# Patient Record
Sex: FEMALE | Race: WHITE | Employment: OTHER | URBAN - METROPOLITAN AREA
[De-identification: names, ages, dates, MRNs, and addresses within clinical notes are randomized per-mention and may not be internally consistent; named-entity substitution may affect disease eponyms.]

---

## 2017-01-06 RX ORDER — BACLOFEN 20 MG/1
TABLET ORAL
Qty: 40 TAB | Refills: 1 | Status: SHIPPED | OUTPATIENT
Start: 2017-01-06 | End: 2017-04-17 | Stop reason: SDUPTHER

## 2017-01-06 NOTE — TELEPHONE ENCOUNTER
She was in the office to see Surendra Ayala in October and has a follow up appt on the schedule with PCP on 2/27/17.  This was last filled in December for 40

## 2017-01-24 RX ORDER — BENAZEPRIL HYDROCHLORIDE 20 MG/1
TABLET ORAL
Qty: 90 TAB | Refills: 2 | Status: SHIPPED | OUTPATIENT
Start: 2017-01-24 | End: 2017-10-31 | Stop reason: SDUPTHER

## 2017-02-27 ENCOUNTER — OFFICE VISIT (OUTPATIENT)
Dept: FAMILY MEDICINE CLINIC | Age: 77
End: 2017-02-27

## 2017-02-27 VITALS
HEIGHT: 65 IN | WEIGHT: 153.9 LBS | DIASTOLIC BLOOD PRESSURE: 81 MMHG | OXYGEN SATURATION: 100 % | HEART RATE: 89 BPM | BODY MASS INDEX: 25.64 KG/M2 | SYSTOLIC BLOOD PRESSURE: 118 MMHG | TEMPERATURE: 98.5 F | RESPIRATION RATE: 20 BRPM

## 2017-02-27 DIAGNOSIS — M48.062 LUMBAR STENOSIS WITH NEUROGENIC CLAUDICATION: ICD-10-CM

## 2017-02-27 DIAGNOSIS — R53.82 CHRONIC FATIGUE: ICD-10-CM

## 2017-02-27 DIAGNOSIS — R73.9 HYPERGLYCEMIA: ICD-10-CM

## 2017-02-27 DIAGNOSIS — G25.2 FINE TREMOR: ICD-10-CM

## 2017-02-27 DIAGNOSIS — G25.81 RESTLESS LEGS SYNDROME (RLS): ICD-10-CM

## 2017-02-27 DIAGNOSIS — I10 ESSENTIAL HYPERTENSION, BENIGN: ICD-10-CM

## 2017-02-27 DIAGNOSIS — Z86.010 PERSONAL HISTORY OF COLONIC POLYPS: ICD-10-CM

## 2017-02-27 DIAGNOSIS — E78.5 DYSLIPIDEMIA: ICD-10-CM

## 2017-02-27 DIAGNOSIS — E55.9 VITAMIN D DEFICIENCY: ICD-10-CM

## 2017-02-27 DIAGNOSIS — T46.4X5A ACE-INHIBITOR COUGH: ICD-10-CM

## 2017-02-27 DIAGNOSIS — J45.20 MILD INTERMITTENT ASTHMA WITHOUT COMPLICATION: ICD-10-CM

## 2017-02-27 DIAGNOSIS — Z78.9 STATIN INTOLERANCE: ICD-10-CM

## 2017-02-27 DIAGNOSIS — Z85.828 HISTORY OF BASAL CELL CARCINOMA: ICD-10-CM

## 2017-02-27 DIAGNOSIS — J30.9 ALLERGIC RHINITIS, CAUSE UNSPECIFIED: ICD-10-CM

## 2017-02-27 DIAGNOSIS — E16.1 HYPERINSULINEMIA: ICD-10-CM

## 2017-02-27 DIAGNOSIS — J20.8 ACUTE BRONCHITIS DUE TO OTHER SPECIFIED ORGANISMS: Primary | ICD-10-CM

## 2017-02-27 DIAGNOSIS — R05.8 ACE-INHIBITOR COUGH: ICD-10-CM

## 2017-02-27 LAB
QUICKVUE INFLUENZA TEST: NEGATIVE
VALID INTERNAL CONTROL?: YES

## 2017-02-27 RX ORDER — MONTELUKAST SODIUM 10 MG/1
10 TABLET ORAL DAILY
Qty: 30 TAB | Refills: 11 | Status: SHIPPED | OUTPATIENT
Start: 2017-02-27 | End: 2018-12-19

## 2017-02-27 RX ORDER — AZITHROMYCIN 250 MG/1
TABLET, FILM COATED ORAL
Qty: 6 TAB | Refills: 0 | Status: SHIPPED | OUTPATIENT
Start: 2017-02-27 | End: 2017-03-04

## 2017-02-27 RX ORDER — ASPIRIN 81 MG/1
81 TABLET ORAL DAILY
Qty: 90 TAB | Refills: 3 | Status: SHIPPED | OUTPATIENT
Start: 2017-02-27 | End: 2017-10-13 | Stop reason: SINTOL

## 2017-02-27 NOTE — MR AVS SNAPSHOT
Visit Information Date & Time Provider Department Dept. Phone Encounter #  
 2/27/2017 10:00 AM Hung Baron DO [facility] [id number] Follow-up Instructions Return in about 1 month (around 3/27/2017) for bronchitis . Your Appointments 5/12/2017  1:40 PM  
Follow Up with Walt García MD  
[facility] [address]) Appt Note: Follow up $0CP tdb 11/3/16  
 [address] [place] [address], 
[address] [facility], [address] [phone]  
[address], [address] [facility], [address] [phone] Upcoming Health Maintenance Date Due  
 GLAUCOMA SCREENING Q2Y 5/31/2017* Pneumococcal 65+ Low/Medium Risk (2 of 2 - PPSV23) 6/21/2017* MEDICARE YEARLY EXAM 8/27/2017 BREAST CANCER SCRN MAMMOGRAM 9/12/2017 COLONOSCOPY 9/16/2020 DTaP/Tdap/Td series (2 - Td) 8/26/2026 *Topic was postponed. The date shown is not the original due date. Allergies as of 2/27/2017  Review Complete On: 2/27/2017 By: Hung Baron DO Severity Noted Reaction Type Reactions Other Food  10/03/2016    Itching IF EATS IN QUANTITY OR ACROSS A FEW DAYS Bee Sting [Sting, Bee] High 09/10/2010    Swelling Pcn [Penicillins] High 10/20/2009    Hives IN CHILDHOOD [place] High 10/03/2016    Itching Atarax [Hydroxyzine Hcl]  10/20/2009    Other (comments)  
 jittery Buspar [Buspirone]  10/20/2009    Nausea Only Crestor [Rosuvastatin]  09/10/2010    Myalgia Body cramps Epinephrine  10/20/2009    Palpitations Fig  10/03/2016    Itching Hydrochlorothiazide (Bulk)  10/20/2009    Myalgia  
 sorethroat Norco [Hydrocodone-acetaminophen]  02/27/2017    Nausea and Vomiting 5/325 MG Peach (Prunus Persica)  10/03/2016    Itching Percocet [Oxycodone-acetaminophen]  10/20/2009    Other (comments) \"hellish nightmares\" Shellfish Derived  10/03/2016    Itching Zestril [Lisinopril]  10/20/2009    Cough Zostavax [Zoster Vaccine Live (Pf)]  09/10/2010    Swelling Severe Right arm swelling with redness after administered by pharmacist in elbow Current Immunizations  Reviewed on 2/27/2017 Name Date Influenza High Dose Vaccine PF 10/7/2016, 9/2/2015, 9/20/2014, 10/29/2013 Pneumococcal Vaccine (Unspecified Type) 10/22/2004, 9/17/1997 TD Vaccine 9/7/2004 Zoster 1/21/2009 Reviewed by Christi Najera DO on 2/27/2017 at 10:42 AM  
You Were Diagnosed With   
  
 Codes Comments Acute bronchitis due to other specified organisms    -  Primary ICD-10-CM: J20.8 ICD-9-CM: 466.0 Mild intermittent asthma without complication     S-48-OZ: J45.20 ICD-9-CM: 493.90 Essential hypertension, benign     ICD-10-CM: I10 
ICD-9-CM: 061. 1 Chronic fatigue     ICD-10-CM: R53.82 
ICD-9-CM: 780.79 Dyslipidemia     ICD-10-CM: E78.5 ICD-9-CM: 272.4 Hyperglycemia     ICD-10-CM: R73.9 ICD-9-CM: 790.29 Allergic rhinitis, cause unspecified     ICD-10-CM: J30.9 ICD-9-CM: 477.9 Restless legs syndrome (RLS)     ICD-10-CM: G25.81 ICD-9-CM: 333.94 improving on Requip History of basal cell carcinoma     ICD-10-CM: R28.629 ICD-9-CM: V10.83 face without recurrence Vitamin D deficiency     ICD-10-CM: E55.9 ICD-9-CM: 268.9 Statin intolerance     ICD-10-CM: Z78.9 ICD-9-CM: 995.27 Hyperinsulinemia     ICD-10-CM: E16.1 ICD-9-CM: 251.1 Lumbar stenosis with neurogenic claudication     ICD-10-CM: M48.06 
ICD-9-CM: 724.03 Fine tremor     ICD-10-CM: G25.2 ICD-9-CM: 781.0 BL hands Personal history of colonic polyps     ICD-10-CM: Z86.010 
ICD-9-CM: V12.72   
 ACE-inhibitor cough     ICD-10-CM: R05, T46.4X5A 
ICD-9-CM: 786.2, E942.6 Vitals BP  
  
  
  
  
  
 118/81 (BP 1 Location: Left arm, BP Patient Position: Sitting) Vitals History BMI and BSA Data Body Mass Index Body Surface Area  
 25.61 kg/m 2 1.79 m 2 Preferred Pharmacy Pharmacy Name Phone [facility], [address] [phone] Your Updated Medication List  
  
   
This list is accurate as of: 2/27/17 11:12 AM.  Always use your most recent med list.  
  
  
  
  
 albuterol 90 mcg/actuation inhaler Commonly known as:  PROVENTIL HFA, VENTOLIN HFA, PROAIR HFA Take 2 Puffs by inhalation every four (4) hours as needed for Wheezing or Shortness of Breath. Indications: BRONCHOSPASM PREVENTION  
  
 amLODIPine 5 mg tablet Commonly known as:  Jus Costa Take 1 Tab by mouth daily. Indications: HYPERTENSION  
  
 ascorbic acid (vitamin C) 500 mg tablet Commonly known as:  VITAMIN C Take  by mouth. aspirin delayed-release 81 mg tablet Take 1 Tab by mouth daily. Indications: prevention of transient ischemic attack  
  
 azithromycin 250 mg tablet Commonly known as:  Bennetta Plum Take 2 tablets today, then take 1 tablet daily  
  
 baclofen 20 mg tablet Commonly known as:  LIORESAL  
take 1 tablet by mouth three times a day if needed for muscle spasm OF SPINAL ORIGIN  
  
 benazepril 20 mg tablet Commonly known as:  LOTENSIN  
take 1 tablet by mouth once daily  
  
 budesonide-formoterol 160-4.5 mcg/actuation HFA inhaler Commonly known as:  SYMBICORT Take 2 Puffs by inhalation two (2) times a day. Indications: MAINTENANCE THERAPY FOR ASTHMA CENTRUM SILVER PO Take  by mouth. cholecalciferol 1,000 unit tablet Commonly known as:  VITAMIN D3 Take  by mouth daily. FLUTICASONE NA  
2 Sprays by Nasal route daily as needed. GLUCOSAMINE-CHONDROITIN PO Take  by mouth two (2) times a day. magnesium 250 mg Tab Take  by mouth two (2) times a day. melatonin 1 mg tablet Take 2 mg by mouth nightly. meloxicam 7.5 mg tablet Commonly known as:  MOBIC  
 Take 7.5 mg by mouth two (2) times a day. Indications: OSTEOARTHRITIS  
  
 montelukast 10 mg tablet Commonly known as:  SINGULAIR Take 1 Tab by mouth daily. Indications: ALLERGIC RHINITIS, MAINTENANCE THERAPY FOR ASTHMA NEURONTIN 300 mg capsule Generic drug:  gabapentin Take 300 mg by mouth every evening. Dr. Miranda Rosado   Indications: ESSENTIAL TREMOR, Restless Legs Syndrome  
  
 omeprazole 40 mg capsule Commonly known as:  PRILOSEC  
daily. rOPINIRole 1 mg tablet Commonly known as:  Arelis Glaze Take 1.5 Tabs by mouth nightly. TYLENOL EXTRA STRENGTH 500 mg tablet Generic drug:  acetaminophen Take  by mouth every six (6) hours as needed for Pain. Prescriptions Sent to Pharmacy Refills  
 azithromycin (ZITHROMAX) 250 mg tablet 0 Sig: Take 2 tablets today, then take 1 tablet daily Class: Normal  
 [place] [id number] [address] [place] [address],[address], [place] [address] Ph #: [phone]  
 montelukast (SINGULAIR) 10 mg tablet 11 Sig: Take 1 Tab by mouth daily. Indications: ALLERGIC RHINITIS, MAINTENANCE THERAPY FOR ASTHMA Class: Normal  
 Pharmacy: [id number] [place] [address],[address], [place] [address] Ph #: [phone] Route: Oral  
 aspirin delayed-release 81 mg tablet 3 Sig: Take 1 Tab by mouth daily. Indications: prevention of transient ischemic attack Class: Normal  
 Pharmacy: [id number] [place] [address],[address] [place] [address] Ph #: [phone] Route: Oral  
  
We Performed the Following AMB POC RAPID INFLUENZA TEST [19577 CPT(R)] CBC W/O DIFF [56287 CPT(R)] HEMOGLOBIN A1C WITH EAG [13408 CPT(R)] LIPID PANEL [51350 CPT(R)] METABOLIC PANEL, COMPREHENSIVE [65163 CPT(R)] MICROALBUMIN, UR, RAND W/ MICROALBUMIN/CREA RATIO [id number] CPT(R)] TSH 3RD GENERATION [82669 CPT(R)] URINALYSIS W/ RFLX MICROSCOPIC [25867 CPT(R)] VITAMIN D, 25 HYDROXY [id number] CPT(R)] Follow-up Instructions Return in about 1 month (around 3/27/2017) for bronchitis . Patient Instructions USE ALBUTEROL INHALER AS NEEDED. IF YOU NEED IT MORE THAN TWICE PER WEEK, RESTART SYMBICORT DAILY. START TAKING SINGULAIR 10 MG AT BEDTIME FOR ALLERGIES AND ASTHMA. Asthma Action Plan: After Your Visit Your Care Instructions An asthma action plan is based on peak flow and asthma symptoms. Sorting symptoms and peak flow into red, yellow, and green \"zones\" can help you know how bad your asthma is and what actions you should take. Work with your doctor to make your plan. An action plan may include: · The peak flow readings and symptoms for each zone. · What medicines to take in each zone. · When to call a doctor. · A list of emergency contact numbers. · A list of your asthma triggers. Follow-up care is a key part of your treatment and safety. Be sure to make and go to all appointments, and call your doctor if you are having problems. It's also a good idea to know your test results and keep a list of the medicines you take. How can you care for yourself at home? · Take your daily medicines to help minimize long-term damage and avoid asthma attacks. · Check your peak flow every morning and evening. This is the best way to know how well your lungs are working. · Check your action plan to see what zone you are in. 
¨ If you are in the green zone, keep taking your daily asthma medicines as prescribed. ¨ If you are in the yellow zone, you may be having or will soon have an asthma attack. You may not have any symptoms, but your lungs are not working as well as they should. Take the medicines listed in your action plan. If you stay in the yellow zone, your doctor may need to increase the dose or add a medicine. ¨ If you are in the red zone, follow your action plan.  If your symptoms or peak flow don't improve soon, you may need to go to the emergency room or be admitted to the hospital. 
 · Use an asthma diary. Write down your peak flow readings in the asthma diary. If you have an attack, write down what caused it (if you know), the symptoms, and what medicine you took. · Make sure you know how and when to call your doctor or go to the hospital. 
· Take both the asthma action plan and the asthma diaryalong with your peak flow meter and medicineswhen you see your doctor. Tell your doctor if you are having trouble following your action plan. When should you call for help? Call 911 anytime you think you may need emergency care. For example, call if: 
· You have severe trouble breathing. Call your doctor now or seek immediate medical care if: 
· Your symptoms do not get better after you have followed your asthma action plan. · You cough up yellow, dark brown, or bloody mucus (sputum). Watch closely for changes in your health, and be sure to contact your doctor if: 
· Your coughing and wheezing get worse. · You need to use quick-relief medicine on more than 2 days a week (unless it is just for exercise). · You need help figuring out what is triggering your asthma attacks. Where can you learn more? Go to [employer] Enter [phone] in the search box to learn more about \"Asthma Action Plan: After Your Visit. \"  
© 7334-1852 Healthwise, Incorporated. Care instructions adapted under license by [employer] (which disclaims liability or warranty for this information). This care instruction is for use with your licensed healthcare professional. If you have questions about a medical condition or this instruction, always ask your healthcare professional. [place] [address] any warranty or liability for your use of this information. Content Version: 28.3.054738; Last Revised: March 9, 2012 Advised protocol for clearing congestion:  Increase fluid intake, especially water to thin mucous and boost the immune system.   Avoid sugar and dairy while congested since they thicken mucous. Get plenty of rest!  Gargle 3 times daily and as needed in Listerine or warm salt water vinegar solutions (1 tsp salt, 1 tsp vinegar in 1 cup lukewarm water.)  Use OTC nasal saline spray up each nostril twice daily. Use humidifier at bedtime. Use OTC Mucinex 600 mg twice daily to loosen mucous. Use OTC Tylenol Arthritis or Ibuprofen up to 800 mg up to 3 times daily as needed for pain, fever or headaches. Avoid decongestants and Ibuprofen if you have high blood pressure! If mucous is consistently discolored yellow or green throughout the day for more than a week, call the doctor for an evaluation. Asthma Action Plan: After Your Visit Your Care Instructions An asthma action plan is based on peak flow and asthma symptoms. Sorting symptoms and peak flow into red, yellow, and green \"zones\" can help you know how bad your asthma is and what actions you should take. Work with your doctor to make your plan. An action plan may include: · The peak flow readings and symptoms for each zone. · What medicines to take in each zone. · When to call a doctor. · A list of emergency contact numbers. · A list of your asthma triggers. Follow-up care is a key part of your treatment and safety. Be sure to make and go to all appointments, and call your doctor if you are having problems. It's also a good idea to know your test results and keep a list of the medicines you take. How can you care for yourself at home? · Take your daily medicines to help minimize long-term damage and avoid asthma attacks. · Check your peak flow every morning and evening. This is the best way to know how well your lungs are working. · Check your action plan to see what zone you are in. 
¨ If you are in the green zone, keep taking your daily asthma medicines as prescribed.  
¨ If you are in the yellow zone, you may be having or will soon have an asthma attack. You may not have any symptoms, but your lungs are not working as well as they should. Take the medicines listed in your action plan. If you stay in the yellow zone, your doctor may need to increase the dose or add a medicine. ¨ If you are in the red zone, follow your action plan. If your symptoms or peak flow don't improve soon, you may need to go to the emergency room or be admitted to the hospital. 
· Use an asthma diary. Write down your peak flow readings in the asthma diary. If you have an attack, write down what caused it (if you know), the symptoms, and what medicine you took. · Make sure you know how and when to call your doctor or go to the hospital. 
· Take both the asthma action plan and the asthma diaryalong with your peak flow meter and medicineswhen you see your doctor. Tell your doctor if you are having trouble following your action plan. When should you call for help? Call 911 anytime you think you may need emergency care. For example, call if: 
· You have severe trouble breathing. Call your doctor now or seek immediate medical care if: 
· Your symptoms do not get better after you have followed your asthma action plan. · You cough up yellow, dark brown, or bloody mucus (sputum). Watch closely for changes in your health, and be sure to contact your doctor if: 
· Your coughing and wheezing get worse. · You need to use quick-relief medicine on more than 2 days a week (unless it is just for exercise). · You need help figuring out what is triggering your asthma attacks. Where can you learn more? Go to [employer]be Enter [phone] in the search box to learn more about \"Asthma Action Plan: After Your Visit. \"  
© 7734-1419 Healthwise, Incorporated. Care instructions adapted under license by [employer]vel[employer] (which disclaims liability or warranty for this information).  This care instruction is for use with your licensed healthcare professional. If you have questions about a medical condition or this instruction, always ask your healthcare professional. [address] any warranty or liability for your use of this information. Content Version: 29.4.743547; Last Revised: March 9, 2012 Introducing [facility]! [employer] introduces [employer] patient portal. Now you can access parts of your medical record, email your doctor's office, and request medication refills online. 1. In your internet browser, go to [email or web address] 2. Click on the First Time User? Click Here link in the Sign In box. You will see the New Member Sign Up page. 3. Enter your [employer] Access Code exactly as it appears below. You will not need to use this code after youve completed the sign-up process. If you do not sign up before the expiration date, you must request a new code. · [employer] Access Code: [email or web address] Expires: 5/28/2017 11:10 AM 
 
4. Enter the last four digits of your Social Security Number (xxxx) and Date of Birth (mm/dd/yyyy) as indicated and click Submit. You will be taken to the next sign-up page. 5. Create a [employer] ID. This will be your [employer] login ID and cannot be changed, so think of one that is secure and easy to remember. 6. Create a [employer] password. You can change your password at any time. 7. Enter your Password Reset Question and Answer. This can be used at a later time if you forget your password. 8. Enter your e-mail address. You will receive e-mail notification when new information is available in [address] 9. Click Sign Up. You can now view and download portions of your medical record. 10. Click the Download Summary menu link to download a portable copy of your medical information. If you have questions, please visit the Frequently Asked Questions section of the [employer] website.  Remember, [employer] is NOT to be used for urgent needs. For medical emergencies, dial 911. Now available from your iPhone and Android! Please provide this summary of care documentation to your next provider. Your primary care clinician is listed as Isabel Serrato. If you have any questions after today's visit, please call [phone].

## 2017-02-27 NOTE — PATIENT INSTRUCTIONS
USE ALBUTEROL INHALER AS NEEDED. IF YOU NEED IT MORE THAN TWICE PER WEEK, RESTART SYMBICORT DAILY. START TAKING SINGULAIR 10 MG AT BEDTIME FOR ALLERGIES AND ASTHMA. Asthma Action Plan: After Your Visit  Your Care Instructions  An asthma action plan is based on peak flow and asthma symptoms. Sorting symptoms and peak flow into red, yellow, and green \"zones\" can help you know how bad your asthma is and what actions you should take. Work with your doctor to make your plan. An action plan may include:  · The peak flow readings and symptoms for each zone. · What medicines to take in each zone. · When to call a doctor. · A list of emergency contact numbers. · A list of your asthma triggers. Follow-up care is a key part of your treatment and safety. Be sure to make and go to all appointments, and call your doctor if you are having problems. It's also a good idea to know your test results and keep a list of the medicines you take. How can you care for yourself at home? · Take your daily medicines to help minimize long-term damage and avoid asthma attacks. · Check your peak flow every morning and evening. This is the best way to know how well your lungs are working. · Check your action plan to see what zone you are in.  ¨ If you are in the green zone, keep taking your daily asthma medicines as prescribed. ¨ If you are in the yellow zone, you may be having or will soon have an asthma attack. You may not have any symptoms, but your lungs are not working as well as they should. Take the medicines listed in your action plan. If you stay in the yellow zone, your doctor may need to increase the dose or add a medicine. ¨ If you are in the red zone, follow your action plan. If your symptoms or peak flow don't improve soon, you may need to go to the emergency room or be admitted to the hospital.  · Use an asthma diary. Write down your peak flow readings in the asthma diary.  If you have an attack, write down what caused it (if you know), the symptoms, and what medicine you took. · Make sure you know how and when to call your doctor or go to the hospital.  · Take both the asthma action plan and the asthma diaryalong with your peak flow meter and medicineswhen you see your doctor. Tell your doctor if you are having trouble following your action plan. When should you call for help? Call 911 anytime you think you may need emergency care. For example, call if:  · You have severe trouble breathing. Call your doctor now or seek immediate medical care if:  · Your symptoms do not get better after you have followed your asthma action plan. · You cough up yellow, dark brown, or bloody mucus (sputum). Watch closely for changes in your health, and be sure to contact your doctor if:  · Your coughing and wheezing get worse. · You need to use quick-relief medicine on more than 2 days a week (unless it is just for exercise). · You need help figuring out what is triggering your asthma attacks. Where can you learn more? Go to [employer]  Enter B511 in the search box to learn more about \"Asthma Action Plan: After Your Visit. \"   © 0433-3765 [employer]. Care instructions adapted under license by [employer] (which disclaims liability or warranty for this information). This care instruction is for use with your licensed healthcare professional. If you have questions about a medical condition or this instruction, always ask your healthcare professional. [place] [address] any warranty or liability for your use of this information. Content Version: 84.8.640339; Last Revised: March 9, 2012           Advised protocol for clearing congestion:  Increase fluid intake, especially water to thin mucous and boost the immune system. Avoid sugar and dairy while congested since they thicken mucous.   Get plenty of rest!  Gargle 3 times daily and as needed in Listerine or warm salt water vinegar solutions (1 tsp salt, 1 tsp vinegar in 1 cup lukewarm water.)  Use OTC nasal saline spray up each nostril twice daily. Use humidifier at bedtime. Use OTC Mucinex 600 mg twice daily to loosen mucous. Use OTC Tylenol Arthritis or Ibuprofen up to 800 mg up to 3 times daily as needed for pain, fever or headaches. Avoid decongestants and Ibuprofen if you have high blood pressure! If mucous is consistently discolored yellow or green throughout the day for more than a week, call the doctor for an evaluation. Asthma Action Plan: After Your Visit  Your Care Instructions  An asthma action plan is based on peak flow and asthma symptoms. Sorting symptoms and peak flow into red, yellow, and green \"zones\" can help you know how bad your asthma is and what actions you should take. Work with your doctor to make your plan. An action plan may include:  · The peak flow readings and symptoms for each zone. · What medicines to take in each zone. · When to call a doctor. · A list of emergency contact numbers. · A list of your asthma triggers. Follow-up care is a key part of your treatment and safety. Be sure to make and go to all appointments, and call your doctor if you are having problems. It's also a good idea to know your test results and keep a list of the medicines you take. How can you care for yourself at home? · Take your daily medicines to help minimize long-term damage and avoid asthma attacks. · Check your peak flow every morning and evening. This is the best way to know how well your lungs are working. · Check your action plan to see what zone you are in.  ¨ If you are in the green zone, keep taking your daily asthma medicines as prescribed. ¨ If you are in the yellow zone, you may be having or will soon have an asthma attack. You may not have any symptoms, but your lungs are not working as well as they should. Take the medicines listed in your action plan.  If you stay in the yellow zone, your doctor may need to increase the dose or add a medicine. ¨ If you are in the red zone, follow your action plan. If your symptoms or peak flow don't improve soon, you may need to go to the emergency room or be admitted to the hospital.  · Use an asthma diary. Write down your peak flow readings in the asthma diary. If you have an attack, write down what caused it (if you know), the symptoms, and what medicine you took. · Make sure you know how and when to call your doctor or go to the hospital.  · Take both the asthma action plan and the asthma diaryalong with your peak flow meter and medicineswhen you see your doctor. Tell your doctor if you are having trouble following your action plan. When should you call for help? Call 911 anytime you think you may need emergency care. For example, call if:  · You have severe trouble breathing. Call your doctor now or seek immediate medical care if:  · Your symptoms do not get better after you have followed your asthma action plan. · You cough up yellow, dark brown, or bloody mucus (sputum). Watch closely for changes in your health, and be sure to contact your doctor if:  · Your coughing and wheezing get worse. · You need to use quick-relief medicine on more than 2 days a week (unless it is just for exercise). · You need help figuring out what is triggering your asthma attacks. Where can you learn more? Go to [employer]  Enter B511 in the search box to learn more about \"Asthma Action Plan: After Your Visit. \"   © 7787-8955 [employer]. Care instructions adapted under license by [employer] (which disclaims liability or warranty for this information).  This care instruction is for use with your licensed healthcare professional. If you have questions about a medical condition or this instruction, always ask your healthcare professional. [address] any warranty or liability for your use of this information. Content Version: 96.3.328667;  Last Revised: March 9, 2012

## 2017-02-27 NOTE — PROGRESS NOTES
HISTORY OF PRESENT ILLNESS  Marsha Mack is a 68 y.o. female presents with Blood Pressure Check; Cold Symptoms; Referral Follow Up; and Labs    Agree with nurse note. Pt with hx of asthma. She complains of nasal congestion with yellow mucus, itchy/watery eyes, and wheezing x3 days. She felt nauseous this morning and has not been eating a lot. She has stayed in bed for the last 3 days and has not used her inhalers. Denies fever, HAs, facial pain, chest pain, chest tightness, or other associated sxs. Hypertensive, hyperglycemic pt with ACE-inhibitor intolerance, hyperinsulinemia, dyslipidemia, and hx of Vitamin D deficiency presents to the office with a BP of 118/81. For BP, she takes Norvasc 5 mg daily and Lotensin 20 mg daily, tolerating well. She weighs 153 lbs, lost 3 lbs since last ov. On 10/7/16, Hgb A1C was 5.9. On 8/26/16, LDL was 151. Triglycerides were 138. In 02/2016, Vitamin D was 41.1. Pt with RLS and fine tremor is followed by neurologist, Dr. Peng Osorio, whom she last saw on 11/3/16. She increased Requip to 1.5 mg qhs and Melatonin to 5 mg qhs. She wanted her to continue on Neurontin 300 mg qhs. F/U 6 months. Pt saw ENT, Dr. Lucero Moss on 2/10/17. Mild to moderate sensorineural hearing loss with slight asymmetry, R>L. She removed cerumen from the R. F/U 3 months and repeat audio screening. Pt has an appointment in 03/2017 with ophthalmologist, Dr. Dallas Wharton. Pt is s/p lumbar laminectomy from L3-S1 on 10/18/16 performed by orthopedist, Dr. Jonah Womack. She takes Baclofen 20 mg nightly to relax her back. She takes Mobic 7.5 mg.     Health Maintenance    Pt's most recent colonoscopy with polypectomy was on 09/16/15 with GI, Dr. Nunu Rajput. Per pt, f/u 5 years. Pt with hx of basal cell carcinoma is followed by dermatologist, Dr. Yakov Noguera. No recurrence.      Pt's most recent mammogram was on 9/12/16; normal.     Written by allen Dow, as dictated by Dr. Kristofer Pablo DO.    ANAHI    Review of Systems negative except as noted above in HPI. ALLERGIES:    Allergies   Allergen Reactions    Other Food Itching     IF EATS IN QUANTITY OR ACROSS A FEW DAYS    Bee Sting [Sting, Bee] Swelling    Pcn [Penicillins] Hives     IN CHILDHOOD    Strawberry Itching    Atarax [Hydroxyzine Hcl] Other (comments)     jittery    Buspar [Buspirone] Nausea Only    Crestor [Rosuvastatin] Myalgia     Body cramps    Epinephrine Palpitations    Fig Itching    Hydrochlorothiazide (Bulk) Myalgia     sorethroat    Norco [Hydrocodone-Acetaminophen] Nausea and Vomiting     5/325 MG    Peach (Prunus Persica) Itching    Percocet [Oxycodone-Acetaminophen] Other (comments)     \"hellish nightmares\"    Shellfish Derived Itching    Zestril [Lisinopril] Cough    Zostavax [Zoster Vaccine Live (Pf)] Swelling     Severe Right arm swelling with redness after administered by pharmacist in elbow       CURRENT MEDICATIONS:    Outpatient Prescriptions Marked as Taking for the 2/27/17 encounter (Office Visit) with Stephan Mcconnell,    Medication Sig Dispense Refill    azithromycin (ZITHROMAX) 250 mg tablet Take 2 tablets today, then take 1 tablet daily 6 Tab 0    montelukast (SINGULAIR) 10 mg tablet Take 1 Tab by mouth daily. Indications: ALLERGIC RHINITIS, MAINTENANCE THERAPY FOR ASTHMA 30 Tab 11    aspirin delayed-release 81 mg tablet Take 1 Tab by mouth daily. Indications: prevention of transient ischemic attack 90 Tab 3    benazepril (LOTENSIN) 20 mg tablet take 1 tablet by mouth once daily 90 Tab 2    baclofen (LIORESAL) 20 mg tablet take 1 tablet by mouth three times a day if needed for muscle spasm OF SPINAL ORIGIN 40 Tab 1    rOPINIRole (REQUIP) 1 mg tablet Take 1.5 Tabs by mouth nightly. 45 Tab 5    FLUTICASONE PROPIONATE (FLUTICASONE NA) 2 Sprays by Nasal route daily as needed.  magnesium 250 mg tab Take  by mouth two (2) times a day.       GLUCOSAMINE/CHONDROITIN SULF A (GLUCOSAMINE-CHONDROITIN PO) Take  by mouth two (2) times a day.  melatonin 1 mg tablet Take 2 mg by mouth nightly.  amLODIPine (NORVASC) 5 mg tablet Take 1 Tab by mouth daily. Indications: HYPERTENSION (Patient taking differently: Take 5 mg by mouth nightly. Indications: HYPERTENSION) 30 Tab 5    cholecalciferol (VITAMIN D3) 1,000 unit tablet Take  by mouth daily.  ascorbic acid (VITAMIN C) 500 mg tablet Take  by mouth.  meloxicam (MOBIC) 7.5 mg tablet Take 7.5 mg by mouth two (2) times a day. Indications: OSTEOARTHRITIS  0    FOLIC ACID/MULTIVIT-MIN/LUTEIN (CENTRUM SILVER PO) Take  by mouth.  omeprazole (PRILOSEC) 40 mg capsule daily. 0    acetaminophen (TYLENOL EXTRA STRENGTH) 500 mg tablet Take  by mouth every six (6) hours as needed for Pain.  gabapentin (NEURONTIN) 300 mg capsule Take 300 mg by mouth every evening. Dr. Ngozi Price   Indications: ESSENTIAL TREMOR, Restless Legs Syndrome      albuterol (PROVENTIL HFA, VENTOLIN HFA) 90 mcg/actuation inhaler Take 2 Puffs by inhalation every four (4) hours as needed for Wheezing or Shortness of Breath. Indications: BRONCHOSPASM PREVENTION 1 Inhaler 5       PAST MEDICAL HISTORY:    Past Medical History:   Diagnosis Date    AC (acromioclavicular) joint bone spurs 01/2014    in back. Dr. Jacquelyn Varela. Txd with shots x 3    Actinic keratosis 2015    Dr. Merelne Villegas. Dr. Courtney Zamora.  Allergic rhinitis, cause unspecified     Anxiety state, unspecified     Arthritis     R KNEE BONE-ON-BONE; R HAND-----OSTEO    Asthma     BRONCHITIS IN SPRING & FALL MOST YEARS    BCC (basal cell carcinoma), face 1980s (nose), 01/28/15 (forehead)    Dr. Mercedez Kerr. Dr. Sherine Waldrop.  Cataract     RIGHT    Chronic insomnia     Chronic low back pain 01/2014    Dr. Cody Lai. DJD and spurs, Narrowing of L 3,4,5. Dr. Jacquelyn Varela.       Chronic obstructive pulmonary disease (HCC)     CHRONIC (TWICE-YEARLY) BRONCHITIS    Colon polyps 11/2005, 01/29/09    benign. Dr. Ta Mclean    Constipation     Degenerative lumbar spinal stenosis 01/2014    Dr. [facility]  Narrowing of L 3,4,5    Dyslipidemia     Dysphagia 09/2014    due to Esophagitis. Dr. Nena Valero.  Essential hypertension, benign     Foot fracture, left 2009    stress.  Foot pain, bilateral 11/13/2013    Dr. Regina Barnett.  GERD (gastroesophageal reflux disease) 09/29/2014    ESOPHAGITIS    Knee pain, bilateral 10/28/13    Dr. Ha Contreras. Right > Left. Severe OA. Txd with PT.    Lumbar stenosis with neurogenic claudication     Menopause 1976    Migraine     NONE SINCE AGE 37    Mixed stress and urge urinary incontinence     NO LEAKAGE; GETS UP SEVERAL TIMES A NIGHT, PT STATES ON 10/3/16    OA (osteoarthritis) of knee     Dr. Ha Contreras    Osteopenia 08/24/2015    Restless legs syndrome (RLS) 09/2015    Dr. Carrillo Riggs.  Shoulder joint dislocation 06/2011    Right. due to fall. Dr. Cintia Iglesias Sleep apnea     undiagnosed    Syncope 06/25/11    due to fall down 5 steps. Right occipital head trauma.  Tremor of both hands 09/2015    Dr. Stew Bro GABAPENTIN       PAST SURGICAL HISTORY:    Past Surgical History:   Procedure Laterality Date    HX CATARACT REMOVAL Left 2007    Dr. Stefani Beavers  09/16/2015    Dr. Ta Mclean. due q 5 yrs    HX COLONOSCOPY  11/2005, 01/29/09    with polypectomy. Dr. Ta Mclean    HX GI  09/2014    due to dysphagia. Dr. Nena Valero. ESOPHAGEL DILATATION    HX GYN  2009? A&P REPAIR    HX LUMBAR LAMINECTOMY  10/18/2016    L3-S1 Dr. Wilder Ek  01/28/15    [address] [place], [place] [address]. L Forehead. MOHs SURGERY. Dr. Oneida Fox.  HX RAIMUNDO AND BSO  1976    due to fibroids    HX TONSILLECTOMY  1948    VT COMBINED ANT/POST COLPORRHAPHY  02/28/05    with enterocele RE.    Bruce and Dr. Blake Goins:    Family History   Problem Relation Age of Onset    Hypertension Mother     Dementia Mother      alzheimers    Thyroid Disease Mother     Hypertension Father     High Cholesterol Father     Kidney Disease Father      1 kidney    Emphysema Father      O2 requiring    Hypertension Sister     Diabetes Maternal Grandmother     Dementia Maternal Grandmother     Thyroid Disease Maternal Grandmother     Thyroid Disease Daughter      goiter    Heart Attack Maternal Uncle 51     massive    Heart Attack Maternal Uncle 39     massive    Heart Attack Maternal Aunt 51     massive    Thyroid Disease Daughter      thyroid cyst    Anesth Problems Neg Hx        SOCIAL HISTORY:    Social History     Social History    Marital status:      Spouse name: N/A    Number of children: N/A    Years of education: N/A     Social History Main Topics    Smoking status: Never Smoker    Smokeless tobacco: Never Used    Alcohol use No    Drug use: No    Sexual activity: Yes     Partners: Male     Birth control/ protection: Surgical     Other Topics Concern    None     Social History Narrative       IMMUNIZATIONS:    Immunization History   Administered Date(s) Administered    Influenza High Dose Vaccine PF 10/29/2013, 09/20/2014, 09/02/2015, 10/07/2016    Pneumococcal Vaccine (Unspecified Type) 09/17/1997, 10/22/2004    TD Vaccine 09/07/2004    Zoster 01/21/2009         PHYSICAL EXAMINATION    Vital Signs    Visit Vitals    /81 (BP 1 Location: Left arm, BP Patient Position: Sitting)    Pulse 89    Temp 98.5 °F (36.9 °C) (Oral)    Resp 20    Ht 5' 5\" (1.651 m)    Wt 153 lb 14.4 oz (69.8 kg)    SpO2 100%    BMI 25.61 kg/m2       Weight Metrics 2/27/2017 11/3/2016 10/20/2016 10/18/2016 10/7/2016 10/3/2016 8/26/2016   Weight 153 lb 14.4 oz 158 lb 140 lb 153 lb 156 lb 153 lb 153 lb 11.2 oz   BMI 25.61 kg/m2 26.29 kg/m2 23.3 kg/m2 27.1 kg/m2 27.63 kg/m2 27.1 kg/m2 27.23 kg/m2       General appearance - Well nourished. Well appearing. Well developed. No acute distress. Overweight. Head - Normocephalic. Atraumatic. Non tender sinuses x 4. Eyes - pupils equal and reactive. Extraocular eye movements intact. Sclera anicteric. Mildly injected sclera. Ears - Hearing is grossly normal bilaterally. Nose - normal and patent. No polyps noted. No erythema. No discharge. Frequent nose blowing. Mouth - mucous membranes with adequate moisture. Posterior pharynx normal with cobblestone appearance. No erythema, white exudate or obstruction. Neck - supple. Midline trachea. No carotid bruits noted bilaterally. No thyromegaly noted. Chest - clear to auscultation bilaterally anteriorly and posteriorly. No wheezes. No rales or rhonchi. Breath sounds are symmetrical bilaterally. Unlabored respirations. Frequent moist, tight cough. Heart - normal rate. Regular rhythm. Normal S1, S2. No murmur noted. No rubs, clicks or gallops noted. Abdomen - soft and distended. No masses or organomegaly. No rebound, rigidity or guarding. Bowel sounds normal x 4 quadrants. No tenderness noted. Neurological - awake, alert and oriented to person, place, and time and event. Cranial nerves II through XII intact. Clear speech. Muscle strength is +5/5 x 4 extremities. Sensation is intact to light touch bilaterally. Steady gait. Heme/Lymph - peripheral pulses normal x 4 extremities. No peripheral edema is noted. Musculoskeletal - Intact x 4 extremities. Full ROM x 4 extremities. No pain with movement. Back exam - normal range of motion. No pain on palpation of the spinous processes in the cervical, thoracic, lumbar, sacral regions. No CVA tenderness. Skin - no rashes, erythema, ecchymosis, lacerations, abrasions, suspicious moles noted  Psychological -   normal behavior, dress and thought processes. Good insight. Good eye contact. Normal affect. Appropriate mood. Normal speech. DATA REVIEWED    Lab Results   Component Value Date/Time    WBC 5.9 10/07/2016 08:57 AM    HGB 13.0 10/07/2016 08:57 AM    HCT 39.9 10/07/2016 08:57 AM    PLATELET 318 70/47/5451 08:57 AM    MCV 90 10/07/2016 08:57 AM     Lab Results   Component Value Date/Time    Sodium 139 10/07/2016 08:57 AM    Potassium 4.8 10/07/2016 08:57 AM    Chloride 98 10/07/2016 08:57 AM    CO2 27 10/07/2016 08:57 AM    Anion gap 7 07/10/2014 02:13 PM    Glucose 112 10/07/2016 08:57 AM    BUN 23 10/07/2016 08:57 AM    Creatinine 0.91 10/07/2016 08:57 AM    BUN/Creatinine ratio 25 10/07/2016 08:57 AM    GFR est AA 71 10/07/2016 08:57 AM    GFR est non-AA 61 10/07/2016 08:57 AM    Calcium 9.3 10/07/2016 08:57 AM    Bilirubin, total 0.4 10/07/2016 08:57 AM    AST (SGOT) 18 10/07/2016 08:57 AM    Alk. phosphatase 67 10/07/2016 08:57 AM    Protein, total 6.8 10/07/2016 08:57 AM    Albumin 4.5 10/07/2016 08:57 AM    Globulin 3.4 07/10/2014 02:13 PM    A-G Ratio 2.0 10/07/2016 08:57 AM    ALT (SGPT) 15 10/07/2016 08:57 AM     Lab Results   Component Value Date/Time    Cholesterol, total 240 08/26/2016 10:52 AM    HDL Cholesterol 61 08/26/2016 10:52 AM    LDL, calculated 151 08/26/2016 10:52 AM    VLDL, calculated 28 08/26/2016 10:52 AM    Triglyceride 138 08/26/2016 10:52 AM    CHOL/HDL Ratio 3.9 09/10/2010 01:43 PM     Lab Results   Component Value Date/Time    Vitamin D 25-Hydroxy 23.7 09/28/2011 12:17 PM    VITAMIN D, 25-HYDROXY 41.1 02/26/2016 11:53 AM       Lab Results   Component Value Date/Time    Hemoglobin A1c 5.9 10/07/2016 08:57 AM     Lab Results   Component Value Date/Time    TSH 1.080 08/26/2016 10:52 AM    TSH 1.73 08/21/2014 09:00 AM       ASSESSMENT and PLAN      ICD-10-CM ICD-9-CM    1. Acute bronchitis due to other specified organisms J20.8 466.0 azithromycin (ZITHROMAX) 250 mg tablet   2.  Mild intermittent asthma without complication Z43.98 936.99 montelukast (SINGULAIR) 10 mg tablet 3. Essential hypertension, benign I10 401.1 LIPID PANEL      METABOLIC PANEL, COMPREHENSIVE      TSH 3RD GENERATION      VITAMIN D, 25 HYDROXY      MICROALBUMIN, UR, RAND W/ MICROALBUMIN/CREA RATIO      URINALYSIS W/ RFLX MICROSCOPIC      aspirin delayed-release 81 mg tablet   4. Chronic fatigue R53.82 780.79 AMB POC RAPID INFLUENZA TEST      METABOLIC PANEL, COMPREHENSIVE      TSH 3RD GENERATION      URINALYSIS W/ RFLX MICROSCOPIC      CBC W/O DIFF   5. Dyslipidemia E78.5 272.4 LIPID PANEL      METABOLIC PANEL, COMPREHENSIVE   6. Hyperglycemia C81.8 301.20 METABOLIC PANEL, COMPREHENSIVE      HEMOGLOBIN A1C WITH EAG   7. Allergic rhinitis, cause unspecified J30.9 477.9 montelukast (SINGULAIR) 10 mg tablet   8. Restless legs syndrome (RLS) G25.81 333.94     improving on Requip   9. History of basal cell carcinoma Z85.828 V10.83     face without recurrence   10. Vitamin D deficiency E55.9 268.9 VITAMIN D, 25 HYDROXY   11. Statin intolerance Z78.9 995.27    12. Hyperinsulinemia E16.1 251.1 HEMOGLOBIN A1C WITH EAG   13. Lumbar stenosis with neurogenic claudication M48.06 724.03    14. Fine tremor G25.2 781.0     BL hands   15. Personal history of colonic polyps Z86.010 V12.72    16. ACE-inhibitor cough R05 786.2     T46.4X5A E942.6      Discussed the patient's BMI with her. The BMI follow up plan is as follows: I have counseled this patient on diet and exercise regimens. Addressed weight, diet and exercise with patient. Increase water intake and rest. Avoid sugar and dairy to thin mucus. Chart reviewed and updated. Continue current medications and care. Take Z-otto and start Singulair 10 mg qhs. Use Albuterol prn and if you need it more than 2x weekly, restart Symbicort daily. Take Aspirin 81 mg daily for cardioprotective benefit. Prescriptions written and sent to pharmacy; medication side effects discussed. Zithromax 250 mg. Singulair 10 mg. Most recent tests reviewed from 10/2016.    Recheck pertinent labs when fasting. Recent office visit notes from Dr. Karli Olivier and Dr. Drake Stephenson reviewed. Counseled patient on health concerns:  Asthma plan, bronchitis, allergies, RLS, and tremor. Relevant handouts given and discussed with patient. Immunizations noted. Postpone Prevnar vaccine until pt is feeling better. Offered empathy, support, legitimation, prayers, partnership to patient. Praised patient for progress. Advance Care Booklet given at office visit. Discussed with pt today. Declines honoring choices referral.   Follow-up Disposition:  Return in about 1 month (around 3/27/2017) for bronchitis . Patient was offered a choice/choices in the treatment plan today. Patient expresses understanding of the plan and agrees with recommendations. Written by allen Parks, as dictated by Dr. Mario Noyola DO. Documentation True and Accepted by Romelia Juarez. Gretchen Moore. Patient Instructions     USE ALBUTEROL INHALER AS NEEDED. IF YOU NEED IT MORE THAN TWICE PER WEEK, RESTART SYMBICORT DAILY. START TAKING SINGULAIR 10 MG AT BEDTIME FOR ALLERGIES AND ASTHMA. Asthma Action Plan: After Your Visit  Your Care Instructions  An asthma action plan is based on peak flow and asthma symptoms. Sorting symptoms and peak flow into red, yellow, and green \"zones\" can help you know how bad your asthma is and what actions you should take. Work with your doctor to make your plan. An action plan may include:  · The peak flow readings and symptoms for each zone. · What medicines to take in each zone. · When to call a doctor. · A list of emergency contact numbers. · A list of your asthma triggers. Follow-up care is a key part of your treatment and safety. Be sure to make and go to all appointments, and call your doctor if you are having problems. It's also a good idea to know your test results and keep a list of the medicines you take. How can you care for yourself at home?   · Take your daily medicines to help minimize long-term damage and avoid asthma attacks. · Check your peak flow every morning and evening. This is the best way to know how well your lungs are working. · Check your action plan to see what zone you are in.  ¨ If you are in the green zone, keep taking your daily asthma medicines as prescribed. ¨ If you are in the yellow zone, you may be having or will soon have an asthma attack. You may not have any symptoms, but your lungs are not working as well as they should. Take the medicines listed in your action plan. If you stay in the yellow zone, your doctor may need to increase the dose or add a medicine. ¨ If you are in the red zone, follow your action plan. If your symptoms or peak flow don't improve soon, you may need to go to the emergency room or be admitted to the hospital.  · Use an asthma diary. Write down your peak flow readings in the asthma diary. If you have an attack, write down what caused it (if you know), the symptoms, and what medicine you took. · Make sure you know how and when to call your doctor or go to the hospital.  · Take both the asthma action plan and the asthma diaryalong with your peak flow meter and medicineswhen you see your doctor. Tell your doctor if you are having trouble following your action plan. When should you call for help? Call 911 anytime you think you may need emergency care. For example, call if:  · You have severe trouble breathing. Call your doctor now or seek immediate medical care if:  · Your symptoms do not get better after you have followed your asthma action plan. · You cough up yellow, dark brown, or bloody mucus (sputum). Watch closely for changes in your health, and be sure to contact your doctor if:  · Your coughing and wheezing get worse. · You need to use quick-relief medicine on more than 2 days a week (unless it is just for exercise). · You need help figuring out what is triggering your asthma attacks. Where can you learn more?    Go to DealExplorer.be  Enter B511 in the search box to learn more about \"Asthma Action Plan: After Your Visit. \"   © 3433-9969 Healthwise, Incorporated. Care instructions adapted under license by [employer] Bad[employer] (which disclaims liability or warranty for this information). This care instruction is for use with your licensed healthcare professional. If you have questions about a medical condition or this instruction, always ask your healthcare professional. [place] [address] any warranty or liability for your use of this information. Content Version: 90.2.838662; Last Revised: March 9, 2012           Advised protocol for clearing congestion:  Increase fluid intake, especially water to thin mucous and boost the immune system. Avoid sugar and dairy while congested since they thicken mucous. Get plenty of rest!  Gargle 3 times daily and as needed in Listerine or warm salt water vinegar solutions (1 tsp salt, 1 tsp vinegar in 1 cup lukewarm water.)  Use OTC nasal saline spray up each nostril twice daily. Use humidifier at bedtime. Use OTC Mucinex 600 mg twice daily to loosen mucous. Use OTC Tylenol Arthritis or Ibuprofen up to 800 mg up to 3 times daily as needed for pain, fever or headaches. Avoid decongestants and Ibuprofen if you have high blood pressure! If mucous is consistently discolored yellow or green throughout the day for more than a week, call the doctor for an evaluation. Asthma Action Plan: After Your Visit  Your Care Instructions  An asthma action plan is based on peak flow and asthma symptoms. Sorting symptoms and peak flow into red, yellow, and green \"zones\" can help you know how bad your asthma is and what actions you should take. Work with your doctor to make your plan. An action plan may include:  · The peak flow readings and symptoms for each zone. · What medicines to take in each zone. · When to call a doctor.   · A list of emergency contact numbers. · A list of your asthma triggers. Follow-up care is a key part of your treatment and safety. Be sure to make and go to all appointments, and call your doctor if you are having problems. It's also a good idea to know your test results and keep a list of the medicines you take. How can you care for yourself at home? · Take your daily medicines to help minimize long-term damage and avoid asthma attacks. · Check your peak flow every morning and evening. This is the best way to know how well your lungs are working. · Check your action plan to see what zone you are in.  ¨ If you are in the green zone, keep taking your daily asthma medicines as prescribed. ¨ If you are in the yellow zone, you may be having or will soon have an asthma attack. You may not have any symptoms, but your lungs are not working as well as they should. Take the medicines listed in your action plan. If you stay in the yellow zone, your doctor may need to increase the dose or add a medicine. ¨ If you are in the red zone, follow your action plan. If your symptoms or peak flow don't improve soon, you may need to go to the emergency room or be admitted to the hospital.  · Use an asthma diary. Write down your peak flow readings in the asthma diary. If you have an attack, write down what caused it (if you know), the symptoms, and what medicine you took. · Make sure you know how and when to call your doctor or go to the hospital.  · Take both the asthma action plan and the asthma diaryalong with your peak flow meter and medicineswhen you see your doctor. Tell your doctor if you are having trouble following your action plan. When should you call for help? Call 911 anytime you think you may need emergency care. For example, call if:  · You have severe trouble breathing. Call your doctor now or seek immediate medical care if:  · Your symptoms do not get better after you have followed your asthma action plan.   · You cough up yellow, dark brown, or bloody mucus (sputum). Watch closely for changes in your health, and be sure to contact your doctor if:  · Your coughing and wheezing get worse. · You need to use quick-relief medicine on more than 2 days a week (unless it is just for exercise). · You need help figuring out what is triggering your asthma attacks. Where can you learn more? Go to [employer].be  Enter B511 in the search box to learn more about \"Asthma Action Plan: After Your Visit. \"   © 5952-0168 Healthwise, Incorporated. Care instructions adapted under license by [employer] (which disclaims liability or warranty for this information). This care instruction is for use with your licensed healthcare professional. If you have questions about a medical condition or this instruction, always ask your healthcare professional. [address] any warranty or liability for your use of this information. Content Version: 28.0.160766;  Last Revised: March 9, 2012

## 2017-02-27 NOTE — ACP (ADVANCE CARE PLANNING)
Re-discussed ACP with patient. Gave her another Right to [facility]. She prefers to discuss it with her . Declines referral to [facility] team at this time. Patient will bring document to her next office visit.

## 2017-02-27 NOTE — PROGRESS NOTES
Chief Complaint   Patient presents with    Blood Pressure Check    Diabetes     1. Have you been to the ER, urgent care clinic since your last visit? Hospitalized since your last visit? No    2. Have you seen or consulted any other health care providers outside of the [address] [place] [address] since your last visit? Include any pap smears or colon screening. No    In the event something were to happen to you and you were unable to speak on your behalf, do you have an Advance Directive/ Living Will in place stating your wishes? NO    If yes, do we have a copy on file NO    If no, would you like information:    Patient stated that she is still in the process of completing. Patient states that she came down with a bad cold around 4 days ago, and has been staying in bed most of the time because she is feeling too weak to do anything. Patient number is wearing a mask this visit. Patient has back surgery 10/18/2016 at [facility]; details of surgery are in Encounters section. Patient stated that she has an eye exam on March 20, 2017. Also stated that she is fasting so she can have lab work done this morning.

## 2017-03-07 ENCOUNTER — HOSPITAL ENCOUNTER (OUTPATIENT)
Dept: GENERAL RADIOLOGY | Age: 77
Discharge: HOME | End: 2017-03-07
Payer: MEDICARE

## 2017-03-07 ENCOUNTER — OFFICE VISIT (OUTPATIENT)
Dept: FAMILY MEDICINE CLINIC | Age: 77
End: 2017-03-07

## 2017-03-07 VITALS
BODY MASS INDEX: 25.87 KG/M2 | DIASTOLIC BLOOD PRESSURE: 84 MMHG | TEMPERATURE: 97.3 F | SYSTOLIC BLOOD PRESSURE: 125 MMHG | RESPIRATION RATE: 18 BRPM | HEIGHT: 65 IN | HEART RATE: 75 BPM | WEIGHT: 155.3 LBS | OXYGEN SATURATION: 99 %

## 2017-03-07 DIAGNOSIS — R05.3 CHRONIC COUGH: ICD-10-CM

## 2017-03-07 DIAGNOSIS — I10 ESSENTIAL HYPERTENSION, BENIGN: ICD-10-CM

## 2017-03-07 DIAGNOSIS — J45.31 MILD PERSISTENT ASTHMA WITH ACUTE EXACERBATION: Primary | ICD-10-CM

## 2017-03-07 DIAGNOSIS — R53.83 OTHER FATIGUE: ICD-10-CM

## 2017-03-07 DIAGNOSIS — J20.9 ACUTE BRONCHITIS WITH SYMPTOMS > 10 DAYS: ICD-10-CM

## 2017-03-07 DIAGNOSIS — Z88.0 PENICILLIN ALLERGY: ICD-10-CM

## 2017-03-07 DIAGNOSIS — J30.89 SEASONAL ALLERGIC RHINITIS DUE TO OTHER ALLERGIC TRIGGER: ICD-10-CM

## 2017-03-07 PROCEDURE — 71020 XR CHEST PA LAT: CPT

## 2017-03-07 RX ORDER — AMLODIPINE BESYLATE 5 MG/1
5 TABLET ORAL DAILY
Qty: 90 TAB | Refills: 3 | Status: SHIPPED | OUTPATIENT
Start: 2017-03-07 | End: 2018-04-17 | Stop reason: ALTCHOICE

## 2017-03-07 RX ORDER — FLUTICASONE PROPIONATE 50 MCG
2 SPRAY, SUSPENSION (ML) NASAL DAILY
Qty: 1 BOTTLE | Refills: 5 | Status: SHIPPED | OUTPATIENT
Start: 2017-03-07 | End: 2021-08-18

## 2017-03-07 RX ORDER — DOXYCYCLINE 100 MG/1
100 TABLET ORAL 2 TIMES DAILY
Qty: 14 TAB | Refills: 0 | Status: SHIPPED | OUTPATIENT
Start: 2017-03-07 | End: 2017-03-14

## 2017-03-07 RX ORDER — BUDESONIDE AND FORMOTEROL FUMARATE DIHYDRATE 160; 4.5 UG/1; UG/1
2 AEROSOL RESPIRATORY (INHALATION)
Qty: 1 INHALER | Refills: 5 | Status: SHIPPED | OUTPATIENT
Start: 2017-03-07 | End: 2017-03-14 | Stop reason: ALTCHOICE

## 2017-03-07 NOTE — PROGRESS NOTES
HISTORY OF PRESENT ILLNESS  Stephen Beaulieu is a 68 y.o. female presents with Cough (getting worse stated by patient. patient stated that she has used her inhalers and tried the singulair also. patient stated that she would like to have a chest x-ray done as well) and Medication Refill    Agree with nurse note. Pt with asthma and allergies. She was recently seen on 2/27/17 and dx'd with bronchitis. Tx'd with Z-otto, which she finished on 3/2/17. She was started on Singulair 10 mg qhs. It was recommended she start Symbicort if she was using Albuterol more than twice weekly. She has used Albuterol more than twice weekly and has not restarted Symbicort. She is taking Singulair 10 mg daily, tolerating well. She continues with a productive cough with green/yellow sputum certain times of the day and nasal congestion with clear mucus. She feels very weak and wakes herself up wheezing. She recalls feeling similarly in 1999 when she had pneumonia and requests a CXR. Her appetite has returned. She is allergic to Penicillin. She received her flu vaccine on 10/7/16. She received her pneumococcal vaccines on 9/17/97 and again on 10/22/14. Hypertensive pt presents to the office with a BP of 125/84. For BP, she takes Norvasc 5 mg daily and Benazepril 20 mg daily, tolerating well. Requests refill of Norvasc 5 mg. She weighs 155 lbs, gained 2 lbs since last ov. Written by allen Chaudhary, as dictated by DO. ANAHI Joe    Review of Systems negative except as noted above in HPI.     ALLERGIES:    Allergies   Allergen Reactions    Other Food Itching     IF EATS IN QUANTITY OR ACROSS A FEW DAYS    Bee Sting [Sting, Bee] Swelling    Pcn [Penicillins] Hives     IN CHILDHOOD    Strawberry Itching    Atarax [Hydroxyzine Hcl] Other (comments)     jittery    Buspar [Buspirone] Nausea Only    Crestor [Rosuvastatin] Myalgia     Body cramps    Epinephrine Palpitations    Fig Itching    Hydrochlorothiazide (Bulk) Myalgia     sorethroat    Norco [Hydrocodone-Acetaminophen] Nausea and Vomiting     5/325 MG    Peach (Prunus Persica) Itching    Percocet [Oxycodone-Acetaminophen] Other (comments)     \"hellish nightmares\"    Shellfish Derived Itching    Zestril [Lisinopril] Cough    Zostavax [Zoster Vaccine Live (Pf)] Swelling     Severe Right arm swelling with redness after administered by pharmacist in elbow       CURRENT MEDICATIONS:    Outpatient Prescriptions Marked as Taking for the 3/7/17 encounter (Office Visit) with Arnulfochelitatorrey Bennett, DO   Medication Sig Dispense Refill    budesonide-formoterol (SYMBICORT) 160-4.5 mcg/actuation HFA inhaler Take 2 Puffs by inhalation two (2) times daily as needed. Indications: MAINTENANCE THERAPY FOR ASTHMA 1 Inhaler 5    fluticasone (FLONASE) 50 mcg/actuation nasal spray 2 Sprays by Both Nostrils route daily. 1 Bottle 5    amLODIPine (NORVASC) 5 mg tablet Take 1 Tab by mouth daily. Indications: hypertension 90 Tab 3    doxycycline (ADOXA) 100 mg tablet Take 1 Tab by mouth two (2) times a day for 7 days. Indications: BRONCHITIS 14 Tab 0    montelukast (SINGULAIR) 10 mg tablet Take 1 Tab by mouth daily. Indications: ALLERGIC RHINITIS, MAINTENANCE THERAPY FOR ASTHMA 30 Tab 11    aspirin delayed-release 81 mg tablet Take 1 Tab by mouth daily. Indications: prevention of transient ischemic attack 90 Tab 3    benazepril (LOTENSIN) 20 mg tablet take 1 tablet by mouth once daily 90 Tab 2    baclofen (LIORESAL) 20 mg tablet take 1 tablet by mouth three times a day if needed for muscle spasm OF SPINAL ORIGIN 40 Tab 1    rOPINIRole (REQUIP) 1 mg tablet Take 1.5 Tabs by mouth nightly. 45 Tab 5    GLUCOSAMINE/CHONDROITIN SULF A (GLUCOSAMINE-CHONDROITIN PO) Take  by mouth two (2) times a day.  melatonin 1 mg tablet Take 2 mg by mouth nightly.  cholecalciferol (VITAMIN D3) 1,000 unit tablet Take  by mouth daily.       ascorbic acid (VITAMIN C) 500 mg tablet Take  by mouth.  meloxicam (MOBIC) 7.5 mg tablet Take 7.5 mg by mouth two (2) times a day. Indications: OSTEOARTHRITIS  0    omeprazole (PRILOSEC) 40 mg capsule daily. 0    acetaminophen (TYLENOL EXTRA STRENGTH) 500 mg tablet Take  by mouth every six (6) hours as needed for Pain.  gabapentin (NEURONTIN) 300 mg capsule Take 300 mg by mouth every evening. Dr. Brenda Guerrero   Indications: ESSENTIAL TREMOR, Restless Legs Syndrome      albuterol (PROVENTIL HFA, VENTOLIN HFA) 90 mcg/actuation inhaler Take 2 Puffs by inhalation every four (4) hours as needed for Wheezing or Shortness of Breath. Indications: BRONCHOSPASM PREVENTION 1 Inhaler 5       PAST MEDICAL HISTORY:    Past Medical History:   Diagnosis Date    AC (acromioclavicular) joint bone spurs 01/2014    in back. Dr. Carmella Rutherford. Txd with shots x 3    Actinic keratosis 2015    Dr. Josh Lee. Dr. Jona Kapadia.  Allergic rhinitis, cause unspecified     Anxiety state, unspecified     Arthritis     R KNEE BONE-ON-BONE; R HAND-----OSTEO    Asthma     BRONCHITIS IN SPRING & FALL MOST YEARS    BCC (basal cell carcinoma), face 1980s (nose), 01/28/15 (forehead)    Dr. Nano Rose. Dr. Bong Rothman.  Cataract     RIGHT    Chronic insomnia     Chronic low back pain 01/2014    Dr. Dex Hodges. DJD and spurs, Narrowing of L 3,4,5. Dr. Carmella Rutherford.  Chronic obstructive pulmonary disease ([facility] [address]     CHRONIC (TWICE-YEARLY) BRONCHITIS    Colon polyps 11/2005, 01/29/09    benign. Dr. Ross Bolus    Constipation     Degenerative lumbar spinal stenosis 01/2014    Dr. Phoebe Goyal Narrowing of L 3,4,5    Dyslipidemia     Dysphagia 09/2014    due to Esophagitis. Dr. Grazyna Medrano.  Essential hypertension, benign     Foot fracture, left 2009    stress.  Foot pain, bilateral 11/13/2013    Dr. Los Frazier.  GERD (gastroesophageal reflux disease) 09/29/2014    ESOPHAGITIS    Knee pain, bilateral 10/28/13    Dr. Deb Martinez. Right > Left. Severe OA. Txd with PT.    Lumbar stenosis with neurogenic claudication     Menopause     Migraine     NONE SINCE AGE 37    Mixed stress and urge urinary incontinence     NO LEAKAGE; GETS UP SEVERAL TIMES A NIGHT, PT STATES ON 10/3/16    OA (osteoarthritis) of knee     Dr. Britany Jennings    Osteopenia 2015    Restless legs syndrome (RLS) 2015    Dr. Toñito Mckeon.  Shoulder joint dislocation 2011    Right. due to fall. Dr. Galdamez Brain Sleep apnea     undiagnosed    Syncope 11    due to fall down 5 steps. Right occipital head trauma.  Tremor of both hands 2015    Dr. Nidhi Cary GABAPENTIN       PAST SURGICAL HISTORY:    Past Surgical History:   Procedure Laterality Date    HX CATARACT REMOVAL Left     Dr. Riley Fox  2015    Dr. Tesha Cole. due q 5 yrs    HX COLONOSCOPY  2005, 09    with polypectomy. Dr. Tesha Cole    HX GI  2014    due to dysphagia. Dr. Elizabeth Joel. ESOPHAGEL DILATATION    HX GYN  ? A&P REPAIR    HX LUMBAR LAMINECTOMY  10/18/2016    L3-S1 Dr. Jorge L Butler  01/28/15    [facility]. L Forehead. MOHs SURGERY. Dr. Rock Garcia.  HX RAIMUNDO AND BSO      due to fibroids    HX TONSILLECTOMY      KS COMBINED ANT/POST COLPORRHAPHY  05    with enterocele RE.   Dr. Shweta Florian and Dr. Patricia Godoy:    Family History   Problem Relation Age of Onset    Hypertension Mother     Dementia Mother      alzheimers    Thyroid Disease Mother     Hypertension Father     High Cholesterol Father     Kidney Disease Father      1 kidney    Emphysema Father      O2 requiring    Hypertension Sister     Diabetes Maternal Grandmother     Dementia Maternal Grandmother     Thyroid Disease Maternal Grandmother     Thyroid Disease Daughter      goiter    Heart Attack Maternal Uncle 46     massive    Heart Attack Maternal Uncle 39     massive    Heart Attack Maternal Aunt 51     massive    Thyroid Disease Daughter      thyroid cyst    Anesth Problems Neg Hx        SOCIAL HISTORY:    Social History     Social History    Marital status:      Spouse name: N/A    Number of children: N/A    Years of education: N/A     Social History Main Topics    Smoking status: Never Smoker    Smokeless tobacco: Never Used    Alcohol use No    Drug use: No    Sexual activity: Yes     Partners: Male     Birth control/ protection: Surgical     Other Topics Concern    None     Social History Narrative       IMMUNIZATIONS:    Immunization History   Administered Date(s) Administered    Influenza High Dose Vaccine PF 10/29/2013, 09/20/2014, 09/02/2015, 10/07/2016    Pneumococcal Vaccine (Unspecified Type) 09/17/1997, 10/22/2004    TD Vaccine 09/07/2004    Zoster 01/21/2009         PHYSICAL EXAMINATION    Vital Signs    Visit Vitals    /84 (BP 1 Location: Left arm, BP Patient Position: Sitting)    Pulse 75    Temp 97.3 °F (36.3 °C) (Oral)    Resp 18    Ht 5' 5\" (1.651 m)    Wt 155 lb 4.8 oz (70.4 kg)    SpO2 99%    BMI 25.84 kg/m2       Weight Metrics 3/7/2017 2/27/2017 11/3/2016 10/20/2016 10/18/2016 10/7/2016 10/3/2016   Weight 155 lb 4.8 oz 153 lb 14.4 oz 158 lb 140 lb 153 lb 156 lb 153 lb   BMI 25.84 kg/m2 25.61 kg/m2 26.29 kg/m2 23.3 kg/m2 27.1 kg/m2 27.63 kg/m2 27.1 kg/m2       General appearance - Well nourished. Tired appearing. Well developed. No acute distress. Overweight. Head - Normocephalic. Atraumatic. Non tender sinuses x 4. Eyes - pupils equal and reactive. Extraocular eye movements intact. Sclera anicteric. Mildly injected sclera. Ears - Hearing is grossly normal bilaterally. Nose - normal and patent. No polyps noted. No erythema. No discharge. Mouth - mucous membranes with adequate moisture. Posterior pharynx normal with cobblestone appearance. No erythema, white exudate or obstruction. Neck - supple. Midline trachea. No carotid bruits noted bilaterally. No thyromegaly noted. Chest - clear to auscultation bilaterally anteriorly and posteriorly. No wheezes. No rales or rhonchi. Breath sounds are symmetrical bilaterally. Unlabored respirations. Frequent, tight, moist cough. Heart - normal rate. Regular rhythm. Normal S1, S2. No murmur noted. No rubs, clicks or gallops noted. Abdomen - soft and distended. No masses or organomegaly. No rebound, rigidity or guarding. Bowel sounds normal x 4 quadrants. No tenderness noted. Neurological - awake, alert and oriented to person, place, and time and event. Cranial nerves II through XII intact. Clear speech. Muscle strength is +5/5 x 4 extremities. Sensation is intact to light touch bilaterally. Steady gait. Skin - no rashes, erythema, ecchymosis, lacerations, abrasions, suspicious moles noted  Psychological -   normal behavior, dress and thought processes. Good insight. Good eye contact. Normal affect. Appropriate mood. Normal speech. DATA REVIEWED    Results for orders placed or performed in visit on 02/27/17   AMB POC RAPID INFLUENZA TEST   Result Value Ref Range    VALID INTERNAL CONTROL POC Yes     QuickVue Influenza test Negative Negative       ASSESSMENT and PLAN      ICD-10-CM ICD-9-CM    1. Mild persistent asthma with acute exacerbation J45.31 493.92 DISCONTINUED: budesonide-formoterol (SYMBICORT) 160-4.5 mcg/actuation HFA inhaler   2. Acute bronchitis with symptoms > 10 days J20.9 466.0 doxycycline (ADOXA) 100 mg tablet   3. Seasonal allergic rhinitis due to other allergic trigger J30.89  fluticasone (FLONASE) 50 mcg/actuation nasal spray   4. Essential hypertension, benign I10 401.1 amLODIPine (NORVASC) 5 mg tablet   5. Penicillin allergy Z88.0 V14.0    6. Chronic cough R05 786.2 XR CHEST PA LAT   7. Other fatigue R53.83 780.79 XR CHEST PA LAT   8.  Essential hypertension, benign I10 401.1 amLODIPine (NORVASC) 5 mg tablet    stable       Discussed the patient's BMI with her. The BMI follow up plan is as follows: I have counseled this patient on diet and exercise regimens. Addressed weight, diet and exercise with patient. Decrease carbohydrates (white foods, sweet foods, sweet drinks and alcohol), increase green leafy vegetables and protein (lean meats and beans) with each meal.  Avoid fried foods. Eat 3-5 small meals daily. Do not skip meals. Increase water intake. Increase physical activity to 30 minutes daily for health benefit or 60 minutes daily to prevent weight regain, as tolerated. Get 7-8 hours uninterrupted sleep nightly. Chart reviewed and updated. CXR ordered. Continue current medications and care. Start Doxycycline 100 mg BID x7 days. Cautioned pt not to take Zinc and Magnesium while taking Doxycycline. Restart OTC antihistamine, Flonase, and continue with Singulair 10 mg. Restart Symbicort 2 puffs daily and use Albuterol prn. Prescriptions written and sent to pharmacy; medication side effects discussed. Doxycycline 100 mg. Symbicort 160-4.5 mcg/actuation HFA inhaler. Flonase 50 mcg. Norvasc 5 mg. Counseled patient on health concerns:  Asthma plan and allergies. Proper inhaler use. Relevant handouts given and discussed with patient. Immunizations noted. Offered empathy, support, legitimation, prayers, partnership to patient. Praised patient for progress. Follow-up Disposition:  Return in about 2 weeks (around 3/21/2017) for cough, asthma, allergies . Patient was offered a choice/choices in the treatment plan today. Patient expresses understanding of the plan and agrees with recommendations. Written by allen Neal, as dictated by Dr. Twila Floyd DO. Documentation True and Accepted by Ani Durbin. Jackelyn Lackey. Patient Instructions   START DOXYCYCLINE (ANTIBIOTIC) 100 MG TWICE A DAY FOR 7 DAYS. START OVER THE COUNTER ANTIHISTAMINE (ALLEGRA, CLARTIIN ETC.) DAILY AND START FLONASE DAILY. CONTINUE TAKING SINGULAIR 10 MG DAILY. RESTART SYMBICORT 2 PUFFS DAILY AND USE ALBUTEROL AS NEEDED.

## 2017-03-07 NOTE — PROGRESS NOTES
Chief Complaint   Patient presents with    Cough     getting worse stated by patient. patient stated that she has used her inhalers and tried the singulair also. patient stated that she would like to have a chest x-ray done as well         1. Have you been to the ER, urgent care clinic since your last visit? Hospitalized since your last visit? no    2. Have you seen or consulted any other health care providers outside of the [address] [place] [address] since your last visit? Include any pap smears or colon screening. No    The patient was counseled on the dangers of tobacco use, and was advised to quit and does not smoke. Reviewed strategies to maximize success, including continue not to smoke.         Advanced Care Planning information given to patient and explained at previous visit

## 2017-03-07 NOTE — PATIENT INSTRUCTIONS
START DOXYCYCLINE (ANTIBIOTIC) 100 MG TWICE A DAY FOR 7 DAYS. START OVER THE COUNTER ANTIHISTAMINE (ALLEGRA, CLARTIIN ETC.) DAILY AND START FLONASE DAILY. CONTINUE TAKING SINGULAIR 10 MG DAILY. RESTART SYMBICORT 2 PUFFS DAILY AND USE ALBUTEROL AS NEEDED.

## 2017-03-07 NOTE — MR AVS SNAPSHOT
Visit Information Date & Time Provider Department Dept. Phone Encounter #  
 3/7/2017  9:15 AM Stephan Mcconnell DO [facility] [phone] [id number] Follow-up Instructions Return in about 2 weeks (around 3/21/2017) for cough, asthma, allergies . Your Appointments 5/12/2017  1:40 PM  
Follow Up with Juan Francisco Mcqueen MD  
[facility] Appt Note: Follow up $0CP tdb 11/3/16  
 [address] [place] [address], 
[address] [place] [address], [address] [address]  
[address] [facility], [address] [facility], [address] [phone]  
  
    
 6/30/2017 10:45 AM  
ROUTINE CARE with DO [facility] ([facility] [address] [place]) Appt Note: Per Dr. Jose Angel Cazares 1 MO F/U Bronchitis; Per Dr. Jose Angel Cazares 1 MO F/U Bronchitis [address] [facility] [address]  
[phone]  
  
   
 [address] [place] [address] Upcoming Health Maintenance Date Due  
 GLAUCOMA SCREENING Q2Y 5/31/2017* Pneumococcal 65+ Low/Medium Risk (2 of 2 - PPSV23) 6/21/2017* MEDICARE YEARLY EXAM 8/27/2017 BREAST CANCER SCRN MAMMOGRAM 9/12/2017 COLONOSCOPY 9/16/2020 DTaP/Tdap/Td series (2 - Td) 8/26/2026 *Topic was postponed. The date shown is not the original due date. Allergies as of 3/7/2017  Review Complete On: 3/7/2017 By: Preston Spencer LPN Severity Noted Reaction Type Reactions Other Food  10/03/2016    Itching IF EATS IN QUANTITY OR ACROSS A FEW DAYS Bee Sting [Sting, Bee] High 09/10/2010    Swelling Pcn [Penicillins] High 10/20/2009    Hives IN CHILDHOOD [place] High 10/03/2016    Itching Atarax [Hydroxyzine Hcl]  10/20/2009    Other (comments)  
 jittery Buspar [Buspirone]  10/20/2009    Nausea Only Crestor [Rosuvastatin]  09/10/2010    Myalgia Body cramps Epinephrine  10/20/2009    Palpitations Fig  10/03/2016    Itching Hydrochlorothiazide (Bulk)  10/20/2009    Myalgia  
 sorethroat Norco [Hydrocodone-acetaminophen]  02/27/2017    Nausea and Vomiting 5/325 MG Peach (Prunus Persica)  10/03/2016    Itching Percocet [Oxycodone-acetaminophen]  10/20/2009    Other (comments) \"hellish nightmares\" Shellfish Derived  10/03/2016    Itching Zestril [Lisinopril]  10/20/2009    Cough Zostavax [Zoster Vaccine Live (Pf)]  09/10/2010    Swelling Severe Right arm swelling with redness after administered by pharmacist in [facility] Current Immunizations  Reviewed on 3/7/2017 Name Date Influenza High Dose Vaccine PF 10/7/2016, 9/2/2015, 9/20/2014, 10/29/2013 Pneumococcal Vaccine (Unspecified Type) 10/22/2004, 9/17/1997 TD Vaccine 9/7/2004 Zoster 1/21/2009 Reviewed by Carolina Cruz DO on 3/7/2017 at 10:44 AM  
You Were Diagnosed With   
  
 Codes Comments Mild persistent asthma with acute exacerbation    -  Primary ICD-10-CM: J45.31 
ICD-9-CM: 493.92 Acute bronchitis with symptoms > 10 days     ICD-10-CM: J20.9 ICD-9-CM: 466.0 Seasonal allergic rhinitis due to other allergic trigger     ICD-10-CM: J30.89 Essential hypertension, benign     ICD-10-CM: I10 
ICD-9-CM: 401.1 Penicillin allergy     ICD-10-CM: Z88.0 ICD-9-CM: V14.0 Chronic cough     ICD-10-CM: R05 ICD-9-CM: 786.2 Other fatigue     ICD-10-CM: R53.83 ICD-9-CM: 780.79 Essential hypertension, benign     ICD-10-CM: I10 
ICD-9-CM: 401.1 stable Vitals BP Pulse Temp Resp Height(growth percentile) Weight(growth percentile) 125/84 (BP 1 Location: Left arm, BP Patient Position: Sitting) 75 97.3 °F (36.3 °C) (Oral) 18 5' 5\" (1.651 m) 155 lb 4.8 oz (70.4 kg) SpO2 BMI OB Status Smoking Status 99% 25.84 kg/m2 Hysterectomy Never Smoker BMI and BSA Data Body Mass Index Body Surface Area  
 25.84 kg/m 2 1.8 m 2 Preferred Pharmacy Pharmacy Name Phone [facility] [phone] Your Updated Medication List  
  
   
This list is accurate as of: 3/7/17 10:52 AM.  Always use your most recent med list.  
  
  
  
  
 albuterol 90 mcg/actuation inhaler Commonly known as:  PROVENTIL HFA, VENTOLIN HFA, PROAIR HFA Take 2 Puffs by inhalation every four (4) hours as needed for Wheezing or Shortness of Breath. Indications: BRONCHOSPASM PREVENTION  
  
 amLODIPine 5 mg tablet Commonly known as:  Rudene Post Take 1 Tab by mouth daily. Indications: hypertension  
  
 ascorbic acid (vitamin C) 500 mg tablet Commonly known as:  VITAMIN C Take  by mouth. aspirin delayed-release 81 mg tablet Take 1 Tab by mouth daily. Indications: prevention of transient ischemic attack  
  
 baclofen 20 mg tablet Commonly known as:  LIORESAL  
take 1 tablet by mouth three times a day if needed for muscle spasm OF SPINAL ORIGIN  
  
 benazepril 20 mg tablet Commonly known as:  LOTENSIN  
take 1 tablet by mouth once daily  
  
 budesonide-formoterol 160-4.5 mcg/actuation HFA inhaler Commonly known as:  SYMBICORT Take 2 Puffs by inhalation two (2) times daily as needed. Indications: MAINTENANCE THERAPY FOR ASTHMA  
  
 cholecalciferol 1,000 unit tablet Commonly known as:  VITAMIN D3 Take  by mouth daily. doxycycline 100 mg tablet Commonly known as:  ADOXA Take 1 Tab by mouth two (2) times a day for 7 days. Indications: BRONCHITIS  
  
 fluticasone 50 mcg/actuation nasal spray Commonly known as:  Imagene Poot 2 Sprays by Both Nostrils route daily. GLUCOSAMINE-CHONDROITIN PO Take  by mouth two (2) times a day. melatonin 1 mg tablet Take 2 mg by mouth nightly. meloxicam 7.5 mg tablet Commonly known as:  MOBIC Take 7.5 mg by mouth two (2) times a day. Indications: OSTEOARTHRITIS  
  
 montelukast 10 mg tablet Commonly known as:  SINGULAIR  
 Take 1 Tab by mouth daily. Indications: ALLERGIC RHINITIS, MAINTENANCE THERAPY FOR ASTHMA NEURONTIN 300 mg capsule Generic drug:  gabapentin Take 300 mg by mouth every evening. Dr. Antionette Pascual   Indications: ESSENTIAL TREMOR, Restless Legs Syndrome  
  
 omeprazole 40 mg capsule Commonly known as:  PRILOSEC  
daily. rOPINIRole 1 mg tablet Commonly known as:  Silva Niuean Take 1.5 Tabs by mouth nightly. TYLENOL EXTRA STRENGTH 500 mg tablet Generic drug:  acetaminophen Take  by mouth every six (6) hours as needed for Pain. Prescriptions Sent to Pharmacy Refills  
 budesonide-formoterol (SYMBICORT) 160-4.5 mcg/actuation HFA inhaler 5 Sig: Take 2 Puffs by inhalation two (2) times daily as needed. Indications: MAINTENANCE THERAPY FOR ASTHMA Class: Normal  
 Pharmacy: [id number] [place] [address],[address] Ph #: [phone] Route: Inhalation  
 fluticasone (FLONASE) 50 mcg/actuation nasal spray 5 Si Sprays by Both Nostrils route daily. Class: Normal  
 Pharmacy: [id number] [place] [address],[address] Ph #: [phone] Route: Both Nostrils  
 amLODIPine (NORVASC) 5 mg tablet 3 Sig: Take 1 Tab by mouth daily. Indications: hypertension Class: Normal  
 Pharmacy: [id number] [place] [address],[address] Ph #: [phone] Route: Oral  
 doxycycline (ADOXA) 100 mg tablet 0 Sig: Take 1 Tab by mouth two (2) times a day for 7 days. Indications: BRONCHITIS Class: Normal  
 Pharmacy: [id number] [address] [place] [address],[address] Ph #: [phone] Route: Oral  
  
Follow-up Instructions Return in about 2 weeks (around 3/21/2017) for cough, asthma, allergies . To-Do List   
 2017 Imaging:  XR CHEST PA LAT Patient Instructions START DOXYCYCLINE (ANTIBIOTIC) 100 MG TWICE A DAY FOR 7 DAYS. START OVER THE COUNTER ANTIHISTAMINE (ALLEGRA, CLARTIIN ETC.) DAILY AND START FLONASE DAILY. CONTINUE TAKING SINGULAIR 10 MG DAILY. RESTART SYMBICORT 2 PUFFS DAILY AND USE ALBUTEROL AS NEEDED. Introducing \[facility] [place] [facility]\"" SERVICES! Daija Gonzalez introduces [employer] patient portal. Now you can access parts of your medical record, email your doctor's office, and request medication refills online. 1. In your internet browser, go to [email or web address] 2. Click on the First Time User? Click Here link in the Sign In box. You will see the New Member Sign Up page. 3. Enter your [employer] Access Code exactly as it appears below. You will not need to use this code after youve completed the sign-up process. If you do not sign up before the expiration date, you must request a new code. · [employer] Access Code: [email or web address] Expires: 5/28/2017 11:10 AM 
 
4. Enter the last four digits of your Social Security Number (xxxx) and Date of Birth (mm/dd/yyyy) as indicated and click Submit. You will be taken to the next sign-up page. 5. Create a [employer] ID. This will be your [employer] login ID and cannot be changed, so think of one that is secure and easy to remember. 6. Create a [employer] password. You can change your password at any time. 7. Enter your Password Reset Question and Answer. This can be used at a later time if you forget your password. 8. Enter your e-mail address. You will receive e-mail notification when new information is available in [address] 9. Click Sign Up. You can now view and download portions of your medical record. 10. Click the Download Summary menu link to download a portable copy of your medical information. If you have questions, please visit the Frequently Asked Questions section of the [employer] website. Remember, [employer] is NOT to be used for urgent needs. For medical emergencies, dial 911. Now available from your iPhone and Android! Please provide this summary of care documentation to your next provider. Your primary care clinician is listed as Ebonie Flores. If you have any questions after today's visit, please call [phone].

## 2017-03-23 ENCOUNTER — OFFICE VISIT (OUTPATIENT)
Dept: FAMILY MEDICINE CLINIC | Age: 77
End: 2017-03-23

## 2017-03-23 VITALS
DIASTOLIC BLOOD PRESSURE: 74 MMHG | WEIGHT: 153.7 LBS | RESPIRATION RATE: 16 BRPM | SYSTOLIC BLOOD PRESSURE: 113 MMHG | TEMPERATURE: 97.8 F | HEART RATE: 80 BPM | HEIGHT: 65 IN | OXYGEN SATURATION: 97 % | BODY MASS INDEX: 25.61 KG/M2

## 2017-03-23 DIAGNOSIS — J45.20 MILD INTERMITTENT ASTHMA WITHOUT COMPLICATION: ICD-10-CM

## 2017-03-23 DIAGNOSIS — H26.9 CATARACT: ICD-10-CM

## 2017-03-23 DIAGNOSIS — R05.8 ACE-INHIBITOR COUGH: ICD-10-CM

## 2017-03-23 DIAGNOSIS — M85.80 OSTEOPENIA: ICD-10-CM

## 2017-03-23 DIAGNOSIS — Z01.818 PRE-OP EXAM: Primary | ICD-10-CM

## 2017-03-23 DIAGNOSIS — R73.9 HYPERGLYCEMIA: ICD-10-CM

## 2017-03-23 DIAGNOSIS — T46.4X5A ACE-INHIBITOR COUGH: ICD-10-CM

## 2017-03-23 DIAGNOSIS — Z78.9 STATIN INTOLERANCE: ICD-10-CM

## 2017-03-23 DIAGNOSIS — M48.061 DEGENERATIVE LUMBAR SPINAL STENOSIS: ICD-10-CM

## 2017-03-23 DIAGNOSIS — I10 ESSENTIAL HYPERTENSION, BENIGN: ICD-10-CM

## 2017-03-23 DIAGNOSIS — E73.9 LACTOSE INTOLERANCE: ICD-10-CM

## 2017-03-23 RX ORDER — NEPAFENAC 3 MG/ML
SUSPENSION OPHTHALMIC
Refills: 0 | COMMUNITY
Start: 2017-03-21 | End: 2017-10-13 | Stop reason: ALTCHOICE

## 2017-03-23 RX ORDER — OFLOXACIN 3 MG/ML
SOLUTION/ DROPS OPHTHALMIC
Refills: 0 | COMMUNITY
Start: 2017-03-20 | End: 2017-10-13 | Stop reason: ALTCHOICE

## 2017-03-23 RX ORDER — DUREZOL 0.5 MG/ML
EMULSION OPHTHALMIC
Refills: 0 | COMMUNITY
Start: 2017-03-21 | End: 2017-10-13 | Stop reason: ALTCHOICE

## 2017-03-23 NOTE — PROGRESS NOTES
Chief Complaint   Patient presents with    Documentation     cataract sx 4/10/17    Cough     follow up, \"no cough, feeling better. \"

## 2017-03-23 NOTE — PROGRESS NOTES
HISTORY OF PRESENT ILLNESS  Alfa Quinn is a 68 y.o. female presents with Pre-op Exam (cataract sx 4/10/17); Cough (follow up, \"no cough, feeling better. \"); Cataract; and Labs    Agree with nurse note. Pt as scheduled for follow up of her cough. She presents with a preop clearance requesting that we \"just fill it out based upon today's follow up visit. \"  When I shared that she needed to have scheduled a preop appointment she then stated that Dr. Travon Jimenez changed her original surgery to the current date so she could have this exam today. She denies having a cough, stating \"I feel fine\" and prefers to be seen for preop clearance today. Pt with hx of BL cataracts has R cataract surgery scheduled for 4/10/17 by ophthalmologist, Dr. Beverley Strauss. Her R eye vision has been \"dim\" and she has had difficulty putting her make up on. She is s/p L cataract surgery since 2007 performed by Dr. Travon Jimenez. He is recommending she have the R cataract removed now. Hypertensive, hyperglycemic pt with Vit D deficiency, lactose intolerant, statin intolerant pt who has a hx of an ACE-inhibitor cough presents to the office with a BP of 113/74. Patient denies vision changes, headaches, dizziness, chest pain, SOB, or swelling. She weighs 153 lbs, lost 2 lbs since last ov. She is fasting today for labs. Pt with asthma; improved since last office visit when she was having a terrible flare up. Written by allen Avery, as dictated by Dr. Kristofer Pablo DO.    ANAHI    Review of Systems negative except as noted above in HPI.     ALLERGIES:    Allergies   Allergen Reactions    Other Food Itching     IF EATS IN QUANTITY OR ACROSS A FEW DAYS  TOMATOES    Bee Sting [Sting, Bee] Swelling    Fig Itching    Pcn [Penicillins] Hives     IN CHILDHOOD    Peach (Prunus Persica) Itching    Shellfish Derived Itching    Strawberry Itching    Zostavax [Zoster Vaccine Live (Pf)] Swelling     Severe Right arm swelling with redness after administered by pharmacist in elbow    Atarax [Hydroxyzine Hcl] Other (comments)     jittery    Buspar [Buspirone] Nausea Only    Crestor [Rosuvastatin] Myalgia     Body cramps    Epinephrine Palpitations    Hydrochlorothiazide (Bulk) Myalgia     sorethroat    Norco [Hydrocodone-Acetaminophen] Nausea and Vomiting     5/325 MG    Percocet [Oxycodone-Acetaminophen] Other (comments)     \"hellish nightmares\"    Zestril [Lisinopril] Cough       CURRENT MEDICATIONS:    Outpatient Prescriptions Marked as Taking for the 3/23/17 encounter (Office Visit) with Randal Cox, DO   Medication Sig Dispense Refill    fluticasone (FLONASE) 50 mcg/actuation nasal spray 2 Sprays by Both Nostrils route daily. 1 Bottle 5    amLODIPine (NORVASC) 5 mg tablet Take 1 Tab by mouth daily. Indications: hypertension 90 Tab 3    aspirin delayed-release 81 mg tablet Take 1 Tab by mouth daily. Indications: prevention of transient ischemic attack 90 Tab 3    benazepril (LOTENSIN) 20 mg tablet take 1 tablet by mouth once daily 90 Tab 2    baclofen (LIORESAL) 20 mg tablet take 1 tablet by mouth three times a day if needed for muscle spasm OF SPINAL ORIGIN 40 Tab 1    rOPINIRole (REQUIP) 1 mg tablet Take 1.5 Tabs by mouth nightly. 45 Tab 5    GLUCOSAMINE/CHONDROITIN SULF A (GLUCOSAMINE-CHONDROITIN PO) Take  by mouth two (2) times a day.  melatonin 1 mg tablet Take 2 mg by mouth nightly.  cholecalciferol (VITAMIN D3) 1,000 unit tablet Take  by mouth daily.  ascorbic acid (VITAMIN C) 500 mg tablet Take  by mouth.  meloxicam (MOBIC) 7.5 mg tablet Take 7.5 mg by mouth two (2) times a day. Indications: OSTEOARTHRITIS  0    omeprazole (PRILOSEC) 40 mg capsule daily. 0    acetaminophen (TYLENOL EXTRA STRENGTH) 500 mg tablet Take  by mouth every six (6) hours as needed for Pain.  gabapentin (NEURONTIN) 300 mg capsule Take 300 mg by mouth every evening.  Dr. Chacha Hwang Indications: ESSENTIAL TREMOR, Restless Legs Syndrome      albuterol (PROVENTIL HFA, VENTOLIN HFA) 90 mcg/actuation inhaler Take 2 Puffs by inhalation every four (4) hours as needed for Wheezing or Shortness of Breath. Indications: BRONCHOSPASM PREVENTION 1 Inhaler 5       PAST MEDICAL HISTORY:    Past Medical History:   Diagnosis Date    AC (acromioclavicular) joint bone spurs 01/2014    in back. Dr. Anastasia Santos. Txd with shots x 3    Actinic keratosis 2015    Dr. Huang Camp. Dr. Paul Villalba  Allergic rhinitis, cause unspecified     Anxiety state, unspecified     Arthritis     R KNEE BONE-ON-BONE; R HAND-----OSTEO    Asthma     BRONCHITIS IN SPRING & FALL MOST YEARS    BCC (basal cell carcinoma), face 1980s (nose), 01/28/15 (forehead)    Dr. Joanna Duffy. Dr. Rhonda Harman.  Cataract 2007    Dr. Leopold Dakin    Chronic insomnia     Chronic low back pain 01/2014    Dr. Karina Smallwood. DJD and spurs, Narrowing of L 3,4,5. Dr. Anastasia Santos.  Chronic obstructive pulmonary disease ([facility] [address]     CHRONIC (TWICE-YEARLY) BRONCHITIS    Colon polyps 11/2005, 01/29/09    benign. Dr. Misti Vargas    Constipation     Degenerative lumbar spinal stenosis 01/2014    Dr. Kelin Granados Narrowing of L 3,4,5    Dyslipidemia     Dysphagia 09/2014    due to Esophagitis. Dr. Juvenal Troy.  Essential hypertension, benign     Foot fracture, left 2009    stress.  Foot pain, bilateral 11/13/2013    Dr. Elinor Bess.  GERD (gastroesophageal reflux disease) 09/29/2014    ESOPHAGITIS    Knee pain, bilateral 10/28/13    Dr. Ronald Glez. Right > Left. Severe OA. Txd with PT.    Lumbar stenosis with neurogenic claudication     Menopause 1976    Migraine     NONE SINCE AGE 37    Mixed stress and urge urinary incontinence     NO LEAKAGE; GETS UP SEVERAL TIMES A NIGHT, PT STATES ON 10/3/16    OA (osteoarthritis) of knee     Dr. Ronald Glez    Osteopenia 08/24/2015    Restless legs syndrome (RLS) 09/2015    Dr. Vanessa Polo.  Shoulder joint dislocation 06/2011    Right. due to fall. Dr. Sejal Watters Sleep apnea     undiagnosed    Syncope 06/25/11    due to fall down 5 steps. Right occipital head trauma.  Tremor of both hands 09/2015    Dr. Omi Gee GABAPENTIN       PAST SURGICAL HISTORY:    Past Surgical History:   Procedure Laterality Date    HX CATARACT REMOVAL Left 2007    Dr. Sujey Tinoco HX COLONOSCOPY  09/16/2015    Dr. Obrien Erps. due q 5 yrs    HX COLONOSCOPY  11/2005, 01/29/09    with polypectomy. Dr. Obrien Erps    HX GI  09/2014    due to dysphagia. Dr. Candida Salcedo. ESOPHAGEL DILATATION    HX GYN  2009? A&P REPAIR    HX LUMBAR LAMINECTOMY  10/18/2016    L3-S1 Dr. Lisbet Jiang  01/28/15    [address] L Forehead. [facility]. Dr. Dutta Needs.  HX RAIMUNDO AND BSO  1976    due to fibroids    HX TONSILLECTOMY  1948    MD COMBINED ANT/POST COLPORRHAPHY  02/28/05    with enterocele RE.   Dr. Eduar Fox and Dr. Mukul Leavitt:    Family History   Problem Relation Age of Onset    Hypertension Mother     Dementia Mother      alzheimers    Thyroid Disease Mother     Hypertension Father     High Cholesterol Father     Kidney Disease Father      1 kidney    Emphysema Father      O2 requiring    Hypertension Sister     Diabetes Maternal Grandmother     Dementia Maternal Grandmother     Thyroid Disease Maternal Grandmother     Thyroid Disease Daughter      goiter    Heart Attack Maternal Uncle 51     massive    Heart Attack Maternal Uncle 39     massive    Heart Attack Maternal Aunt 51     massive    Thyroid Disease Daughter      thyroid cyst    Anesth Problems Neg Hx        SOCIAL HISTORY:    Social History     Social History    Marital status:      Spouse name: N/A    Number of children: N/A    Years of education: N/A     Social History Main Topics    Smoking status: Never Smoker    Smokeless tobacco: Never Used    Alcohol use No    Drug use: No    Sexual activity: Yes     Partners: Male     Birth control/ protection: Surgical     Other Topics Concern    None     Social History Narrative       IMMUNIZATIONS:    Immunization History   Administered Date(s) Administered    Influenza High Dose Vaccine PF 10/29/2013, 09/20/2014, 09/02/2015, 10/07/2016    Pneumococcal Vaccine (Unspecified Type) 09/17/1997, 10/22/2004    TD Vaccine 09/07/2004    Zoster 01/21/2009         PHYSICAL EXAMINATION    Vital Signs    Visit Vitals    /74 (BP 1 Location: Right arm, BP Patient Position: Sitting)    Pulse 80    Temp 97.8 °F (36.6 °C) (Oral)    Resp 16    Ht 5' 5\" (1.651 m)    Wt 153 lb 11.2 oz (69.7 kg)    SpO2 97%    BMI 25.58 kg/m2       Weight Metrics 3/23/2017 3/7/2017 2/27/2017 11/3/2016 10/20/2016 10/18/2016 10/7/2016   Weight 153 lb 11.2 oz 155 lb 4.8 oz 153 lb 14.4 oz 158 lb 140 lb 153 lb 156 lb   BMI 25.58 kg/m2 25.84 kg/m2 25.61 kg/m2 26.29 kg/m2 23.3 kg/m2 27.1 kg/m2 27.63 kg/m2       General appearance - Well nourished. Well appearing. Well developed. No acute distress. Head - Normocephalic. Atraumatic. Eyes - pupils equal and reactive. Extraocular eye movements intact. Sclera anicteric. Mildly injected sclera. Ears - Hearing is grossly normal bilaterally. Mild TM retraction BL. Decreased red reflex on the R. Nose - normal and patent. No polyps noted. No erythema. No discharge. Mouth - mucous membranes with adequate moisture. Posterior pharynx normal with cobblestone appearance. No erythema, white exudate or obstruction. Neck - supple. Midline trachea. No carotid bruits noted bilaterally. No thyromegaly noted. Chest - clear to auscultation bilaterally anteriorly and posteriorly. No wheezes. No rales or rhonchi. Breath sounds are symmetrical bilaterally. Unlabored respirations.   Heart - normal rate. Regular rhythm. Normal S1, S2. No murmur noted. No rubs, clicks or gallops noted. Abdomen - soft and nondistended. No masses or organomegaly. No rebound, rigidity or guarding. Bowel sounds normal x 4 quadrants. No tenderness noted. Neurological - awake, alert and oriented to person, place, and time and event. Cranial nerves II through XII intact. Clear speech. Muscle strength is +5/5 x 4 extremities. Sensation is intact to light touch bilaterally. Steady gait. Heme/Lymph - peripheral pulses normal x 4 extremities. No peripheral edema is noted. Musculoskeletal - Intact x 4 extremities. Full ROM x 4 extremities. No pain with movement. Back exam - normal range of motion. No pain on palpation of the spinous processes in the cervical, thoracic, lumbar, sacral regions. No CVA tenderness. Skin - no rashes, erythema, ecchymosis, lacerations, abrasions, suspicious moles noted  Psychological -   normal behavior, dress and thought processes. Good insight. Good eye contact. Normal affect. Appropriate mood. Normal speech. DATA REVIEWED    Lab Results   Component Value Date/Time    WBC 5.9 10/07/2016 08:57 AM    HGB 13.0 10/07/2016 08:57 AM    HCT 39.9 10/07/2016 08:57 AM    PLATELET 056 16/09/2100 08:57 AM    MCV 90 10/07/2016 08:57 AM     Lab Results   Component Value Date/Time    Sodium 139 10/07/2016 08:57 AM    Potassium 4.8 10/07/2016 08:57 AM    Chloride 98 10/07/2016 08:57 AM    CO2 27 10/07/2016 08:57 AM    Anion gap 7 07/10/2014 02:13 PM    Glucose 112 10/07/2016 08:57 AM    BUN 23 10/07/2016 08:57 AM    Creatinine 0.91 10/07/2016 08:57 AM    BUN/Creatinine ratio 25 10/07/2016 08:57 AM    GFR est AA 71 10/07/2016 08:57 AM    GFR est non-AA 61 10/07/2016 08:57 AM    Calcium 9.3 10/07/2016 08:57 AM    Bilirubin, total 0.4 10/07/2016 08:57 AM    AST (SGOT) 18 10/07/2016 08:57 AM    Alk.  phosphatase 67 10/07/2016 08:57 AM    Protein, total 6.8 10/07/2016 08:57 AM Albumin 4.5 10/07/2016 08:57 AM    Globulin 3.4 07/10/2014 02:13 PM    A-G Ratio 2.0 10/07/2016 08:57 AM    ALT (SGPT) 15 10/07/2016 08:57 AM     Lab Results   Component Value Date/Time    Cholesterol, total 240 08/26/2016 10:52 AM    HDL Cholesterol 61 08/26/2016 10:52 AM    LDL, calculated 151 08/26/2016 10:52 AM    VLDL, calculated 28 08/26/2016 10:52 AM    Triglyceride 138 08/26/2016 10:52 AM    CHOL/HDL Ratio 3.9 09/10/2010 01:43 PM     Lab Results   Component Value Date/Time    Vitamin D 25-Hydroxy 23.7 09/28/2011 12:17 PM    VITAMIN D, 25-HYDROXY 41.1 02/26/2016 11:53 AM       Lab Results   Component Value Date/Time    Hemoglobin A1c 5.9 10/07/2016 08:57 AM     Lab Results   Component Value Date/Time    TSH 1.080 08/26/2016 10:52 AM    TSH 1.73 08/21/2014 09:00 AM       ASSESSMENT and PLAN      ICD-10-CM ICD-9-CM    1. Pre-op exam Z01.818 V72.84 AMB POC EKG ROUTINE W/ 12 LEADS, INTER & REP   2. Essential hypertension, benign I10 401.1     stable   3. Cataract H26.9 366.9 DUREZOL 0.05 % ophthalmic emulsion      ILEVRO 0.3 % drps      ofloxacin (FLOXIN) 0.3 % ophthalmic solution    Right   4. Mild intermittent asthma without complication A26.04 378.79     stable   5. Osteopenia M85.80 733.90    6. Hyperglycemia R73.9 790.29    7. Degenerative lumbar spinal stenosis M48.06 724.02    8. Lactose intolerance E73.9 271.3    9. Statin intolerance Z78.9 995.27    10. ACE-inhibitor cough R05 786.2     T46.4X5A E942.6        Discussed the patient's BMI with her. The BMI follow up plan is as follows: Pt not eligible for BMI calculation due to normal weight. Addressed weight, diet and exercise with patient. Decrease carbohydrates (white foods, sweet foods, sweet drinks and alcohol), increase green leafy vegetables and protein (lean meats and beans) with each meal.  Avoid fried foods. Eat 3-5 small meals daily. Do not skip meals. Increase water intake.     Increase physical activity to 30 minutes daily for health benefit or 60 minutes daily to prevent weight regain, as tolerated. Get 7-8 hours uninterrupted sleep nightly. Chart reviewed and updated. Pt cleared for surgery with acceptable risk. Pre-op paperwork completed. Original given to patient today and copied to the chart. SEE SCANNED DOCUMENT. Continue current medications and care. Most recent tests reviewed. Recheck pertinent labs today while fasting. Get recent office visit notes from Dr. Benedicto Baker. Advised pt to sign release. Counseled patient on health concerns:  Cataracts. Immunizations noted. Offered empathy, support, legitimation, prayers, partnership to patient. Praised patient for progress. Remind pt to complete ACP. Follow-up Disposition:  Return in about 6 months (around 9/23/2017) for bp check, results, referral follow up . Patient was offered a choice/choices in the treatment plan today. Patient expresses understanding of the plan and agrees with recommendations. Patient declines any additional handouts. Patient is satisfied with previous handouts received from our office    Written by allen Ahn, as dictated by Dr. Jonel Bravo DO. Documentation True and Accepted by Susana Grimm.  Parish Reyes.

## 2017-04-04 LAB
25(OH)D3+25(OH)D2 SERPL-MCNC: 46.3 NG/ML (ref 30–100)
ALBUMIN SERPL-MCNC: 4.1 G/DL (ref 3.5–4.8)
ALBUMIN/CREAT UR: <13.2 MG/G CREAT (ref 0–30)
ALBUMIN/GLOB SERPL: 1.8 {RATIO} (ref 1.2–2.2)
ALP SERPL-CCNC: 63 IU/L (ref 39–117)
ALT SERPL-CCNC: 16 IU/L (ref 0–32)
APPEARANCE UR: CLEAR
AST SERPL-CCNC: 17 IU/L (ref 0–40)
BILIRUB SERPL-MCNC: 0.4 MG/DL (ref 0–1.2)
BILIRUB UR QL STRIP: NEGATIVE
BUN SERPL-MCNC: 16 MG/DL (ref 8–27)
BUN/CREAT SERPL: 17 (ref 12–28)
CALCIUM SERPL-MCNC: 9.1 MG/DL (ref 8.7–10.3)
CHLORIDE SERPL-SCNC: 100 MMOL/L (ref 96–106)
CHOLEST SERPL-MCNC: 208 MG/DL (ref 100–199)
CO2 SERPL-SCNC: 23 MMOL/L (ref 18–29)
COLOR UR: YELLOW
CREAT SERPL-MCNC: 0.92 MG/DL (ref 0.57–1)
CREAT UR-MCNC: 22.8 MG/DL
ERYTHROCYTE [DISTWIDTH] IN BLOOD BY AUTOMATED COUNT: 14.3 % (ref 12.3–15.4)
EST. AVERAGE GLUCOSE BLD GHB EST-MCNC: 128 MG/DL
GLOBULIN SER CALC-MCNC: 2.3 G/DL (ref 1.5–4.5)
GLUCOSE SERPL-MCNC: 105 MG/DL (ref 65–99)
GLUCOSE UR QL: NEGATIVE
HBA1C MFR BLD: 6.1 % (ref 4.8–5.6)
HCT VFR BLD AUTO: 39.8 % (ref 34–46.6)
HDLC SERPL-MCNC: 53 MG/DL
HGB BLD-MCNC: 13.1 G/DL (ref 11.1–15.9)
HGB UR QL STRIP: NEGATIVE
INTERPRETATION, 910389: NORMAL
KETONES UR QL STRIP: NEGATIVE
LDLC SERPL CALC-MCNC: 125 MG/DL (ref 0–99)
LEUKOCYTE ESTERASE UR QL STRIP: NEGATIVE
MCH RBC QN AUTO: 29.3 PG (ref 26.6–33)
MCHC RBC AUTO-ENTMCNC: 32.9 G/DL (ref 31.5–35.7)
MCV RBC AUTO: 89 FL (ref 79–97)
MICRO URNS: NORMAL
MICROALBUMIN UR-MCNC: <3 UG/ML
NITRITE UR QL STRIP: NEGATIVE
PH UR STRIP: 6.5 [PH] (ref 5–7.5)
PLATELET # BLD AUTO: 341 X10E3/UL (ref 150–379)
POTASSIUM SERPL-SCNC: 4.2 MMOL/L (ref 3.5–5.2)
PROT SERPL-MCNC: 6.4 G/DL (ref 6–8.5)
PROT UR QL STRIP: NEGATIVE
RBC # BLD AUTO: 4.47 X10E6/UL (ref 3.77–5.28)
SODIUM SERPL-SCNC: 139 MMOL/L (ref 134–144)
SP GR UR: 1.01 (ref 1–1.03)
TRIGL SERPL-MCNC: 149 MG/DL (ref 0–149)
TSH SERPL DL<=0.005 MIU/L-ACNC: 1.38 UIU/ML (ref 0.45–4.5)
UROBILINOGEN UR STRIP-MCNC: 0.2 MG/DL (ref 0.2–1)
VLDLC SERPL CALC-MCNC: 30 MG/DL (ref 5–40)
WBC # BLD AUTO: 5.4 X10E3/UL (ref 3.4–10.8)

## 2017-04-20 RX ORDER — BACLOFEN 20 MG/1
TABLET ORAL
Qty: 40 TAB | Refills: 1 | Status: SHIPPED | OUTPATIENT
Start: 2017-04-20 | End: 2017-07-08 | Stop reason: SDUPTHER

## 2017-07-12 RX ORDER — BACLOFEN 20 MG/1
TABLET ORAL
Qty: 40 TAB | Refills: 1 | Status: SHIPPED | OUTPATIENT
Start: 2017-07-12 | End: 2017-10-13 | Stop reason: SDUPTHER

## 2017-07-25 DIAGNOSIS — Z86.2 HISTORY OF ANEMIA: ICD-10-CM

## 2017-07-25 DIAGNOSIS — G25.81 RESTLESS LEG SYNDROME: ICD-10-CM

## 2017-07-25 RX ORDER — ROPINIROLE 1 MG/1
TABLET, FILM COATED ORAL
Qty: 45 TAB | Refills: 5 | Status: SHIPPED | OUTPATIENT
Start: 2017-07-25 | End: 2018-02-27 | Stop reason: SDUPTHER

## 2017-10-13 ENCOUNTER — OFFICE VISIT (OUTPATIENT)
Dept: FAMILY MEDICINE CLINIC | Age: 77
End: 2017-10-13

## 2017-10-13 VITALS
HEART RATE: 87 BPM | HEIGHT: 65 IN | RESPIRATION RATE: 18 BRPM | OXYGEN SATURATION: 97 % | SYSTOLIC BLOOD PRESSURE: 91 MMHG | DIASTOLIC BLOOD PRESSURE: 62 MMHG | WEIGHT: 146.9 LBS | BODY MASS INDEX: 24.47 KG/M2 | TEMPERATURE: 97.1 F

## 2017-10-13 DIAGNOSIS — M85.88 OSTEOPENIA OF OTHER SITE: ICD-10-CM

## 2017-10-13 DIAGNOSIS — M81.0 POSTMENOPAUSAL BONE LOSS: ICD-10-CM

## 2017-10-13 DIAGNOSIS — Z90.710 S/P TAH-BSO (TOTAL ABDOMINAL HYSTERECTOMY AND BILATERAL SALPINGO-OOPHORECTOMY): ICD-10-CM

## 2017-10-13 DIAGNOSIS — R25.2 MUSCLE CRAMPS: ICD-10-CM

## 2017-10-13 DIAGNOSIS — H25.11 AGE-RELATED NUCLEAR CATARACT OF RIGHT EYE: ICD-10-CM

## 2017-10-13 DIAGNOSIS — E78.5 DYSLIPIDEMIA: ICD-10-CM

## 2017-10-13 DIAGNOSIS — Z13.820 OSTEOPOROSIS SCREENING: ICD-10-CM

## 2017-10-13 DIAGNOSIS — J45.20 MILD INTERMITTENT ASTHMA WITHOUT COMPLICATION: ICD-10-CM

## 2017-10-13 DIAGNOSIS — Z90.722 S/P TAH-BSO (TOTAL ABDOMINAL HYSTERECTOMY AND BILATERAL SALPINGO-OOPHORECTOMY): ICD-10-CM

## 2017-10-13 DIAGNOSIS — E55.9 VITAMIN D DEFICIENCY: ICD-10-CM

## 2017-10-13 DIAGNOSIS — Z86.010 HISTORY OF COLON POLYPS: ICD-10-CM

## 2017-10-13 DIAGNOSIS — G89.29 CHRONIC PAIN OF BOTH KNEES: ICD-10-CM

## 2017-10-13 DIAGNOSIS — M25.561 CHRONIC PAIN OF BOTH KNEES: ICD-10-CM

## 2017-10-13 DIAGNOSIS — Z78.9 STATIN INTOLERANCE: ICD-10-CM

## 2017-10-13 DIAGNOSIS — Z83.3 FAMILY HISTORY OF DIABETES MELLITUS: ICD-10-CM

## 2017-10-13 DIAGNOSIS — G89.29 INSOMNIA SECONDARY TO CHRONIC PAIN: ICD-10-CM

## 2017-10-13 DIAGNOSIS — Z00.00 MEDICARE ANNUAL WELLNESS VISIT, SUBSEQUENT: Primary | ICD-10-CM

## 2017-10-13 DIAGNOSIS — G25.81 RESTLESS LEGS SYNDROME (RLS): ICD-10-CM

## 2017-10-13 DIAGNOSIS — M48.061 DEGENERATIVE LUMBAR SPINAL STENOSIS: ICD-10-CM

## 2017-10-13 DIAGNOSIS — E16.1 HYPERINSULINEMIA: ICD-10-CM

## 2017-10-13 DIAGNOSIS — Z23 ENCOUNTER FOR IMMUNIZATION: ICD-10-CM

## 2017-10-13 DIAGNOSIS — I10 ESSENTIAL HYPERTENSION, BENIGN: ICD-10-CM

## 2017-10-13 DIAGNOSIS — R05.8 ACE-INHIBITOR COUGH: ICD-10-CM

## 2017-10-13 DIAGNOSIS — R73.9 HYPERGLYCEMIA: ICD-10-CM

## 2017-10-13 DIAGNOSIS — R63.4 WEIGHT LOSS: ICD-10-CM

## 2017-10-13 DIAGNOSIS — T46.4X5A ACE-INHIBITOR COUGH: ICD-10-CM

## 2017-10-13 DIAGNOSIS — G25.2 FINE TREMOR: ICD-10-CM

## 2017-10-13 DIAGNOSIS — G47.01 INSOMNIA SECONDARY TO CHRONIC PAIN: ICD-10-CM

## 2017-10-13 DIAGNOSIS — R23.3 EASY BRUISING: ICD-10-CM

## 2017-10-13 DIAGNOSIS — Z90.79 S/P TAH-BSO (TOTAL ABDOMINAL HYSTERECTOMY AND BILATERAL SALPINGO-OOPHORECTOMY): ICD-10-CM

## 2017-10-13 DIAGNOSIS — R29.898 BILATERAL LEG WEAKNESS: ICD-10-CM

## 2017-10-13 DIAGNOSIS — Z78.9 ASPIRIN INTOLERANCE: ICD-10-CM

## 2017-10-13 DIAGNOSIS — E11.9 DIABETES MELLITUS TYPE 2, DIET-CONTROLLED (HCC): ICD-10-CM

## 2017-10-13 DIAGNOSIS — Z82.49 FAMILY HISTORY OF HEART ATTACK: ICD-10-CM

## 2017-10-13 DIAGNOSIS — Z83.49 FAMILY HISTORY OF THYROID DISEASE: ICD-10-CM

## 2017-10-13 DIAGNOSIS — M25.562 CHRONIC PAIN OF BOTH KNEES: ICD-10-CM

## 2017-10-13 RX ORDER — BACLOFEN 20 MG/1
20 TABLET ORAL
Qty: 90 TAB | Refills: 1 | Status: SHIPPED | OUTPATIENT
Start: 2017-10-13 | End: 2018-04-30 | Stop reason: SDUPTHER

## 2017-10-13 NOTE — PROGRESS NOTES
Demi Lovett is a 68 y.o. female and presents for annual Medicare Wellness Visit. Problem List: Reviewed with patient and discussed risk factors.     Patient Active Problem List   Diagnosis Code    Allergic rhinitis, cause unspecified J30.9    Menopause Z78.0    Essential hypertension, benign I10    Fine tremor G25.2    Dysphagia R13.10    Actinic keratosis L57.0    Family history of thyroid disease Z80.51    Family history of diabetes mellitus Z83.3    Family history of heart attack Z82.49    Dyslipidemia E78.5    Vitamin D deficiency E55.9    Hyperglycemia R73.9    Asthma J45.909    Hyperinsulinemia E16.1    Muscle cramps R25.2    Degenerative lumbar spinal stenosis M48.061    Personal history of colonic polyps Z86.010    Lactose intolerance E73.9    Restless legs syndrome (RLS) G25.81    Chronic pain of both knees M25.561, M25.562, G89.29    Osteopenia M85.80    Statin intolerance Z78.9    Insomnia secondary to chronic pain G89.29, G47.01    Advance care planning Z71.89    Family history of thyroid disease in grandmother Z80.51   Arley eTllo Jerky body movements R25.8    Lumbar stenosis with neurogenic claudication M48.062    ACE-inhibitor cough R05, T46.4X5A    Diabetes mellitus type 2, diet-controlled ([address] E11.9       Current medical providers:  Patient Care Team:  Case Pickett DO as PCP - General  Becca Chapa MD as Physician (Radiology)  Carla Keating MD (Pain Management)  Maribell Cevallos MD (Dermatology)  Corina Monteiro MD (Neurology)  Caden Frances MD (Colon and Rectal Surgery)  Asha Goins MD (Otolaryngology)  Kaitlin Cat MD (Orthopedic Surgery)  Aydin Burgos MD (Ophthalmology)  Jeff Sapp MD (Gastroenterology)    PSH: Reviewed with patient  Past Surgical History:   Procedure Laterality Date    HX CATARACT REMOVAL Bilateral , R 04/10/17    Dr. Aydin Burgos    HX COLONOSCOPY  09/16/2015    Dr. Erica Winter Rosalio Mohs. due q 5 yrs    HX COLONOSCOPY  11/2005, 01/29/09    with polypectomy. Dr. Tesha Cole    HX GI  09/2014    due to dysphagia. Dr. Elizabeth Joel. ESOPHAGEL DILATATION    HX GYN  2009? A&P REPAIR    HX LUMBAR LAMINECTOMY  10/18/2016    L3-S1 Dr. Jorge L Butler  01/28/15    [address]  Forehead. MOHs SURGERY. Dr. Rock Garcia.  HX RAIMUNDO AND BSO  1976    due to fibroids    HX TONSILLECTOMY  1948    CT COMBINED ANT/POST COLPORRHAPHY  02/28/05    with enterocele RE. Dr. Shweta Florian and Dr. Chiki Ribeiro        SH: Reviewed with patient  Social History   Substance Use Topics    Smoking status: Never Smoker    Smokeless tobacco: Never Used    Alcohol use No       FH: Reviewed with patient  Family History   Problem Relation Age of Onset    Hypertension Mother     Dementia Mother      alzheimers    Thyroid Disease Mother     Hypertension Father     High Cholesterol Father     Kidney Disease Father      1 kidney    Emphysema Father      O2 requiring    Hypertension Sister     Diabetes Maternal Grandmother     Dementia Maternal Grandmother     Thyroid Disease Maternal Grandmother     Thyroid Disease Daughter      goiter    Heart Attack Maternal Uncle 46     massive    Heart Attack Maternal Uncle 39     massive    Heart Attack Maternal Aunt 51     massive    Thyroid Disease Daughter      thyroid cyst    Anesth Problems Neg Hx        Medications/Allergies: Reviewed with patient  Current Outpatient Prescriptions on File Prior to Visit   Medication Sig Dispense Refill    rOPINIRole (REQUIP) 1 mg tablet take 1 and 1/2 tablets by mouth NIGHTLY 45 Tab 5    amLODIPine (NORVASC) 5 mg tablet Take 1 Tab by mouth daily. Indications: hypertension 90 Tab 3    benazepril (LOTENSIN) 20 mg tablet take 1 tablet by mouth once daily 90 Tab 2    melatonin 1 mg tablet Take 3 mg by mouth nightly.       omeprazole (PRILOSEC) 40 mg capsule Take 40 mg by mouth daily. 3 times weekly  0    gabapentin (NEURONTIN) 300 mg capsule Take 300 mg by mouth every evening. Dr. Ashly Davenport   Indications: ESSENTIAL TREMOR, Restless Legs Syndrome      albuterol (PROVENTIL VENTOLIN) 2.5 mg /3 mL (0.083 %) nebulizer solution 1.5 mL by Nebulization route every four (4) hours as needed for Wheezing. 24 Each 3    budesonide (PULMICORT) 0.25 mg/2 mL nbsp 2 mL by Nebulization route two (2) times a day. 24 Each 3    fluticasone (FLONASE) 50 mcg/actuation nasal spray 2 Sprays by Both Nostrils route daily. 1 Bottle 5    montelukast (SINGULAIR) 10 mg tablet Take 1 Tab by mouth daily. Indications: ALLERGIC RHINITIS, MAINTENANCE THERAPY FOR ASTHMA 30 Tab 11    acetaminophen (TYLENOL EXTRA STRENGTH) 500 mg tablet Take  by mouth every six (6) hours as needed for Pain.  albuterol (PROVENTIL HFA, VENTOLIN HFA) 90 mcg/actuation inhaler Take 2 Puffs by inhalation every four (4) hours as needed for Wheezing or Shortness of Breath. Indications: BRONCHOSPASM PREVENTION 1 Inhaler 5     No current facility-administered medications on file prior to visit.        Allergies   Allergen Reactions    Other Food Itching     IF EATS IN QUANTITY OR ACROSS A FEW DAYS  TOMATOES    Bee Sting [Sting, Bee] Swelling    Fig Itching    Pcn [Penicillins] Hives     IN CHILDHOOD    Peach (Prunus Persica) Itching    Shellfish Derived Itching    Strawberry Itching    Zostavax [Zoster Vaccine Live (Pf)] Swelling     Severe Right arm swelling with redness after administered by pharmacist in elbow    Atarax [Hydroxyzine Hcl] Other (comments)     jittery    Buspar [Buspirone] Nausea Only    Crestor [Rosuvastatin] Myalgia     Body cramps    Epinephrine Palpitations    Hydrochlorothiazide (Bulk) Myalgia     sorethroat    Norco [Hydrocodone-Acetaminophen] Nausea and Vomiting     5/325 MG    Percocet [Oxycodone-Acetaminophen] Other (comments)     \"hellish nightmares\"    Zestril [Lisinopril] Cough Objective:  Visit Vitals    BP 91/62    Pulse 87    Temp 97.1 °F (36.2 °C) (Oral)    Resp 18    Ht 5' 5\" (1.651 m)    Wt 146 lb 14.4 oz (66.6 kg)    SpO2 97%    BMI 24.45 kg/m2    Body mass index is 24.45 kg/(m^2). Assessment of cognitive impairment: Alert and oriented x 4    Depression Screen:   PHQ over the last two weeks 10/13/2017   Little interest or pleasure in doing things Not at all   Feeling down, depressed or hopeless Not at all   Total Score PHQ 2 0       Fall Risk Assessment:    Fall Risk Assessment, last 12 mths 10/13/2017   Able to walk? Yes   Fall in past 12 months? Yes   Fall with injury? No   Number of falls in past 12 months 1   Fall Risk Score 1   SEAT FOLDED UNDER HER AT A RETREAT WHILE SITTING DOWN.  R KNEE HURT. BETTER NOW. Functional Ability:   Does the patient exhibit a steady gait? No.  NOTICES LEG WEAKNESS WITH STAIRCLIMBING. DENIES ORTHO OR PT REFERRAL TODAY. How long did it take the patient to get up and walk from a sitting position? INSTANTLY AND WITHOUT ASSISTANCE   Is the patient self reliant?  (ie can do own laundry, meals, household chores)  yes     Does the patient handle his/her own medications? yes     Does the patient handle his/her own money? yes     Is the patients home safe (ie good lighting, handrails on stairs and bath, etc.)? yes     Did you notice or did patient express any hearing difficulties? Yes. HAD A HEARING TEST THAT SHOWED DECREASE ON THE R.  NO HEARING AIDS RECOMMENDED. Did you notice or did patient express any vision difficulties?   no     Were distance and reading eye charts used? no       Advance Care Planning:   Patient was offered the opportunity to discuss advance care planning:  yes     Does patient have an Advance Directive:  no   If no, did you provide information on [facility]? Yes. ACCEPTS [place]. Plan:      ICD-10-CM ICD-9-CM    1.  Medicare annual wellness visit, subsequent Z00.00 V70.0    2. Essential hypertension, benign I10 401.1     with low readings    3. Diabetes mellitus type 2, diet-controlled (HCC) E11.9 250.00 HEMOGLOBIN A1C WITH EAG   4. Dyslipidemia E78.5 272.4 LIPID PANEL   5. Hyperglycemia R73.9 790.29    6. Weight loss R63.4 783.21     9# since 03/2017 due to reduced sweets, increased vegetables, less meat, eating earlier, sleeping longer   7. Restless legs syndrome (RLS) G25.81 333.94    8. Mild intermittent asthma without complication I68.05 405.29 Nebulizer Accessories misc    stable worse during Spring and Fall   9. Vitamin D deficiency E55.9 268.9    10. Hyperinsulinemia E16.1 251.1    11. Muscle cramps R25.2 729.82 baclofen (LIORESAL) 20 mg tablet   12. Osteopenia of other site M85.88 733.90 DEXA BONE DENSITY STUDY AXIAL   13. Statin intolerance Z78.9 995.27    14. Insomnia secondary to chronic pain G89.29 338.29     G47.01 327.01     in BL legs and feet at night due to cramping   15. Aspirin intolerance Z78.9 995.27      E935.3    16. Easy bruising R23.8 782.9     improved since stopped baby ASA daily   17. Family history of thyroid disease Z83.49 V18.19    18. Family history of diabetes mellitus Z83.3 V18.0    23. Family history of heart attack Z82.49 V17.3    20. ACE-inhibitor cough R05 786.2     T46.4X5A E942.6     Lisinopril   21. Fine tremor G25.2 781.0     unchanged on Neurontin 300 mg q hs   22. Degenerative lumbar spinal stenosis M48.061 724.02 baclofen (LIORESAL) 20 mg tablet   23. Bilateral leg weakness R29.898 729.89     due to back vs deconditioning vs other   24. S/P RAIMUNDO-BSO (total abdominal hysterectomy and bilateral salpingo-oophorectomy) Z90.710 V88.01     Z90.722 V45.77     Z90.79     25. Age-related nuclear cataract of right eye H25.11 366.16     Resolved since surgery 04/10/17   26. Postmenopausal bone loss M81.0 733.01 DEXA BONE DENSITY STUDY AXIAL   27. Osteoporosis screening Z13.820 V82.81 DEXA BONE DENSITY STUDY AXIAL   28.  History of colon polyps Z86.010 V12.72    29. Chronic pain of both knees M25.561 719.46     M25.562 338.29     G89.29      R>L   30. Encounter for immunization Z23 V03.89 INFLUENZA VIRUS VACCINE, HIGH DOSE SEASONAL, PRESERVATIVE FREE      PNEUMOCOCCAL CONJ VACCINE 13 VALENT IM      NE IMMUNIZ ADMIN,1 SINGLE/COMB VAC/TOXOID       Orders Placed This Encounter    DEXA BONE DENSITY STUDY AXIAL    LIPID PANEL    HEMOGLOBIN A1C WITH EAG    Nebulizer Accessories misc    baclofen (LIORESAL) 20 mg tablet       Health Maintenance   Topic Date Due    GLAUCOMA SCREENING Q2Y  02/16/2018 (Originally 6/20/2005)    BREAST CANCER SCRN MAMMOGRAM  09/13/2018    MEDICARE YEARLY EXAM  10/14/2018    COLONOSCOPY  09/16/2020    DTaP/Tdap/Td series (2 - Td) 08/26/2026    OSTEOPOROSIS SCREENING (DEXA)  Completed    ZOSTER VACCINE AGE 60>  Completed    Pneumococcal 65+ Low/Medium Risk  Completed    INFLUENZA AGE 9 TO ADULT  Completed       *Patient verbalized understanding and agreement with the plan. A copy of the After Visit Summary with personalized health plan was given to the patient today.

## 2017-10-13 NOTE — ACP (ADVANCE CARE PLANNING)
Re-discussed ACP with patient. Gave her an [facility] folder. Accepts referral to [facility] Choices team at this time.   Staff message sent to [facility]. team.

## 2017-10-13 NOTE — MR AVS SNAPSHOT
Visit Information Date & Time Provider Department Dept. Phone Encounter #  
 10/13/2017 11:00 AM DO Aj Vidal [phone] [id number] Follow-up Instructions Return in about 6 months (around 4/13/2018) for bp check, DM, results . Routing History Upcoming Health Maintenance Date Due  
 EYE EXAM RETINAL OR DILATED Q1 6/20/1950 FOOT EXAM Q1 3/1/2011 HEMOGLOBIN A1C Q6M 10/3/2017 GLAUCOMA SCREENING Q2Y 2/16/2018* MICROALBUMIN Q1 4/3/2018 LIPID PANEL Q1 4/3/2018 BREAST CANCER SCRN MAMMOGRAM 9/13/2018 MEDICARE YEARLY EXAM 10/14/2018 COLONOSCOPY 9/16/2020 DTaP/Tdap/Td series (2 - Td) 8/26/2026 *Topic was postponed. The date shown is not the original due date. Allergies as of 10/13/2017  Review Complete On: 10/13/2017 By: Tesha Pratt Severity Noted Reaction Type Reactions Other Food High 10/03/2016    Itching IF EATS IN QUANTITY OR ACROSS A FEW DAYS 
TOMATOES Bee Sting [Sting, Bee] High 09/10/2010    Swelling Fig High 10/03/2016    Itching Pcn [Penicillins] High 10/20/2009    Hives IN CHILDHOOD Peach (Prunus Persica) High 10/03/2016    Itching Shellfish Derived High 10/03/2016    Itching Enders High 10/03/2016    Itching Zostavax [Zoster Vaccine Live (Pf)] High 09/10/2010    Swelling Severe Right arm swelling with redness after administered by pharmacist in elbow Atarax [Hydroxyzine Hcl]  10/20/2009    Other (comments)  
 jittery Buspar [Buspirone]  10/20/2009    Nausea Only Crestor [Rosuvastatin]  09/10/2010    Myalgia Body cramps Epinephrine  10/20/2009    Palpitations Hydrochlorothiazide (Bulk)  10/20/2009    Myalgia  
 sorethroat Norco [Hydrocodone-acetaminophen]  02/27/2017    Nausea and Vomiting 5/325 MG Percocet [Oxycodone-acetaminophen]  10/20/2009    Other (comments) \"hellish nightmares\" Zestril [Lisinopril]  10/20/2009    Cough Current Immunizations  Reviewed on 10/13/2017 Name Date Influenza High Dose Vaccine PF 10/7/2016, 9/2/2015, 9/20/2014, 10/29/2013 TD Vaccine 9/7/2004 ZZZ-RETIRED (DO NOT USE) Pneumococcal Vaccine (Unspecified Type) 10/22/2004, 9/17/1997 Zoster 1/21/2009 Reviewed by Jennifer Gamino DO on 10/13/2017 at 11:42 AM  
You Were Diagnosed With   
  
 Codes Comments Medicare annual wellness visit, subsequent    -  Primary ICD-10-CM: Z00.00 ICD-9-CM: V70.0 Essential hypertension, benign     ICD-10-CM: I10 
ICD-9-CM: 401.1 with low readings Diabetes mellitus type 2, diet-controlled ([facility] [address]     ICD-10-CM: E11.9 ICD-9-CM: 250.00 Dyslipidemia     ICD-10-CM: E78.5 ICD-9-CM: 272.4 Hyperglycemia     ICD-10-CM: R73.9 ICD-9-CM: 790.29 Weight loss     ICD-10-CM: R63.4 ICD-9-CM: 783.21 9# since 03/2017 due to reduced sweets, increased vegetables, less meat, eating earlier, sleeping longer Restless legs syndrome (RLS)     ICD-10-CM: G25.81 ICD-9-CM: 333.94 Mild intermittent asthma without complication     VPZ-73-YG: J45.20 ICD-9-CM: 493.90 stable worse during Spring and Fall Vitamin D deficiency     ICD-10-CM: E55.9 ICD-9-CM: 268.9 Hyperinsulinemia     ICD-10-CM: E16.1 ICD-9-CM: 251.1 Muscle cramps     ICD-10-CM: R25.2 ICD-9-CM: 729.82 Osteopenia of other site     ICD-10-CM: M85.88 ICD-9-CM: 733.90 Statin intolerance     ICD-10-CM: Z78.9 ICD-9-CM: 995.27 Insomnia secondary to chronic pain     ICD-10-CM: G89.29, G47.01 
ICD-9-CM: 338.29, 327.01 in BL legs and feet at night due to cramping Aspirin intolerance     ICD-10-CM: Z78.9 ICD-9-CM: 995.27, E935.3 Easy bruising     ICD-10-CM: R23.8 ICD-9-CM: 782.9 improved since stopped baby ASA daily Family history of thyroid disease     ICD-10-CM: Z83.49 
ICD-9-CM: V18.19 Family history of diabetes mellitus     ICD-10-CM: Z83.3 ICD-9-CM: V18.0 Family history of heart attack     ICD-10-CM: Z82.49 
ICD-9-CM: V17.3 ACE-inhibitor cough     ICD-10-CM: R05, T46.4X5A 
ICD-9-CM: 786.2, E942.6 Lisinopril Fine tremor     ICD-10-CM: G25.2 ICD-9-CM: 781.0 unchanged on Neurontin 300 mg q hs Degenerative lumbar spinal stenosis     ICD-10-CM: M48.061 
ICD-9-CM: 724.02 Bilateral leg weakness     ICD-10-CM: R29.898 ICD-9-CM: 729.89 due to back vs deconditioning vs other S/P RAIMUNDO-BSO (total abdominal hysterectomy and bilateral salpingo-oophorectomy)     ICD-10-CM: Z90.710, Z90.722, Z90.79 ICD-9-CM: V88.01, V45.77 Age-related nuclear cataract of right eye     ICD-10-CM: H25.11 ICD-9-CM: 366.16 Resolved since surgery 04/10/17 Postmenopausal bone loss     ICD-10-CM: M81.0 ICD-9-CM: 733.01 Osteoporosis screening     ICD-10-CM: Z13.820 ICD-9-CM: V82.81 History of colon polyps     ICD-10-CM: Z86.010 
ICD-9-CM: V12.72 Chronic pain of both knees     ICD-10-CM: M25.561, M25.562, G89.29 ICD-9-CM: 719.46, 338.29 R>L Vitals BP Pulse Temp Resp Height(growth percentile) Weight(growth percentile) 91/62 87 97.1 °F (36.2 °C) (Oral) 18 5' 5\" (1.651 m) 146 lb 14.4 oz (66.6 kg) SpO2 BMI OB Status Smoking Status 97% 24.45 kg/m2 Hysterectomy Never Smoker Vitals History BMI and BSA Data Body Mass Index Body Surface Area  
 24.45 kg/m 2 1.75 m 2 Preferred Pharmacy Pharmacy Name Phone [phone] [phone] Your Updated Medication List  
  
   
This list is accurate as of: 10/13/17 12:19 PM.  Always use your most recent med list.  
  
  
  
  
 * albuterol 90 mcg/actuation inhaler Commonly known as:  PROVENTIL HFA, VENTOLIN HFA, PROAIR HFA Take 2 Puffs by inhalation every four (4) hours as needed for Wheezing or Shortness of Breath. Indications: BRONCHOSPASM PREVENTION  
  
 * albuterol 2.5 mg /3 mL (0.083 %) nebulizer solution Commonly known as:  PROVENTIL VENTOLIN  
1.5 mL by Nebulization route every four (4) hours as needed for Wheezing. amLODIPine 5 mg tablet Commonly known as:  Cecilia Croon Take 1 Tab by mouth daily. Indications: hypertension  
  
 baclofen 20 mg tablet Commonly known as:  LIORESAL Take 1 Tab by mouth nightly. Indications: Muscle Spasticity of Spinal Origin  
  
 benazepril 20 mg tablet Commonly known as:  LOTENSIN  
take 1 tablet by mouth once daily  
  
 budesonide 0.25 mg/2 mL Nbsp Commonly known as:  PULMICORT  
2 mL by Nebulization route two (2) times a day. fluticasone 50 mcg/actuation nasal spray Commonly known as:  Melodie Serene 2 Sprays by Both Nostrils route daily. melatonin 1 mg tablet Take 3 mg by mouth nightly. montelukast 10 mg tablet Commonly known as:  SINGULAIR Take 1 Tab by mouth daily. Indications: ALLERGIC RHINITIS, MAINTENANCE THERAPY FOR ASTHMA [address] Please dispense 1 nebulizer kit and accessories  approved by patient's insurance company NEURONTIN 300 mg capsule Generic drug:  gabapentin Take 300 mg by mouth every evening. Dr. Lakia Tian   Indications: ESSENTIAL TREMOR, Restless Legs Syndrome  
  
 omeprazole 40 mg capsule Commonly known as:  PRILOSEC Take 40 mg by mouth daily. 3 times weekly  
  
 rOPINIRole 1 mg tablet Commonly known as:  REQUIP  
take 1 and 1/2 tablets by mouth NIGHTLY  
  
 TYLENOL EXTRA STRENGTH 500 mg tablet Generic drug:  acetaminophen Take  by mouth every six (6) hours as needed for Pain. * Notice: This list has 2 medication(s) that are the same as other medications prescribed for you. Read the directions carefully, and ask your doctor or other care provider to review them with you. Prescriptions Sent to Pharmacy Refills Nebulizer Accessories misc 0 Sig: Please dispense 1 nebulizer kit and accessories  approved by patient's insurance company Class: Normal  
 [facility]acy: [id number] [address], [facility] Ph #: [phone]  
 baclofen (LIORESAL) 20 mg tablet 1 Sig: Take 1 Tab by mouth nightly. Indications: Muscle Spasticity of Spinal Origin Class: Normal  
 Pharmacy: [id number] [address], [place] [address] Ph #: [phone] Route: Oral  
  
We Performed the Following HEMOGLOBIN A1C WITH EAG [61034 CPT(R)] LIPID PANEL [21396 CPT(R)] Follow-up Instructions Return in about 6 months (around 4/13/2018) for bp check, DM, results . To-Do List   
 10/13/2017 Imaging:  DEXA BONE DENSITY STUDY AXIAL Patient Instructions Stop Amlodipine 5 mg but continue taking Benazepril 20 mg daily. Increase your water intake to 4 bottles or about 64 oz daily. This may help your muscle cramps. Diet for a Healthy Bladder: Care Instructions Your Care Instructions You can help your bladder stay healthy. Drink lots of water and eat a healthy diet. This may help prevent urinary tract infections and other bladder problems. Follow-up care is a key part of your treatment and safety. Be sure to make and go to all appointments, and call your doctor if you are having problems. It's also a good idea to know your test results and keep a list of the medicines you take. How can you care for yourself at home? · Drink plenty of water or other drinks that do not have alcohol. This will help flush bacteria from your bladder. If you have kidney, heart, or liver disease and have to limit fluids, talk with your doctor before you increase the amount of fluids you drink. · Limit or avoid alcohol. Alcohol can irritate the bladder. For some people, caffeine (found in coffee, tea, and cola drinks) also can irritate the bladder. · Avoid constipation. This can help some people who have a problem with an urgent need to urinate a lot. ¨ Include fruits, vegetables, cooked dry beans, and whole grains in your diet each day. These foods are high in fiber. ¨ Get at least 30 minutes of physical activity on most days of the week. Walking is a good choice. You also may want to do other activities, such as running, swimming, cycling, or playing tennis or team sports. ¨ Take a fiber supplement, such as Citrucel or Metamucil, every day if needed. Read and follow all instructions on the label. ¨ Schedule time each day for a bowel movement. Having a daily routine may help. Take your time and do not strain when having your bowel movement. Where can you learn more? Go to http://newton-briana.info/. Enter D477 in the search box to learn more about \"Diet for a Healthy Bladder: Care Instructions. \" Current as of: May 4, 2017 Content Version: 11.3 © 7417-6748 Movable[employer]k. Care instructions adapted under license by [employer] (which disclaims liability or warranty for this information). If you have questions about a medical condition or this instruction, always ask your healthcare professional. [address] any warranty or liability for your use of this information. Bladder Training: Care Instructions Your Care Instructions Bladder training is used to treat urge incontinence and stress incontinence. Urge incontinence means that the need to urinate comes on so fast that you can't get to a toilet in time. Stress incontinence means that you leak urine because of pressure on your bladder. For example, it may happen when you laugh, cough, or lift something heavy. Bladder training can increase how long you can wait before you have to urinate. It can also help your bladder hold more urine. And it can give you better control over the urge to urinate.  
It is important to remember that bladder training takes a few weeks to a few months to make a difference. You may not see results right away, but don't give up. Follow-up care is a key part of your treatment and safety. Be sure to make and go to all appointments, and call your doctor if you are having problems. It's also a good idea to know your test results and keep a list of the medicines you take. How can you care for yourself at home? Work with your doctor to come up with a bladder training program that is right for you. You may use one or more of the following methods. Delayed urination · In the beginning, try to keep from urinating for 5 minutes after you first feel the need to go. · While you wait, take deep, slow breaths to relax. Kegel exercises can also help you delay the need to go to the bathroom. · After some practice, when you can easily wait 5 minutes to urinate, try to wait 10 minutes before you urinate. · Slowly increase the waiting period until you are able to control when you have to urinate. Scheduled urination · Empty your bladder when you first wake up in the morning. · Schedule times throughout the day when you will urinate. · Start by going to the bathroom every hour, even if you don't need to go. · Slowly increase the time between trips to the bathroom. · When you have found a schedule that works well for you, keep doing it. · If you wake up during the night and have to urinate, do it. Apply your schedule to waking hours only. Kegel exercises These tighten and strengthen pelvic muscles, which can help you control the flow of urine. To do Kegel exercises: 
· Squeeze the same muscles you would use to stop your urine. Your belly and thighs should not move. · Hold the squeeze for 3 seconds, and then relax for 3 seconds. · Start with 3 seconds. Then add 1 second each week until you are able to squeeze for 10 seconds. · Repeat the exercise 10 to 15 times a session. Do three or more sessions a day. When should you call for help? Watch closely for changes in your health, and be sure to contact your doctor if: 
· Your incontinence is getting worse. · You do not get better as expected. Where can you learn more? Go to http://newton-briana.info/. Enter X306 in the search box to learn more about \"Bladder Training: Care Instructions. \" Current as of: August 12, 2016 Content Version: 11.3 © 8704-4844 [employer]hot. Care instructions adapted under license by [employer] (which disclaims liability or warranty for this information). If you have questions about a medical condition or this instruction, always ask your healthcare professional. [place] [address] any warranty or liability for your use of this information. Resistance Training With Surgical Tubing: Exercises Your Care Instructions Here are some examples of exercises for resistance training. Start each exercise slowly. Ease off the exercise if you start to have pain. Your doctor or physical therapist will tell you when you can start these exercises and which ones will work best for you. How to do the exercises Side pull 1. Raise both arms overhead, palms of your hands facing forward. 2. Pull one arm down and to the side, bending your elbow as shown, and hold. 3. Slowly reach up again. Repeat with the other arm. 4. Repeat 8 to 12 times with each hand. 5. Rest for a minute, and repeat the exercise. Overhead pull 1. Raise both arms overhead, palms of your hands facing forward. 2. Tighten the tubing by slowly pulling both arms away from center, and hold. 3. Slowly return to the starting position with your arms straight up. 4. Repeat 8 to 12 times. 5. Rest for a minute, and repeat the exercise. Up-down pull 1. Raise both arms overhead. 2. Bend your elbows so that they are shoulder height, and hold the stretched tubing behind or in front of your head. 3. Slowly return to the starting position with your arms straight up. 4. Repeat 8 to 12 times. 5. Rest for a minute, and repeat the exercise. Chest-level pull 1. Raise your arms in front of you to chest level. Your elbows will be bent and held up at about shoulder height. 2. Pull your hands apart, stretching the tubing, and hold. Try to keep your hands up at your chest level, and do not pull your shoulders up toward your ears. 3. Slowly return to your starting position. 4. Repeat 8 to 12 times. 5. Rest for a minute, and repeat the exercise. Hip-level pull 1. Hold your hands at the level of your hips, or near your lap if you are sitting down. 2. Pull your hands apart, stretching the tubing, and hold. 3. Slowly return to your starting position. 4. Repeat 8 to 12 times. 5. Rest for a minute, and repeat the exercise. Follow-up care is a key part of your treatment and safety. Be sure to make and go to all appointments, and call your doctor if you are having problems. It's also a good idea to know your test results and keep a list of the medicines you take. Where can you learn more? Go to http://newton-briana.info/. Enter D991 in the search box to learn more about \"Resistance Training With Surgical Tubing: Exercises. \" Current as of: March 13, 2017 Content Version: 11.3 © 7282-6803 [employer]. Care instructions adapted under license by [employer] (which disclaims liability or warranty for this information). If you have questions about a medical condition or this instruction, always ask your healthcare professional. [address] any warranty or liability for your use of this information. Resistance Training With Free Weights: Exercises Your Care Instructions Here are some examples of exercises for resistance training. Start each exercise slowly. Ease off the exercise if you start to have pain. Your doctor or physical therapist will tell you when you can start these exercises and which ones will work best for you. How to do the exercises Chest fly 6. Lie on a bench or exercise ball, and hold the weights straight up over your chest. Do not lock your elbows. You can keep them slightly bent if that is comfortable for you. 7. Slowly lower your arms, keeping them extended, until the weights are level with your chest, or slightly lower. 8. Slowly raise your arms until you are in the starting position. 9. Repeat 8 to 12 times. 10. Rest for a minute, and repeat the exercise. Lateral raise for the outer part of the shoulder (deltoid) 6. Stand with your feet shoulder-width apart and your knees slightly bent. 7. Bend your arms 90 degrees with your elbows at hip level. With your palms facing in, hold the weights straight in front of you. 8. Slowly lift the weights and your elbows out to the sides to shoulder level, keeping your elbows bent. Keep your shoulders down and relaxed as you lift. If you find you are shrugging your shoulders up toward your ears, your weights may be too heavy. 9. Slowly lower the weights back to your sides. 10. Repeat 8 to 12 times. 11. Rest for a minute, and repeat the exercise. Biceps curls 6. Sit leaning forward with your legs slightly spread and your left hand on your left thigh. 7. Hold the weight in your right hand, and place your right elbow on your right thigh. 8. Slowly curl the weight up and toward your chest. 
9. Slowly lower the weight to the original position. 10. Repeat 8 to 12 times. 11. Rest for a minute, and repeat the exercise. 12. Do the same exercise with your other arm. Follow-up care is a key part of your treatment and safety. Be sure to make and go to all appointments, and call your doctor if you are having problems. It's also a good idea to know your test results and keep a list of the medicines you take. Where can you learn more? Go to http://newton-briana.info/. Enter Q855 in the search box to learn more about \"Resistance Training With Free Weights: Exercises. \" Current as of: March 13, 2017 Content Version: 11.3 © 7812-2557 Serious Parody. Care instructions adapted under license by [employer] (which disclaims liability or warranty for this information). If you have questions about a medical condition or this instruction, always ask your healthcare professional. [address] any warranty or liability for your use of this information. Knee: Exercises Your Care Instructions Here are some examples of exercises for your knee. Start each exercise slowly. Ease off the exercise if you start to have pain. Your doctor or physical therapist will tell you when you can start these exercises and which ones will work best for you. How to do the exercises Flexuspine 11. Sit with your leg straight and supported on the floor or a firm bed. (If you feel discomfort in the front or back of your knee, place a small towel roll under your knee.) 12. Tighten the muscles on top of your thigh by pressing the back of your knee flat down to the floor. (If you feel discomfort under your kneecap, place a small towel roll under your knee.) 13. Hold for about 6 seconds, then rest for up to 10 seconds. 14. Do 8 to 12 repetitions several times a day. Straight-leg raises to the front 12. Lie on your back with your good knee bent so that your foot rests flat on the floor. Your injured leg should be straight. Make sure that your low back has a normal curve. You should be able to slip your flat hand in between the floor and the small of your back, with your palm touching the floor and your back touching the back of your hand. 15. Tighten the thigh muscles in the injured leg by pressing the back of your knee flat down to the floor. Hold your knee straight. 14. Keeping the thigh muscles tight, lift your injured leg up so that your heel is about 12 inches off the floor. Hold for about 6 seconds and then lower slowly. 15. Do 8 to 12 repetitions, 3 times a day. Straight-leg raises to the outside 13. Lie on your side, with your injured leg on top. 15. Tighten the front thigh muscles of your injured leg to keep your knee straight. 15. Keep your hip and your leg straight in line with the rest of your body, and keep your knee pointing forward. Do not drop your hip back. 16. Lift your injured leg straight up toward the ceiling, about 12 inches off the floor. Hold for about 6 seconds, then slowly lower your leg. 17. Do 8 to 12 repetitions. Straight-leg raises to the back 6. Lie on your stomach, and lift your leg straight up behind you (toward the ceiling). 7. Lift your toes about 6 inches off the floor, hold for about 6 seconds, then lower slowly. 8. Do 8 to 12 repetitions. Straight-leg raises to the inside 6. Lie on the side of your body with the injured leg. 7. You can either prop your other (good) leg up on a chair, or you can bend your good knee and put that foot in front of your injured knee. Do not drop your hip back. 8. Tighten the muscles on the front of your thigh to straighten your injured knee. 9. Keep your kneecap pointing forward, and lift your whole leg up toward the ceiling about 6 inches. Hold for about 6 seconds, then lower slowly. 10. Do 8 to 12 repetitions. Heel dig bridging 1. Lie on your back with both knees bent and your ankles bent so that only your heels are digging into the floor. Your knees should be bent about 90 degrees. 2. Then push your heels into the floor, squeeze your buttocks, and lift your hips off the floor until your shoulders, hips, and knees are all in a straight line.  
3. Hold for about 6 seconds as you continue to breathe normally, and then slowly lower your hips back down to the floor and rest for up to 10 seconds. 4. Do 8 to 12 repetitions. Hamstring curls 1. Lie on your stomach with your knees straight. If your kneecap is uncomfortable, roll up a washcloth and put it under your leg just above your kneecap. 2. Lift the foot of your injured leg by bending the knee so that you bring the foot up toward your buttock. If this motion hurts, try it without bending your knee quite as far. This may help you avoid any painful motion. 3. Slowly lower your leg back to the floor. 4. Do 8 to 12 repetitions. 5. With permission from your doctor or physical therapist, you may also want to add a cuff weight to your ankle (not more than 5 pounds). With weight, you do not have to lift your leg more than 12 inches to get a hamstring workout. Shallow standing knee bends Note: Do this exercise only if you have very little pain; if you have no clicking, locking, or giving way if you have an injured knee; and if it does not hurt while you are doing 8 to 12 repetitions. 1. Stand with your hands lightly resting on a counter or chair in front of you. Put your feet shoulder-width apart. 2. Slowly bend your knees so that you squat down like you are going to sit in a chair. Make sure your knees do not go in front of your toes. 3. Lower yourself about 6 inches. Your heels should remain on the floor at all times. 4. Rise slowly to a standing position. Heel raises 1. Stand with your feet a few inches apart, with your hands lightly resting on a counter or chair in front of you. 2. Slowly raise your heels off the floor while keeping your knees straight. 3. Hold for about 6 seconds, then slowly lower your heels to the floor. 4. Do 8 to 12 repetitions several times during the day. Follow-up care is a key part of your treatment and safety. Be sure to make and go to all appointments, and call your doctor if you are having problems. It's also a good idea to know your test results and keep a list of the medicines you take. Where can you learn more? Go to http://[email or web address]-[email or web address].info/. Enter G966 in the search box to learn more about \"Knee: Exercises. \" Current as of: March 21, 2017 Content Version: 11.3 © 1604-2292 [employer]. Care instructions adapted under license by [employer] (which disclaims liability or warranty for this information). If you have questions about a medical condition or this instruction, always ask your healthcare professional. [address] any warranty or liability for your use of this information. Knee Arthritis: Exercises Your Care Instructions Here are some examples of exercises for knee arthritis. Start each exercise slowly. Ease off the exercise if you start to have pain. Your doctor or physical therapist will tell you when you can start these exercises and which ones will work best for you. How to do the exercises Knee flexion with heel slide 15. Lie on your back with your knees bent. 16. Slide your heel back by bending your affected knee as far as you can. Then hook your other foot around your ankle to help pull your heel even farther back. 17. Hold for about 6 seconds, then rest for up to 10 seconds. 18. Repeat 8 to 12 times. 19. Switch legs and repeat steps 1 through 4, even if only one knee is sore. [employer] 16. Sit with your affected leg straight and supported on the floor or a firm bed. Place a small, rolled-up towel under your knee. Your other leg should be bent, with that foot flat on the floor. 16. Tighten the thigh muscles of your affected leg by pressing the back of your knee down into the towel. 18. Hold for about 6 seconds, then rest for up to 10 seconds. 19. Repeat 8 to 12 times. 20. Switch legs and repeat steps 1 through 4, even if only one knee is sore. Straight-leg raises to the front 18.  Lie on your back with your good knee bent so that your foot rests flat on the floor. Your affected leg should be straight. Make sure that your low back has a normal curve. You should be able to slip your hand in between the floor and the small of your back, with your palm touching the floor and your back touching the back of your hand. 23. Tighten the thigh muscles in your affected leg by pressing the back of your knee flat down to the floor. Hold your knee straight. 20. Keeping the thigh muscles tight and your leg straight, lift your affected leg up so that your heel is about 12 inches off the floor. Hold for about 6 seconds, then lower slowly. 21. Relax for up to 10 seconds between repetitions. 22. Repeat 8 to 12 times. 23. Switch legs and repeat steps 1 through 5, even if only one knee is sore. Active knee flexion 9. Lie on your stomach with your knees straight. If your kneecap is uncomfortable, roll up a washcloth and put it under your leg just above your kneecap. 10. Lift the foot of your affected leg by bending the knee so that you bring the foot up toward your buttock. If this motion hurts, try it without bending your knee quite as far. This may help you avoid any painful motion. 11. Slowly move your leg up and down. 12. Repeat 8 to 12 times. 13. Switch legs and repeat steps 1 through 4, even if only one knee is sore. Quadriceps stretch (facedown) 11. Lie flat on your stomach, and rest your face on the floor. 12. Wrap a towel or belt strap around the lower part of your affected leg. Then use the towel or belt strap to slowly pull your heel toward your buttock until you feel a stretch. 13. Hold for about 15 to 30 seconds, then relax your leg against the towel or belt strap. 14. Repeat 2 to 4 times. 15. Switch legs and repeat steps 1 through 4, even if only one knee is sore. Stationary exercise bike If you do not have a stationary exercise bike at home, you can find one to ride at your local health club or community center. 5. Adjust the height of the bike seat so that your knee is slightly bent when your leg is extended downward. If your knee hurts when the pedal reaches the top, you can raise the seat so that your knee does not bend as much. 6. Start slowly. At first, try to do 5 to 10 minutes of cycling with little to no resistance. Then increase your time and the resistance bit by bit until you can do 20 to 30 minutes without pain. 7. If you start to have pain, rest your knee until your pain gets back to the level that is normal for you. Or cycle for less time or with less effort. Follow-up care is a key part of your treatment and safety. Be sure to make and go to all appointments, and call your doctor if you are having problems. It's also a good idea to know your test results and keep a list of the medicines you take. Where can you learn more? Go to http://[email or web address]-briana.info/. Enter C159 in the search box to learn more about \"Knee Arthritis: Exercises. \" Current as of: March 21, 2017 Content Version: 11.3 © 7867-9014 [employer]. Care instructions adapted under license by [employer] (which disclaims liability or warranty for this information). If you have questions about a medical condition or this instruction, always ask your healthcare professional. [place] [address] any warranty or liability for your use of this information. Introducing [facility]! [employer] introduces [employer] patient portal. Now you can access parts of your medical record, email your doctor's office, and request medication refills online. 1. In your internet browser, go to [email or web address] 2. Click on the First Time User? Click Here link in the Sign In box. You will see the New Member Sign Up page. 3. Enter your [employer] Access Code exactly as it appears below. You will not need to use this code after youve completed the sign-up process.  If you do not sign up before the expiration date, you must request a new code. · [facility] Access Code: [email or web address] Expires: 1/11/2018 10:53 AM 
 
4. Enter the last four digits of your Social Security Number (xxxx) and Date of Birth (mm/dd/yyyy) as indicated and click Submit. You will be taken to the next sign-up page. 5. Create a [facility] ID. This will be your [facility] login ID and cannot be changed, so think of one that is secure and easy to remember. 6. Create a [facility] password. You can change your password at any time. 7. Enter your Password Reset Question and Answer. This can be used at a later time if you forget your password. 8. Enter your e-mail address. You will receive e-mail notification when new information is available in [address] 9. Click Sign Up. You can now view and download portions of your medical record. 10. Click the Download Summary menu link to download a portable copy of your medical information. If you have questions, please visit the Frequently Asked Questions section of the [facility] website. Remember, [facility] is NOT to be used for urgent needs. For medical emergencies, dial 911. Now available from your iPhone and Android! Please provide this summary of care documentation to your next provider. Your primary care clinician is listed as Grey Espinoza. If you have any questions after today's visit, please call [phone].

## 2017-10-13 NOTE — PROGRESS NOTES
HISTORY OF PRESENT ILLNESS  Sharmaine Dia is a 68 y.o. female presents with Blood Pressure Check; Results; Referral Follow Up; Annual Wellness Visit; Immunization/Injection; and Diabetes    Agree with nurse note. Diabetic, hypertensive pt with dyslipidemia, Vit D deficiency, hyperinsulinemia, statin intolerance, and family hx of heart attack, DM, and thyroid disease presents to the office with a BP of 91/62. She brought her BP log today which shows a range of 92/60 up to 124/56 with one reading of 145/72. For BP, she takes Benazepril 20 mg daily and Norvasc 5 mg daily, tolerating well. Patient denies vision changes, headaches, dizziness, chest pain, SOB, or swelling. She requests her most recent labs from 04/03/17. Urine microalbumin <3.0. CBC WNL. Hgb A1C 6.1. Vit D 46.3. TSH 1.38. Cr 0.92. . She weighs 146 lbs, lost 9 lbs since 03/2017. She has decreased her intake of potatoes and sweets. She increased her intake of vegetables. She also eats earlier now. She drinks 32 oz of water daily. \"I feel really good and I have been able to go down in my clothes. \"     Pt with RLS, muscle cramps, back spasms, chronic BL knee pain, and hx of lumbar laminectomy at L3-S1 on 10/18/16 with Dr. Darin Bartlett. She wakes up nightly 1-2x with foot cramps and occasionally leg cramps. She takes Baclofen 20 mg nightly and requests a refill. She also takes Requip 1 mg and Gabapentin 300 mg nightly, tolerating well. She feels her leg are weak and declines PT referral. She attributes the leg weakness to being deconditioned and plans to stop walking. Pt with mild intermittent asthma. She has not needed Albuterol inhaler, Flonase, or Singulair recently. She typically has an asthma flare up this time of year but feels well today. She requests an order for a nebulizer machine be sent to [facility]. She limits taking Prilosec 40 mg to a few x per week for heartburn.     She stopped taking Aspirin 81 mg daily because she was getting easy bruising. Health Maintenance    Pt's most recent colonoscopy was on 09/16/15 with GI, Dr. Rodolfo Penn. F/U 5 years. She had R cataract removed on 04/10/17 and L cataract removed in 2007, both performed by ophthalmologist, Dr. Bridger Galloway. Pt's most recent mammogram was on 09/13/17 at the [facility]; normal.      DEXA scan on 08/24/15 showed osteopenia. Annual [address] completed today. Written by allen Parks, as dictated by Dr. Mario Noyola DO.    ROS    Review of Systems negative except as noted above in HPI. ALLERGIES:    Allergies   Allergen Reactions    Other Food Itching     IF EATS IN QUANTITY OR ACROSS A FEW DAYS  TOMATOES    Bee Sting [Sting, Bee] Swelling    Fig Itching    Pcn [Penicillins] Hives     IN CHILDHOOD    Peach (Prunus Persica) Itching    Shellfish Derived Itching    Strawberry Itching    Zostavax [Zoster Vaccine Live (Pf)] Swelling     Severe Right arm swelling with redness after administered by pharmacist in elbow    Atarax [Hydroxyzine Hcl] Other (comments)     jittery    Buspar [Buspirone] Nausea Only    Crestor [Rosuvastatin] Myalgia     Body cramps    Epinephrine Palpitations    Hydrochlorothiazide (Bulk) Myalgia     sorethroat    Norco [Hydrocodone-Acetaminophen] Nausea and Vomiting     5/325 MG    Percocet [Oxycodone-Acetaminophen] Other (comments)     \"hellish nightmares\"    Zestril [Lisinopril] Cough       CURRENT MEDICATIONS:    Outpatient Prescriptions Marked as Taking for the 10/13/17 encounter (Office Visit) with Christi Najera DO   Medication Sig Dispense Refill    Nebulizer Accessories misc Please dispense 1 nebulizer kit and accessories  approved by patient's insurance company 1 Each 0    baclofen (LIORESAL) 20 mg tablet Take 1 Tab by mouth nightly.  Indications: Muscle Spasticity of Spinal Origin 90 Tab 1    rOPINIRole (REQUIP) 1 mg tablet take 1 and 1/2 tablets by mouth NIGHTLY 45 Tab 5    amLODIPine (NORVASC) 5 mg tablet Take 1 Tab by mouth daily. Indications: hypertension 90 Tab 3    benazepril (LOTENSIN) 20 mg tablet take 1 tablet by mouth once daily 90 Tab 2    melatonin 1 mg tablet Take 3 mg by mouth nightly.  omeprazole (PRILOSEC) 40 mg capsule Take 40 mg by mouth daily. 3 times weekly  0    gabapentin (NEURONTIN) 300 mg capsule Take 300 mg by mouth every evening. Dr. Souht Settler   Indications: ESSENTIAL TREMOR, Restless Legs Syndrome         PAST MEDICAL HISTORY:    Past Medical History:   Diagnosis Date    AC (acromioclavicular) joint bone spurs 01/2014    in back. Dr. Chantal Grajeda. Txd with shots x 3    Actinic keratosis 2015    Dr. Maximino Choi. Dr. Bibiana Peters.  Allergic rhinitis, cause unspecified     Anxiety state, unspecified     Arthritis     R KNEE BONE-ON-BONE; R HAND-----OSTEO    Asthma     BRONCHITIS IN SPRING & FALL MOST YEARS    BCC (basal cell carcinoma), face 1980s (nose), 01/28/15 (forehead)    Dr. Guedra Albright. Dr. Fina Wilson.  Cataract 2007    Dr. Danny Fofana    Chronic insomnia     Chronic low back pain 01/2014    Dr. Juan Maharaj. DJD and spurs, Narrowing of L 3,4,5. Dr. Chantal Grajeda.  Chronic obstructive pulmonary disease ([facility] [address]     CHRONIC (TWICE-YEARLY) BRONCHITIS    Colon polyps 11/2005, 01/29/09    benign. Dr. Verena Pickett    Constipation     Degenerative lumbar spinal stenosis 01/2014    Dr. Scott Le Narrowing of L 3,4,5    Diabetes mellitus type 2, diet-controlled ([facility] [address] 10/13/2017    Dyslipidemia     Dysphagia 09/2014    due to Esophagitis. Dr. Mckay Henry.  Essential hypertension, benign     Foot fracture, left 2009    stress.  Foot pain, bilateral 11/13/2013    Dr. Socrates Betancourt.  GERD (gastroesophageal reflux disease) 09/29/2014    ESOPHAGITIS    Hearing loss     Dr. Jerson Rodgers.  Knee pain, bilateral 10/28/13    Dr. Lukas Moreno. Right > Left. Severe OA. Txd with PT.   [facility] Lumbar stenosis with neurogenic claudication     Menopause 1976    Migraine     NONE SINCE AGE 37    Mixed stress and urge urinary incontinence     NO LEAKAGE; GETS UP SEVERAL TIMES A NIGHT, PT STATES ON 10/3/16    OA (osteoarthritis) of knee     Dr. Queen    Osteopenia 08/24/2015    Restless legs syndrome (RLS) 09/2015    Dr. Shashank Worthy.  Shoulder joint dislocation 06/2011    Right. due to fall. Dr. Rahul Archer Sleep apnea     undiagnosed    Syncope 06/25/11    due to fall down 5 steps. Right occipital head trauma.  Tremor of both hands 09/2015    Dr. Norvell Meckel GABAPENTIN       PAST SURGICAL HISTORY:    Past Surgical History:   Procedure Laterality Date    HX CATARACT REMOVAL Bilateral , R 04/10/17    Dr. Frederick Drake  09/16/2015    Dr. Tre Sagastume. due q 5 yrs    HX COLONOSCOPY  11/2005, 01/29/09    with polypectomy. Dr. Tre Sagastume    HX GI  09/2014    due to dysphagia. Dr. Leeanna Faith. ESOPHAGEL DILATATION    HX GYN  2009? A&P REPAIR    HX LUMBAR LAMINECTOMY  10/18/2016    L3-S1 Dr. Joseph Night  01/28/15    [address] [facility]. MOHs SURGERY. Dr. Janise Duverney.  HX RAIMUNDO AND BSO  1976    due to fibroids    HX TONSILLECTOMY  1948    OR COMBINED ANT/POST COLPORRHAPHY  02/28/05    with enterocele RE.   Dr. Kingston Calvo and Dr. Arturo Giles:    Family History   Problem Relation Age of Onset    Hypertension Mother     Dementia Mother      alzheimers    Thyroid Disease Mother     Hypertension Father     High Cholesterol Father     Kidney Disease Father      1 kidney    Emphysema Father      O2 requiring    Hypertension Sister     Diabetes Maternal Grandmother     Dementia Maternal Grandmother     Thyroid Disease Maternal Grandmother     Thyroid Disease Daughter      goiter    Heart Attack Maternal Uncle 51     massive    Heart Attack Maternal Uncle 39     massive    Heart Attack Maternal Aunt 51     massive    Thyroid Disease Daughter      thyroid cyst    Anesth Problems Neg Hx        SOCIAL HISTORY:    Social History     Social History    Marital status:      Spouse name: N/A    Number of children: N/A    Years of education: N/A     Social History Main Topics    Smoking status: Never Smoker    Smokeless tobacco: Never Used    Alcohol use No    Drug use: No    Sexual activity: Yes     Partners: Male     Birth control/ protection: Surgical     Other Topics Concern    None     Social History Narrative       IMMUNIZATIONS:    Immunization History   Administered Date(s) Administered    Influenza High Dose Vaccine PF 10/29/2013, 09/20/2014, 09/02/2015, 10/07/2016    TD Vaccine 09/07/2004    ZZZ-RETIRED (DO NOT USE) Pneumococcal Vaccine (Unspecified Type) 09/17/1997, 10/22/2004    Zoster 01/21/2009         PHYSICAL EXAMINATION    Vital Signs    Visit Vitals    BP 91/62    Pulse 87    Temp 97.1 °F (36.2 °C) (Oral)    Resp 18    Ht 5' 5\" (1.651 m)    Wt 146 lb 14.4 oz (66.6 kg)    SpO2 97%    BMI 24.45 kg/m2       Weight Metrics 10/13/2017 3/23/2017 3/7/2017 2/27/2017 11/3/2016 10/20/2016 10/18/2016   Weight 146 lb 14.4 oz 153 lb 11.2 oz 155 lb 4.8 oz 153 lb 14.4 oz 158 lb 140 lb 153 lb   BMI 24.45 kg/m2 25.58 kg/m2 25.84 kg/m2 25.61 kg/m2 26.29 kg/m2 23.3 kg/m2 27.1 kg/m2       General appearance - Well nourished. Well appearing. Well developed. No acute distress. Head - Normocephalic. Atraumatic. Eyes - pupils equal and reactive. Extraocular eye movements intact. Sclera anicteric. Mildly injected sclera. Ears - Hearing is grossly normal bilaterally. Nose - normal and patent. No polyps noted. No erythema. No discharge. Mouth - mucous membranes with adequate moisture. Posterior pharynx normal with cobblestone appearance. No erythema, white exudate or obstruction. Neck - supple. Midline trachea. No carotid bruits noted bilaterally. No thyromegaly noted. Chest - clear to auscultation bilaterally anteriorly and posteriorly. No wheezes. No rales or rhonchi. Breath sounds are symmetrical bilaterally. Unlabored respirations. Heart - normal rate. Regular rhythm. Normal S1, S2. No murmur noted. No rubs, clicks or gallops noted. Abdomen - soft and nondistended. No masses or organomegaly. No rebound, rigidity or guarding. Bowel sounds normal x 4 quadrants. No tenderness noted. Neurological - awake, alert and oriented to person, place, and time and event. Cranial nerves II through XII intact. Clear speech. Muscle strength is +5/5 x 4 extremities. Sensation is intact to light touch bilaterally. Steady gait. Mild fine hand tremor noted. Heme/Lymph - peripheral pulses normal x 4 extremities. No peripheral edema is noted. Musculoskeletal - Intact x 4 extremities. Full ROM x 4 extremities. No pain with movement. Back exam - normal range of motion. No pain on palpation of the spinous processes in the cervical, thoracic, lumbar, sacral regions. No CVA tenderness. Skin - no rashes, erythema, ecchymosis, lacerations, abrasions, suspicious moles noted  Psychological -   normal behavior, dress and thought processes. Good insight. Good eye contact. Normal affect. Appropriate mood. Normal speech.       DATA REVIEWED    Results for orders placed or performed in visit on 02/27/17   LIPID PANEL   Result Value Ref Range    Cholesterol, total 208 (H) 100 - 199 mg/dL    Triglyceride 149 0 - 149 mg/dL    HDL Cholesterol 53 >39 mg/dL    VLDL, calculated 30 5 - 40 mg/dL    LDL, calculated 125 (H) 0 - 99 mg/dL   METABOLIC PANEL, COMPREHENSIVE   Result Value Ref Range    Glucose 105 (H) 65 - 99 mg/dL    BUN 16 8 - 27 mg/dL    Creatinine 0.92 0.57 - 1.00 mg/dL    GFR est non-AA 61 >59 mL/min/1.73    GFR est AA 70 >59 mL/min/1.73    BUN/Creatinine ratio 17 12 - 28    Sodium 139 134 - 144 mmol/L    Potassium 4.2 3.5 - 5.2 mmol/L    Chloride 100 96 - 106 mmol/L    CO2 23 18 - 29 mmol/L    Calcium 9.1 8.7 - 10.3 mg/dL    Protein, total 6.4 6.0 - 8.5 g/dL    Albumin 4.1 3.5 - 4.8 g/dL    GLOBULIN, TOTAL 2.3 1.5 - 4.5 g/dL    A-G Ratio 1.8 1.2 - 2.2    Bilirubin, total 0.4 0.0 - 1.2 mg/dL    Alk.  phosphatase 63 39 - 117 IU/L    AST (SGOT) 17 0 - 40 IU/L    ALT (SGPT) 16 0 - 32 IU/L   TSH 3RD GENERATION   Result Value Ref Range    TSH 1.380 0.450 - 4.500 uIU/mL   VITAMIN D, 25 HYDROXY   Result Value Ref Range    VITAMIN D, 25-HYDROXY 46.3 30.0 - 100.0 ng/mL   MICROALBUMIN, UR, RAND W/ MICROALBUMIN/CREA RATIO   Result Value Ref Range    Creatinine, urine 22.8 Not Estab. mg/dL    Microalbumin, urine <3.0 Not Estab. ug/mL    Microalb/Creat ratio (ug/mg creat.) <13.2 0.0 - 30.0 mg/g creat   URINALYSIS W/ RFLX MICROSCOPIC   Result Value Ref Range    Specific Gravity 1.011 1.005 - 1.030    pH (UA) 6.5 5.0 - 7.5    Color Yellow Yellow    Appearance Clear Clear    Leukocyte Esterase Negative Negative    Protein Negative Negative/Trace    Glucose Negative Negative    Ketone Negative Negative    Blood Negative Negative    Bilirubin Negative Negative    Urobilinogen 0.2 0.2 - 1.0 mg/dL    Nitrites Negative Negative    Microscopic Examination Comment    HEMOGLOBIN A1C WITH EAG   Result Value Ref Range    Hemoglobin A1c 6.1 (H) 4.8 - 5.6 %    Estimated average glucose 128 mg/dL   CBC W/O DIFF   Result Value Ref Range    WBC 5.4 3.4 - 10.8 x10E3/uL    RBC 4.47 3.77 - 5.28 x10E6/uL    HGB 13.1 11.1 - 15.9 g/dL    HCT 39.8 34.0 - 46.6 %    MCV 89 79 - 97 fL    MCH 29.3 26.6 - 33.0 pg    MCHC 32.9 31.5 - 35.7 g/dL    RDW 14.3 12.3 - 15.4 %    PLATELET 691 950 - 888 x10E3/uL   CVD REPORT   Result Value Ref Range    INTERPRETATION Note    AMB POC RAPID INFLUENZA TEST   Result Value Ref Range    VALID INTERNAL CONTROL POC Yes     QuickVue Influenza test Negative Negative       ASSESSMENT and PLAN      ICD-10-CM ICD-9-CM 1. Medicare annual wellness visit, subsequent Z00.00 V70.0    2. Essential hypertension, benign I10 401.1     with low readings    3. Diabetes mellitus type 2, diet-controlled (HCC) E11.9 250.00 HEMOGLOBIN A1C WITH EAG   4. Dyslipidemia E78.5 272.4 LIPID PANEL   5. Hyperglycemia R73.9 790.29    6. Weight loss R63.4 783.21     9# since 03/2017 due to reduced sweets, increased vegetables, less meat, eating earlier, sleeping longer   7. Restless legs syndrome (RLS) G25.81 333.94    8. Mild intermittent asthma without complication H65.78 055.99 Nebulizer Accessories misc    stable worse during Spring and Fall   9. Vitamin D deficiency E55.9 268.9    10. Hyperinsulinemia E16.1 251.1    11. Muscle cramps R25.2 729.82 baclofen (LIORESAL) 20 mg tablet   12. Osteopenia of other site M85.88 733.90 DEXA BONE DENSITY STUDY AXIAL   13. Statin intolerance Z78.9 995.27    14. Insomnia secondary to chronic pain G89.29 338.29     G47.01 327.01     in BL legs and feet at night due to cramping   15. Aspirin intolerance Z78.9 995.27      E935.3    16. Easy bruising R23.8 782.9     improved since stopped baby ASA daily   17. Family history of thyroid disease Z83.49 V18.19    18. Family history of diabetes mellitus Z83.3 V18.0    23. Family history of heart attack Z82.49 V17.3    20. ACE-inhibitor cough R05 786.2     T46.4X5A E942.6     Lisinopril   21. Fine tremor G25.2 781.0     unchanged on Neurontin 300 mg q hs   22. Degenerative lumbar spinal stenosis M48.061 724.02 baclofen (LIORESAL) 20 mg tablet   23. Bilateral leg weakness R29.898 729.89     due to back vs deconditioning vs other   24. S/P RAIMUNDO-BSO (total abdominal hysterectomy and bilateral salpingo-oophorectomy) Z90.710 V88.01     Z90.722 V45.77     Z90.79     25. Age-related nuclear cataract of right eye H25.11 366.16     Resolved since surgery 04/10/17   26. Postmenopausal bone loss M81.0 733.01 DEXA BONE DENSITY STUDY AXIAL   27.  Osteoporosis screening Z13.820 V82.81 DEXA BONE DENSITY STUDY AXIAL   28. History of colon polyps Z86.010 V12.72    29. Chronic pain of both knees M25.561 719.46     M25.562 338.29     G89.29      R>L   30. Encounter for immunization Z23 V03.89 INFLUENZA VIRUS VACCINE, HIGH DOSE SEASONAL, PRESERVATIVE FREE      PNEUMOCOCCAL CONJ VACCINE 13 VALENT IM      [place] IMMUNIZ ADMIN,1 SINGLE/COMB VAC/TOXOID       Discussed the patient's BMI with her. The BMI follow up plan is as follows: Pt not eligible for BMI calculation due to normal weight. Decrease carbohydrates (white foods, sweet foods, sweet drinks and alcohol), increase green leafy vegetables and protein (lean meats and beans) with each meal.  Avoid fried foods. Eat 3-5 small meals daily. Do not skip meals. Increase water intake. Increase physical activity to 30 minutes daily for health benefit or 60 minutes daily to prevent weight regain, as tolerated. Get 7-8 hours uninterrupted sleep nightly. Chart reviewed and updated. Continue current medications and care. Stop Norvasc 5 mg and continue Benazepril 20 mg.   Prescriptions written and sent to pharmacy; medication side effects discussed. Nebulizer kit. Baclofen 20 mg. DEXA Scan ordered. Most recent tests reviewed from 04/03/17. Recheck pertinent labs today while fasting. Get recent office visit notes from Dr. Sakshi Colon. Advised pt to sign release. Counseled patient on health concerns: DM, BP, weight management, muscle cramps, osteopenia, and knee care. Sleep hygiene. Relevant handouts given and discussed with patient. Immunizations noted. Administered flu and Prevnar vaccines today. Advise tetanus vaccine when pt has a cut, animal bite, or burn. Offered empathy, support, legitimation, prayers, partnership to patient. Praised patient for progress. Referred to honoring choices for assistance with ACP. Staff message sent to facilitator.    Follow-up Disposition:  Return in about 6 months (around 4/13/2018) for bp check, DM, results .    Patient was offered a choice/choices in the treatment plan today. Patient expresses understanding of the plan and agrees with recommendations. Written by allen Philippe, as dictated by Dr. Fritz Erwin DO. Documentation True and Accepted by Efrain Reed. Marcela Silva. Patient Instructions   Stop Amlodipine 5 mg but continue taking Benazepril 20 mg daily. Increase your water intake to 4 bottles or about 64 oz daily. This may help your muscle cramps. Diet for a Healthy Bladder: Care Instructions  Your Care Instructions  You can help your bladder stay healthy. Drink lots of water and eat a healthy diet. This may help prevent urinary tract infections and other bladder problems. Follow-up care is a key part of your treatment and safety. Be sure to make and go to all appointments, and call your doctor if you are having problems. It's also a good idea to know your test results and keep a list of the medicines you take. How can you care for yourself at home? · Drink plenty of water or other drinks that do not have alcohol. This will help flush bacteria from your bladder. If you have kidney, heart, or liver disease and have to limit fluids, talk with your doctor before you increase the amount of fluids you drink. · Limit or avoid alcohol. Alcohol can irritate the bladder. For some people, caffeine (found in coffee, tea, and cola drinks) also can irritate the bladder. · Avoid constipation. This can help some people who have a problem with an urgent need to urinate a lot. ¨ Include fruits, vegetables, cooked dry beans, and whole grains in your diet each day. These foods are high in fiber. ¨ Get at least 30 minutes of physical activity on most days of the week. Walking is a good choice. You also may want to do other activities, such as running, swimming, cycling, or playing tennis or team sports. ¨ Take a fiber supplement, such as Citrucel or Metamucil, every day if needed.  Read and follow all instructions on the label. ¨ Schedule time each day for a bowel movement. Having a daily routine may help. Take your time and do not strain when having your bowel movement. Where can you learn more? Go to http://newton-briana.info/. Enter H090 in the search box to learn more about \"Diet for a Healthy Bladder: Care Instructions. \"  Current as of: May 4, 2017  Content Version: 11.3  © 5668-3249 [employer]. Care instructions adapted under license by [employer] (which disclaims liability or warranty for this information). If you have questions about a medical condition or this instruction, always ask your healthcare professional. [place] [address] any warranty or liability for your use of this information. Bladder Training: Care Instructions  Your Care Instructions  Bladder training is used to treat urge incontinence and stress incontinence. Urge incontinence means that the need to urinate comes on so fast that you can't get to a toilet in time. Stress incontinence means that you leak urine because of pressure on your bladder. For example, it may happen when you laugh, cough, or lift something heavy. Bladder training can increase how long you can wait before you have to urinate. It can also help your bladder hold more urine. And it can give you better control over the urge to urinate. It is important to remember that bladder training takes a few weeks to a few months to make a difference. You may not see results right away, but don't give up. Follow-up care is a key part of your treatment and safety. Be sure to make and go to all appointments, and call your doctor if you are having problems. It's also a good idea to know your test results and keep a list of the medicines you take. How can you care for yourself at home? Work with your doctor to come up with a bladder training program that is right for you.  You may use one or more of the following methods. Delayed urination  · In the beginning, try to keep from urinating for 5 minutes after you first feel the need to go. · While you wait, take deep, slow breaths to relax. Kegel exercises can also help you delay the need to go to the bathroom. · After some practice, when you can easily wait 5 minutes to urinate, try to wait 10 minutes before you urinate. · Slowly increase the waiting period until you are able to control when you have to urinate. Scheduled urination  · Empty your bladder when you first wake up in the morning. · Schedule times throughout the day when you will urinate. · Start by going to the bathroom every hour, even if you don't need to go. · Slowly increase the time between trips to the bathroom. · When you have found a schedule that works well for you, keep doing it. · If you wake up during the night and have to urinate, do it. Apply your schedule to waking hours only. Kegel exercises  These tighten and strengthen pelvic muscles, which can help you control the flow of urine. To do Kegel exercises:  · Squeeze the same muscles you would use to stop your urine. Your belly and thighs should not move. · Hold the squeeze for 3 seconds, and then relax for 3 seconds. · Start with 3 seconds. Then add 1 second each week until you are able to squeeze for 10 seconds. · Repeat the exercise 10 to 15 times a session. Do three or more sessions a day. When should you call for help? Watch closely for changes in your health, and be sure to contact your doctor if:  · Your incontinence is getting worse. · You do not get better as expected. Where can you learn more? Go to http://newton-briana.info/. Enter Q782 in the search box to learn more about \"Bladder Training: Care Instructions. \"  Current as of: August 12, 2016  Content Version: 11.3  © 1545-1178 [employer].  Care instructions adapted under license by [employer] (which disclaims liability or warranty for this information). If you have questions about a medical condition or this instruction, always ask your healthcare professional. [place] [address] any warranty or liability for your use of this information. Resistance Training With Surgical Tubing: Exercises  Your Care Instructions  Here are some examples of exercises for resistance training. Start each exercise slowly. Ease off the exercise if you start to have pain. Your doctor or physical therapist will tell you when you can start these exercises and which ones will work best for you. How to do the exercises  Side pull    1. Raise both arms overhead, palms of your hands facing forward. 2. Pull one arm down and to the side, bending your elbow as shown, and hold. 3. Slowly reach up again. Repeat with the other arm. 4. Repeat 8 to 12 times with each hand. 5. Rest for a minute, and repeat the exercise. Overhead pull    1. Raise both arms overhead, palms of your hands facing forward. 2. Tighten the tubing by slowly pulling both arms away from center, and hold. 3. Slowly return to the starting position with your arms straight up. 4. Repeat 8 to 12 times. 5. Rest for a minute, and repeat the exercise. Up-down pull    1. Raise both arms overhead. 2. Bend your elbows so that they are shoulder height, and hold the stretched tubing behind or in front of your head. 3. Slowly return to the starting position with your arms straight up. 4. Repeat 8 to 12 times. 5. Rest for a minute, and repeat the exercise. Chest-level pull    1. Raise your arms in front of you to chest level. Your elbows will be bent and held up at about shoulder height. 2. Pull your hands apart, stretching the tubing, and hold. Try to keep your hands up at your chest level, and do not pull your shoulders up toward your ears. 3. Slowly return to your starting position. 4. Repeat 8 to 12 times.   5. Rest for a minute, and repeat the exercise. Hip-level pull    1. Hold your hands at the level of your hips, or near your lap if you are sitting down. 2. Pull your hands apart, stretching the tubing, and hold. 3. Slowly return to your starting position. 4. Repeat 8 to 12 times. 5. Rest for a minute, and repeat the exercise. Follow-up care is a key part of your treatment and safety. Be sure to make and go to all appointments, and call your doctor if you are having problems. It's also a good idea to know your test results and keep a list of the medicines you take. Where can you learn more? Go to http://[facility]riana.info/. Enter Y463 in the search box to learn more about \"Resistance Training With Surgical Tubing: Exercises. \"  Current as of: March 13, 2017  Content Version: 11.3  © 9343-5912 [employer]. Care instructions adapted under license by [employer] (which disclaims liability or warranty for this information). If you have questions about a medical condition or this instruction, always ask your healthcare professional. [address] any warranty or liability for your use of this information. Resistance Training With Free Weights: Exercises  Your Care Instructions  Here are some examples of exercises for resistance training. Start each exercise slowly. Ease off the exercise if you start to have pain. Your doctor or physical therapist will tell you when you can start these exercises and which ones will work best for you. How to do the exercises  Chest fly    6. Lie on a bench or exercise ball, and hold the weights straight up over your chest. Do not lock your elbows. You can keep them slightly bent if that is comfortable for you. 7. Slowly lower your arms, keeping them extended, until the weights are level with your chest, or slightly lower. 8. Slowly raise your arms until you are in the starting position. 9. Repeat 8 to 12 times.   10. Rest for a minute, and repeat the exercise. Lateral raise for the outer part of the shoulder (deltoid)    6. Stand with your feet shoulder-width apart and your knees slightly bent. 7. Bend your arms 90 degrees with your elbows at hip level. With your palms facing in, hold the weights straight in front of you. 8. Slowly lift the weights and your elbows out to the sides to shoulder level, keeping your elbows bent. Keep your shoulders down and relaxed as you lift. If you find you are shrugging your shoulders up toward your ears, your weights may be too heavy. 9. Slowly lower the weights back to your sides. 10. Repeat 8 to 12 times. 11. Rest for a minute, and repeat the exercise. Biceps curls    6. Sit leaning forward with your legs slightly spread and your left hand on your left thigh. 7. Hold the weight in your right hand, and place your right elbow on your right thigh. 8. Slowly curl the weight up and toward your chest.  9. Slowly lower the weight to the original position. 10. Repeat 8 to 12 times. 11. Rest for a minute, and repeat the exercise. 12. Do the same exercise with your other arm. Follow-up care is a key part of your treatment and safety. Be sure to make and go to all appointments, and call your doctor if you are having problems. It's also a good idea to know your test results and keep a list of the medicines you take. Where can you learn more? Go to http://new[facility]-briana.info/. Enter G436 in the search box to learn more about \"Resistance Training With Free Weights: Exercises. \"  Current as of: March 13, 2017  Content Version: 11.3  © 0314-4602 [employer]. Care instructions adapted under license by [employer] (which disclaims liability or warranty for this information). If you have questions about a medical condition or this instruction, always ask your healthcare professional. [address] any warranty or liability for your use of this information. Knee: Exercises  Your Care Instructions  Here are some examples of exercises for your knee. Start each exercise slowly. Ease off the exercise if you start to have pain. Your doctor or physical therapist will tell you when you can start these exercises and which ones will work best for you. How to do the exercises  Quad sets    11. Sit with your leg straight and supported on the floor or a firm bed. (If you feel discomfort in the front or back of your knee, place a small towel roll under your knee.)  12. Tighten the muscles on top of your thigh by pressing the back of your knee flat down to the floor. (If you feel discomfort under your kneecap, place a small towel roll under your knee.)  13. Hold for about 6 seconds, then rest for up to 10 seconds. 14. Do 8 to 12 repetitions several times a day. Straight-leg raises to the front    12. Lie on your back with your good knee bent so that your foot rests flat on the floor. Your injured leg should be straight. Make sure that your low back has a normal curve. You should be able to slip your flat hand in between the floor and the small of your back, with your palm touching the floor and your back touching the back of your hand. 15. Tighten the thigh muscles in the injured leg by pressing the back of your knee flat down to the floor. Hold your knee straight. 14. Keeping the thigh muscles tight, lift your injured leg up so that your heel is about 12 inches off the floor. Hold for about 6 seconds and then lower slowly. 15. Do 8 to 12 repetitions, 3 times a day. Straight-leg raises to the outside    13. Lie on your side, with your injured leg on top. 15. Tighten the front thigh muscles of your injured leg to keep your knee straight. 15. Keep your hip and your leg straight in line with the rest of your body, and keep your knee pointing forward. Do not drop your hip back. 16. Lift your injured leg straight up toward the ceiling, about 12 inches off the floor.  Hold for about 6 seconds, then slowly lower your leg. 17. Do 8 to 12 repetitions. Straight-leg raises to the back    6. Lie on your stomach, and lift your leg straight up behind you (toward the ceiling). 7. Lift your toes about 6 inches off the floor, hold for about 6 seconds, then lower slowly. 8. Do 8 to 12 repetitions. Straight-leg raises to the inside    6. Lie on the side of your body with the injured leg. 7. You can either prop your other (good) leg up on a chair, or you can bend your good knee and put that foot in front of your injured knee. Do not drop your hip back. 8. Tighten the muscles on the front of your thigh to straighten your injured knee. 9. Keep your kneecap pointing forward, and lift your whole leg up toward the ceiling about 6 inches. Hold for about 6 seconds, then lower slowly. 10. Do 8 to 12 repetitions. Heel dig bridging    1. Lie on your back with both knees bent and your ankles bent so that only your heels are digging into the floor. Your knees should be bent about 90 degrees. 2. Then push your heels into the floor, squeeze your buttocks, and lift your hips off the floor until your shoulders, hips, and knees are all in a straight line. 3. Hold for about 6 seconds as you continue to breathe normally, and then slowly lower your hips back down to the floor and rest for up to 10 seconds. 4. Do 8 to 12 repetitions. Hamstring curls    1. Lie on your stomach with your knees straight. If your kneecap is uncomfortable, roll up a washcloth and put it under your leg just above your kneecap. 2. Lift the foot of your injured leg by bending the knee so that you bring the foot up toward your buttock. If this motion hurts, try it without bending your knee quite as far. This may help you avoid any painful motion. 3. Slowly lower your leg back to the floor. 4. Do 8 to 12 repetitions.   5. With permission from your doctor or physical therapist, you may also want to add a cuff weight to your ankle (not more than 5 pounds). With weight, you do not have to lift your leg more than 12 inches to get a hamstring workout. Shallow standing knee bends    Note: Do this exercise only if you have very little pain; if you have no clicking, locking, or giving way if you have an injured knee; and if it does not hurt while you are doing 8 to 12 repetitions. 1. Stand with your hands lightly resting on a counter or chair in front of you. Put your feet shoulder-width apart. 2. Slowly bend your knees so that you squat down like you are going to sit in a chair. Make sure your knees do not go in front of your toes. 3. Lower yourself about 6 inches. Your heels should remain on the floor at all times. 4. Rise slowly to a standing position. Heel raises    1. Stand with your feet a few inches apart, with your hands lightly resting on a counter or chair in front of you. 2. Slowly raise your heels off the floor while keeping your knees straight. 3. Hold for about 6 seconds, then slowly lower your heels to the floor. 4. Do 8 to 12 repetitions several times during the day. Follow-up care is a key part of your treatment and safety. Be sure to make and go to all appointments, and call your doctor if you are having problems. It's also a good idea to know your test results and keep a list of the medicines you take. Where can you learn more? Go to http://newton-briana.info/. Enter L964 in the search box to learn more about \"Knee: Exercises. \"  Current as of: March 21, 2017  Content Version: 11.3  © 7173-7510 [employer]. Care instructions adapted under license by [employer] (which disclaims liability or warranty for this information). If you have questions about a medical condition or this instruction, always ask your healthcare professional. [address] any warranty or liability for your use of this information.        Knee Arthritis: Exercises  Your Care Instructions  Here are some examples of exercises for knee arthritis. Start each exercise slowly. Ease off the exercise if you start to have pain. Your doctor or physical therapist will tell you when you can start these exercises and which ones will work best for you. How to do the exercises  Knee flexion with heel slide    15. Lie on your back with your knees bent. 16. Slide your heel back by bending your affected knee as far as you can. Then hook your other foot around your ankle to help pull your heel even farther back. 17. Hold for about 6 seconds, then rest for up to 10 seconds. 18. Repeat 8 to 12 times. 19. Switch legs and repeat steps 1 through 4, even if only one knee is sore. Quad sets    16. Sit with your affected leg straight and supported on the floor or a firm bed. Place a small, rolled-up towel under your knee. Your other leg should be bent, with that foot flat on the floor. 16. Tighten the thigh muscles of your affected leg by pressing the back of your knee down into the towel. 18. Hold for about 6 seconds, then rest for up to 10 seconds. 19. Repeat 8 to 12 times. 20. Switch legs and repeat steps 1 through 4, even if only one knee is sore. Straight-leg raises to the front    18. Lie on your back with your good knee bent so that your foot rests flat on the floor. Your affected leg should be straight. Make sure that your low back has a normal curve. You should be able to slip your hand in between the floor and the small of your back, with your palm touching the floor and your back touching the back of your hand. 23. Tighten the thigh muscles in your affected leg by pressing the back of your knee flat down to the floor. Hold your knee straight. 20. Keeping the thigh muscles tight and your leg straight, lift your affected leg up so that your heel is about 12 inches off the floor. Hold for about 6 seconds, then lower slowly. 21. Relax for up to 10 seconds between repetitions.   22. Repeat 8 to 12 times. 23. Switch legs and repeat steps 1 through 5, even if only one knee is sore. Active knee flexion    9. Lie on your stomach with your knees straight. If your kneecap is uncomfortable, roll up a washcloth and put it under your leg just above your kneecap. 10. Lift the foot of your affected leg by bending the knee so that you bring the foot up toward your buttock. If this motion hurts, try it without bending your knee quite as far. This may help you avoid any painful motion. 11. Slowly move your leg up and down. 12. Repeat 8 to 12 times. 13. Switch legs and repeat steps 1 through 4, even if only one knee is sore. Quadriceps stretch (facedown)    11. Lie flat on your stomach, and rest your face on the floor. 12. Wrap a towel or belt strap around the lower part of your affected leg. Then use the towel or belt strap to slowly pull your heel toward your buttock until you feel a stretch. 13. Hold for about 15 to 30 seconds, then relax your leg against the towel or belt strap. 14. Repeat 2 to 4 times. 15. Switch legs and repeat steps 1 through 4, even if only one knee is sore. Stationary exercise bike    If you do not have a stationary exercise bike at home, you can find one to ride at your local health club or community center. 5. Adjust the height of the bike seat so that your knee is slightly bent when your leg is extended downward. If your knee hurts when the pedal reaches the top, you can raise the seat so that your knee does not bend as much. 6. Start slowly. At first, try to do 5 to 10 minutes of cycling with little to no resistance. Then increase your time and the resistance bit by bit until you can do 20 to 30 minutes without pain. 7. If you start to have pain, rest your knee until your pain gets back to the level that is normal for you. Or cycle for less time or with less effort. Follow-up care is a key part of your treatment and safety.  Be sure to make and go to all appointments, and call your doctor if you are having problems. It's also a good idea to know your test results and keep a list of the medicines you take. Where can you learn more? Go to http://[facility]riana.info/. Enter C159 in the search box to learn more about \"Knee Arthritis: Exercises. \"  Current as of: March 21, 2017  Content Version: 11.3  © 7298-8502 [employer]. Care instructions adapted under license by [employer] (which disclaims liability or warranty for this information). If you have questions about a medical condition or this instruction, always ask your healthcare professional. [address] any warranty or liability for your use of this information.

## 2017-10-13 NOTE — PATIENT INSTRUCTIONS
Stop Amlodipine 5 mg but continue taking Benazepril 20 mg daily. Increase your water intake to 4 bottles or about 64 oz daily. This may help your muscle cramps. Diet for a Healthy Bladder: Care Instructions  Your Care Instructions  You can help your bladder stay healthy. Drink lots of water and eat a healthy diet. This may help prevent urinary tract infections and other bladder problems. Follow-up care is a key part of your treatment and safety. Be sure to make and go to all appointments, and call your doctor if you are having problems. It's also a good idea to know your test results and keep a list of the medicines you take. How can you care for yourself at home? · Drink plenty of water or other drinks that do not have alcohol. This will help flush bacteria from your bladder. If you have kidney, heart, or liver disease and have to limit fluids, talk with your doctor before you increase the amount of fluids you drink. · Limit or avoid alcohol. Alcohol can irritate the bladder. For some people, caffeine (found in coffee, tea, and cola drinks) also can irritate the bladder. · Avoid constipation. This can help some people who have a problem with an urgent need to urinate a lot. ¨ Include fruits, vegetables, cooked dry beans, and whole grains in your diet each day. These foods are high in fiber. ¨ Get at least 30 minutes of physical activity on most days of the week. Walking is a good choice. You also may want to do other activities, such as running, swimming, cycling, or playing tennis or team sports. ¨ Take a fiber supplement, such as Citrucel or Metamucil, every day if needed. Read and follow all instructions on the label. ¨ Schedule time each day for a bowel movement. Having a daily routine may help. Take your time and do not strain when having your bowel movement. Where can you learn more? Go to http://newton-briana.info/.   Enter J761 in the search box to learn more about \"Diet for a Healthy Bladder: Care Instructions. \"  Current as of: May 4, 2017  Content Version: 11.3  © 6113-0709 [employer]. Care instructions adapted under license by [employer] (which disclaims liability or warranty for this information). If you have questions about a medical condition or this instruction, always ask your healthcare professional. [address] any warranty or liability for your use of this information. Bladder Training: Care Instructions  Your Care Instructions  Bladder training is used to treat urge incontinence and stress incontinence. Urge incontinence means that the need to urinate comes on so fast that you can't get to a toilet in time. Stress incontinence means that you leak urine because of pressure on your bladder. For example, it may happen when you laugh, cough, or lift something heavy. Bladder training can increase how long you can wait before you have to urinate. It can also help your bladder hold more urine. And it can give you better control over the urge to urinate. It is important to remember that bladder training takes a few weeks to a few months to make a difference. You may not see results right away, but don't give up. Follow-up care is a key part of your treatment and safety. Be sure to make and go to all appointments, and call your doctor if you are having problems. It's also a good idea to know your test results and keep a list of the medicines you take. How can you care for yourself at home? Work with your doctor to come up with a bladder training program that is right for you. You may use one or more of the following methods. Delayed urination  · In the beginning, try to keep from urinating for 5 minutes after you first feel the need to go. · While you wait, take deep, slow breaths to relax. Kegel exercises can also help you delay the need to go to the bathroom.   · After some practice, when you can easily wait 5 minutes to urinate, try to wait 10 minutes before you urinate. · Slowly increase the waiting period until you are able to control when you have to urinate. Scheduled urination  · Empty your bladder when you first wake up in the morning. · Schedule times throughout the day when you will urinate. · Start by going to the bathroom every hour, even if you don't need to go. · Slowly increase the time between trips to the bathroom. · When you have found a schedule that works well for you, keep doing it. · If you wake up during the night and have to urinate, do it. Apply your schedule to waking hours only. Kegel exercises  These tighten and strengthen pelvic muscles, which can help you control the flow of urine. To do Kegel exercises:  · Squeeze the same muscles you would use to stop your urine. Your belly and thighs should not move. · Hold the squeeze for 3 seconds, and then relax for 3 seconds. · Start with 3 seconds. Then add 1 second each week until you are able to squeeze for 10 seconds. · Repeat the exercise 10 to 15 times a session. Do three or more sessions a day. When should you call for help? Watch closely for changes in your health, and be sure to contact your doctor if:  · Your incontinence is getting worse. · You do not get better as expected. Where can you learn more? Go to http://newton-briana.info/. Enter E475 in the search box to learn more about \"Bladder Training: Care Instructions. \"  Current as of: August 12, 2016  Content Version: 11.3  © 6034-9269 [employer]. Care instructions adapted under license by [employer] (which disclaims liability or warranty for this information). If you have questions about a medical condition or this instruction, always ask your healthcare professional. [address] any warranty or liability for your use of this information.        Resistance Training With Surgical Tubing: Exercises  Your Care Instructions  Here are some examples of exercises for resistance training. Start each exercise slowly. Ease off the exercise if you start to have pain. Your doctor or physical therapist will tell you when you can start these exercises and which ones will work best for you. How to do the exercises  Side pull    1. Raise both arms overhead, palms of your hands facing forward. 2. Pull one arm down and to the side, bending your elbow as shown, and hold. 3. Slowly reach up again. Repeat with the other arm. 4. Repeat 8 to 12 times with each hand. 5. Rest for a minute, and repeat the exercise. Overhead pull    1. Raise both arms overhead, palms of your hands facing forward. 2. Tighten the tubing by slowly pulling both arms away from center, and hold. 3. Slowly return to the starting position with your arms straight up. 4. Repeat 8 to 12 times. 5. Rest for a minute, and repeat the exercise. Up-down pull    1. Raise both arms overhead. 2. Bend your elbows so that they are shoulder height, and hold the stretched tubing behind or in front of your head. 3. Slowly return to the starting position with your arms straight up. 4. Repeat 8 to 12 times. 5. Rest for a minute, and repeat the exercise. Chest-level pull    1. Raise your arms in front of you to chest level. Your elbows will be bent and held up at about shoulder height. 2. Pull your hands apart, stretching the tubing, and hold. Try to keep your hands up at your chest level, and do not pull your shoulders up toward your ears. 3. Slowly return to your starting position. 4. Repeat 8 to 12 times. 5. Rest for a minute, and repeat the exercise. Hip-level pull    1. Hold your hands at the level of your hips, or near your lap if you are sitting down. 2. Pull your hands apart, stretching the tubing, and hold. 3. Slowly return to your starting position. 4. Repeat 8 to 12 times. 5. Rest for a minute, and repeat the exercise.   Follow-up care is a key part of your treatment and safety. Be sure to make and go to all appointments, and call your doctor if you are having problems. It's also a good idea to know your test results and keep a list of the medicines you take. Where can you learn more? Go to http://newton-briana.info/. Enter E894 in the search box to learn more about \"Resistance Training With Surgical Tubing: Exercises. \"  Current as of: March 13, 2017  Content Version: 11.3  © 5722-4936 [employer]. Care instructions adapted under license by [employer] (which disclaims liability or warranty for this information). If you have questions about a medical condition or this instruction, always ask your healthcare professional. [address] any warranty or liability for your use of this information. Resistance Training With Free Weights: Exercises  Your Care Instructions  Here are some examples of exercises for resistance training. Start each exercise slowly. Ease off the exercise if you start to have pain. Your doctor or physical therapist will tell you when you can start these exercises and which ones will work best for you. How to do the exercises  Chest fly    6. Lie on a bench or exercise ball, and hold the weights straight up over your chest. Do not lock your elbows. You can keep them slightly bent if that is comfortable for you. 7. Slowly lower your arms, keeping them extended, until the weights are level with your chest, or slightly lower. 8. Slowly raise your arms until you are in the starting position. 9. Repeat 8 to 12 times. 10. Rest for a minute, and repeat the exercise. Lateral raise for the outer part of the shoulder (deltoid)    6. Stand with your feet shoulder-width apart and your knees slightly bent. 7. Bend your arms 90 degrees with your elbows at hip level. With your palms facing in, hold the weights straight in front of you.   8. Slowly lift the weights and your elbows out to the sides to shoulder level, keeping your elbows bent. Keep your shoulders down and relaxed as you lift. If you find you are shrugging your shoulders up toward your ears, your weights may be too heavy. 9. Slowly lower the weights back to your sides. 10. Repeat 8 to 12 times. 11. Rest for a minute, and repeat the exercise. Biceps curls    6. Sit leaning forward with your legs slightly spread and your left hand on your left thigh. 7. Hold the weight in your right hand, and place your right elbow on your right thigh. 8. Slowly curl the weight up and toward your chest.  9. Slowly lower the weight to the original position. 10. Repeat 8 to 12 times. 11. Rest for a minute, and repeat the exercise. 12. Do the same exercise with your other arm. Follow-up care is a key part of your treatment and safety. Be sure to make and go to all appointments, and call your doctor if you are having problems. It's also a good idea to know your test results and keep a list of the medicines you take. Where can you learn more? Go to http://newton-briana.info/. Enter U916 in the search box to learn more about \"Resistance Training With Free Weights: Exercises. \"  Current as of: March 13, 2017  Content Version: 11.3  © 4714-6103 [employer]. Care instructions adapted under license by [employer] (which disclaims liability or warranty for this information). If you have questions about a medical condition or this instruction, always ask your healthcare professional. [place] [address] any warranty or liability for your use of this information. Knee: Exercises  Your Care Instructions  Here are some examples of exercises for your knee. Start each exercise slowly. Ease off the exercise if you start to have pain. Your doctor or physical therapist will tell you when you can start these exercises and which ones will work best for you.   How to do the exercises  Quad sets    11. Sit with your leg straight and supported on the floor or a firm bed. (If you feel discomfort in the front or back of your knee, place a small towel roll under your knee.)  12. Tighten the muscles on top of your thigh by pressing the back of your knee flat down to the floor. (If you feel discomfort under your kneecap, place a small towel roll under your knee.)  13. Hold for about 6 seconds, then rest for up to 10 seconds. 14. Do 8 to 12 repetitions several times a day. Straight-leg raises to the front    12. Lie on your back with your good knee bent so that your foot rests flat on the floor. Your injured leg should be straight. Make sure that your low back has a normal curve. You should be able to slip your flat hand in between the floor and the small of your back, with your palm touching the floor and your back touching the back of your hand. 15. Tighten the thigh muscles in the injured leg by pressing the back of your knee flat down to the floor. Hold your knee straight. 14. Keeping the thigh muscles tight, lift your injured leg up so that your heel is about 12 inches off the floor. Hold for about 6 seconds and then lower slowly. 15. Do 8 to 12 repetitions, 3 times a day. Straight-leg raises to the outside    13. Lie on your side, with your injured leg on top. 15. Tighten the front thigh muscles of your injured leg to keep your knee straight. 15. Keep your hip and your leg straight in line with the rest of your body, and keep your knee pointing forward. Do not drop your hip back. 16. Lift your injured leg straight up toward the ceiling, about 12 inches off the floor. Hold for about 6 seconds, then slowly lower your leg. 17. Do 8 to 12 repetitions. Straight-leg raises to the back    6. Lie on your stomach, and lift your leg straight up behind you (toward the ceiling). 7. Lift your toes about 6 inches off the floor, hold for about 6 seconds, then lower slowly.   8. Do 8 to 12 repetitions. Straight-leg raises to the inside    6. Lie on the side of your body with the injured leg. 7. You can either prop your other (good) leg up on a chair, or you can bend your good knee and put that foot in front of your injured knee. Do not drop your hip back. 8. Tighten the muscles on the front of your thigh to straighten your injured knee. 9. Keep your kneecap pointing forward, and lift your whole leg up toward the ceiling about 6 inches. Hold for about 6 seconds, then lower slowly. 10. Do 8 to 12 repetitions. Heel dig bridging    1. Lie on your back with both knees bent and your ankles bent so that only your heels are digging into the floor. Your knees should be bent about 90 degrees. 2. Then push your heels into the floor, squeeze your buttocks, and lift your hips off the floor until your shoulders, hips, and knees are all in a straight line. 3. Hold for about 6 seconds as you continue to breathe normally, and then slowly lower your hips back down to the floor and rest for up to 10 seconds. 4. Do 8 to 12 repetitions. Hamstring curls    1. Lie on your stomach with your knees straight. If your kneecap is uncomfortable, roll up a washcloth and put it under your leg just above your kneecap. 2. Lift the foot of your injured leg by bending the knee so that you bring the foot up toward your buttock. If this motion hurts, try it without bending your knee quite as far. This may help you avoid any painful motion. 3. Slowly lower your leg back to the floor. 4. Do 8 to 12 repetitions. 5. With permission from your doctor or physical therapist, you may also want to add a cuff weight to your ankle (not more than 5 pounds). With weight, you do not have to lift your leg more than 12 inches to get a hamstring workout.   Shallow standing knee bends    Note: Do this exercise only if you have very little pain; if you have no clicking, locking, or giving way if you have an injured knee; and if it does not hurt while you are doing 8 to 12 repetitions. 1. Stand with your hands lightly resting on a counter or chair in front of you. Put your feet shoulder-width apart. 2. Slowly bend your knees so that you squat down like you are going to sit in a chair. Make sure your knees do not go in front of your toes. 3. Lower yourself about 6 inches. Your heels should remain on the floor at all times. 4. Rise slowly to a standing position. Heel raises    1. Stand with your feet a few inches apart, with your hands lightly resting on a counter or chair in front of you. 2. Slowly raise your heels off the floor while keeping your knees straight. 3. Hold for about 6 seconds, then slowly lower your heels to the floor. 4. Do 8 to 12 repetitions several times during the day. Follow-up care is a key part of your treatment and safety. Be sure to make and go to all appointments, and call your doctor if you are having problems. It's also a good idea to know your test results and keep a list of the medicines you take. Where can you learn more? Go to http://[email or web address].info/. Enter J651 in the search box to learn more about \"Knee: Exercises. \"  Current as of: March 21, 2017  Content Version: 11.3  © 6181-5405 [employer]. Care instructions adapted under license by [employer] (which disclaims liability or warranty for this information). If you have questions about a medical condition or this instruction, always ask your healthcare professional. [place] [address] any warranty or liability for your use of this information. Knee Arthritis: Exercises  Your Care Instructions  Here are some examples of exercises for knee arthritis. Start each exercise slowly. Ease off the exercise if you start to have pain. Your doctor or physical therapist will tell you when you can start these exercises and which ones will work best for you.   How to do the exercises  Knee flexion with heel slide    15. Lie on your back with your knees bent. 16. Slide your heel back by bending your affected knee as far as you can. Then hook your other foot around your ankle to help pull your heel even farther back. 17. Hold for about 6 seconds, then rest for up to 10 seconds. 18. Repeat 8 to 12 times. 19. Switch legs and repeat steps 1 through 4, even if only one knee is sore. Quad sets    16. Sit with your affected leg straight and supported on the floor or a firm bed. Place a small, rolled-up towel under your knee. Your other leg should be bent, with that foot flat on the floor. 16. Tighten the thigh muscles of your affected leg by pressing the back of your knee down into the towel. 18. Hold for about 6 seconds, then rest for up to 10 seconds. 19. Repeat 8 to 12 times. 20. Switch legs and repeat steps 1 through 4, even if only one knee is sore. Straight-leg raises to the front    18. Lie on your back with your good knee bent so that your foot rests flat on the floor. Your affected leg should be straight. Make sure that your low back has a normal curve. You should be able to slip your hand in between the floor and the small of your back, with your palm touching the floor and your back touching the back of your hand. 23. Tighten the thigh muscles in your affected leg by pressing the back of your knee flat down to the floor. Hold your knee straight. 20. Keeping the thigh muscles tight and your leg straight, lift your affected leg up so that your heel is about 12 inches off the floor. Hold for about 6 seconds, then lower slowly. 21. Relax for up to 10 seconds between repetitions. 22. Repeat 8 to 12 times. 23. Switch legs and repeat steps 1 through 5, even if only one knee is sore. Active knee flexion    9. Lie on your stomach with your knees straight. If your kneecap is uncomfortable, roll up a washcloth and put it under your leg just above your kneecap.   10. Lift the foot of your affected leg by bending the knee so that you bring the foot up toward your buttock. If this motion hurts, try it without bending your knee quite as far. This may help you avoid any painful motion. 11. Slowly move your leg up and down. 12. Repeat 8 to 12 times. 13. Switch legs and repeat steps 1 through 4, even if only one knee is sore. Quadriceps stretch (facedown)    11. Lie flat on your stomach, and rest your face on the floor. 12. Wrap a towel or belt strap around the lower part of your affected leg. Then use the towel or belt strap to slowly pull your heel toward your buttock until you feel a stretch. 13. Hold for about 15 to 30 seconds, then relax your leg against the towel or belt strap. 14. Repeat 2 to 4 times. 15. Switch legs and repeat steps 1 through 4, even if only one knee is sore. Stationary exercise bike    If you do not have a stationary exercise bike at home, you can find one to ride at your local health club or community center. 5. Adjust the height of the bike seat so that your knee is slightly bent when your leg is extended downward. If your knee hurts when the pedal reaches the top, you can raise the seat so that your knee does not bend as much. 6. Start slowly. At first, try to do 5 to 10 minutes of cycling with little to no resistance. Then increase your time and the resistance bit by bit until you can do 20 to 30 minutes without pain. 7. If you start to have pain, rest your knee until your pain gets back to the level that is normal for you. Or cycle for less time or with less effort. Follow-up care is a key part of your treatment and safety. Be sure to make and go to all appointments, and call your doctor if you are having problems. It's also a good idea to know your test results and keep a list of the medicines you take. Where can you learn more? Go to http://newton-briana.info/. Enter C159 in the search box to learn more about \"Knee Arthritis: Exercises. \"  Current as of: March 21, 2017  Content Version: 11.3  © 1121-6779 [employer]. Care instructions adapted under license by [employer] (which disclaims liability or warranty for this information). If you have questions about a medical condition or this instruction, always ask your healthcare professional. [address] any warranty or liability for your use of this information.

## 2017-10-13 NOTE — PROGRESS NOTES
Chief Complaint   Patient presents with    Blood Pressure Check    Results    Referral Follow Up    Annual Wellness Visit    Immunization/Injection    Diabetes     1. Have you been to the ER, urgent care clinic since your last visit? Hospitalized since your last visit? No    2. Have you seen or consulted any other health care providers outside of the [address] [place] [address] since your last visit? Include any pap smears or colon screening. Elizabeth Guillen is a 68 y.o. female who presents for routine immunizations. She denies any symptoms , reactions or allergies that would exclude them from being immunized today. Risks and adverse reactions were discussed and the VIS was given to them. All questions were addressed. She was observed for 15 min post injection. There were no reactions observed.     Aundra Kolton Corea         Diabetic foot exam performed by Serena Stephens     Measurement  Response Nurse Comment Physician Comment   Monofilament  R - normal sensation with micro filament  L - normal sensation with micro filament     Pulse DP R - present  L - present     Pulse TP R - present  L - present     Structural deformity R - None  L - None     Skin Integrity / Deformity R - None  L - None        Reviewed by: Starr Haynes LPN

## 2017-10-14 LAB
CHOLEST SERPL-MCNC: 221 MG/DL (ref 100–199)
EST. AVERAGE GLUCOSE BLD GHB EST-MCNC: 117 MG/DL
HBA1C MFR BLD: 5.7 % (ref 4.8–5.6)
HDLC SERPL-MCNC: 55 MG/DL
INTERPRETATION, 910389: NORMAL
LDLC SERPL CALC-MCNC: 148 MG/DL (ref 0–99)
Lab: NORMAL
TRIGL SERPL-MCNC: 88 MG/DL (ref 0–149)
VLDLC SERPL CALC-MCNC: 18 MG/DL (ref 5–40)

## 2017-10-31 RX ORDER — BENAZEPRIL HYDROCHLORIDE 20 MG/1
TABLET ORAL
Qty: 90 TAB | Refills: 2 | Status: SHIPPED | OUTPATIENT
Start: 2017-10-31 | End: 2018-07-22 | Stop reason: SDUPTHER

## 2017-11-02 ENCOUNTER — HOSPITAL ENCOUNTER (OUTPATIENT)
Dept: MAMMOGRAPHY | Age: 77
Discharge: HOME | End: 2017-11-02
Attending: FAMILY MEDICINE
Payer: MEDICARE

## 2017-11-02 DIAGNOSIS — M81.0 POSTMENOPAUSAL BONE LOSS: ICD-10-CM

## 2017-11-02 DIAGNOSIS — M85.88 OSTEOPENIA OF OTHER SITE: ICD-10-CM

## 2017-11-02 DIAGNOSIS — Z13.820 OSTEOPOROSIS SCREENING: ICD-10-CM

## 2017-11-02 PROCEDURE — 77080 DXA BONE DENSITY AXIAL: CPT

## 2018-02-27 DIAGNOSIS — G25.81 RESTLESS LEG SYNDROME: ICD-10-CM

## 2018-02-27 DIAGNOSIS — Z86.2 HISTORY OF ANEMIA: ICD-10-CM

## 2018-02-27 RX ORDER — ROPINIROLE 1 MG/1
TABLET, FILM COATED ORAL
Qty: 45 TAB | Refills: 5 | Status: SHIPPED | OUTPATIENT
Start: 2018-02-27 | End: 2018-09-23 | Stop reason: SDUPTHER

## 2018-04-03 ENCOUNTER — HOSPITAL ENCOUNTER (OUTPATIENT)
Age: 78
Setting detail: OUTPATIENT SURGERY
Discharge: HOME | End: 2018-04-03
Attending: ORTHOPAEDIC SURGERY | Admitting: ORTHOPAEDIC SURGERY
Payer: MEDICARE

## 2018-04-03 VITALS
TEMPERATURE: 98 F | OXYGEN SATURATION: 99 % | DIASTOLIC BLOOD PRESSURE: 68 MMHG | WEIGHT: 146.83 LBS | HEIGHT: 65 IN | HEART RATE: 68 BPM | BODY MASS INDEX: 24.46 KG/M2 | SYSTOLIC BLOOD PRESSURE: 131 MMHG | RESPIRATION RATE: 20 BRPM

## 2018-04-03 PROBLEM — G56.02 CARPAL TUNNEL SYNDROME OF LEFT WRIST: Status: ACTIVE | Noted: 2018-04-03

## 2018-04-03 PROCEDURE — 77030020782 HC GWN BAIR PAWS FLX 3M -B

## 2018-04-03 PROCEDURE — 77030002916 HC SUT ETHLN J&J -A: Performed by: ORTHOPAEDIC SURGERY

## 2018-04-03 PROCEDURE — 74011000250 HC RX REV CODE- 250: Performed by: ORTHOPAEDIC SURGERY

## 2018-04-03 PROCEDURE — 76030000002 HC AMB SURG OR TIME FIRST 0.: Performed by: ORTHOPAEDIC SURGERY

## 2018-04-03 PROCEDURE — 77030018836 HC SOL IRR NACL ICUM -A: Performed by: ORTHOPAEDIC SURGERY

## 2018-04-03 PROCEDURE — 77030020754 HC CUF TRNQT 2BLA STRY -B: Performed by: ORTHOPAEDIC SURGERY

## 2018-04-03 PROCEDURE — 76210000050 HC AMBSU PH II REC 0.5 TO 1 HR: Performed by: ORTHOPAEDIC SURGERY

## 2018-04-03 RX ORDER — LIDOCAINE HYDROCHLORIDE 10 MG/ML
INJECTION INFILTRATION; PERINEURAL AS NEEDED
Status: DISCONTINUED | OUTPATIENT
Start: 2018-04-03 | End: 2018-04-03 | Stop reason: HOSPADM

## 2018-04-03 NOTE — OP NOTES
NAME: Karl Kraus Donya  Surgeon: Rudolph Seip, MD         Preoperative Diagnosis: left Carpal Tunnel Syndrome    Postoperative Diagnosis: left Carpal Tunnel Syndrome    Surgeon: Rudolph Seip, MD    Specimen removed: none    Estimated blood loss: minimal    Anesthesia: Local/No Anesthesia    Indications: A 68 y.o. female with pain and paresthesias in the median nerve distribution, not responsive to conservative management with positive   EMG. Description of Procedure: The patient was taken to the operating room and given conscious sedation. The right hand and arm were prepped and draped in   the usual fashion. Lidocaine 1% was used to infiltrate the area for the   proposed incision. The hand was elevated and manually exsanguinated and the   tourniquet inflated to 250 mmHg. A longitudinal palmar incision was made in line with the ring finger down   through skin and subcutaneous tissue. A self-retaining retractor was   placed. The transverse carpal ligament was identified and transected down   to the underlying carpal canal. The median nerve was identified. A dilator   was advanced just underneath the ligament, thereby protecting the nerve. A   15 blade was advanced down the groove with the dilator releasing the   ligament distally well into the distal palmar fascia. The dilator was then   advanced proximally again just underneath the ligament, thereby protecting   the nerve. A 15 blade was advanced down the groove with the dilator   releasing the ligament proximally well up in the distal forearm fascia. I then evaluated the nerve, noted it to be intact and free of further   compression. The skin edges were approximated using 4-0 nylon in horizontal   mattress fashion. A bulky sterile dressing was applied and the tourniquet   was let down after a total tourniquet time of 8 minutes. The patient was   then transferred to the recovery room in stable condition. There were no   Complications. Estimated Blood Loss: Minimal    Specimen: None    Reviewed on 4/3/2018 at 11:54 AM    BRYAN López MD

## 2018-04-03 NOTE — BRIEF OP NOTE
BRIEF OPERATIVE NOTE    Date of Procedure: 4/3/2018   Preoperative Diagnosis: LEFT CARPAL TUNNEL  Postoperative Diagnosis: LEFT CARPAL TUNNEL    Procedure(s):  LEFT CARPAL TUNNEL RELEASE   Surgeon(s) and Role:     * Sydnee Swenson MD - Primary         Assistant Staff: None      Surgical Staff:  Circ-1: Gardenia Richardson RN  Local Nurse Monitor: Gianfranco Saenz RN  Scrub RN-1: Haven Campbell RN  Event Time In   Incision Start 1141   Incision Close 1151     Anesthesia: Local   Estimated Blood Loss: none  Specimens: * No specimens in log *   Findings: as above   Complications: none  Implants: * No implants in log *

## 2018-04-03 NOTE — H&P
Problem List      None          Subjective    Subjective:          Patient Active Problem List   Diagnosis    Asthma    Degenerative lumbar spinal stenosis    Diabetes mellitus type 2, diet-controlled    Dyslipidemia    Essential hypertension, benign         Chief Complaint: Numbness and Follow-up of the Left Hand        HPI: Christiano Morales is a 68 y.o. female who comes in today with continued complaints of numbness and tingling and primarily in her left hand. She has some mild symptoms on the right. She continues to awaken nightly with paresthesias in the left hand. She has been wearing a brace with some improvement. She has not noticed any weakness. She underwent EMG studies confirming moderate bilateral carpal tunnel syndrome. She continues to complain of pain in her right knee where she has documented osteoarthritis.             Objective:      There were no vitals filed for this visit. Height: 5' 3\"       Wt Readings from Last 3 Encounters:   02/20/18 140 lb   02/15/18 140 lb   02/08/18 140 lb      Body mass index is 24.8 kg/m².     Musculoskeletal :  She has some mild thenar atrophy on the left. She has subjective decreased sensation in the median nerve distribution. She has a positive Tinel sign and positive Phalen sign. There is no swelling or synovitis. She has a full range of motion of bilateral hips and knees. Right knee has diffuse crepitus with no effusion. Her ligaments are stable. She has a strong pedal pulse. She has no pedal edema.        Radiographs:       No imaging obtained       Assessment:      1. Bilateral carpal tunnel syndrome    2. Primary localized osteoarthritis of right knee          Plan:   I reviewed her EMG studies with her. We discussed treatment options. She has symptomatic left carpal tunnel syndrome. This is confirmed by EMG studies. We discussed treatment options.   I went over carpal tunnel release surgery at length including expected outcomes and postop recovery as well as risks and complications. She has symptomatic osteoarthritis of the right knee however would like to continue with conservative management of this. After much discussion she would like to proceed with left carpal tunnel release under local anesthesia. PROCEDURE: Left carpal tunnel release  Date of Surgery Update:  Aroldo Oliva was seen and examined. Past Medical History:   Diagnosis Date    AC (acromioclavicular) joint bone spurs 01/2014    in back. Dr. Kiran Doctor. Txd with shots x 3    Actinic keratosis 2015    Dr. Janice Avalos. Dr. Shira Rucker.  Allergic rhinitis, cause unspecified     Anxiety state, unspecified     Arthritis     R KNEE BONE-ON-BONE; R HAND-----OSTEO    Asthma     BRONCHITIS IN [place] MOST YEARS    BCC (basal cell carcinoma), face 1980s (nose), 01/28/15 (forehead)    Dr. Benito Wilson. Dr. Mk Anaya.  Cataract 2007    Dr. Luis Lopez    Chronic insomnia     Chronic low back pain 01/2014    Dr. Karen Shultz. DJD and spurs, Narrowing of L 3,4,5. Dr. Kiran Doctor.  Chronic obstructive pulmonary disease ([facility] [address]     CHRONIC (TWICE-YEARLY) BRONCHITIS    Colon polyps 11/2005, 01/29/09    benign. Dr. Michelle Ott    Constipation     Degenerative lumbar spinal stenosis 01/2014    Dr. Juan R Medina Narrowing of L 3,4,5    Diabetes mellitus type 2, diet-controlled ([facility] [address] 10/13/2017    Dyslipidemia     Dysphagia 09/2014    due to Esophagitis. Dr. Sera Mckinley.  Essential hypertension, benign     Foot fracture, left 2009    stress.  Foot pain, bilateral 11/13/2013    Dr. Rachelle Hanson.  GERD (gastroesophageal reflux disease) 09/29/2014    ESOPHAGITIS    Hearing loss     Dr. Thomas Jimenez.  Knee pain, bilateral 10/28/13    Dr. Frederick Carranza. Right > Left. Severe OA. Txd with PT.    Lumbar stenosis with neurogenic claudication     Menopause 1976    Migraine     NONE SINCE AGE 37    Mixed stress and urge urinary incontinence     NO LEAKAGE; GETS UP SEVERAL TIMES A NIGHT, PT STATES ON 10/3/16    OA (osteoarthritis) of knee     Dr. Gabriela Jones    Osteopenia 2015    Restless legs syndrome (RLS) 2015    Dr. Essie Carrel.  Shoulder joint dislocation 2011    Right. due to fall. Dr. Jim Holcomb Sleep apnea     undiagnosed    Syncope 11    due to fall down 5 steps. Right occipital head trauma.  Tremor of both hands 2015    Dr. Marsha Thakkar GABAPENTIN     Prior to Admission Medications   Prescriptions Last Dose Informant Patient Reported? Taking? Nebulizer Accessories misc Not Taking at Unknown time  No No   Sig: Please dispense 1 nebulizer kit and accessories  approved by patient's insurance company   acetaminophen (TYLENOL EXTRA STRENGTH) 500 mg tablet Not Taking at Unknown time  Yes No   Sig: Take  by mouth every six (6) hours as needed for Pain. albuterol (PROVENTIL HFA, VENTOLIN HFA) 90 mcg/actuation inhaler 2018 at Unknown time  No Yes   Sig: Take 2 Puffs by inhalation every four (4) hours as needed for Wheezing or Shortness of Breath. Indications: BRONCHOSPASM PREVENTION   albuterol (PROVENTIL VENTOLIN) 2.5 mg /3 mL (0.083 %) nebulizer solution 2018 at Unknown time  No Yes   Si.5 mL by Nebulization route every four (4) hours as needed for Wheezing. amLODIPine (NORVASC) 5 mg tablet 2018 at Unknown time  No Yes   Sig: Take 1 Tab by mouth daily. Indications: hypertension   baclofen (LIORESAL) 20 mg tablet Not Taking at Unknown time  No No   Sig: Take 1 Tab by mouth nightly. Indications: Muscle Spasticity of Spinal Origin   benazepril (LOTENSIN) 20 mg tablet 2018 at Unknown time  No Yes   Sig: take 1 tablet by mouth once daily   budesonide (PULMICORT) 0.25 mg/2 mL nbsp 4/3/2018 at Unknown time  No Yes   Si mL by Nebulization route two (2) times a day.    fluticasone (FLONASE) 50 mcg/actuation nasal spray 4/3/2018 at Unknown time  No Yes   Si Sprays by Both Nostrils route daily. gabapentin (NEURONTIN) 300 mg capsule 4/3/2018 at Unknown time  Yes Yes   Sig: Take 300 mg by mouth every evening. Dr. Yuniel Camacho   Indications: ESSENTIAL TREMOR, Restless Legs Syndrome   melatonin 1 mg tablet 2018 at Unknown time  Yes Yes   Sig: Take 3 mg by mouth nightly. montelukast (SINGULAIR) 10 mg tablet 4/3/2018 at Unknown time  No Yes   Sig: Take 1 Tab by mouth daily. Indications: ALLERGIC RHINITIS, MAINTENANCE THERAPY FOR ASTHMA   omeprazole (PRILOSEC) 40 mg capsule 2018 at Unknown time  Yes Yes   Sig: Take 40 mg by mouth daily. 3 times weekly   rOPINIRole (REQUIP) 1 mg tablet 2018 at Unknown time  No Yes   Sig: take 1 and 1/2 tablets by mouth NIGHTLY      Facility-Administered Medications: None      Allergy to: Other food; Bee sting [sting, bee]; Fig; Pcn [penicillins]; Peach (prunus persica); Shellfish derived; Strawberry; Zostavax [zoster vaccine live (pf)]; Atarax [hydroxyzine hcl]; Buspar [buspirone]; Crestor [rosuvastatin]; Epinephrine; Hydrochlorothiazide (bulk); Norco [hydrocodone-acetaminophen]; Percocet [oxycodone-acetaminophen]; and Zestril [lisinopril]  Physical Examination: General appearance - alert, well appearing, and in no distress  Chest - clear to auscultation, no wheezes, rales or rhonchi, symmetric air entry  Heart - normal rate and regular rhythm  Abdomen - soft, nontender, nondistended, no masses or organomegaly  History and physical has been reviewed. The patient has been examined.  There have been no significant clinical changes since the completion of the originally dated History and Physical.    Signed By: Nat Monzon MD     April 3, 2018 11:32 AM

## 2018-04-03 NOTE — IP AVS SNAPSHOT
[address] [facility] [address] [place] 
[phone] Patient: Aroldo Oliva MRN: [id number] About your hospitalization You were admitted on:  April 3, 2018 You last received care in the:  [facility] PACU You were discharged on:  April 3, 2018 Why you were hospitalized Your primary diagnosis was:  Carpal Tunnel Syndrome Of Left Wrist  
  
Follow-up Information Follow up With Details Comments Contact Info Jorge A Roca, [address] [place] [address] 
[address] [facility] [place] [address] 
[phone] Your Scheduled Appointments Tuesday April 17, 2018 11:30 AM EDT  
SAME DAY with DO Aman Ferreira 74 (HARLAN Boykin) [address] [facility] [address] [phone] Discharge Orders None A check bishnu indicates which time of day the medication should be taken. My Medications CONTINUE taking these medications Instructions Each Dose to Equal  
 Morning Noon Evening Bedtime * albuterol 90 mcg/actuation inhaler Commonly known as:  PROVENTIL HFA, VENTOLIN HFA, PROAIR HFA Your last dose was: Your next dose is: Take 2 Puffs by inhalation every four (4) hours as needed for Wheezing or Shortness of Breath. Indications: BRONCHOSPASM PREVENTION  
 2 Puff * albuterol 2.5 mg /3 mL (0.083 %) nebulizer solution Commonly known as:  PROVENTIL VENTOLIN Your last dose was: Your next dose is:    
   
   
 1.5 mL by Nebulization route every four (4) hours as needed for Wheezing. 1.25 mg  
    
   
   
   
  
 amLODIPine 5 mg tablet Commonly known as:  Dk Corey Your last dose was: Your next dose is: Take 1 Tab by mouth daily. Indications: hypertension 5 mg  
    
   
   
   
  
 baclofen 20 mg tablet Commonly known as:  LIORESAL Your last dose was: Your next dose is: Take 1 Tab by mouth nightly. Indications: Muscle Spasticity of Spinal Origin 20 mg  
    
   
   
   
  
 benazepril 20 mg tablet Commonly known as:  LOTENSIN Your last dose was: Your next dose is:    
   
   
 take 1 tablet by mouth once daily  
     
   
   
   
  
 budesonide 0.25 mg/2 mL Nbsp Commonly known as:  PULMICORT Your last dose was: Your next dose is:    
   
   
 2 mL by Nebulization route two (2) times a day. 250 mcg  
    
   
   
   
  
 fluticasone 50 mcg/actuation nasal spray Commonly known as:  Jacek Carreno Your last dose was: Your next dose is: 2 Sprays by Both Nostrils route daily. 2 Spray  
    
   
   
   
  
 melatonin 1 mg tablet Your last dose was: Your next dose is: Take 3 mg by mouth nightly. 3 mg  
    
   
   
   
  
 montelukast 10 mg tablet Commonly known as:  SINGULAIR Your last dose was: Your next dose is: Take 1 Tab by mouth daily. Indications: ALLERGIC RHINITIS, MAINTENANCE THERAPY FOR ASTHMA 10 mg [address] Your last dose was: Your next dose is: Please dispense 1 nebulizer kit and accessories  approved by patient's insurance company NEURONTIN 300 mg capsule Generic drug:  gabapentin Your last dose was: Your next dose is: Take 300 mg by mouth every evening. Dr. Neri Perez   Indications: ESSENTIAL TREMOR, Restless Legs Syndrome 300 mg  
    
   
   
   
  
 omeprazole 40 mg capsule Commonly known as:  PRILOSEC Your last dose was: Your next dose is: Take 40 mg by mouth daily. 3 times weekly 40 mg  
    
   
   
   
  
 rOPINIRole 1 mg tablet Commonly known as:  Brandy Dias Your last dose was: Your next dose is:    
   
   
 take 1 and 1/2 tablets by mouth NIGHTLY  
     
   
   
   
  
 TYLENOL EXTRA STRENGTH 500 mg tablet Generic drug:  acetaminophen Your last dose was: Your next dose is: Take  by mouth every six (6) hours as needed for Pain. * Notice: This list has 2 medication(s) that are the same as other medications prescribed for you. Read the directions carefully, and ask your doctor or other care provider to review them with you. Discharge Instructions Carpal Tunnel Release: What to Expect at Home Your Recovery Your hand will hurt and may feel weak with some numbness. This usually goes away in a few days, but it may take several months. Your doctor may remove the large bandage, or he or she will tell you when and how to remove it yourself. In some cases, you may have a splint. If you have one, you will wear it for about 2 weeks. Your doctor will take out your stitches in 1 to 2 weeks. Your hand and wrist may feel worse than they had felt. But the pain should begin to go away. It usually takes 3 to 4 months to recover and up to 1 year before hand strength returns. How much hand strength returns will vary. The timing of your return to work depends on the type of surgery you had, whether the surgery was on your dominant hand (the hand you use most), and your work activities. If you had open surgery on your dominant hand and you do repeated actions at work, you may be able to return to work in 10 to 8 weeks. Repeated motions include typing or assembly-line work. If the surgery was on the other hand and you do not do repeated actions at work, you may be able to return to work in 9 to 14 days. If you had endoscopic surgery, you may be able to return to work sooner than with open surgery.  
This care sheet gives you a general idea about how long it will take for you to recover. But each person recovers at a different pace. Follow the steps below to get better as quickly as possible. How can you care for yourself at home? Activity ? · Rest when you feel tired. Getting enough sleep will help you recover. ? · Try to walk each day. Start by walking a little more than you did the day before. Bit by bit, increase the amount you walk. ? · For up to 2 weeks after surgery, avoid lifting things heavier than 1 to 2 pounds and using your hand. This includes doing repeated arm or hand movements, such as typing or using a computer mouse, washing windows, vacuuming, or chopping food. Do not use power tools, and avoid activities that cause vibration. ? · You may begin heavier tasks about 4 weeks after surgery. These include vacuuming, mowing the lawn, and gardening. ? · You may shower 24 to 48 hours after surgery, if your doctor okays it. Keep your bandage dry by taping a sheet of plastic to cover it. If you have a splint, keep it dry. Your doctor will tell you whether you can remove it when you shower. Be careful not to put the splint on too tight. Do not take a bath until the incision heals, or until your doctor tells you it is okay. ? · You may drive when you are fully able to use your hand. Diet ? · You can eat your normal diet. If your stomach is upset, try bland, low-fat foods like plain rice, broiled chicken, toast, and yogurt. Medicines ? · Your doctor will tell you if and when you can restart your medicines. He or she will also give you instructions about taking any new medicines. ? · If you take blood thinners, such as warfarin (Coumadin), clopidogrel (Plavix), or aspirin, be sure to talk to your doctor. He or she will tell you if and when to start taking those medicines again. Make sure that you understand exactly what your doctor wants you to do. ? · Take pain medicines exactly as directed. ¨ If the doctor gave you a prescription medicine for pain, take it as prescribed. ¨ If you are not taking a prescription pain medicine, take an over-the-counter medicine such as acetaminophen (Tylenol), ibuprofen (Advil, Motrin), or naproxen (Aleve). Read and follow all instructions on the label. ¨ Do not take two or more pain medicines at the same time unless the doctor told you to. Many pain medicines have acetaminophen, which is Tylenol. Too much acetaminophen (Tylenol) can be harmful. ? · If you think your pain medicine is making you sick to your stomach: 
¨ Take your medicine after meals (unless your doctor has told you not to). ¨ Ask your doctor for a different pain medicine. ? · If your doctor prescribed antibiotics, take them as directed. Do not stop taking them just because you feel better. You need to take the full course of antibiotics. ?Incision and splint care ? · Keep your bandage dry. If it gets dirty, you may change it. ? · If you have a splint, talk to your doctor about when you should wear it. Exercise ? · You may need wrist and hand rehabilitation. This is a series of exercises you do after your surgery. This helps you get back your wrist's and hand's range of motion, strength, and . You will work with your doctor and physical or occupational therapist to plan this exercise program. To get the best results, you need to do the exercises correctly and as often and as long as your doctor tells you. Ice and elevation ? · Put ice or a cold pack on your wrist for 10 to 20 minutes at a time. Try to do this every 1 to 2 hours for the next 3 days (when you are awake) or until the swelling goes down. Put a thin cloth between the ice and your skin. ? · Prop up the sore wrist on a pillow when you ice it or anytime you sit or lie down during the next 3 days. Try to keep it above the level of your heart. This will help reduce swelling. Other instructions ? · Avoid letting your hand hang down. This can cause swelling. Follow-up care is a key part of your treatment and safety. Be sure to make and go to all appointments, and call your doctor if you are having problems. It's also a good idea to know your test results and keep a list of the medicines you take. When should you call for help? Call 911 anytime you think you may need emergency care. For example, call if: 
? · You passed out (lost consciousness). ? · You have chest pain, are short of breath, or cough up blood. ?Call your doctor now or seek immediate medical care if: 
? · You have pain that does not get better after you take pain medicine. ? · Your hand is cool or pale or changes color. ? · Your cast or splint feels too tight. ? · You have tingling, weakness, or numbness in your hand or fingers. ? · You are sick to your stomach or cannot drink fluids. ? · You have loose stitches, or your incision comes open. ? · You have signs of a blood clot in your leg (called a deep vein thrombosis), such as: 
¨ Pain in your calf, back of the knee, thigh, or groin. ¨ Redness or swelling in your leg. ? · You have signs of infection, such as: 
¨ Increased pain, swelling, warmth, or redness. ¨ Red streaks leading from the incision. ¨ Pus draining from the incision. ¨ A fever. ? · Bright red blood has soaked through the bandage over your incision. ? Watch closely for any changes in your health, and be sure to contact your doctor if: 
? · You have a problem with your cast or splint. ? · You do not get better as expected. Where can you learn more? Go to http://newton-briana.info/. Enter N388 in the search box to learn more about \"Carpal Tunnel Release: What to Expect at Home. \" Current as of: March 21, 2017 Content Version: 11.4 © 6684-7329 [employer].  Care instructions adapted under license by "[employer] (which disclaims liability or warranty for this information). If you have questions about a medical condition or this instruction, always ask your healthcare professional. [address] any warranty or liability for your use of this information. ACO Transitions of Care Introducing [facility] offers a voluntary care coordination program to provide high quality service and care to [facility] fee-for-service beneficiaries. Jeffrey Ornelas was designed to help you enhance your health and well-being through the following services: ? Transitions of Care  support for individuals who are transitioning from one care setting to another (example: Hospital to home). ? Chronic and Complex Care Coordination  support for individuals and caregivers of those with serious or chronic illnesses or with more than one chronic (ongoing) condition and those who take a number of different medications. If you meet specific medical criteria, a [facility] [address] may call you directly to coordinate your care with your primary care physician and your other care providers. For questions about the [facility] programs, please, contact your physicians office. For general questions or additional information about Accountable Care Organizations: 
Please visit www.medicare.gov/acos. html or call 1-800-MEDICARE (9-174.146.8160) TTY users should call 6-188.127.6566. Introducing \[facility]\"" & HEALTH SERVICES! [employer] introduces [employer] patient portal. Now you can access parts of your medical record, email your doctor's office, and request medication refills online. 1. In your internet browser, go to [email or web address] 2. Click on the First Time User? Click Here link in the Sign In box. You will see the New Member Sign Up page. 3. Enter your [employer] Access Code exactly as it appears below.  You will not need to use this code after youve completed the sign-up process. If you do not sign up before the expiration date, you must request a new code. · [employer] Access Code: [email or web address] Expires: 7/1/2018  8:16 AM 
 
4. Enter the last four digits of your Social Security Number (xxxx) and Date of Birth (mm/dd/yyyy) as indicated and click Submit. You will be taken to the next sign-up page. 5. Create a [employer] ID. This will be your [employer] login ID and cannot be changed, so think of one that is secure and easy to remember. 6. Create a [employer] password. You can change your password at any time. 7. Enter your Password Reset Question and Answer. This can be used at a later time if you forget your password. 8. Enter your e-mail address. You will receive e-mail notification when new information is available in [address] 9. Click Sign Up. You can now view and download portions of your medical record. 10. Click the Download Summary menu link to download a portable copy of your medical information. If you have questions, please visit the Frequently Asked Questions section of the [employer] website. Remember, [employer] is NOT to be used for urgent needs. For medical emergencies, dial 911. Now available from your iPhone and Android! Introducing Get Maldonado As a [facility] patient, I wanted to make you aware of our electronic visit tool called Get Maldonado. [facility] 24/7 allows you to connect within minutes with a medical provider 24 hours a day, seven days a week via a mobile device or tablet or logging into a secure website from your computer. You can access Get Maldonado from anywhere in the [place].  
 
A virtual visit might be right for you when you have a simple condition and feel like you just dont want to get out of bed, or cant get away from work for an appointment, when your regular [facility] provider is not available (evenings, weekends or holidays), or when youre out of town and need minor care. Electronic visits cost only $49 and if the [facility] 24/Inquirly provider determines a prescription is needed to treat your condition, one can be electronically transmitted to a nearby pharmacy*. Please take a moment to enroll today if you have not already done so. The enrollment process is free and takes just a few minutes. To enroll, please download the [employer] elena to your tablet or phone, or visit [email or web address] to enroll on your computer. And, as an [address] [place] [address] patient with a [employer] account, the results of your visits will be scanned into your electronic medical record and your primary care provider will be able to view the scanned results. We urge you to continue to see your regular [facility] provider for your ongoing medical care. And while your primary care provider may not be the one available when you seek a [employer] virtual visit, the peace of mind you get from getting a real diagnosis real time can be priceless. For more information on [employer], view our Frequently Asked Questions (FAQs) at [email or web address]. Sincerely, 
 
Navin Long MD 
Chief Medical Officer [employer] *:  certain medications cannot be prescribed via [employer] Providers Seen During Your Hospitalization Provider Specialty Primary office phone Guilherme Hays MD Orthopedic Surgery [phone] Your Primary Care Physician (PCP) Primary Care Physician Office Phone Office Fax Asher Youngblood [phone] You are allergic to the following Allergen Reactions Other Food Itching IF EATS IN QUANTITY OR ACROSS A FEW DAYS 
TOMATOES Bee Sting (Sting, Bee) Swelling Fig Itching Pcn (Penicillins) Hives IN CHILDHOOD Peach (Prunus Persica) Itching Shellfish Derived Itching Strawberry Itching Zostavax (Zoster Vaccine Live (Pf)) Swelling Severe Right arm swelling with redness after administered by pharmacist in elbow Atarax (Hydroxyzine Hcl) Other (comments)  
 jittery Buspar (Buspirone) Nausea Only Crestor (Rosuvastatin) Myalgia Body cramps Epinephrine Palpitations Hydrochlorothiazide (Bulk) Myalgia  
 sorethroat Norco (Hydrocodone-Acetaminophen) Nausea and Vomiting 5/325 MG Percocet (Oxycodone-Acetaminophen) Other (comments) \"hellish nightmares\" Zestril (Lisinopril) Cough Recent Documentation Height Weight BMI OB Status Smoking Status 1.651 m 66.6 kg 24.43 kg/m2 Hysterectomy Never Smoker Emergency Contacts Name Discharge Info Relation Home Work Mobile Eric Naqvi DISCHARGE CAREGIVER [3] Spouse [3] [phone] Patient Belongings The following personal items are in your possession at time of discharge: 
  Dental Appliances: None Please provide this summary of care documentation to your next provider. Signatures-by signing, you are acknowledging that this After Visit Summary has been reviewed with you and you have received a copy. Patient Signature:  ____________________________________________________________ Date:  ____________________________________________________________  
  
Bobby Felix Provider Signature:  ____________________________________________________________ Date:  ____________________________________________________________

## 2018-04-03 NOTE — DISCHARGE INSTRUCTIONS
Carpal Tunnel Release: What to Expect at Home  Your Recovery  Your hand will hurt and may feel weak with some numbness. This usually goes away in a few days, but it may take several months. Your doctor may remove the large bandage, or he or she will tell you when and how to remove it yourself. In some cases, you may have a splint. If you have one, you will wear it for about 2 weeks. Your doctor will take out your stitches in 1 to 2 weeks. Your hand and wrist may feel worse than they had felt. But the pain should begin to go away. It usually takes 3 to 4 months to recover and up to 1 year before hand strength returns. How much hand strength returns will vary. The timing of your return to work depends on the type of surgery you had, whether the surgery was on your dominant hand (the hand you use most), and your work activities. If you had open surgery on your dominant hand and you do repeated actions at work, you may be able to return to work in 10 to 8 weeks. Repeated motions include typing or assembly-line work. If the surgery was on the other hand and you do not do repeated actions at work, you may be able to return to work in 9 to 14 days. If you had endoscopic surgery, you may be able to return to work sooner than with open surgery. This care sheet gives you a general idea about how long it will take for you to recover. But each person recovers at a different pace. Follow the steps below to get better as quickly as possible. How can you care for yourself at home? Activity  ? · Rest when you feel tired. Getting enough sleep will help you recover. ? · Try to walk each day. Start by walking a little more than you did the day before. Bit by bit, increase the amount you walk. ? · For up to 2 weeks after surgery, avoid lifting things heavier than 1 to 2 pounds and using your hand.  This includes doing repeated arm or hand movements, such as typing or using a computer mouse, washing windows, vacuuming, or chopping food. Do not use power tools, and avoid activities that cause vibration. ? · You may begin heavier tasks about 4 weeks after surgery. These include vacuuming, mowing the lawn, and gardening. ? · You may shower 24 to 48 hours after surgery, if your doctor okays it. Keep your bandage dry by taping a sheet of plastic to cover it. If you have a splint, keep it dry. Your doctor will tell you whether you can remove it when you shower. Be careful not to put the splint on too tight. Do not take a bath until the incision heals, or until your doctor tells you it is okay. ? · You may drive when you are fully able to use your hand. Diet  ? · You can eat your normal diet. If your stomach is upset, try bland, low-fat foods like plain rice, broiled chicken, toast, and yogurt. Medicines  ? · Your doctor will tell you if and when you can restart your medicines. He or she will also give you instructions about taking any new medicines. ? · If you take blood thinners, such as warfarin (Coumadin), clopidogrel (Plavix), or aspirin, be sure to talk to your doctor. He or she will tell you if and when to start taking those medicines again. Make sure that you understand exactly what your doctor wants you to do. ? · Take pain medicines exactly as directed. ¨ If the doctor gave you a prescription medicine for pain, take it as prescribed. ¨ If you are not taking a prescription pain medicine, take an over-the-counter medicine such as acetaminophen (Tylenol), ibuprofen (Advil, Motrin), or naproxen (Aleve). Read and follow all instructions on the label. ¨ Do not take two or more pain medicines at the same time unless the doctor told you to. Many pain medicines have acetaminophen, which is Tylenol. Too much acetaminophen (Tylenol) can be harmful. ? · If you think your pain medicine is making you sick to your stomach:  ¨ Take your medicine after meals (unless your doctor has told you not to).   ¨ Ask your doctor for a different pain medicine. ? · If your doctor prescribed antibiotics, take them as directed. Do not stop taking them just because you feel better. You need to take the full course of antibiotics. ?Incision and splint care  ? · Keep your bandage dry. If it gets dirty, you may change it. ? · If you have a splint, talk to your doctor about when you should wear it. Exercise  ? · You may need wrist and hand rehabilitation. This is a series of exercises you do after your surgery. This helps you get back your wrist's and hand's range of motion, strength, and . You will work with your doctor and physical or occupational therapist to plan this exercise program. To get the best results, you need to do the exercises correctly and as often and as long as your doctor tells you. Ice and elevation  ? · Put ice or a cold pack on your wrist for 10 to 20 minutes at a time. Try to do this every 1 to 2 hours for the next 3 days (when you are awake) or until the swelling goes down. Put a thin cloth between the ice and your skin. ? · Prop up the sore wrist on a pillow when you ice it or anytime you sit or lie down during the next 3 days. Try to keep it above the level of your heart. This will help reduce swelling. Other instructions  ? · Avoid letting your hand hang down. This can cause swelling. Follow-up care is a key part of your treatment and safety. Be sure to make and go to all appointments, and call your doctor if you are having problems. It's also a good idea to know your test results and keep a list of the medicines you take. When should you call for help? Call 911 anytime you think you may need emergency care. For example, call if:  ? · You passed out (lost consciousness). ? · You have chest pain, are short of breath, or cough up blood. ?Call your doctor now or seek immediate medical care if:  ? · You have pain that does not get better after you take pain medicine.    ? · Your hand is cool or pale or changes color. ? · Your cast or splint feels too tight. ? · You have tingling, weakness, or numbness in your hand or fingers. ? · You are sick to your stomach or cannot drink fluids. ? · You have loose stitches, or your incision comes open. ? · You have signs of a blood clot in your leg (called a deep vein thrombosis), such as:  ¨ Pain in your calf, back of the knee, thigh, or groin. ¨ Redness or swelling in your leg. ? · You have signs of infection, such as:  ¨ Increased pain, swelling, warmth, or redness. ¨ Red streaks leading from the incision. ¨ Pus draining from the incision. ¨ A fever. ? · Bright red blood has soaked through the bandage over your incision. ? Watch closely for any changes in your health, and be sure to contact your doctor if:  ? · You have a problem with your cast or splint. ? · You do not get better as expected. Where can you learn more? Go to http://newton-briana.info/. Enter N388 in the search box to learn more about \"Carpal Tunnel Release: What to Expect at Home. \"  Current as of: March 21, 2017  Content Version: 11.4  © 1627-8774 Healthwise, [employer]. Care instructions adapted under license by [employer] (which disclaims liability or warranty for this information). If you have questions about a medical condition or this instruction, always ask your healthcare professional. [place] [address] any warranty or liability for your use of this information.

## 2018-04-17 ENCOUNTER — OFFICE VISIT (OUTPATIENT)
Dept: FAMILY MEDICINE CLINIC | Age: 78
End: 2018-04-17

## 2018-04-17 VITALS
HEIGHT: 65 IN | TEMPERATURE: 97.4 F | WEIGHT: 143.1 LBS | SYSTOLIC BLOOD PRESSURE: 150 MMHG | OXYGEN SATURATION: 98 % | RESPIRATION RATE: 12 BRPM | BODY MASS INDEX: 23.84 KG/M2 | HEART RATE: 58 BPM | DIASTOLIC BLOOD PRESSURE: 93 MMHG

## 2018-04-17 DIAGNOSIS — E55.9 VITAMIN D DEFICIENCY: ICD-10-CM

## 2018-04-17 DIAGNOSIS — R05.8 ACE-INHIBITOR COUGH: ICD-10-CM

## 2018-04-17 DIAGNOSIS — G56.02 CARPAL TUNNEL SYNDROME OF LEFT WRIST: ICD-10-CM

## 2018-04-17 DIAGNOSIS — I10 ESSENTIAL HYPERTENSION, BENIGN: ICD-10-CM

## 2018-04-17 DIAGNOSIS — R68.82 DECREASED LIBIDO: ICD-10-CM

## 2018-04-17 DIAGNOSIS — E11.9 DIABETES MELLITUS TYPE 2, DIET-CONTROLLED (HCC): Primary | ICD-10-CM

## 2018-04-17 DIAGNOSIS — E16.1 HYPERINSULINEMIA: ICD-10-CM

## 2018-04-17 DIAGNOSIS — E78.5 DYSLIPIDEMIA: ICD-10-CM

## 2018-04-17 DIAGNOSIS — R25.2 MUSCLE CRAMPS: ICD-10-CM

## 2018-04-17 DIAGNOSIS — M25.561 BILATERAL CHRONIC KNEE PAIN: ICD-10-CM

## 2018-04-17 DIAGNOSIS — G89.29 BILATERAL CHRONIC KNEE PAIN: ICD-10-CM

## 2018-04-17 DIAGNOSIS — Z78.9 STATIN INTOLERANCE: ICD-10-CM

## 2018-04-17 DIAGNOSIS — M25.562 BILATERAL CHRONIC KNEE PAIN: ICD-10-CM

## 2018-04-17 DIAGNOSIS — T46.4X5A ACE-INHIBITOR COUGH: ICD-10-CM

## 2018-04-17 DIAGNOSIS — R63.4 WEIGHT LOSS: ICD-10-CM

## 2018-04-17 LAB — HBA1C MFR BLD HPLC: 5.5 %

## 2018-04-17 RX ORDER — ASPIRIN 81 MG/1
TABLET ORAL DAILY
COMMUNITY
End: 2018-08-13

## 2018-04-17 RX ORDER — MELOXICAM 7.5 MG/1
TABLET ORAL
Refills: 0 | COMMUNITY
Start: 2018-03-06 | End: 2018-08-29

## 2018-04-17 NOTE — ACP (ADVANCE CARE PLANNING)
Re-discussed ACP with patient. Patient already has an Advanced Directive on file. She returned it today. No changes to the documentation or further support from the [facility] Choices team requested at this time.

## 2018-04-17 NOTE — PROGRESS NOTES
Bridget Naqvi  Identified pt with two pt identifiers(name and ). Chief Complaint   Patient presents with    Blood Pressure Check    Diabetes    Results    Labs       1. Have you been to the ER, urgent care clinic since your last visit? Hospitalized since your last visit? No    2. Have you seen or consulted any other health care providers outside of the [address] [place] [address] since your last visit? Include any pap smears or colon screening. Dr. Jaymie Claros, Dr. Thomas Mitchell     In the event something were to happen to you and you were unable to speak on your behalf, do you have an Advance Directive/ Living Will in place stating your wishes? YES    If yes, do we have a copy on file NO    If no, would you like information:   Pt brought into office and needs two witness signatures. Medication reconciliation up to date and corrected with patient at this time. Today's provider has been notified of reason for visit, vitals and flowsheets obtained on patients. Reviewed record in preparation for visit, huddled with provider and have obtained necessary documentation.       Health Maintenance Due   Topic    EYE EXAM RETINAL OR DILATED Q1     GLAUCOMA SCREENING Q2Y     FOOT EXAM Q1     MICROALBUMIN Q1     HEMOGLOBIN A1C Q6M        Wt Readings from Last 3 Encounters:   18 143 lb 1.6 oz (64.9 kg)   18 146 lb 13.2 oz (66.6 kg)   10/13/17 146 lb 14.4 oz (66.6 kg)     Temp Readings from Last 3 Encounters:   18 98 °F (36.7 °C)   10/13/17 97.1 °F (36.2 °C) (Oral)   17 97.8 °F (36.6 °C) (Oral)     BP Readings from Last 3 Encounters:   18 131/68   10/13/17 91/62   17 113/74     Pulse Readings from Last 3 Encounters:   18 68   10/13/17 87   17 80     Vitals:    18 1142   BP: (!) 150/93   Pulse: (!) 58   Resp: 12   Temp: 97.4 °F (36.3 °C)   TempSrc: Oral   SpO2: 98%   Weight: 143 lb 1.6 oz (64.9 kg)   Height: 5' 5\" (1.651 m)   PainSc:   4   PainLoc: Knee Learning Assessment:  :     Learning Assessment 11/3/2016 4/4/2016 9/29/2015 8/19/2014 5/2/2014   PRIMARY LEARNER Patient Patient Patient Patient Patient   HIGHEST LEVEL OF EDUCATION - PRIMARY LEARNER  - - - - 4 YEARS OF COLLEGE   BARRIERS PRIMARY LEARNER - - - NONE NONE   CO-LEARNER CAREGIVER - - - No -   PRIMARY LANGUAGE ENGLISH ENGLISH ENGLISH ENGLISH ENGLISH   LEARNER PREFERENCE PRIMARY DEMONSTRATION DEMONSTRATION DEMONSTRATION DEMONSTRATION READING     - - - LISTENING PICTURES   ANSWERED BY patient patient patient patient patient   RELATIONSHIP SELF SELF SELF SELF SELF       Depression Screening:  :     PHQ over the last two weeks 10/13/2017   Little interest or pleasure in doing things Not at all   Feeling down, depressed or hopeless Not at all   Total Score PHQ 2 0       Fall Risk Assessment:  :     Fall Risk Assessment, last 12 mths 10/13/2017   Able to walk? Yes   Fall in past 12 months? Yes   Fall with injury? No   Number of falls in past 12 months 1   Fall Risk Score 1       Abuse Screening:  :     Abuse Screening Questionnaire 10/13/2017   Do you ever feel afraid of your partner? N   Are you in a relationship with someone who physically or mentally threatens you? N   Is it safe for you to go home?  Y       ADL Screening:  :     ADL Assessment 10/13/2017   Feeding yourself No Help Needed   Getting from bed to chair No Help Needed   Getting dressed No Help Needed   Bathing or showering No Help Needed   Walk across the room (includes cane/walker) No Help Needed   Using the telphone No Help Needed   Taking your medications No Help Needed   Preparing meals No Help Needed   Managing money (expenses/bills) No Help Needed   Moderately strenuous housework (laundry) No Help Needed   Shopping for personal items (toiletries/medicines) No Help Needed   Shopping for groceries No Help Needed   Driving No Help Needed   Climbing a flight of stairs No Help Needed   Getting to places beyond walking distances No Help Needed          Diabetic foot exam performed by Virginia Jasmine     Measurement  Response Nurse Comment Physician Comment   Monofilament  R - normal sensation with micro filament  L - normal sensation with micro filament     Pulse DP R - present  L - present     Pulse TP R - present  L - present     Structural deformity R - None  L - None     Skin Integrity / Deformity R - None  L - None        Reviewed by:

## 2018-04-17 NOTE — MR AVS SNAPSHOT
[address] [place] 
[address] [place] [address] 
[phone] Patient: Aroldo Oliva MRN:  [id number] Visit Information Date & Time Provider Department Dept. Phone Encounter #  
 4/17/2018 11:30 AM Jorge A Roca DO [facility] [phone] [id number] Follow-up Instructions Return in about 6 months (around 10/17/2018) for bp, dm, results, medicare wellness visit . Upcoming Health Maintenance Date Due  
 EYE EXAM RETINAL OR DILATED Q1 6/20/1950 GLAUCOMA SCREENING Q2Y 6/20/2005 BREAST CANCER SCRN MAMMOGRAM 9/13/2018 Pneumococcal 65+ Low/Medium Risk (2 of 2 - PPSV23) 10/13/2018 LIPID PANEL Q1 10/13/2018 MEDICARE YEARLY EXAM 10/14/2018 HEMOGLOBIN A1C Q6M 10/17/2018 FOOT EXAM Q1 4/17/2019 MICROALBUMIN Q1 4/17/2019 COLONOSCOPY 9/16/2020 DTaP/Tdap/Td series (2 - Td) 8/26/2026 Allergies as of 4/17/2018  Review Complete On: 4/17/2018 By: Jorge A Roca DO Severity Noted Reaction Type Reactions Other Food High 10/03/2016    Itching IF EATS IN QUANTITY OR ACROSS A FEW DAYS 
TOMATOES Bee Sting [Sting, Bee] High 09/10/2010    Swelling Fig High 10/03/2016    Itching Pcn [Penicillins] High 10/20/2009    Hives IN CHILDHOOD Peach (Prunus Persica) High 10/03/2016    Itching Shellfish Derived High 10/03/2016    Itching [place] High 10/03/2016    Itching Zostavax [Zoster Vaccine Live (Pf)] High 09/10/2010    Swelling Severe Right arm swelling with redness after administered by pharmacist in elbow Atarax [Hydroxyzine Hcl]  10/20/2009    Other (comments)  
 jittery Buspar [Buspirone]  10/20/2009    Nausea Only Crestor [Rosuvastatin]  09/10/2010    Myalgia Body cramps Epinephrine  10/20/2009    Palpitations Hydrochlorothiazide (Bulk)  10/20/2009    Myalgia  
 sorethroat Norco [Hydrocodone-acetaminophen]  02/27/2017    Nausea and Vomiting 5/325 MG Percocet [Oxycodone-acetaminophen]  10/20/2009    Other (comments) \"hellish nightmares\" Zestril [Lisinopril]  10/20/2009    Cough Current Immunizations  Reviewed on 4/17/2018 Name Date Influenza High Dose Vaccine PF 10/13/2017, 10/7/2016, 9/2/2015, 9/20/2014, 10/29/2013 Pneumococcal Conjugate (PCV-13) 10/13/2017 TD Vaccine 9/7/2004 ZZZ-RETIRED (DO NOT USE) Pneumococcal Vaccine (Unspecified Type) 10/22/2004, 9/17/1997 Zoster 1/21/2009 Reviewed by Quirino Goodpasture, DO on 4/17/2018 at  1:10 PM  
 Reviewed by Quirino Goodpasture, DO on 4/17/2018 at  1:14 PM  
You Were Diagnosed With   
  
 Codes Comments Diabetes mellitus type 2, diet-controlled ([facility]    -  Primary ICD-10-CM: E11.9 ICD-9-CM: 250.00 Essential hypertension, benign     ICD-10-CM: I10 
ICD-9-CM: 401.1 Dyslipidemia     ICD-10-CM: E78.5 ICD-9-CM: 272.4 worse due to red meat and fried chicken consumption Bilateral chronic knee pain     ICD-10-CM: M25.561, M25.562, G89.29 ICD-9-CM: 719.46, 338.29 R>L, due to severe OA Muscle cramps     ICD-10-CM: R25.2 ICD-9-CM: 729.82 worse in legs and feet at night, due to dehydration vs other Carpal tunnel syndrome of left wrist     ICD-10-CM: G56.02 
ICD-9-CM: 354.0 improved since released 04/03/18 Weight loss     ICD-10-CM: R63.4 ICD-9-CM: 783.21 3# since , 35# since 2015 due to no sodas, no sugar, no eating out ACE-inhibitor cough     ICD-10-CM: R05, T46.4X5A 
ICD-9-CM: 786.2, E942.6 Decreased libido     ICD-10-CM: V29.59 
ICD-9-CM: 799.81 Statin intolerance     ICD-10-CM: Z78.9 ICD-9-CM: 995.27 Vitamin D deficiency     ICD-10-CM: E55.9 ICD-9-CM: 268.9 Hyperinsulinemia     ICD-10-CM: E16.1 ICD-9-CM: 251.1 Vitals BP Pulse Temp Resp Height(growth percentile) Weight(growth percentile) (!) 150/93 (BP 1 Location: Right arm, BP Patient Position: Sitting) (!) 58 97.4 °F (36.3 °C) (Oral) 12 5' 5\" (1.651 m) 143 lb 1.6 oz (64.9 kg) SpO2 BMI OB Status Smoking Status 98% 23.81 kg/m2 Hysterectomy Never Smoker Vitals History BMI and BSA Data Body Mass Index Body Surface Area  
 23.81 kg/m 2 1.73 m 2 Preferred Pharmacy Pharmacy Name Phone [facility] [phone] Your Updated Medication List  
  
   
This list is accurate as of 4/17/18  1:18 PM.  Always use your most recent med list.  
  
  
  
  
 * albuterol 90 mcg/actuation inhaler Commonly known as:  PROVENTIL HFA, VENTOLIN HFA, PROAIR HFA Take 2 Puffs by inhalation every four (4) hours as needed for Wheezing or Shortness of Breath. Indications: BRONCHOSPASM PREVENTION  
  
 * albuterol 2.5 mg /3 mL (0.083 %) nebulizer solution Commonly known as:  PROVENTIL VENTOLIN  
1.5 mL by Nebulization route every four (4) hours as needed for Wheezing. aspirin delayed-release 81 mg tablet Take  by mouth daily. baclofen 20 mg tablet Commonly known as:  LIORESAL Take 1 Tab by mouth nightly. Indications: Muscle Spasticity of Spinal Origin  
  
 benazepril 20 mg tablet Commonly known as:  LOTENSIN  
take 1 tablet by mouth once daily  
  
 fluticasone 50 mcg/actuation nasal spray Commonly known as:  Ileana Thompson 2 Sprays by Both Nostrils route daily. melatonin 1 mg tablet Take 3 mg by mouth nightly. meloxicam 7.5 mg tablet Commonly known as:  MOBIC  
take 1 tablet by mouth twice a day if needed  
  
 montelukast 10 mg tablet Commonly known as:  SINGULAIR Take 1 Tab by mouth daily. Indications: ALLERGIC RHINITIS, MAINTENANCE THERAPY FOR ASTHMA [address] Please dispense 1 nebulizer kit and accessories  approved by patient's insurance company  
  
 omeprazole 40 mg capsule Commonly known as:  PRILOSEC Take 40 mg by mouth daily. 3 times weekly  
  
 rOPINIRole 1 mg tablet Commonly known as:  REQUIP  
take 1 and 1/2 tablets by mouth NIGHTLY  
  
 TYLENOL EXTRA STRENGTH 500 mg tablet Generic drug:  acetaminophen Take  by mouth every six (6) hours as needed for Pain. * Notice: This list has 2 medication(s) that are the same as other medications prescribed for you. Read the directions carefully, and ask your doctor or other care provider to review them with you. We Performed the Following AMB POC HEMOGLOBIN A1C [74258 CPT(R)] CBC W/O DIFF [44909 CPT(R)] CK [id number] CPT(R)]  DIABETES FOOT EXAM [HM7 Custom] LIPID PANEL [06336 CPT(R)] MAGNESIUM [id number] CPT(R)] METABOLIC PANEL, COMPREHENSIVE [96975 CPT(R)] MICROALBUMIN, UR, RAND W/ MICROALB/CREAT RATIO [id number] CPT(R)] REFERRAL TO GYNECOLOGY [REF30 Custom] Comments:  
 Decreased libido SED RATE (ESR) [id number] CPT(R)] TSH 3RD GENERATION [02625 CPT(R)] URINALYSIS W/ RFLX MICROSCOPIC [36027 CPT(R)] VITAMIN D, 25 HYDROXY [id number] CPT(R)] Follow-up Instructions Return in about 6 months (around 10/17/2018) for bp, dm, results, medicare wellness visit . Referral Information Referral ID Referred By Referred To  
  
 [id number] Paul Bhatia MD   
   [place] [address] [address] Phone: [phone] Fax: [phone] Visits Status Start Date End Date 1 New Request 4/17/18 4/17/19 If your referral has a status of pending review or denied, additional information will be sent to support the outcome of this decision. Introducing [facility]! [employer] introduces Verical patient portal. Now you can access parts of your medical record, email your doctor's office, and request medication refills online. 1. In your internet browser, go to [email or web address] 2. Click on the First Time User? Click Here link in the Sign In box. You will see the New Member Sign Up page. 3. Enter your Trendient Access Code exactly as it appears below. You will not need to use this code after youve completed the sign-up process. If you do not sign up before the expiration date, you must request a new code. · Trendient Access Code: [email or web address] Expires: 7/1/2018  8:16 AM 
 
4. Enter the last four digits of your Social Security Number (xxxx) and Date of Birth (mm/dd/yyyy) as indicated and click Submit. You will be taken to the next sign-up page. 5. Create a Trendient ID. This will be your Trendient login ID and cannot be changed, so think of one that is secure and easy to remember. 6. Create a Trendient password. You can change your password at any time. 7. Enter your Password Reset Question and Answer. This can be used at a later time if you forget your password. 8. Enter your e-mail address. You will receive e-mail notification when new information is available in [address] 9. Click Sign Up. You can now view and download portions of your medical record. 10. Click the Download Summary menu link to download a portable copy of your medical information. If you have questions, please visit the Frequently Asked Questions section of the Trendient website. Remember, Trendient is NOT to be used for urgent needs. For medical emergencies, dial 911. Now available from your iPhone and Android! Please provide this summary of care documentation to your next provider. Your primary care clinician is listed as Rodriguez Almendarez. If you have any questions after today's visit, please call [phone].

## 2018-04-17 NOTE — PROGRESS NOTES
HISTORY OF PRESENT ILLNESS  Omar Francisco is a 68 y.o. female presents with Blood Pressure Check; Diabetes; Results; Referral Follow Up; and Labs    Agree with nurse note. Diabetic, hypertensive pt with dyslipidemia, vit d deficiency, hyperinsulinemia, statin intolerance, and hx of ACE inhibitor cough presents to the office with a BP of 150/93 and HR of 58 bpm. She brought her BP log which shows a range of 120s-130s/60s-70s with one reading of 113/63. For BP, she take Benazepril 30 mg daily, tolerating well. Today, Hgb A1c is 5.5, down from 5.7 in 10/2017. Her DM is diet controlled. Patient denies hypoglycemic episodes, vision changes, headaches, dizziness, chest pain, SOB, or swelling. She weighs 143 lbs, down 3 lbs since 10/2017, down 12 lbs since 03/2017, and down 35 lbs since 2015. She now goes to bed and eats earlier. She does not snack after dinner anymore. She eats a salad for lunch daily. She has decreased her intake of sugar and increased her intake of vegetables. She no longer drinks orange juice or soda and previously was drinking 2 sodas per day. She drinks decaf tea once weekly with splenda. She was eating out about 3-4 nights weekly and now she eats out maybe once. She was wearing size 14 pants but now wears size 8. She is s/p L Carpal Tunnel release on 04/03/18 performed by orthopedist, Dr. Fred Calle. Since having surgery, she can feel again in her first three L fingers. She has not had any pain. She has a follow up later today to remove her stitches. Pt with BL chronic knee pain. She report seeing Dr. Carmenza Lei for her R knee pain as well. She reports having an injection in her R knee. She mentions having leg cramps after eating salty foods like chips. She also gets foot cramps in the middle of the night. This has occurred x years. She Baclofen 20-40 mg nightly, tolerating well. She drinks apple cider vinegar, honey, and water plus mustard with relief in less than 1 min. She has questions about decreased libido. She is interested in options to help improve her libido. Health Maintenance    Pt's most recent colonoscopy was on 09/16/15 with GI, Dr. Evelin Tyler. F/U 5 years. Pt's most recent mammogram was on 09/13/17; normal.     Written by allen Arrington, as dictated by Dr. Billie Kramer DO.    ROS    Review of Systems negative except as noted above in HPI. ALLERGIES:    Allergies   Allergen Reactions    Other Food Itching     IF EATS IN QUANTITY OR ACROSS A FEW DAYS  TOMATOES    Bee Sting [Sting, Bee] Swelling    Fig Itching    Pcn [Penicillins] Hives     IN CHILDHOOD    Peach (Prunus Persica) Itching    Shellfish Derived Itching    Strawberry Itching    Zostavax [Zoster Vaccine Live (Pf)] Swelling     Severe Right arm swelling with redness after administered by pharmacist in elbow    Atarax [Hydroxyzine Hcl] Other (comments)     jittery    Buspar [Buspirone] Nausea Only    Crestor [Rosuvastatin] Myalgia     Body cramps    Epinephrine Palpitations    Hydrochlorothiazide (Bulk) Myalgia     sorethroat    Norco [Hydrocodone-Acetaminophen] Nausea and Vomiting     5/325 MG    Percocet [Oxycodone-Acetaminophen] Other (comments)     \"hellish nightmares\"    Zestril [Lisinopril] Cough       CURRENT MEDICATIONS:    Outpatient Prescriptions Marked as Taking for the 4/17/18 encounter (Office Visit) with Westley Peck DO   Medication Sig Dispense Refill    meloxicam (MOBIC) 7.5 mg tablet take 1 tablet by mouth twice a day if needed  0    aspirin delayed-release 81 mg tablet Take  by mouth daily.  rOPINIRole (REQUIP) 1 mg tablet take 1 and 1/2 tablets by mouth NIGHTLY 45 Tab 5    benazepril (LOTENSIN) 20 mg tablet take 1 tablet by mouth once daily 90 Tab 2    baclofen (LIORESAL) 20 mg tablet Take 1 Tab by mouth nightly. Indications: Muscle Spasticity of Spinal Origin 90 Tab 1    melatonin 1 mg tablet Take 3 mg by mouth nightly.  omeprazole (PRILOSEC) 40 mg capsule Take 40 mg by mouth daily. 3 times weekly  0    acetaminophen (TYLENOL EXTRA STRENGTH) 500 mg tablet Take  by mouth every six (6) hours as needed for Pain. PAST MEDICAL HISTORY:    Past Medical History:   Diagnosis Date    AC (acromioclavicular) joint bone spurs 01/2014    in back. Dr. Chary Lofton. Txd with shots x 3    Actinic keratosis 2015    Dr. Gunjan Tovar. Dr. Roach Cousin.  Allergic rhinitis, cause unspecified     Anxiety state, unspecified     Arthritis     R KNEE BONE-ON-BONE; R HAND-----OSTEO    Asthma     BRONCHITIS IN SPRING & FALL MOST YEARS    BCC (basal cell carcinoma), face 1980s (nose), 01/28/15 (forehead)    Dr. Cori Miller. Dr. Dario Martin.  Cataract 2007    Dr. Ehsan Donnelly    Chronic insomnia     Chronic low back pain 01/2014    Dr. Rene Dyer. DJD and spurs, Narrowing of L 3,4,5. Dr. Chary Lofton.  Chronic obstructive pulmonary disease ([facility] [address]     CHRONIC (TWICE-YEARLY) BRONCHITIS    Colon polyps 11/2005, 01/29/09    benign. Dr. Ivonne Bueno    Constipation     Degenerative lumbar spinal stenosis 01/2014    Dr. Demetri Suazo Narrowing of L 3,4,5    Diabetes mellitus type 2, diet-controlled ([facility] [address] 10/13/2017    Dyslipidemia     Dysphagia 09/2014    due to Esophagitis. Dr. Emili Bowen.  Essential hypertension, benign     Foot fracture, left 2009    stress.  Foot pain, bilateral 11/13/2013    Dr. Bishop Duggan.  GERD (gastroesophageal reflux disease) 09/29/2014    ESOPHAGITIS    Hearing loss     Dr. Wesley Christian.  Knee pain, bilateral 10/28/13    Dr. Breonna Durham. Right > Left. Severe OA. Txd with PT.    Lumbar stenosis with neurogenic claudication     Menopause 1976    Migraine     NONE SINCE AGE 37    Mixed stress and urge urinary incontinence     NO LEAKAGE; GETS UP SEVERAL TIMES A NIGHT, PT STATES ON 10/3/16    OA (osteoarthritis) of knee     Dr. Janice Reyes    Osteopenia 08/24/2015    Restless legs syndrome (RLS) 09/2015    Dr. Alexandru Kirk.  Shoulder joint dislocation 06/2011    Right. due to fall. Dr. Madhavi Toth Sleep apnea     undiagnosed    Syncope 06/25/11    due to fall down 5 steps. Right occipital head trauma.  Tremor of both hands 09/2015    Dr. Choudhury President GABAPENTIN       PAST SURGICAL HISTORY:    Past Surgical History:   Procedure Laterality Date    HX CARPAL TUNNEL RELEASE Left 04/03/2018    Dr. Lucero Mckeon     HX CATARACT REMOVAL Bilateral , R 04/10/17    Dr. Laurie Martinez COLONOSCOPY  11/2005, 01/29/09, 09/16/15    with polypectomy. Dr. Nikki Arzate q5 years    HX GI  09/2014    due to dysphagia. Dr. Noreen Salguero. ESOPHAGEL DILATATION    HX GYN  2009? A&P REPAIR    HX LUMBAR LAMINECTOMY  10/18/2016    L3-S1 Dr. Nayeli Novoa  01/28/15    [facility]. L Forehead. MOHs SURGERY. Dr. Oz Swift.  HX RAIMUNDO AND BSO  1976    due to fibroids    HX TONSILLECTOMY  1948    IN COMBINED ANT/POST COLPORRHAPHY  02/28/05    with enterocele RE.   Dr. Yuniel Campos and Dr. Governor Lange:    Family History   Problem Relation Age of Onset    Hypertension Mother     Dementia Mother      alzheimers    Thyroid Disease Mother     Hypertension Father     High Cholesterol Father     Kidney Disease Father      1 kidney    Emphysema Father      O2 requiring    Hypertension Sister     Diabetes Maternal Grandmother     Dementia Maternal Grandmother     Thyroid Disease Maternal Grandmother     Thyroid Disease Daughter      goiter    Heart Attack Maternal Uncle 51     massive    Heart Attack Maternal Uncle 39     massive    Heart Attack Maternal Aunt 51     massive    Thyroid Disease Daughter      thyroid cyst    Anesth Problems Neg Hx        SOCIAL HISTORY:    Social History     Social History    Marital status:      Spouse name: N/A    Number of children: N/A    Years of education: N/A     Social History Main Topics    Smoking status: Never Smoker    Smokeless tobacco: Never Used    Alcohol use No    Drug use: No    Sexual activity: Yes     Partners: Male     Birth control/ protection: Surgical     Other Topics Concern    None     Social History Narrative       IMMUNIZATIONS:    Immunization History   Administered Date(s) Administered    Influenza High Dose Vaccine PF 10/29/2013, 09/20/2014, 09/02/2015, 10/07/2016, 10/13/2017    Pneumococcal Conjugate (PCV-13) 10/13/2017    TD Vaccine 09/07/2004    ZZZ-RETIRED (DO NOT USE) Pneumococcal Vaccine (Unspecified Type) 09/17/1997, 10/22/2004    Zoster 01/21/2009         PHYSICAL EXAMINATION    Vital Signs    Visit Vitals    BP (!) 150/93 (BP 1 Location: Right arm, BP Patient Position: Sitting)    Pulse (!) 58    Temp 97.4 °F (36.3 °C) (Oral)    Resp 12    Ht 5' 5\" (1.651 m)    Wt 143 lb 1.6 oz (64.9 kg)    SpO2 98%    BMI 23.81 kg/m2       Weight Metrics 4/17/2018 4/3/2018 10/13/2017 3/23/2017 3/7/2017 2/27/2017 11/3/2016   Weight 143 lb 1.6 oz 146 lb 13.2 oz 146 lb 14.4 oz 153 lb 11.2 oz 155 lb 4.8 oz 153 lb 14.4 oz 158 lb   BMI 23.81 kg/m2 24.43 kg/m2 24.45 kg/m2 25.58 kg/m2 25.84 kg/m2 25.61 kg/m2 26.29 kg/m2       General appearance - Well nourished. Well appearing. Well developed. No acute distress. Head - Normocephalic. Atraumatic. Eyes - pupils equal and reactive. Extraocular eye movements intact. Sclera anicteric. Mildly injected sclera. Ears - Hearing is grossly normal bilaterally. Nose - normal and patent. No polyps noted. No erythema. No discharge. Mouth - mucous membranes with adequate moisture. Posterior pharynx normal with cobblestone appearance. No erythema, white exudate or obstruction. Neck - supple. Midline trachea. No carotid bruits noted bilaterally. No thyromegaly noted.   Chest - clear to auscultation bilaterally anteriorly and posteriorly. No wheezes. No rales or rhonchi. Breath sounds are symmetrical bilaterally. Unlabored respirations. Heart - low rate. Regular rhythm. Normal S1, S2.  3/6 murmur noted. No rubs, clicks or gallops noted. Abdomen - soft and nondistended. No masses or organomegaly. No rebound, rigidity or guarding. Bowel sounds normal x 4 quadrants. No tenderness noted. Neurological - awake, alert and oriented to person, place, and time and event. Cranial nerves II through XII intact. Clear speech. Muscle strength is +5/5 x 4 extremities. Sensation is intact to light touch bilaterally. Steady gait. Heme/Lymph - peripheral pulses normal x 4 extremities. No peripheral edema is noted. Musculoskeletal - Intact x 4 extremities. Full ROM x 4 extremities. Wrist splint intact to L wrist.   Back exam - normal range of motion. No pain on palpation of the spinous processes in the cervical, thoracic, lumbar, sacral regions. No CVA tenderness. Skin - no rashes, erythema, ecchymosis, lacerations, abrasions, suspicious moles noted  Psychological -   normal behavior, dress and thought processes. Good insight. Good eye contact. Normal affect. Appropriate mood. Normal speech.       DATA REVIEWED    Lab Results   Component Value Date/Time    WBC 5.4 04/03/2017 08:51 AM    HGB 13.1 04/03/2017 08:51 AM    HCT 39.8 04/03/2017 08:51 AM    PLATELET 281 22/84/9773 08:51 AM    MCV 89 04/03/2017 08:51 AM     Lab Results   Component Value Date/Time    Sodium 139 04/03/2017 08:51 AM    Potassium 4.2 04/03/2017 08:51 AM    Chloride 100 04/03/2017 08:51 AM    CO2 23 04/03/2017 08:51 AM    Anion gap 7 07/10/2014 02:13 PM    Glucose 105 (H) 04/03/2017 08:51 AM    BUN 16 04/03/2017 08:51 AM    Creatinine 0.92 04/03/2017 08:51 AM    BUN/Creatinine ratio 17 04/03/2017 08:51 AM    GFR est AA 70 04/03/2017 08:51 AM    GFR est non-AA 61 04/03/2017 08:51 AM    Calcium 9.1 04/03/2017 08:51 AM    Bilirubin, total 0.4 04/03/2017 08:51 AM    AST (SGOT) 17 04/03/2017 08:51 AM    Alk. phosphatase 63 04/03/2017 08:51 AM    Protein, total 6.4 04/03/2017 08:51 AM    Albumin 4.1 04/03/2017 08:51 AM    Globulin 3.4 07/10/2014 02:13 PM    A-G Ratio 1.8 04/03/2017 08:51 AM    ALT (SGPT) 16 04/03/2017 08:51 AM     Lab Results   Component Value Date/Time    Cholesterol, total 221 (H) 10/13/2017 12:23 PM    HDL Cholesterol 55 10/13/2017 12:23 PM    LDL, calculated 148 (H) 10/13/2017 12:23 PM    VLDL, calculated 18 10/13/2017 12:23 PM    Triglyceride 88 10/13/2017 12:23 PM    CHOL/HDL Ratio 3.9 09/10/2010 01:43 PM     Lab Results   Component Value Date/Time    Vitamin D 25-Hydroxy 23.7 (L) 09/28/2011 12:17 PM    VITAMIN D, 25-HYDROXY 46.3 04/03/2017 08:51 AM       Lab Results   Component Value Date/Time    Hemoglobin A1c 5.7 (H) 10/13/2017 12:23 PM    Hemoglobin A1c (POC) 5.5 04/17/2018 12:02 PM     Lab Results   Component Value Date/Time    TSH 1.380 04/03/2017 08:51 AM    TSH 1.73 08/21/2014 09:00 AM     Lab Results   Component Value Date/Time    Microalb/Creat ratio (ug/mg creat.) <13.2 04/03/2017 10:46 AM       ASSESSMENT and PLAN      ICD-10-CM ICD-9-CM    1. Diabetes mellitus type 2, diet-controlled (HCC) E11.9 250.00 meloxicam (MOBIC) 7.5 mg tablet      aspirin delayed-release 81 mg tablet      AMB POC HEMOGLOBIN A1C      HM DIABETES FOOT EXAM      URINALYSIS W/ RFLX MICROSCOPIC      MICROALBUMIN, UR, RAND W/ MICROALB/CREAT RATIO      METABOLIC PANEL, COMPREHENSIVE      LIPID PANEL   2. Essential hypertension, benign I10 401.1 URINALYSIS W/ RFLX MICROSCOPIC      MICROALBUMIN, UR, RAND W/ MICROALB/CREAT RATIO      METABOLIC PANEL, COMPREHENSIVE      LIPID PANEL      TSH 3RD GENERATION   3. Dyslipidemia O89.1 106.5 METABOLIC PANEL, COMPREHENSIVE      LIPID PANEL      CK    worse due to red meat and fried chicken consumption   4. Bilateral chronic knee pain M25.561 719.46     M25.562 338.29     G89.29      R>L, due to severe OA   5.  Muscle cramps R25.2 729.82 MAGNESIUM      METABOLIC PANEL, COMPREHENSIVE      SED RATE (ESR)      TSH 3RD GENERATION    worse in legs and feet at night, due to dehydration vs other   6. Carpal tunnel syndrome of left wrist G56.02 354.0     improved since released 04/03/18   7. Weight loss R63.4 783.21 CBC W/O DIFF    3# since , 35# since 2015 due to no sodas, no sugar, no eating out   8. ACE-inhibitor cough R05 786.2     T46.4X5A E942.6    9. Decreased libido R68.82 799.81 REFERRAL TO GYNECOLOGY   10. Statin intolerance Z78.9 995.27    11. Vitamin D deficiency E55.9 268.9 VITAMIN D, 25 HYDROXY   12. Hyperinsulinemia E16.1 251.1        Discussed the patient's BMI with her. The BMI follow up plan is as follows: Pt not eligible for BMI calculation due to normal BMI. Decrease carbohydrates (white foods, sweet foods, sweet drinks and alcohol), increase green leafy vegetables and protein (lean meats and beans) with each meal.  Avoid fried foods. Eat 3-5 small meals daily. Do not skip meals. Increase water intake. Increase physical activity to 30 minutes daily for health benefit, as tolerated. Get 7-8 hours uninterrupted sleep nightly. Chart reviewed and updated. Continue current medications and care. Most recent tests reviewed. Recheck pertinent labs today while fasting. Recent office visit notes from Dr. Sweta Romo reviewed. Referrals given; patient urged to keep appointments with specialists. GYN. Counseled patient on health concerns:  DM, BP, weight management, cholesterol, and libido concerns. Immunizations noted. Offered empathy, support, legitimation, prayers, partnership to patient. Praised patient for progress. Follow-up Disposition:  Return in about 6 months (around 10/17/2018) for bp, dm, results, medicare wellness visit . Patient was offered a choice/choices in the treatment plan today. Patient expresses understanding of the plan and agrees with recommendations.     Patient declines any additional handouts. Patient is satisfied with previous handouts received from our office    More than 40 mins spent face to face with patient and more than 50% of this time spent in counseling and coordinating care. Written by allen Kirkland, as dictated by Dr. Giorgio Kuhn DO. Documentation True and Accepted by Callum Sanabria.  Ko Peterson.

## 2018-04-18 LAB
25(OH)D3+25(OH)D2 SERPL-MCNC: 32.4 NG/ML (ref 30–100)
ALBUMIN SERPL-MCNC: 4.8 G/DL (ref 3.5–4.8)
ALBUMIN/CREAT UR: <6.4 MG/G CREAT (ref 0–30)
ALBUMIN/GLOB SERPL: 2.1 {RATIO} (ref 1.2–2.2)
ALP SERPL-CCNC: 75 IU/L (ref 39–117)
ALT SERPL-CCNC: 10 IU/L (ref 0–32)
APPEARANCE UR: CLEAR
AST SERPL-CCNC: 17 IU/L (ref 0–40)
BILIRUB SERPL-MCNC: 0.6 MG/DL (ref 0–1.2)
BILIRUB UR QL STRIP: NEGATIVE
BUN SERPL-MCNC: 19 MG/DL (ref 8–27)
BUN/CREAT SERPL: 25 (ref 12–28)
CALCIUM SERPL-MCNC: 9.7 MG/DL (ref 8.7–10.3)
CHLORIDE SERPL-SCNC: 104 MMOL/L (ref 96–106)
CHOLEST SERPL-MCNC: 212 MG/DL (ref 100–199)
CK SERPL-CCNC: 118 U/L (ref 24–173)
CO2 SERPL-SCNC: 26 MMOL/L (ref 18–29)
COLOR UR: YELLOW
CREAT SERPL-MCNC: 0.75 MG/DL (ref 0.57–1)
CREAT UR-MCNC: 47 MG/DL
ERYTHROCYTE [DISTWIDTH] IN BLOOD BY AUTOMATED COUNT: 14.3 % (ref 12.3–15.4)
ERYTHROCYTE [SEDIMENTATION RATE] IN BLOOD BY WESTERGREN METHOD: 2 MM/HR (ref 0–40)
GFR SERPLBLD CREATININE-BSD FMLA CKD-EPI: 77 ML/MIN/1.73
GFR SERPLBLD CREATININE-BSD FMLA CKD-EPI: 89 ML/MIN/1.73
GLOBULIN SER CALC-MCNC: 2.3 G/DL (ref 1.5–4.5)
GLUCOSE SERPL-MCNC: 100 MG/DL (ref 65–99)
GLUCOSE UR QL: NEGATIVE
HCT VFR BLD AUTO: 42.1 % (ref 34–46.6)
HDLC SERPL-MCNC: 68 MG/DL
HGB BLD-MCNC: 13.7 G/DL (ref 11.1–15.9)
HGB UR QL STRIP: NEGATIVE
INTERPRETATION, 910389: NORMAL
KETONES UR QL STRIP: NEGATIVE
LDLC SERPL CALC-MCNC: 127 MG/DL (ref 0–99)
LEUKOCYTE ESTERASE UR QL STRIP: NEGATIVE
Lab: NORMAL
MAGNESIUM SERPL-MCNC: 2.1 MG/DL (ref 1.6–2.3)
MCH RBC QN AUTO: 29.4 PG (ref 26.6–33)
MCHC RBC AUTO-ENTMCNC: 32.5 G/DL (ref 31.5–35.7)
MCV RBC AUTO: 90 FL (ref 79–97)
MICRO URNS: NORMAL
MICROALBUMIN UR-MCNC: <3 UG/ML
NITRITE UR QL STRIP: NEGATIVE
PH UR STRIP: 6.5 [PH] (ref 5–7.5)
PLATELET # BLD AUTO: 320 X10E3/UL (ref 150–379)
POTASSIUM SERPL-SCNC: 4.7 MMOL/L (ref 3.5–5.2)
PROT SERPL-MCNC: 7.1 G/DL (ref 6–8.5)
PROT UR QL STRIP: NEGATIVE
RBC # BLD AUTO: 4.66 X10E6/UL (ref 3.77–5.28)
SODIUM SERPL-SCNC: 142 MMOL/L (ref 134–144)
SP GR UR: 1.02 (ref 1–1.03)
TRIGL SERPL-MCNC: 86 MG/DL (ref 0–149)
TSH SERPL DL<=0.005 MIU/L-ACNC: 1.21 UIU/ML (ref 0.45–4.5)
UROBILINOGEN UR STRIP-MCNC: 0.2 MG/DL (ref 0.2–1)
VLDLC SERPL CALC-MCNC: 17 MG/DL (ref 5–40)
WBC # BLD AUTO: 6.2 X10E3/UL (ref 3.4–10.8)

## 2018-04-30 DIAGNOSIS — M48.061 DEGENERATIVE LUMBAR SPINAL STENOSIS: ICD-10-CM

## 2018-04-30 DIAGNOSIS — R25.2 MUSCLE CRAMPS: ICD-10-CM

## 2018-05-02 NOTE — TELEPHONE ENCOUNTER
PCP: Liz Iniguez DO    Last appt: 4/17/2018  Future Appointments  Date Time Provider Whitney Chacko   4/44/1811 43:09 AM Lexi Oropeza  [address]   10/18/2018 9:00 AM Liz Iniguez DO [facility]       Requested Prescriptions     Pending Prescriptions Disp Refills    baclofen (LIORESAL) 20 mg tablet [Pharmacy Med Name: BACLOFEN 20 MG TABLET] 90 Tab 1     Sig: take 1 tablet by mouth NIGHTLY FOR MUSCLE SPASTICITY OF SPINAL ORIGIN     Lab Results   Component Value Date/Time    Sodium 142 04/17/2018 02:27 PM    Potassium 4.7 04/17/2018 02:27 PM    Chloride 104 04/17/2018 02:27 PM    CO2 26 04/17/2018 02:27 PM    Anion gap 7 07/10/2014 02:13 PM    Glucose 100 (H) 04/17/2018 02:27 PM    BUN 19 04/17/2018 02:27 PM    Creatinine 0.75 04/17/2018 02:27 PM    BUN/Creatinine ratio 25 04/17/2018 02:27 PM    GFR est AA 89 04/17/2018 02:27 PM    GFR est non-AA 77 04/17/2018 02:27 PM    Calcium 9.7 04/17/2018 02:27 PM     Lab Results   Component Value Date/Time    Hemoglobin A1c 5.7 (H) 10/13/2017 12:23 PM    Hemoglobin A1c (POC) 5.5 04/17/2018 12:02 PM     Lab Results   Component Value Date/Time    Cholesterol, total 212 (H) 04/17/2018 02:27 PM    HDL Cholesterol 68 04/17/2018 02:27 PM    LDL, calculated 127 (H) 04/17/2018 02:27 PM    VLDL, calculated 17 04/17/2018 02:27 PM    Triglyceride 86 04/17/2018 02:27 PM    CHOL/HDL Ratio 3.9 09/10/2010 01:43 PM     Lab Results   Component Value Date/Time    TSH 1.210 04/17/2018 02:27 PM    TSH 1.73 08/21/2014 09:00 AM

## 2018-05-05 RX ORDER — BACLOFEN 20 MG/1
TABLET ORAL
Qty: 90 TAB | Refills: 1 | Status: SHIPPED | OUTPATIENT
Start: 2018-05-05 | End: 2018-10-18 | Stop reason: ALTCHOICE

## 2018-07-23 NOTE — TELEPHONE ENCOUNTER
PCP: Mark Velazquez DO    Last appt: 4/17/2018  Future Appointments  Date Time Provider Whitney Ginna   8/20/8692 82:94 AM Heri Márquez  [address]   8/13/2018 9:30 AM [facility] PAT [facility]SR[facility]   8/13/2018 11:30 AM [facility] PAT EXAM RM [address] [facility]   10/18/2018 9:00 AM Mark Velazquez DO [facility]       Requested Prescriptions     Pending Prescriptions Disp Refills    benazepril (LOTENSIN) 20 mg tablet [Pharmacy Med Name: BENAZEPRIL HCL 20 MG TABLET] 90 Tab 2     Sig: take 1 tablet by mouth once daily       Prior labs and Blood pressures:  BP Readings from Last 3 Encounters:   04/17/18 (!) 150/93   04/03/18 131/68   10/13/17 91/62     Lab Results   Component Value Date/Time    Sodium 142 04/17/2018 02:27 PM    Potassium 4.7 04/17/2018 02:27 PM    Chloride 104 04/17/2018 02:27 PM    CO2 26 04/17/2018 02:27 PM    Anion gap 7 07/10/2014 02:13 PM    Glucose 100 (H) 04/17/2018 02:27 PM    BUN 19 04/17/2018 02:27 PM    Creatinine 0.75 04/17/2018 02:27 PM    BUN/Creatinine ratio 25 04/17/2018 02:27 PM    GFR est AA 89 04/17/2018 02:27 PM    GFR est non-AA 77 04/17/2018 02:27 PM    Calcium 9.7 04/17/2018 02:27 PM     Lab Results   Component Value Date/Time    Hemoglobin A1c 5.7 (H) 10/13/2017 12:23 PM    Hemoglobin A1c (POC) 5.5 04/17/2018 12:02 PM     Lab Results   Component Value Date/Time    Cholesterol, total 212 (H) 04/17/2018 02:27 PM    HDL Cholesterol 68 04/17/2018 02:27 PM    LDL, calculated 127 (H) 04/17/2018 02:27 PM    VLDL, calculated 17 04/17/2018 02:27 PM    Triglyceride 86 04/17/2018 02:27 PM    CHOL/HDL Ratio 3.9 09/10/2010 01:43 PM     Lab Results   Component Value Date/Time    Vitamin D 25-Hydroxy 23.7 (L) 09/28/2011 12:17 PM    VITAMIN D, 25-HYDROXY 32.4 04/17/2018 02:27 PM       Lab Results   Component Value Date/Time    TSH 1.210 04/17/2018 02:27 PM    TSH 1.73 08/21/2014 09:00 AM

## 2018-07-27 RX ORDER — BENAZEPRIL HYDROCHLORIDE 20 MG/1
TABLET ORAL
Qty: 90 TAB | Refills: 2 | Status: SHIPPED | OUTPATIENT
Start: 2018-07-27 | End: 2018-08-10 | Stop reason: SDUPTHER

## 2018-07-27 RX ORDER — BENAZEPRIL HYDROCHLORIDE 20 MG/1
TABLET ORAL
Qty: 90 TAB | Refills: 2 | Status: SHIPPED | OUTPATIENT
Start: 2018-07-27 | End: 2019-04-16 | Stop reason: SDUPTHER

## 2018-08-10 ENCOUNTER — OFFICE VISIT (OUTPATIENT)
Dept: FAMILY MEDICINE CLINIC | Age: 78
End: 2018-08-10

## 2018-08-10 VITALS
HEIGHT: 62 IN | BODY MASS INDEX: 26.02 KG/M2 | DIASTOLIC BLOOD PRESSURE: 78 MMHG | OXYGEN SATURATION: 98 % | TEMPERATURE: 98.2 F | RESPIRATION RATE: 15 BRPM | HEART RATE: 87 BPM | WEIGHT: 141.4 LBS | SYSTOLIC BLOOD PRESSURE: 117 MMHG

## 2018-08-10 DIAGNOSIS — M25.562 CHRONIC PAIN OF BOTH KNEES: ICD-10-CM

## 2018-08-10 DIAGNOSIS — R25.8 JERKY BODY MOVEMENTS: ICD-10-CM

## 2018-08-10 DIAGNOSIS — M48.062 LUMBAR STENOSIS WITH NEUROGENIC CLAUDICATION: ICD-10-CM

## 2018-08-10 DIAGNOSIS — R05.8 ACE-INHIBITOR COUGH: ICD-10-CM

## 2018-08-10 DIAGNOSIS — Z01.818 PREOP EXAMINATION: Primary | ICD-10-CM

## 2018-08-10 DIAGNOSIS — E78.5 DYSLIPIDEMIA: ICD-10-CM

## 2018-08-10 DIAGNOSIS — M48.061 DEGENERATIVE LUMBAR SPINAL STENOSIS: ICD-10-CM

## 2018-08-10 DIAGNOSIS — I10 ESSENTIAL HYPERTENSION, BENIGN: ICD-10-CM

## 2018-08-10 DIAGNOSIS — G89.29 CHRONIC PAIN OF BOTH KNEES: ICD-10-CM

## 2018-08-10 DIAGNOSIS — K59.09 CHRONIC CONSTIPATION: ICD-10-CM

## 2018-08-10 DIAGNOSIS — R63.4 WEIGHT LOSS: ICD-10-CM

## 2018-08-10 DIAGNOSIS — J45.20 MILD INTERMITTENT ASTHMA WITHOUT COMPLICATION: ICD-10-CM

## 2018-08-10 DIAGNOSIS — G25.81 RESTLESS LEGS SYNDROME (RLS): ICD-10-CM

## 2018-08-10 DIAGNOSIS — R12 HEARTBURN: ICD-10-CM

## 2018-08-10 DIAGNOSIS — T46.4X5A ACE-INHIBITOR COUGH: ICD-10-CM

## 2018-08-10 DIAGNOSIS — E11.9 DIABETES MELLITUS TYPE 2, DIET-CONTROLLED (HCC): ICD-10-CM

## 2018-08-10 DIAGNOSIS — G47.01 INSOMNIA SECONDARY TO CHRONIC PAIN: ICD-10-CM

## 2018-08-10 DIAGNOSIS — M25.561 CHRONIC PAIN OF BOTH KNEES: ICD-10-CM

## 2018-08-10 DIAGNOSIS — M85.88 OSTEOPENIA OF OTHER SITE: ICD-10-CM

## 2018-08-10 DIAGNOSIS — G89.29 INSOMNIA SECONDARY TO CHRONIC PAIN: ICD-10-CM

## 2018-08-10 DIAGNOSIS — E55.9 VITAMIN D DEFICIENCY: ICD-10-CM

## 2018-08-10 DIAGNOSIS — Z78.9 STATIN INTOLERANCE: ICD-10-CM

## 2018-08-10 NOTE — PROGRESS NOTES
Bridget Naqvi  Identified pt with two pt identifiers(name and ). Chief Complaint   Patient presents with    Pre-op Exam     total right knee replacement       1. Have you been to the ER, urgent care clinic since your last visit? Hospitalized since your last visit? No    2. Have you seen or consulted any other health care providers outside of the [facility] since your last visit? Include any pap smears or colon screening. Dr. Jacquelin Banks, Dr. Germán Lopez for eye exam done in 2018      Medication reconciliation up to date and corrected with patient at this time. Today's provider has been notified of reason for visit, vitals and flowsheets obtained on patients. Reviewed record in preparation for visit, huddled with provider and have obtained necessary documentation.       Health Maintenance Due   Topic    BREAST CANCER SCRN MAMMOGRAM        Wt Readings from Last 3 Encounters:   08/10/18 141 lb 6.4 oz (64.1 kg)   18 143 lb 1.6 oz (64.9 kg)   18 146 lb 13.2 oz (66.6 kg)     Temp Readings from Last 3 Encounters:   18 97.4 °F (36.3 °C) (Oral)   18 98 °F (36.7 °C)   10/13/17 97.1 °F (36.2 °C) (Oral)     BP Readings from Last 3 Encounters:   18 (!) 150/93   18 131/68   10/13/17 91/62     Pulse Readings from Last 3 Encounters:   18 (!) 58   18 68   10/13/17 87     Vitals:    08/10/18 1042   BP: 117/78   Pulse: 87   Resp: 15   Temp: 98.2 °F (36.8 °C)   TempSrc: Temporal   SpO2: 98%   Weight: 141 lb 6.4 oz (64.1 kg)   Height: 5' 2.21\" (1.58 m)   PainSc:   6   PainLoc: Knee         Learning Assessment:  :     Learning Assessment 11/3/2016 2016 2015 2014 2014   PRIMARY LEARNER Patient Patient Patient Patient Patient   HIGHEST LEVEL OF EDUCATION - PRIMARY LEARNER  - - - - 4 YEARS OF COLLEGE   BARRIERS PRIMARY LEARNER - - - NONE NONE   CO-LEARNER CAREGIVER - - - No -   PRIMARY LANGUAGE ENGLISH ENGLISH ENGLISH ENGLISH ENGLISH LEARNER PREFERENCE PRIMARY DEMONSTRATION DEMONSTRATION DEMONSTRATION DEMONSTRATION READING     - - - LISTENING PICTURES   ANSWERED BY patient patient patient patient patient   RELATIONSHIP SELF SELF SELF SELF SELF       Depression Screening:  :     PHQ over the last two weeks 8/10/2018   Little interest or pleasure in doing things Not at all   Feeling down, depressed, irritable, or hopeless Not at all   Total Score PHQ 2 0       Fall Risk Assessment:  :     Fall Risk Assessment, last 12 mths 8/10/2018   Able to walk? Yes   Fall in past 12 months? No   Fall with injury? -   Number of falls in past 12 months -   Fall Risk Score -       Abuse Screening:  :     Abuse Screening Questionnaire 8/10/2018 10/13/2017   Do you ever feel afraid of your partner? N N   Are you in a relationship with someone who physically or mentally threatens you? N N   Is it safe for you to go home?  Y Y       ADL Screening:  :     ADL Assessment 8/10/2018   Feeding yourself No Help Needed   Getting from bed to chair No Help Needed   Getting dressed No Help Needed   Bathing or showering No Help Needed   Walk across the room (includes cane/walker) No Help Needed   Using the telphone No Help Needed   Taking your medications No Help Needed   Preparing meals No Help Needed   Managing money (expenses/bills) No Help Needed   Moderately strenuous housework (laundry) No Help Needed   Shopping for personal items (toiletries/medicines) No Help Needed   Shopping for groceries No Help Needed   Driving No Help Needed   Climbing a flight of stairs No Help Needed   Getting to places beyond walking distances No Help Needed

## 2018-08-10 NOTE — PROGRESS NOTES
HISTORY OF PRESENT ILLNESS  Kat Zuniga is a 66 y.o. female presents with Pre-op Exam (total right knee replacement); Knee Pain; Sleep Problem; Referral Follow Up; Blood Pressure Check; and Weight Management (atkins x 2 weeks, less than 20 carbs daily)    Agree with nurse note. Pt with type 2 DM, hypertension, mild intermittent asthma, ace-inhibitor cough, statin intolerance, osteopenia, and jerky body movements presents to the office with a BP of 117/78. She brought BP readings from home ranging from 107/62 to 130/64. HRs 73-80. For BP, she uses Lotensin 20 mg, tolerating well. She was using Aspirin 81 mg daily but reports Dr. Meche Vieyra nurse recommended she stop taking it. Pt requested to review labs from 4/17/2018. TSH 1.27, Creatine Kinase 118, Vit D 32.4, down from 46.3, , down from 148, 68, up from 55, Trigs 86, Mg 2.1, Hgb A1c 5.5, down from 6.1 in 2/2017. Pt is here for pre-op examination. She is scheduled to have R total knee replacement on 8/27/2018 with Dr. Shaggy Billy. She has a notebook from Dr. Francisco Thompson. She shares pg 8 regarding preadmission testing, for which she has scheduled 8/13. They will perform blood work, nasal culture, and ekg. She has had the R knee pain since 2010 and had had bone on bone since then. The pain worsens as the day goes on. Denies ankle or hip pain. Her knee gives out every once in awhile. She hasn't used Tylenol in 2-3 weeks because she doesn't like to take medication. Pt wonders if she should stop taking Meloxciam prior to surgery. She last saw Dr. Shaggy Billy on 7/5/2018 for osteoarthritis of R knee. She was given knee injection and xr showed bone on bone arthritis of medial compartments and patella. He noted injections have previously provided no relief. Pain is keeping her up at night and she did not have relief with NSAIDs. She was given a 2nd injection at 7/5 visit, and notes relief lasted for 2 weeks. Pt with restless leg syndrome.  She uses Requip 1 mg tablet nightly, tolerating well. Pt with lumbar stenosis. She is stable on Baclofen 20 mg nightly. Pt with heartburn. She uses prilosec 3 times a week with relief and is followed by GI, Dr. Sammy Osman. Pt with chronic constipation. She has to press her bowels and uses milk of magnesia with some relief. Weight is 141 lbs, down 2 lbs since 4/2018. She has been on Atkins diet for 2 weeks and has been watching sweets and carbs. Breakfast is atkins bar, low carb milk, or 1 or 2 eggs with sausage patties. Lunch is salad with piece of meat. Dinner is steamer vegetables with protein. She hardly ever eats potatoes anymore. Health Maintenance    Pt reports she had a recent eye exam with Dr. Paige Lyons. Written by allen Peralta, as dictated by Dr. Stephen Escobar DO.      ROS    Review of Systems negative except as noted above in HPI.     ALLERGIES:    Allergies   Allergen Reactions    Other Food Itching     If eats large quantities over several days causes itching: tomatoes, peaches, strawberry, fig    Bee Sting [Sting, Bee] Swelling    Pcn [Penicillins] Hives     IN CHILDHOOD    Percocet [Oxycodone-Acetaminophen] Other (comments)      Other (comments)\"hellish nightmares\"      Shellfish Containing Products Itching    Zoster Vaccine Live Swelling     Severe Right arm swelling with redness after administered by pharmacist in elbow    Atarax [Hydroxyzine Hcl] Other (comments)     jittery    Buspar [Buspirone] Nausea Only    Crestor [Rosuvastatin] Myalgia    Hydrochlorothiazide Myalgia     Other reaction(s): sorethroat    Hydroxyzine Other (comments)     jittery    Norco [Hydrocodone-Acetaminophen] Nausea and Vomiting                CURRENT MEDICATIONS:    Outpatient Prescriptions Marked as Taking for the 8/10/18 encounter (Office Visit) with Mark Velazquez DO   Medication Sig Dispense Refill    benazepril (LOTENSIN) 20 mg tablet take 1 tablet by mouth once daily 90 Tab 2    baclofen (LIORESAL) 20 mg tablet take 1 tablet by mouth NIGHTLY FOR MUSCLE SPASTICITY OF SPINAL ORIGIN 90 Tab 1    meloxicam (MOBIC) 7.5 mg tablet take 1 tablet by mouth twice a day if needed  0    rOPINIRole (REQUIP) 1 mg tablet take 1 and 1/2 tablets by mouth NIGHTLY 45 Tab 5    omeprazole (PRILOSEC) 40 mg capsule Take 40 mg by mouth three (3) days a week. AT HS  0    acetaminophen (TYLENOL EXTRA STRENGTH) 500 mg tablet Take  by mouth every six (6) hours as needed for Pain. PAST MEDICAL HISTORY:    Past Medical History:   Diagnosis Date    AC (acromioclavicular) joint bone spurs 01/2014    in back. Dr. Davion Lopez. Txd with shots x 3    Actinic keratosis 2015    Dr. Eliza Ricardo. Dr. Rusty Soto.  Allergic rhinitis, cause unspecified     Anxiety state, unspecified     Arthritis     R KNEE BONE-ON-BONE; R HAND-----OSTEO    Asthma     BRONCHITIS IN SPRING & FALL MOST YEARS    BCC (basal cell carcinoma), face 1980s (nose), 01/28/15 (forehead)    Dr. Janise Duverney. Dr. Barrington Montez.  Cataract 2007    Dr. Matthew Desouza    Chronic insomnia     Chronic low back pain 01/2014    Dr. Carmen Estrada. DJD and spurs, Narrowing of L 3,4,5. Dr. Davion Lopez.  Chronic obstructive pulmonary disease (HCC)     CHRONIC (TWICE-YEARLY) BRONCHITIS    Chronic pain     Colon polyps 11/2005, 01/29/09    benign. Dr. Tre Sagastume    Constipation     Degenerative lumbar spinal stenosis 01/2014    Dr. Az Waldrop Narrowing of L 3,4,5    Diabetes mellitus type 2, diet-controlled ([facility] [address] 10/13/2017    LOST WEIGHT AND IS NOT DIABETIC    Dyslipidemia     Dysphagia 09/2014    due to Esophagitis. Dr. Leeanna Faith.  Essential hypertension, benign     Foot fracture, left 2009    stress.  Foot pain, bilateral 11/13/2013    Dr. Pawel Carreon.  GERD (gastroesophageal reflux disease) 09/29/2014    ESOPHAGITIS    Hearing loss     Dr. Kylah Kim.     Knee pain, bilateral 10/28/13    Dr. Danette Denise. Right > Left. Severe OA. Txd with PT.    Lumbar stenosis with neurogenic claudication     Menopause 1976    Migraine     NONE SINCE AGE 37    Mixed stress and urge urinary incontinence     NO LEAKAGE; GETS UP SEVERAL TIMES A NIGHT, PT STATES ON 10/3/16    OA (osteoarthritis) of knee     Dr. Danette Denise    Osteopenia 08/24/2015    Restless legs syndrome (RLS) 09/2015    Dr. Mathews Gitelman.  Shoulder joint dislocation 06/2011    Right. due to fall. Dr. Corky Bustamante Sleep apnea     undiagnosed    Syncope 06/25/11    due to fall down 5 steps. Right occipital head trauma.  Tremor of both hands 09/2015    Dr. Karen Zarate GABAPENTIN       PAST SURGICAL HISTORY:    Past Surgical History:   Procedure Laterality Date    HX CARPAL TUNNEL RELEASE Left 04/03/2018    Dr. Jonnathan Peguero     HX CATARACT REMOVAL Bilateral , R 04/10/17    Dr. Az Benites COLONOSCOPY  11/2005, 01/29/09, 09/16/15    with polypectomy. Dr. Nacho Cleaning q5 years    HX GI  09/2014    due to dysphagia. Dr. Maryjean Boeck. ESOPHAGEL DILATATION    HX GI      COLONOSCOPY    HX GYN  2009? A&P REPAIR    HX MALIGNANT SKIN LESION EXCISION  01/28/15    BCC. L Forehead. MOHs SURGERY. Dr. Isaias Curran.  HX ORTHOPAEDIC  10/18/2016    LUMBAR LAMINECTOMY    HX ORTHOPAEDIC Left 04/03/2018    CARPEL TUNNEL    HX RAIMUNDO AND BSO  1976    due to fibroids    HX TONSILLECTOMY  1948    OH COMBINED ANT/POST COLPORRHAPHY  02/28/05    with enterocele RE.   Dr. Dony Gutierres and Dr. Blake Goins:    Family History   Problem Relation Age of Onset    Hypertension Mother     Dementia Mother      alzheimers    Hypertension Father     High Cholesterol Father     Kidney Disease Father      1 kidney    Emphysema Father      O2 requiring    Hypertension Sister     Diabetes Maternal Grandmother     Dementia Maternal Grandmother     Thyroid Disease Maternal Grandmother     Thyroid Disease Daughter      goiter    Heart Attack Maternal Uncle 46     massive    Heart Attack Maternal Uncle 39     massive    Heart Attack Maternal Aunt 51     massive    Thyroid Disease Daughter      thyroid cyst    Other Brother       AT 1DAYS OLD    Anesth Problems Neg Hx        SOCIAL HISTORY:    Social History     Social History    Marital status:      Spouse name: N/A    Number of children: N/A    Years of education: N/A     Social History Main Topics    Smoking status: Never Smoker    Smokeless tobacco: Never Used    Alcohol use No    Drug use: No    Sexual activity: Yes     Partners: Male     Birth control/ protection: Surgical     Other Topics Concern    None     Social History Narrative       IMMUNIZATIONS:    Immunization History   Administered Date(s) Administered    Influenza High Dose Vaccine PF 10/29/2013, 2014, 2015, 10/07/2016, 10/13/2017    Pneumococcal Conjugate (PCV-13) 10/13/2017    TD Vaccine 2004    ZZZ-RETIRED (DO NOT USE) Pneumococcal Vaccine (Unspecified Type) 1997, 10/22/2004    Zoster 2009         PHYSICAL EXAMINATION    Vital Signs    Visit Vitals    /78 (BP 1 Location: Right arm, BP Patient Position: Sitting)    Pulse 87    Temp 98.2 °F (36.8 °C) (Temporal)    Resp 15    Ht 5' 2.21\" (1.58 m)    Wt 141 lb 6.4 oz (64.1 kg)    SpO2 98%    BMI 25.69 kg/m2       Weight Metrics 2018 8/10/2018 2018 4/3/2018 10/13/2017 3/23/2017 3/7/2017   Weight 138 lb 141 lb 6.4 oz 143 lb 1.6 oz 146 lb 13.2 oz 146 lb 14.4 oz 153 lb 11.2 oz 155 lb 4.8 oz   BMI 25.24 kg/m2 25.69 kg/m2 23.81 kg/m2 24.43 kg/m2 24.45 kg/m2 25.58 kg/m2 25.84 kg/m2       General appearance - Well nourished. Well appearing. Well developed. No acute distress. Overweight. Head - Normocephalic. Atraumatic. Eyes - pupils equal and reactive. Extraocular eye movements intact. Sclera anicteric. Mildly injected sclera. Ears - Hearing is grossly normal bilaterally. Nose - normal and patent. No polyps noted. No erythema. No discharge. Mouth - mucous membranes with adequate moisture. Posterior pharynx normal with cobblestone appearance. No erythema, white exudate or obstruction. Neck - supple. Midline trachea. No carotid bruits noted bilaterally. No thyromegaly noted. Chest - clear to auscultation bilaterally anteriorly and posteriorly. No wheezes. No rales or rhonchi. Unlabored respirations. Harsh low pitched breath sounds, cleared after forced cough. Heart - normal rate. Regular rhythm. Normal S1, S2. No murmur noted. No rubs, clicks or gallops noted. Abdomen - soft and distended. No masses or organomegaly. No rebound, rigidity or guarding. Bowel sounds normal x 4 quadrants. No tenderness noted. Neurological - awake, alert and oriented to person, place, and time and event. Cranial nerves II through XII intact. Clear speech. Muscle strength is +5/5 x 4 extremities. Sensation is intact to light touch bilaterally. Steady gait with slight limp. Heme/Lymph - peripheral pulses normal x 4 extremities. No peripheral edema is noted. Medial edema noted at R knee. Musculoskeletal - Intact x 4 extremities. Full ROM x 4 extremities. R knee pain with movement. No tenderness in the pelvis, pubic bone, bilateral hips, ankles. L knee larger than R.   Back exam - normal range of motion. No pain on palpation of the spinous processes in the cervical, thoracic, lumbar, sacral regions. No CVA tenderness. Skin - no rashes, erythema, ecchymosis, lacerations, abrasions, suspicious moles noted  Psychological -   normal behavior, dress and thought processes. Good insight. Good eye contact. Normal affect. Appropriate mood. Normal speech.       DATA REVIEWED    Lab Results   Component Value Date/Time    WBC 5.4 08/13/2018 12:47 PM    HGB 14.0 08/13/2018 12:47 PM    HCT 42.4 08/13/2018 12:47 PM    PLATELET 327 41/19/1439 12:47 PM    MCV 93.4 08/13/2018 12:47 PM     Lab Results   Component Value Date/Time    Sodium 139 08/13/2018 12:47 PM    Potassium 4.3 08/13/2018 12:47 PM    Chloride 104 08/13/2018 12:47 PM    CO2 25 08/13/2018 12:47 PM    Anion gap 10 08/13/2018 12:47 PM    Glucose 94 08/13/2018 12:47 PM    BUN 14 08/13/2018 12:47 PM    Creatinine 0.80 08/13/2018 12:47 PM    BUN/Creatinine ratio 18 08/13/2018 12:47 PM    GFR est AA >60 08/13/2018 12:47 PM    GFR est non-AA >60 08/13/2018 12:47 PM    Calcium 9.2 08/13/2018 12:47 PM    Bilirubin, total 0.6 04/17/2018 02:27 PM    AST (SGOT) 17 04/17/2018 02:27 PM    Alk. phosphatase 75 04/17/2018 02:27 PM    Protein, total 7.1 04/17/2018 02:27 PM    Albumin 4.8 04/17/2018 02:27 PM    Globulin 3.4 07/10/2014 02:13 PM    A-G Ratio 2.1 04/17/2018 02:27 PM    ALT (SGPT) 10 04/17/2018 02:27 PM     Lab Results   Component Value Date/Time    Cholesterol, total 212 (H) 04/17/2018 02:27 PM    HDL Cholesterol 68 04/17/2018 02:27 PM    LDL, calculated 127 (H) 04/17/2018 02:27 PM    VLDL, calculated 17 04/17/2018 02:27 PM    Triglyceride 86 04/17/2018 02:27 PM    CHOL/HDL Ratio 3.9 09/10/2010 01:43 PM     Lab Results   Component Value Date/Time    Vitamin D 25-Hydroxy 23.7 (L) 09/28/2011 12:17 PM    VITAMIN D, 25-HYDROXY 32.4 04/17/2018 02:27 PM       Lab Results   Component Value Date/Time    Hemoglobin A1c 5.9 08/13/2018 12:47 PM    Hemoglobin A1c (POC) 5.5 04/17/2018 12:02 PM     Lab Results   Component Value Date/Time    TSH 1.210 04/17/2018 02:27 PM    TSH 1.73 08/21/2014 09:00 AM       Lab Results   Component Value Date/Time    Microalb/Creat ratio (ug/mg creat.) <6.4 04/17/2018 02:27 PM         ASSESSMENT and PLAN      ICD-10-CM ICD-9-CM    1. Preop examination Z01.818 V72.84    2. Diabetes mellitus type 2, diet-controlled (HCC) E11.9 250.00    3. Chronic pain of both knees M25.561 719.46     M25.562 338.29     G89.29      R>L   4.  Essential hypertension, benign I10 401.1     stable   5. Mild intermittent asthma without complication K40.60 336.74     stable   6. Insomnia secondary to chronic pain G89.29 338.29     G47.01 327.01    7. Dyslipidemia E78.5 272.4    8. Weight loss R63.4 783.21     2# since last ov due to Atkins Diet x 2 weeks   9. ACE-inhibitor cough R05 786.2     T46.4X5A E942.6    10. Statin intolerance Z78.9 995.27    11. Jerky body movements R25.8 781.0    12. Osteopenia of other site M85.88 733.90    13. Vitamin D deficiency E55.9 268.9    14. Degenerative lumbar spinal stenosis M48.061 724.02     stable on Baclofen q hs   15. Restless legs syndrome (RLS) G25.81 333.94     stable on Requip   16. Lumbar stenosis with neurogenic claudication M48.062 724.03    17. Heartburn R12 787.1     stable on Prilosec 3 x weekly   18. Chronic constipation K59.09 564.00     improves with pressing her stools and using milk of magnesia. Discussed the patient's BMI with her. The BMI follow up plan is as follows: I have counseled this patient on diet and exercise regimens. Decrease carbohydrates (white foods, sweet foods, sweet drinks and alcohol), increase green leafy vegetables and protein (lean meats and beans) with each meal.  Avoid fried foods. Eat 3-5 small meals daily. Do not skip meals. Increase water intake. Increase physical activity to 30 minutes daily for health benefit or 60 minutes daily to prevent weight regain, as tolerated. Get 7-8 hours uninterrupted sleep nightly. Chart reviewed and updated. Pt approved for TKR pending results from pre-op examination she has scheduled for 8/13/2018. Continue current medications and care. Restart Asprin 81 mg daily after TKR and cleared by surgeon. Advised pt to stop Meloxicam 2 weeks prior to surgery date. Most recent tests reviewed from 4/17/2018. Recheck lipids and A1c at next office visit. Get recent office visit notes from Dr. Linda Nina. Advised pt to sign release. Sent for, received and reviewed notes from Dr. Robi Pang at visit today. Counseled patient on health concerns:  Knee pain, lumbar stenosis, RLS, constipation, heartburn, osteopenia, cholesterol, weight management, asthma, DM. Relevant handouts given and discussed with patient. Immunizations noted. Offered empathy, support, legitimation, prayers, partnership to patient. Praised patient for progress. Follow-up Disposition:  Return in about 2 months (around 10/10/2018) for diabetes, medicare wellness visit, results, referral follow up. Patient was offered a choice/choices in the treatment plan today. Patient expresses understanding of the plan and agrees with recommendations. Written by allen Echeverria, as dictated by Dr. Tam Curran DO. Documentation True and Accepted by Murrel Dancer. Carter Gonzáles. Patient Instructions        Learning About Low-Carbohydrate Diets for Weight Loss  What is a low-carbohydrate diet? Low-carb diets avoid foods that are high in carbohydrate. These high-carb foods include pasta, bread, rice, cereal, fruits, and starchy vegetables. Instead, these diets usually have you eat foods that are high in fat and protein. Many people lose weight quickly on a low-carb diet. But the early weight loss is water. People on this diet often gain the weight back after they start eating carbs again. Not all diet plans are safe or work well. A lot of the evidence shows that low-carb diets aren't healthy. That's because these diets often don't include healthy foods like fruits and vegetables. Losing weight safely means balancing protein, fat, and carbs with every meal and snack. And low-carb diets don't always provide the vitamins, minerals, and fiber you need. If you have a serious medical condition, talk to your doctor before you try any diet. These conditions include kidney disease, heart disease, type 2 diabetes, high cholesterol, and high blood pressure.   If you are pregnant, it may not be safe for your baby if you are on a low-carb diet. How can you lose weight safely? You might have heard that a diet plan helped another person lose weight. But that doesn't mean that it will work for you. It is very hard to stay on a diet that includes lots of big changes in your eating habits. If you want to get to a healthy weight and stay there, making healthy lifestyle changes will often work better than dieting. These steps can help. · Make a plan for change. Work with your doctor to create a plan that is right for you. · See a dietitian. He or she can show you how to make healthy changes in your eating habits. · Manage stress. If you have a lot of stress in your life, it can be hard to focus on making healthy changes to your daily habits. · Track your food and activity. You are likely to do better at losing weight if you keep track of what you eat and what you do. Follow-up care is a key part of your treatment and safety. Be sure to make and go to all appointments, and call your doctor if you are having problems. It's also a good idea to know your test results and keep a list of the medicines you take. Where can you learn more? Go to http://newton-briana.info/. Enter A121 in the search box to learn more about \"Learning About Low-Carbohydrate Diets for Weight Loss. \"  Current as of: May 12, 2017  Content Version: 11.7  © 7346-0498 [employer]. Care instructions adapted under license by [employer] (which disclaims liability or warranty for this information). If you have questions about a medical condition or this instruction, always ask your healthcare professional. [address] any warranty or liability for your use of this information.

## 2018-08-10 NOTE — PATIENT INSTRUCTIONS
Learning About Low-Carbohydrate Diets for Weight Loss  What is a low-carbohydrate diet? Low-carb diets avoid foods that are high in carbohydrate. These high-carb foods include pasta, bread, rice, cereal, fruits, and starchy vegetables. Instead, these diets usually have you eat foods that are high in fat and protein. Many people lose weight quickly on a low-carb diet. But the early weight loss is water. People on this diet often gain the weight back after they start eating carbs again. Not all diet plans are safe or work well. A lot of the evidence shows that low-carb diets aren't healthy. That's because these diets often don't include healthy foods like fruits and vegetables. Losing weight safely means balancing protein, fat, and carbs with every meal and snack. And low-carb diets don't always provide the vitamins, minerals, and fiber you need. If you have a serious medical condition, talk to your doctor before you try any diet. These conditions include kidney disease, heart disease, type 2 diabetes, high cholesterol, and high blood pressure. If you are pregnant, it may not be safe for your baby if you are on a low-carb diet. How can you lose weight safely? You might have heard that a diet plan helped another person lose weight. But that doesn't mean that it will work for you. It is very hard to stay on a diet that includes lots of big changes in your eating habits. If you want to get to a healthy weight and stay there, making healthy lifestyle changes will often work better than dieting. These steps can help. · Make a plan for change. Work with your doctor to create a plan that is right for you. · See a dietitian. He or she can show you how to make healthy changes in your eating habits. · Manage stress. If you have a lot of stress in your life, it can be hard to focus on making healthy changes to your daily habits. · Track your food and activity.  You are likely to do better at losing weight if you keep track of what you eat and what you do. Follow-up care is a key part of your treatment and safety. Be sure to make and go to all appointments, and call your doctor if you are having problems. It's also a good idea to know your test results and keep a list of the medicines you take. Where can you learn more? Go to http://newton-briana.info/. Enter A121 in the search box to learn more about \"Learning About Low-Carbohydrate Diets for Weight Loss. \"  Current as of: May 12, 2017  Content Version: 11.7  © 1611-6987 [employer]. Care instructions adapted under license by [employer] (which disclaims liability or warranty for this information). If you have questions about a medical condition or this instruction, always ask your healthcare professional. [address] any warranty or liability for your use of this information.

## 2018-08-10 NOTE — MR AVS SNAPSHOT
Sharee Veras 
 
 
 [address] 
[address] [place] [address] 
[phone] Patient: Greg Santana MRN:  [id number] Visit Information Date & Time Provider Department Dept. Phone Encounter #  
 8/10/2018 10:30 AM DO Aman Hardy 74 [phone] [id number] Follow-up Instructions Return in about 2 months (around 10/10/2018) for diabetes, medicare wellness visit, results, referral follow up. Your Appointments 10/18/2018  9:00 AM  
ROUTINE CARE with David Guajardo DO [address] (HARLAN Boykin) Appt Note: 6 month follow up for dm,bp,results,medicare wellness visit [address] [facility] [address]  
[phone]  
  
   
 [address]  
  
    
 10/29/2018 10:40 AM  
New Patient with Samantha Porter MD  
[facility] AT [address] [place] [address] ([facility]) Appt Note: NGYN/decreased libido/ref by Mayra Hammonds DO  
 [address], [address] [place] [address] Upcoming Health Maintenance Date Due  
 BREAST CANCER SCRN MAMMOGRAM 9/13/2018 GLAUCOMA SCREENING Q2Y 8/31/2018* EYE EXAM RETINAL OR DILATED Q1 8/31/2018* Influenza Age 5 to Adult 9/28/2018* Pneumococcal 65+ Low/Medium Risk (2 of 2 - PPSV23) 10/13/2018 MEDICARE YEARLY EXAM 10/14/2018 HEMOGLOBIN A1C Q6M 10/17/2018 FOOT EXAM Q1 4/17/2019 MICROALBUMIN Q1 4/17/2019 LIPID PANEL Q1 4/17/2019 COLONOSCOPY 9/16/2020 DTaP/Tdap/Td series (2 - Td) 8/26/2026 *Topic was postponed. The date shown is not the original due date. Allergies as of 8/10/2018  Review Complete On: 8/10/2018 By: David Guajardo DO Severity Noted Reaction Type Reactions Other Food High 10/03/2016    Itching IF EATS IN QUANTITY OR ACROSS A FEW DAYS TOMATOES Bee Sting [Sting, Bee] High 09/10/2010    Swelling Fig High 10/03/2016    Itching Pcn [Penicillins] High 10/20/2009    Hives Other reaction(s): Penicillins Other reaction(s): Penicillins IN CHILDHOOD [place] (Prunus Persica) High 10/03/2016    Itching Shellfish Containing Products High 10/03/2016    Itching Shellfish Derived High 10/03/2016    Itching [place] High 10/03/2016    Itching Strawberry Extract High 10/03/2016    Itching Venom-honey Bee High 09/10/2010    Swelling Zostavax [Zoster Vaccine Live (Pf)] High 09/10/2010    Swelling Severe Right arm swelling with redness after administered by pharmacist in elbow Zoster Vaccine Live High 09/10/2010    Swelling Severe Right arm swelling with redness after administered by pharmacist in elbow Epinephrine Medium 10/20/2009    Palpitations Other reaction(s): Epinephrine Other reaction(s): Epinephrine Atarax [Hydroxyzine Hcl]  10/20/2009    Other (comments)  
 jittery Buspar [Buspirone]  10/20/2009    Nausea Only Crestor [Rosuvastatin]  09/10/2010    Myalgia Other reaction(s): Myalgia Body cramps Body cramps Hydrochlorothiazide  10/20/2009    Other (comments) Other reaction(s): Myalgia 
sorethroat Hydrochlorothiazide (Bulk)  10/20/2009    Myalgia  
 sorethroat Hydroxyzine  10/20/2009    Other (comments) Other reaction(s): Other (comments) 
jittery Norco [Hydrocodone-acetaminophen]  02/27/2017    Nausea and Vomiting Other reaction(s): Nausea and Vomiting 5/325 MG 
5/325 MG Percocet [Oxycodone-acetaminophen]  10/20/2009    Other (comments) Other reaction(s): Other (comments) \"hellish nightmares\" \"hellish nightmares\" Zestril [Lisinopril]  10/20/2009    Cough Other reaction(s): Cough Current Immunizations  Reviewed on 8/10/2018 Name Date Influenza High Dose Vaccine PF 10/13/2017, 10/7/2016, 9/2/2015, 9/20/2014, 10/29/2013 Pneumococcal Conjugate (PCV-13) 10/13/2017 TD Vaccine 9/7/2004 ZZZ-RETIRED (DO NOT USE) Pneumococcal Vaccine (Unspecified Type) 10/22/2004, 9/17/1997 Zoster 1/21/2009 Reviewed by Jorgito Landry DO on 8/10/2018 at 11:57 AM  
You Were Diagnosed With   
  
 Codes Comments Preop examination    -  Primary ICD-10-CM: N14.153 ICD-9-CM: V72.84 Diabetes mellitus type 2, diet-controlled ([facility]     ICD-10-CM: E11.9 ICD-9-CM: 250.00 Chronic pain of both knees     ICD-10-CM: M25.561, M25.562, G89.29 ICD-9-CM: 719.46, 338.29 R>L Essential hypertension, benign     ICD-10-CM: I10 
ICD-9-CM: 401.1 stable Mild intermittent asthma without complication     KCI-55-OK: J45.20 ICD-9-CM: 493.90 stable Insomnia secondary to chronic pain     ICD-10-CM: G89.29, G47.01 
ICD-9-CM: 338.29, 327.01 Dyslipidemia     ICD-10-CM: E78.5 ICD-9-CM: 272.4 Weight loss     ICD-10-CM: R63.4 ICD-9-CM: 783.21 2# since last ov due to Atkins Diet x 2 weeks ACE-inhibitor cough     ICD-10-CM: R05, T46.4X5A 
ICD-9-CM: 786.2, E942.6 Statin intolerance     ICD-10-CM: Z78.9 ICD-9-CM: 995.27   
 Jerky body movements     ICD-10-CM: R25.8 ICD-9-CM: 781.0 Osteopenia of other site     ICD-10-CM: M85.88 ICD-9-CM: 733.90 Vitamin D deficiency     ICD-10-CM: E55.9 ICD-9-CM: 268.9 Degenerative lumbar spinal stenosis     ICD-10-CM: M48.061 
ICD-9-CM: 724.02 stable on Baclofen q hs Restless legs syndrome (RLS)     ICD-10-CM: G25.81 ICD-9-CM: 333.94 stable on Requip Lumbar stenosis with neurogenic claudication     ICD-10-CM: M48.062 
ICD-9-CM: 724.03 Heartburn     ICD-10-CM: R12 
ICD-9-CM: 787.1 stable on Prilosec 3 x weekly Vitals BP Pulse Temp Resp Height(growth percentile) 117/78 (BP 1 Location: Right arm, BP Patient Position: Sitting) 87 98.2 °F (36.8 °C) (Temporal) 15 5' 2.21\" (1.58 m) Weight(growth percentile) SpO2 BMI OB Status Smoking Status 141 lb 6.4 oz (64.1 kg) 98% 25.69 kg/m2 Hysterectomy Never Smoker Vitals History BMI and BSA Data Body Mass Index Body Surface Area  
 25.69 kg/m 2 1.68 m 2 Preferred Pharmacy Pharmacy Name Phone [facility] [phone] Your Updated Medication List  
  
   
This list is accurate as of 8/10/18 12:05 PM.  Always use your most recent med list.  
  
  
  
  
 * albuterol 90 mcg/actuation inhaler Commonly known as:  PROVENTIL HFA, VENTOLIN HFA, PROAIR HFA Take 2 Puffs by inhalation every four (4) hours as needed for Wheezing or Shortness of Breath. Indications: BRONCHOSPASM PREVENTION  
  
 * albuterol 2.5 mg /3 mL (0.083 %) nebulizer solution Commonly known as:  PROVENTIL VENTOLIN  
1.5 mL by Nebulization route every four (4) hours as needed for Wheezing. aspirin delayed-release 81 mg tablet Take  by mouth daily. baclofen 20 mg tablet Commonly known as:  LIORESAL  
take 1 tablet by mouth NIGHTLY FOR MUSCLE SPASTICITY OF SPINAL ORIGIN  
  
 benazepril 20 mg tablet Commonly known as:  LOTENSIN  
take 1 tablet by mouth once daily  
  
 fluticasone 50 mcg/actuation nasal spray Commonly known as:  Euna Marshall 2 Sprays by Both Nostrils route daily. melatonin 1 mg tablet Take 3 mg by mouth nightly. meloxicam 7.5 mg tablet Commonly known as:  MOBIC  
take 1 tablet by mouth twice a day if needed  
  
 montelukast 10 mg tablet Commonly known as:  SINGULAIR Take 1 Tab by mouth daily. Indications: ALLERGIC RHINITIS, MAINTENANCE THERAPY FOR ASTHMA [address] Please dispense 1 nebulizer kit and accessories  approved by patient's insurance company  
  
 omeprazole 40 mg capsule Commonly known as:  PRILOSEC Take 40 mg by mouth daily. 3 times weekly  
  
 rOPINIRole 1 mg tablet Commonly known as:  REQUIP  
take 1 and 1/2 tablets by mouth NIGHTLY TYLENOL EXTRA STRENGTH 500 mg tablet Generic drug:  acetaminophen Take  by mouth every six (6) hours as needed for Pain. * Notice: This list has 2 medication(s) that are the same as other medications prescribed for you. Read the directions carefully, and ask your doctor or other care provider to review them with you. Follow-up Instructions Return in about 2 months (around 10/10/2018) for diabetes, medicare wellness visit, results, referral follow up. To-Do List   
 08/13/2018 9:30 AM  
  Appointment with [facility] at [address] [place] [address] ([phone])  
  
 08/13/2018 11:30 AM  
  Appointment with [facility] PAT EXAM RM 6 at [address] [place] [address] ([phone]) Patient Instructions Learning About Low-Carbohydrate Diets for Weight Loss What is a low-carbohydrate diet? Low-carb diets avoid foods that are high in carbohydrate. These high-carb foods include pasta, bread, rice, cereal, fruits, and starchy vegetables. Instead, these diets usually have you eat foods that are high in fat and protein. Many people lose weight quickly on a low-carb diet. But the early weight loss is water. People on this diet often gain the weight back after they start eating carbs again. Not all diet plans are safe or work well. A lot of the evidence shows that low-carb diets aren't healthy. That's because these diets often don't include healthy foods like fruits and vegetables. Losing weight safely means balancing protein, fat, and carbs with every meal and snack. And low-carb diets don't always provide the vitamins, minerals, and fiber you need. If you have a serious medical condition, talk to your doctor before you try any diet. These conditions include kidney disease, heart disease, type 2 diabetes, high cholesterol, and high blood pressure. If you are pregnant, it may not be safe for your baby if you are on a low-carb diet. How can you lose weight safely? You might have heard that a diet plan helped another person lose weight. But that doesn't mean that it will work for you. It is very hard to stay on a diet that includes lots of big changes in your eating habits. If you want to get to a healthy weight and stay there, making healthy lifestyle changes will often work better than dieting. These steps can help. · Make a plan for change. Work with your doctor to create a plan that is right for you. · See a dietitian. He or she can show you how to make healthy changes in your eating habits. · Manage stress. If you have a lot of stress in your life, it can be hard to focus on making healthy changes to your daily habits. · Track your food and activity. You are likely to do better at losing weight if you keep track of what you eat and what you do. Follow-up care is a key part of your treatment and safety. Be sure to make and go to all appointments, and call your doctor if you are having problems. It's also a good idea to know your test results and keep a list of the medicines you take. Where can you learn more? Go to http://newton-briana.info/. Enter A121 in the search box to learn more about \"Learning About Low-Carbohydrate Diets for Weight Loss. \" Current as of: May 12, 2017 Content Version: 11.7 © 7781-9052 [employer]. Care instructions adapted under license by [employer] (which disclaims liability or warranty for this information). If you have questions about a medical condition or this instruction, always ask your healthcare professional. [place] [address] any warranty or liability for your use of this information. Introducing [facility]! Dear Yue Ritchie: 
Thank you for requesting a clickTRUE account. Our records indicate that you already have an active clickTRUE account. You can access your account anytime at [email or web address] Did you know that you can access your hospital and ER discharge instructions at any time in iJukebox? You can also review all of your test results from your hospital stay or ER visit. Additional Information If you have questions, please visit the Frequently Asked Questions section of the iJukebox website at [email or web address] Remember, iJukebox is NOT to be used for urgent needs. For medical emergencies, dial 911. Now available from your iPhone and Android! Please provide this summary of care documentation to your next provider. Your primary care clinician is listed as Errol Dale. If you have any questions after today's visit, please call [phone].

## 2018-08-13 ENCOUNTER — HOSPITAL ENCOUNTER (OUTPATIENT)
Dept: PREADMISSION TESTING | Age: 78
Discharge: HOME | End: 2018-08-13
Payer: MEDICARE

## 2018-08-13 VITALS
HEART RATE: 62 BPM | WEIGHT: 138 LBS | SYSTOLIC BLOOD PRESSURE: 154 MMHG | HEIGHT: 62 IN | TEMPERATURE: 98.1 F | DIASTOLIC BLOOD PRESSURE: 81 MMHG | BODY MASS INDEX: 25.4 KG/M2

## 2018-08-13 LAB
ABO + RH BLD: NORMAL
ANION GAP SERPL CALC-SCNC: 10 MMOL/L (ref 5–15)
APPEARANCE UR: CLEAR
ATRIAL RATE: 60 BPM
BACTERIA URNS QL MICRO: NEGATIVE /HPF
BILIRUB UR QL: NEGATIVE
BLOOD GROUP ANTIBODIES SERPL: NORMAL
BUN SERPL-MCNC: 14 MG/DL (ref 6–20)
BUN/CREAT SERPL: 18 (ref 12–20)
CALCIUM SERPL-MCNC: 9.2 MG/DL (ref 8.5–10.1)
CALCULATED P AXIS, ECG09: 9 DEGREES
CALCULATED R AXIS, ECG10: -25 DEGREES
CALCULATED T AXIS, ECG11: 25 DEGREES
CHLORIDE SERPL-SCNC: 104 MMOL/L (ref 97–108)
CO2 SERPL-SCNC: 25 MMOL/L (ref 21–32)
COLOR UR: NORMAL
CREAT SERPL-MCNC: 0.8 MG/DL (ref 0.55–1.02)
DIAGNOSIS, 93000: NORMAL
EPITH CASTS URNS QL MICRO: NORMAL /LPF
ERYTHROCYTE [DISTWIDTH] IN BLOOD BY AUTOMATED COUNT: 12.7 % (ref 11.5–14.5)
EST. AVERAGE GLUCOSE BLD GHB EST-MCNC: 123 MG/DL
GLUCOSE SERPL-MCNC: 94 MG/DL (ref 65–100)
GLUCOSE UR STRIP.AUTO-MCNC: NEGATIVE MG/DL
HBA1C MFR BLD: 5.9 % (ref 4.2–6.3)
HCT VFR BLD AUTO: 42.4 % (ref 35–47)
HGB BLD-MCNC: 14 G/DL (ref 11.5–16)
HGB UR QL STRIP: NEGATIVE
HYALINE CASTS URNS QL MICRO: NORMAL /LPF (ref 0–5)
INR PPP: 1 (ref 0.9–1.1)
KETONES UR QL STRIP.AUTO: NEGATIVE MG/DL
LEUKOCYTE ESTERASE UR QL STRIP.AUTO: NEGATIVE
MCH RBC QN AUTO: 30.8 PG (ref 26–34)
MCHC RBC AUTO-ENTMCNC: 33 G/DL (ref 30–36.5)
MCV RBC AUTO: 93.4 FL (ref 80–99)
NITRITE UR QL STRIP.AUTO: NEGATIVE
NRBC # BLD: 0 K/UL (ref 0–0.01)
NRBC BLD-RTO: 0 PER 100 WBC
P-R INTERVAL, ECG05: 166 MS
PH UR STRIP: 7 [PH] (ref 5–8)
PLATELET # BLD AUTO: 306 K/UL (ref 150–400)
PMV BLD AUTO: 9.9 FL (ref 8.9–12.9)
POTASSIUM SERPL-SCNC: 4.3 MMOL/L (ref 3.5–5.1)
PROT UR STRIP-MCNC: NEGATIVE MG/DL
PROTHROMBIN TIME: 10.2 SEC (ref 9–11.1)
Q-T INTERVAL, ECG07: 436 MS
QRS DURATION, ECG06: 92 MS
QTC CALCULATION (BEZET), ECG08: 436 MS
RBC # BLD AUTO: 4.54 M/UL (ref 3.8–5.2)
RBC #/AREA URNS HPF: NORMAL /HPF (ref 0–5)
SODIUM SERPL-SCNC: 139 MMOL/L (ref 136–145)
SP GR UR REFRACTOMETRY: 1.01 (ref 1–1.03)
SPECIMEN EXP DATE BLD: NORMAL
UA: UC IF INDICATED,UAUC: NORMAL
UROBILINOGEN UR QL STRIP.AUTO: 0.2 EU/DL (ref 0.2–1)
VENTRICULAR RATE, ECG03: 60 BPM
WBC # BLD AUTO: 5.4 K/UL (ref 3.6–11)
WBC URNS QL MICRO: NORMAL /HPF (ref 0–4)

## 2018-08-13 PROCEDURE — 93005 ELECTROCARDIOGRAM TRACING: CPT

## 2018-08-13 PROCEDURE — 81001 URINALYSIS AUTO W/SCOPE: CPT | Performed by: ORTHOPAEDIC SURGERY

## 2018-08-13 PROCEDURE — 85610 PROTHROMBIN TIME: CPT | Performed by: ORTHOPAEDIC SURGERY

## 2018-08-13 PROCEDURE — 85027 COMPLETE CBC AUTOMATED: CPT | Performed by: ORTHOPAEDIC SURGERY

## 2018-08-13 PROCEDURE — 83036 HEMOGLOBIN GLYCOSYLATED A1C: CPT | Performed by: ORTHOPAEDIC SURGERY

## 2018-08-13 PROCEDURE — 86901 BLOOD TYPING SEROLOGIC RH(D): CPT | Performed by: ORTHOPAEDIC SURGERY

## 2018-08-13 PROCEDURE — 80048 BASIC METABOLIC PNL TOTAL CA: CPT | Performed by: ORTHOPAEDIC SURGERY

## 2018-08-13 PROCEDURE — 36415 COLL VENOUS BLD VENIPUNCTURE: CPT | Performed by: ORTHOPAEDIC SURGERY

## 2018-08-13 RX ORDER — DIPHENHYDRAMINE HCL 25 MG
25 CAPSULE ORAL
COMMUNITY
End: 2018-12-19

## 2018-08-14 LAB
BACTERIA SPEC CULT: NORMAL
BACTERIA SPEC CULT: NORMAL
SERVICE CMNT-IMP: NORMAL

## 2018-08-22 NOTE — H&P
PCP: Trinidad Castelan DO      Patient Active Problem List   Diagnosis    Asthma    Degenerative lumbar spinal stenosis    Diabetes mellitus type 2, diet-controlled    Dyslipidemia    Essential hypertension, benign         Subjective:   Chief Complaint: Follow-up of the Right Knee        HPI: Sam Quezada is a 66 y.o. female who comes in today with recurrent right knee pain. We injected her knee back in April. We get x-rays at that time that showed bone on bone arthritis of the medial compartment as well as bone on bone arthritis of the patellofemoral joint. She get only temporary relief from that injection. She states that the knee affects her everyday activities. She is no longer able to walk for exercise. She is having night pain. She has tried anti-inflammatory agents without relief. She complains of stiffness and swelling of the knee.        Objective:          Vitals:     07/05/18 1439   BP: (!) 145/82   Pulse: (!) 98      Height: 5' 3\"       Wt Readings from Last 3 Encounters:   07/05/18 140 lb   04/17/18 143 lb   02/20/18 140 lb      Body mass index is 24.8 kg/m².     Musculoskeletal :  She has a full range of motion of bilateral hips and knees. The right knee has varus deformity with medial crepitus. She has a positive patellofemoral grind. There is a small effusion. Her ligaments are stable. Her skin is healthy and intact. She has a strong pedal pulse. She has no pedal edema.   She has no apparent atrophy.     Procedures:    Large Joint Arthrocentesis  Consent given by: patient  Timeout: Immediately prior to procedure a time out was called to verify the correct patient, procedure, equipment, support staff and site/side marked as required   Supporting Documentation  Indications: pain   Procedure Details  Location: knee - R knee  Needle size: 22 G  Approach: anterolateral  Patient tolerance: patient tolerated the procedure well with no immediate complications     Medications administered: 1 mL Betamethasone 6 (3-3) MG/ML; 2 mL bupivacaine 0.5 %           Radiographs:             No imaging obtained        Assessment:      1. Primary localized osteoarthrosis of right lower leg          Plan:   I reviewed her x-ray findings with her. She has end-stage osteoarthritis of the right knee which is failed all conservative management. We discuss knee replacement surgery at length including expected outcomes and postop recovery as well as risks and complications specifically DVT, PE, infection. After much discussion she would like to proceed in the fall. She requests another injection today. I injected her right knee today. We will set her up for right total knee replacement at her convenience.     No orders of the defined types were placed in this encounter. Return for scheduled surgery.         Baron Hashimoto, MD   7/5/2018  4:43 PM      Date of Surgery Update:  Alina Busch was seen and examined. Past Medical History:   Diagnosis Date    AC (acromioclavicular) joint bone spurs 01/2014    in back. Dr. Soumya Corey. Txd with shots x 3    Actinic keratosis 2015    Dr. Dale Baugh. Dr. Varun Taveras.  Allergic rhinitis, cause unspecified     Anxiety state, unspecified     Arthritis     R KNEE BONE-ON-BONE; R HAND-----OSTEO    Asthma     BRONCHITIS IN SPRING & FALL MOST YEARS    BCC (basal cell carcinoma), face 1980s (nose), 01/28/15 (forehead)    Dr. Karissa Acuña. Dr. Sue Lance.  Cataract 2007    Dr. Joseph Suazo    Chronic insomnia     Chronic low back pain 01/2014    Dr. Jabari Lopez. DJD and spurs, Narrowing of L 3,4,5. Dr. Soumya Corey.  Chronic obstructive pulmonary disease (HCC)     CHRONIC (TWICE-YEARLY) BRONCHITIS    Chronic pain     Colon polyps 11/2005, 01/29/09    benign.   Dr. Kaya Garner    Constipation     Degenerative lumbar spinal stenosis 01/2014    Dr. Nga Nuno Narrowing of L 3,4,5    Diabetes mellitus type 2, diet-controlled ([facility] 10/13/2017    LOST WEIGHT AND IS NOT DIABETIC    Dyslipidemia     Dysphagia 2014    due to Esophagitis. Dr. Danny Mcneal.  Essential hypertension, benign     Foot fracture, left     stress.  Foot pain, bilateral 2013    Dr. Briseida Sutton.  GERD (gastroesophageal reflux disease) 2014    ESOPHAGITIS    Hearing loss     Dr. Timothy Muhammad.  Knee pain, bilateral 10/28/13    Dr. Hilario Talavera. Right > Left. Severe OA. Txd with PT.    Lumbar stenosis with neurogenic claudication     Menopause     Migraine     NONE SINCE AGE 37    Mixed stress and urge urinary incontinence     NO LEAKAGE; GETS UP SEVERAL TIMES A NIGHT, PT STATES ON 10/3/16    OA (osteoarthritis) of knee     Dr. Hilario Talavera    Osteopenia 2015    Restless legs syndrome (RLS) 2015    Dr. Laura Obrien.  Shoulder joint dislocation 2011    Right. due to fall. Dr. Javier Irvin Sleep apnea     undiagnosed    Syncope 11    due to fall down 5 steps. Right occipital head trauma.  Tremor of both hands 2015    Dr. Belen Dumont GABAPENTIN     Prior to Admission Medications   Prescriptions Last Dose Informant Patient Reported? Taking?   acetaminophen (TYLENOL EXTRA STRENGTH) 500 mg tablet   Yes No   Sig: Take  by mouth every six (6) hours as needed for Pain. albuterol (PROVENTIL HFA, VENTOLIN HFA) 90 mcg/actuation inhaler   No No   Sig: Take 2 Puffs by inhalation every four (4) hours as needed for Wheezing or Shortness of Breath. Indications: BRONCHOSPASM PREVENTION   albuterol (PROVENTIL VENTOLIN) 2.5 mg /3 mL (0.083 %) nebulizer solution   No No   Si.5 mL by Nebulization route every four (4) hours as needed for Wheezing.    baclofen (LIORESAL) 20 mg tablet   No No   Sig: take 1 tablet by mouth NIGHTLY FOR MUSCLE SPASTICITY OF SPINAL ORIGIN   benazepril (LOTENSIN) 20 mg tablet   No No   Sig: take 1 tablet by mouth once daily diphenhydrAMINE (BENADRYL) 25 mg capsule   Yes No   Sig: Take 25 mg by mouth nightly as needed. Indications: Insomnia   fluticasone (FLONASE) 50 mcg/actuation nasal spray   No No   Si Sprays by Both Nostrils route daily. melatonin 1 mg tablet   Yes No   Sig: Take 3 mg by mouth nightly. meloxicam (MOBIC) 7.5 mg tablet   Yes No   Sig: take 1 tablet by mouth twice a day if needed   montelukast (SINGULAIR) 10 mg tablet   No No   Sig: Take 1 Tab by mouth daily. Indications: ALLERGIC RHINITIS, MAINTENANCE THERAPY FOR ASTHMA   omeprazole (PRILOSEC) 40 mg capsule   Yes No   Sig: Take 40 mg by mouth three (3) days a week. AT HS   rOPINIRole (REQUIP) 1 mg tablet   No No   Sig: take 1 and 1/2 tablets by mouth NIGHTLY      Facility-Administered Medications: None      Allergy to: Other food; Bee sting [sting, bee]; Pcn [penicillins]; Percocet [oxycodone-acetaminophen]; Shellfish containing products; Zoster vaccine live; Atarax [hydroxyzine hcl]; Buspar [buspirone]; Crestor [rosuvastatin]; Hydrochlorothiazide; Hydroxyzine; and Norco [hydrocodone-acetaminophen]  Physical Examination: General appearance - alert, well appearing, and in no distress  Chest - clear to auscultation, no wheezes, rales or rhonchi, symmetric air entry  Heart - normal rate and regular rhythm  Abdomen - soft, nontender, nondistended, no masses or organomegaly  History and physical has been reviewed. The patient has been examined.  There have been no significant clinical changes since the completion of the originally dated History and Physical.    Signed By: Shila Zaman PA-C     2018 7:46 AM

## 2018-08-24 ENCOUNTER — ANESTHESIA EVENT (OUTPATIENT)
Dept: SURGERY | Age: 78
DRG: 470 | End: 2018-08-24
Payer: MEDICARE

## 2018-08-27 ENCOUNTER — ANESTHESIA (OUTPATIENT)
Dept: SURGERY | Age: 78
DRG: 470 | End: 2018-08-27
Payer: MEDICARE

## 2018-08-27 ENCOUNTER — HOSPITAL ENCOUNTER (INPATIENT)
Age: 78
LOS: 2 days | Discharge: HOME HEALTH | DRG: 470 | End: 2018-08-29
Attending: ORTHOPAEDIC SURGERY | Admitting: ORTHOPAEDIC SURGERY
Payer: MEDICARE

## 2018-08-27 DIAGNOSIS — M17.11 PRIMARY OSTEOARTHRITIS OF RIGHT KNEE: Primary | ICD-10-CM

## 2018-08-27 LAB
GLUCOSE BLD STRIP.AUTO-MCNC: 104 MG/DL (ref 65–100)
GLUCOSE BLD STRIP.AUTO-MCNC: 120 MG/DL (ref 65–100)
GLUCOSE BLD STRIP.AUTO-MCNC: 126 MG/DL (ref 65–100)
GLUCOSE BLD STRIP.AUTO-MCNC: 89 MG/DL (ref 65–100)
SERVICE CMNT-IMP: ABNORMAL
SERVICE CMNT-IMP: NORMAL

## 2018-08-27 PROCEDURE — 97116 GAIT TRAINING THERAPY: CPT | Performed by: PHYSICAL THERAPIST

## 2018-08-27 PROCEDURE — C1713 ANCHOR/SCREW BN/BN,TIS/BN: HCPCS | Performed by: ORTHOPAEDIC SURGERY

## 2018-08-27 PROCEDURE — 77030006822 HC BLD SAW SAG BRSM -B: Performed by: ORTHOPAEDIC SURGERY

## 2018-08-27 PROCEDURE — 74011250636 HC RX REV CODE- 250/636: Performed by: ORTHOPAEDIC SURGERY

## 2018-08-27 PROCEDURE — 77030018822 HC SLV COMPR FT COVD -B

## 2018-08-27 PROCEDURE — 77030028224 HC PDNG CST BSNM -A: Performed by: ORTHOPAEDIC SURGERY

## 2018-08-27 PROCEDURE — 77030038020 HC MANFLD NEPTUNE STRY -B: Performed by: ORTHOPAEDIC SURGERY

## 2018-08-27 PROCEDURE — 76210000006 HC OR PH I REC 0.5 TO 1 HR: Performed by: ORTHOPAEDIC SURGERY

## 2018-08-27 PROCEDURE — 74011250636 HC RX REV CODE- 250/636

## 2018-08-27 PROCEDURE — 77030003601 HC NDL NRV BLK BBMI -A

## 2018-08-27 PROCEDURE — 74011000250 HC RX REV CODE- 250

## 2018-08-27 PROCEDURE — 97161 PT EVAL LOW COMPLEX 20 MIN: CPT | Performed by: PHYSICAL THERAPIST

## 2018-08-27 PROCEDURE — 77030014077 HC TOWER MX CEM J&J -C: Performed by: ORTHOPAEDIC SURGERY

## 2018-08-27 PROCEDURE — 77030020782 HC GWN BAIR PAWS FLX 3M -B

## 2018-08-27 PROCEDURE — 82962 GLUCOSE BLOOD TEST: CPT

## 2018-08-27 PROCEDURE — 77030011640 HC PAD GRND REM COVD -A: Performed by: ORTHOPAEDIC SURGERY

## 2018-08-27 PROCEDURE — 77030018846 HC SOL IRR STRL H20 ICUM -A: Performed by: ORTHOPAEDIC SURGERY

## 2018-08-27 PROCEDURE — 77030000032 HC CUF TRNQT ZIMM -B: Performed by: ORTHOPAEDIC SURGERY

## 2018-08-27 PROCEDURE — 76060000034 HC ANESTHESIA 1.5 TO 2 HR: Performed by: ORTHOPAEDIC SURGERY

## 2018-08-27 PROCEDURE — 65270000029 HC RM PRIVATE

## 2018-08-27 PROCEDURE — 77030018836 HC SOL IRR NACL ICUM -A: Performed by: ORTHOPAEDIC SURGERY

## 2018-08-27 PROCEDURE — 77030034850: Performed by: ORTHOPAEDIC SURGERY

## 2018-08-27 PROCEDURE — 74011250636 HC RX REV CODE- 250/636: Performed by: PHYSICIAN ASSISTANT

## 2018-08-27 PROCEDURE — 74011250637 HC RX REV CODE- 250/637: Performed by: PHYSICIAN ASSISTANT

## 2018-08-27 PROCEDURE — 77030008467 HC STPLR SKN COVD -B: Performed by: ORTHOPAEDIC SURGERY

## 2018-08-27 PROCEDURE — 77030007866 HC KT SPN ANES BBMI -B: Performed by: ANESTHESIOLOGY

## 2018-08-27 PROCEDURE — C1776 JOINT DEVICE (IMPLANTABLE): HCPCS | Performed by: ORTHOPAEDIC SURGERY

## 2018-08-27 PROCEDURE — 76010000162 HC OR TIME 1.5 TO 2 HR INTENSV-TIER 1: Performed by: ORTHOPAEDIC SURGERY

## 2018-08-27 PROCEDURE — 77030035236 HC SUT PDS STRATFX BARB J&J -B: Performed by: ORTHOPAEDIC SURGERY

## 2018-08-27 PROCEDURE — 74011000250 HC RX REV CODE- 250: Performed by: ORTHOPAEDIC SURGERY

## 2018-08-27 PROCEDURE — 74011000250 HC RX REV CODE- 250: Performed by: PHYSICIAN ASSISTANT

## 2018-08-27 PROCEDURE — 77030031139 HC SUT VCRL2 J&J -A: Performed by: ORTHOPAEDIC SURGERY

## 2018-08-27 PROCEDURE — 74011000258 HC RX REV CODE- 258

## 2018-08-27 PROCEDURE — 74011250636 HC RX REV CODE- 250/636: Performed by: ANESTHESIOLOGY

## 2018-08-27 PROCEDURE — 0SRC0J9 REPLACEMENT OF RIGHT KNEE JOINT WITH SYNTHETIC SUBSTITUTE, CEMENTED, OPEN APPROACH: ICD-10-PCS | Performed by: ORTHOPAEDIC SURGERY

## 2018-08-27 DEVICE — PAT INST NXGN 38MM POLYETH --: Type: IMPLANTABLE DEVICE | Site: KNEE | Status: FUNCTIONAL

## 2018-08-27 DEVICE — COMPONENT FEM SZ F R ZMLY PC CEM POST STBL PRI FEM NXGN: Type: IMPLANTABLE DEVICE | Site: KNEE | Status: FUNCTIONAL

## 2018-08-27 DEVICE — Z DUP USE 2502857 COMPONENT ARTC SURF PS 5-6 EF UNIV 10 MM CNDYL KNEE CONSTRN: Type: IMPLANTABLE DEVICE | Site: KNEE | Status: FUNCTIONAL

## 2018-08-27 DEVICE — SMARTSET HV HIGH VISCOSITY BONE CEMENT 40G
Type: IMPLANTABLE DEVICE | Site: KNEE | Status: FUNCTIONAL
Brand: SMARTSET

## 2018-08-27 DEVICE — PLATE TIB STEM PC NXGN SZ 5 --: Type: IMPLANTABLE DEVICE | Site: KNEE | Status: FUNCTIONAL

## 2018-08-27 DEVICE — KIT TKR CEM FLX: Type: IMPLANTABLE DEVICE | Site: KNEE | Status: FUNCTIONAL

## 2018-08-27 DEVICE — PLUG STEM TIV FLX TAPR FOR MG II ST BNE SCR NXGN: Type: IMPLANTABLE DEVICE | Site: KNEE | Status: FUNCTIONAL

## 2018-08-27 RX ORDER — SODIUM CHLORIDE 9 MG/ML
125 INJECTION, SOLUTION INTRAVENOUS CONTINUOUS
Status: DISCONTINUED | OUTPATIENT
Start: 2018-08-27 | End: 2018-08-28

## 2018-08-27 RX ORDER — SODIUM CHLORIDE 0.9 % (FLUSH) 0.9 %
5-10 SYRINGE (ML) INJECTION AS NEEDED
Status: DISCONTINUED | OUTPATIENT
Start: 2018-08-27 | End: 2018-08-27 | Stop reason: HOSPADM

## 2018-08-27 RX ORDER — AMOXICILLIN 250 MG
1 CAPSULE ORAL 2 TIMES DAILY
Status: DISCONTINUED | OUTPATIENT
Start: 2018-08-27 | End: 2018-08-29 | Stop reason: HOSPADM

## 2018-08-27 RX ORDER — ONDANSETRON 2 MG/ML
INJECTION INTRAMUSCULAR; INTRAVENOUS AS NEEDED
Status: DISCONTINUED | OUTPATIENT
Start: 2018-08-27 | End: 2018-08-27 | Stop reason: HOSPADM

## 2018-08-27 RX ORDER — POLYETHYLENE GLYCOL 3350 17 G/17G
17 POWDER, FOR SOLUTION ORAL DAILY
Status: DISCONTINUED | OUTPATIENT
Start: 2018-08-28 | End: 2018-08-29 | Stop reason: HOSPADM

## 2018-08-27 RX ORDER — ONDANSETRON 2 MG/ML
4 INJECTION INTRAMUSCULAR; INTRAVENOUS AS NEEDED
Status: DISCONTINUED | OUTPATIENT
Start: 2018-08-27 | End: 2018-08-27 | Stop reason: HOSPADM

## 2018-08-27 RX ORDER — DEXAMETHASONE SODIUM PHOSPHATE 4 MG/ML
4 INJECTION, SOLUTION INTRA-ARTICULAR; INTRALESIONAL; INTRAMUSCULAR; INTRAVENOUS; SOFT TISSUE
Status: DISCONTINUED | OUTPATIENT
Start: 2018-08-27 | End: 2018-08-28

## 2018-08-27 RX ORDER — MELOXICAM 7.5 MG/1
7.5 TABLET ORAL DAILY
Status: DISCONTINUED | OUTPATIENT
Start: 2018-08-28 | End: 2018-08-29 | Stop reason: HOSPADM

## 2018-08-27 RX ORDER — INSULIN LISPRO 100 [IU]/ML
INJECTION, SOLUTION INTRAVENOUS; SUBCUTANEOUS
Status: DISCONTINUED | OUTPATIENT
Start: 2018-08-27 | End: 2018-08-28

## 2018-08-27 RX ORDER — SODIUM CHLORIDE, SODIUM LACTATE, POTASSIUM CHLORIDE, CALCIUM CHLORIDE 600; 310; 30; 20 MG/100ML; MG/100ML; MG/100ML; MG/100ML
50 INJECTION, SOLUTION INTRAVENOUS CONTINUOUS
Status: DISCONTINUED | OUTPATIENT
Start: 2018-08-27 | End: 2018-08-27 | Stop reason: HOSPADM

## 2018-08-27 RX ORDER — CELECOXIB 200 MG/1
200 CAPSULE ORAL ONCE
Status: COMPLETED | OUTPATIENT
Start: 2018-08-27 | End: 2018-08-27

## 2018-08-27 RX ORDER — CEFAZOLIN SODIUM/WATER 2 G/20 ML
2 SYRINGE (ML) INTRAVENOUS EVERY 8 HOURS
Status: COMPLETED | OUTPATIENT
Start: 2018-08-27 | End: 2018-08-28

## 2018-08-27 RX ORDER — ROPIVACAINE HYDROCHLORIDE 5 MG/ML
30 INJECTION, SOLUTION EPIDURAL; INFILTRATION; PERINEURAL AS NEEDED
Status: DISCONTINUED | OUTPATIENT
Start: 2018-08-27 | End: 2018-08-27 | Stop reason: HOSPADM

## 2018-08-27 RX ORDER — MAGNESIUM SULFATE 100 %
4 CRYSTALS MISCELLANEOUS AS NEEDED
Status: DISCONTINUED | OUTPATIENT
Start: 2018-08-27 | End: 2018-08-29 | Stop reason: HOSPADM

## 2018-08-27 RX ORDER — NALOXONE HYDROCHLORIDE 0.4 MG/ML
0.4 INJECTION, SOLUTION INTRAMUSCULAR; INTRAVENOUS; SUBCUTANEOUS AS NEEDED
Status: DISCONTINUED | OUTPATIENT
Start: 2018-08-27 | End: 2018-08-29 | Stop reason: HOSPADM

## 2018-08-27 RX ORDER — FENTANYL CITRATE 50 UG/ML
10 INJECTION, SOLUTION INTRAMUSCULAR; INTRAVENOUS
Status: DISCONTINUED | OUTPATIENT
Start: 2018-08-27 | End: 2018-08-28

## 2018-08-27 RX ORDER — PROPOFOL 10 MG/ML
INJECTION, EMULSION INTRAVENOUS
Status: DISCONTINUED | OUTPATIENT
Start: 2018-08-27 | End: 2018-08-27 | Stop reason: HOSPADM

## 2018-08-27 RX ORDER — LIDOCAINE HYDROCHLORIDE 10 MG/ML
0.1 INJECTION, SOLUTION EPIDURAL; INFILTRATION; INTRACAUDAL; PERINEURAL AS NEEDED
Status: DISCONTINUED | OUTPATIENT
Start: 2018-08-27 | End: 2018-08-27 | Stop reason: HOSPADM

## 2018-08-27 RX ORDER — FENTANYL CITRATE 50 UG/ML
25 INJECTION, SOLUTION INTRAMUSCULAR; INTRAVENOUS
Status: DISCONTINUED | OUTPATIENT
Start: 2018-08-27 | End: 2018-08-27 | Stop reason: HOSPADM

## 2018-08-27 RX ORDER — PREGABALIN 75 MG/1
75 CAPSULE ORAL ONCE
Status: COMPLETED | OUTPATIENT
Start: 2018-08-27 | End: 2018-08-27

## 2018-08-27 RX ORDER — FAMOTIDINE 20 MG/1
20 TABLET, FILM COATED ORAL 2 TIMES DAILY
Status: DISCONTINUED | OUTPATIENT
Start: 2018-08-27 | End: 2018-08-27 | Stop reason: SDUPTHER

## 2018-08-27 RX ORDER — SODIUM CHLORIDE 0.9 % (FLUSH) 0.9 %
5-10 SYRINGE (ML) INJECTION EVERY 8 HOURS
Status: DISCONTINUED | OUTPATIENT
Start: 2018-08-28 | End: 2018-08-29 | Stop reason: HOSPADM

## 2018-08-27 RX ORDER — MONTELUKAST SODIUM 10 MG/1
10 TABLET ORAL DAILY
Status: DISCONTINUED | OUTPATIENT
Start: 2018-08-27 | End: 2018-08-29 | Stop reason: HOSPADM

## 2018-08-27 RX ORDER — PROPOFOL 10 MG/ML
INJECTION, EMULSION INTRAVENOUS AS NEEDED
Status: DISCONTINUED | OUTPATIENT
Start: 2018-08-27 | End: 2018-08-27 | Stop reason: HOSPADM

## 2018-08-27 RX ORDER — ACETAMINOPHEN 500 MG
1000 TABLET ORAL EVERY 6 HOURS
Status: DISCONTINUED | OUTPATIENT
Start: 2018-08-27 | End: 2018-08-29 | Stop reason: HOSPADM

## 2018-08-27 RX ORDER — ONDANSETRON 2 MG/ML
4 INJECTION INTRAMUSCULAR; INTRAVENOUS
Status: DISPENSED | OUTPATIENT
Start: 2018-08-27 | End: 2018-08-28

## 2018-08-27 RX ORDER — FENTANYL CITRATE 50 UG/ML
50 INJECTION, SOLUTION INTRAMUSCULAR; INTRAVENOUS AS NEEDED
Status: DISCONTINUED | OUTPATIENT
Start: 2018-08-27 | End: 2018-08-27 | Stop reason: HOSPADM

## 2018-08-27 RX ORDER — LISINOPRIL 20 MG/1
20 TABLET ORAL DAILY
Status: DISCONTINUED | OUTPATIENT
Start: 2018-08-28 | End: 2018-08-28

## 2018-08-27 RX ORDER — DEXTROSE 50 % IN WATER (D50W) INTRAVENOUS SYRINGE
25-50 AS NEEDED
Status: DISCONTINUED | OUTPATIENT
Start: 2018-08-27 | End: 2018-08-29 | Stop reason: HOSPADM

## 2018-08-27 RX ORDER — TRAMADOL HYDROCHLORIDE 50 MG/1
50-100 TABLET ORAL
Status: DISCONTINUED | OUTPATIENT
Start: 2018-08-27 | End: 2018-08-28

## 2018-08-27 RX ORDER — SODIUM CHLORIDE 0.9 % (FLUSH) 0.9 %
5-10 SYRINGE (ML) INJECTION AS NEEDED
Status: DISCONTINUED | OUTPATIENT
Start: 2018-08-27 | End: 2018-08-29 | Stop reason: HOSPADM

## 2018-08-27 RX ORDER — BACLOFEN 10 MG/1
20 TABLET ORAL
Status: DISCONTINUED | OUTPATIENT
Start: 2018-08-27 | End: 2018-08-29 | Stop reason: HOSPADM

## 2018-08-27 RX ORDER — SODIUM CHLORIDE, SODIUM LACTATE, POTASSIUM CHLORIDE, CALCIUM CHLORIDE 600; 310; 30; 20 MG/100ML; MG/100ML; MG/100ML; MG/100ML
INJECTION, SOLUTION INTRAVENOUS
Status: DISCONTINUED | OUTPATIENT
Start: 2018-08-27 | End: 2018-08-27 | Stop reason: HOSPADM

## 2018-08-27 RX ORDER — ROPINIROLE 1 MG/1
1 TABLET, FILM COATED ORAL DAILY
Status: DISCONTINUED | OUTPATIENT
Start: 2018-08-27 | End: 2018-08-29 | Stop reason: HOSPADM

## 2018-08-27 RX ORDER — NALBUPHINE HYDROCHLORIDE 10 MG/ML
5 INJECTION, SOLUTION INTRAMUSCULAR; INTRAVENOUS; SUBCUTANEOUS
Status: DISCONTINUED | OUTPATIENT
Start: 2018-08-27 | End: 2018-08-29 | Stop reason: HOSPADM

## 2018-08-27 RX ORDER — ACETAMINOPHEN 500 MG
1000 TABLET ORAL ONCE
Status: COMPLETED | OUTPATIENT
Start: 2018-08-27 | End: 2018-08-27

## 2018-08-27 RX ORDER — HYDROMORPHONE HYDROCHLORIDE 2 MG/ML
0.5 INJECTION, SOLUTION INTRAMUSCULAR; INTRAVENOUS; SUBCUTANEOUS
Status: DISCONTINUED | OUTPATIENT
Start: 2018-08-27 | End: 2018-08-27 | Stop reason: HOSPADM

## 2018-08-27 RX ORDER — SODIUM CHLORIDE 0.9 % (FLUSH) 0.9 %
5-10 SYRINGE (ML) INJECTION EVERY 8 HOURS
Status: DISCONTINUED | OUTPATIENT
Start: 2018-08-27 | End: 2018-08-27 | Stop reason: HOSPADM

## 2018-08-27 RX ORDER — ALBUTEROL SULFATE 0.83 MG/ML
2.5 SOLUTION RESPIRATORY (INHALATION)
Status: DISCONTINUED | OUTPATIENT
Start: 2018-08-27 | End: 2018-08-29 | Stop reason: HOSPADM

## 2018-08-27 RX ORDER — BUPIVACAINE HYDROCHLORIDE 5 MG/ML
INJECTION, SOLUTION EPIDURAL; INTRACAUDAL AS NEEDED
Status: DISCONTINUED | OUTPATIENT
Start: 2018-08-27 | End: 2018-08-27 | Stop reason: HOSPADM

## 2018-08-27 RX ORDER — FACIAL-BODY WIPES
10 EACH TOPICAL DAILY PRN
Status: DISCONTINUED | OUTPATIENT
Start: 2018-08-29 | End: 2018-08-29 | Stop reason: HOSPADM

## 2018-08-27 RX ORDER — CEFAZOLIN SODIUM/WATER 2 G/20 ML
2 SYRINGE (ML) INTRAVENOUS ONCE
Status: COMPLETED | OUTPATIENT
Start: 2018-08-27 | End: 2018-08-27

## 2018-08-27 RX ORDER — PANTOPRAZOLE SODIUM 40 MG/1
40 TABLET, DELAYED RELEASE ORAL
Status: DISCONTINUED | OUTPATIENT
Start: 2018-08-28 | End: 2018-08-29 | Stop reason: HOSPADM

## 2018-08-27 RX ORDER — MIDAZOLAM HYDROCHLORIDE 1 MG/ML
1 INJECTION, SOLUTION INTRAMUSCULAR; INTRAVENOUS AS NEEDED
Status: DISCONTINUED | OUTPATIENT
Start: 2018-08-27 | End: 2018-08-27 | Stop reason: HOSPADM

## 2018-08-27 RX ADMIN — TRAMADOL HYDROCHLORIDE 50 MG: 50 TABLET, FILM COATED ORAL at 19:22

## 2018-08-27 RX ADMIN — Medication 1 TABLET: at 18:07

## 2018-08-27 RX ADMIN — Medication 2 G: at 09:28

## 2018-08-27 RX ADMIN — SODIUM CHLORIDE, SODIUM LACTATE, POTASSIUM CHLORIDE, CALCIUM CHLORIDE: 600; 310; 30; 20 INJECTION, SOLUTION INTRAVENOUS at 08:50

## 2018-08-27 RX ADMIN — MIDAZOLAM 2 MG: 1 INJECTION INTRAMUSCULAR; INTRAVENOUS at 08:46

## 2018-08-27 RX ADMIN — Medication 2 G: at 18:07

## 2018-08-27 RX ADMIN — FENTANYL CITRATE 50 MCG: 50 INJECTION, SOLUTION INTRAMUSCULAR; INTRAVENOUS at 08:46

## 2018-08-27 RX ADMIN — RIVAROXABAN 10 MG: 10 TABLET, FILM COATED ORAL at 19:13

## 2018-08-27 RX ADMIN — BUPIVACAINE HYDROCHLORIDE 10 MG: 5 INJECTION, SOLUTION EPIDURAL; INTRACAUDAL at 09:11

## 2018-08-27 RX ADMIN — ACETAMINOPHEN 500 MG TABLET 1000 MG: at 18:07

## 2018-08-27 RX ADMIN — SODIUM CHLORIDE, SODIUM LACTATE, POTASSIUM CHLORIDE, AND CALCIUM CHLORIDE 50 ML/HR: 600; 310; 30; 20 INJECTION, SOLUTION INTRAVENOUS at 08:23

## 2018-08-27 RX ADMIN — BACLOFEN 20 MG: 10 TABLET ORAL at 21:50

## 2018-08-27 RX ADMIN — SODIUM CHLORIDE 125 ML/HR: 900 INJECTION, SOLUTION INTRAVENOUS at 11:15

## 2018-08-27 RX ADMIN — PROPOFOL 30 MG: 10 INJECTION, EMULSION INTRAVENOUS at 09:05

## 2018-08-27 RX ADMIN — ACETAMINOPHEN 1000 MG: 500 TABLET ORAL at 08:38

## 2018-08-27 RX ADMIN — ROPINIROLE HYDROCHLORIDE 1 MG: 1 TABLET, FILM COATED ORAL at 20:11

## 2018-08-27 RX ADMIN — PROPOFOL 75 MCG/KG/MIN: 10 INJECTION, EMULSION INTRAVENOUS at 09:13

## 2018-08-27 RX ADMIN — CELECOXIB 200 MG: 200 CAPSULE ORAL at 08:38

## 2018-08-27 RX ADMIN — SODIUM CHLORIDE 125 ML/HR: 900 INJECTION, SOLUTION INTRAVENOUS at 12:15

## 2018-08-27 RX ADMIN — ONDANSETRON 4 MG: 2 INJECTION INTRAMUSCULAR; INTRAVENOUS at 10:12

## 2018-08-27 RX ADMIN — PREGABALIN 75 MG: 75 CAPSULE ORAL at 08:38

## 2018-08-27 RX ADMIN — PROPOFOL 20 MG: 10 INJECTION, EMULSION INTRAVENOUS at 09:19

## 2018-08-27 NOTE — ANESTHESIA PROCEDURE NOTES
Peripheral Block    Start time: 8/27/2018 8:44 AM  End time: 8/27/2018 8:19 AM  Performed by: Jenniffer Sender  Authorized by: Enmanuel Colon       Pre-procedure: Indications: at surgeon's request and post-op pain management    Preanesthetic Checklist: patient identified, risks and benefits discussed, site marked, timeout performed and patient being monitored      Block Type:   Block Type:   Adductor canal  Laterality:  Right  Monitoring:  Standard ASA monitoring, continuous pulse ox, frequent vital sign checks, oxygen, responsive to questions and heart rate  Injection Technique:  Single shot  Procedures: ultrasound guided    Patient Position: supine  Prep: chlorhexidine    Location:  Mid thigh  Needle Type:  Stimuplex  Needle Gauge:  22 G  Needle Localization:  Ultrasound guidance  Medication Injected:  0.5%  ropivacaine  Volume (mL):  20    Assessment:  Number of attempts:  1  Injection Assessment:  Incremental injection every 5 mL, local visualized surrounding nerve on ultrasound, negative aspiration for blood, no intravascular symptoms and no paresthesia  Patient tolerance:  Patient tolerated the procedure well with no immediate complications

## 2018-08-27 NOTE — IP AVS SNAPSHOT
[address] 
[phone] Patient: Elaine Jeffers MRN: [id number] MCR:7/37/2198 A check bishnu indicates which time of day the medication should be taken. My Medications START taking these medications Instructions Each Dose to Equal  
 Morning Noon Evening Bedtime HYDROmorphone 2 mg tablet Commonly known as:  DILAUDID Your last dose was: Your next dose is: Take 0.5-1 Tabs by mouth every four (4) hours as needed. Max Daily Amount: 12 mg.  
 1-2 mg  
    
   
   
   
  
 rivaroxaban 10 mg tablet Commonly known as:  Ky Oseguera Your last dose was: Your next dose is: Take 1 Tab by mouth daily (with lunch). Indications: KNEE REPLACEMENT DEEP VEIN THROMBOSIS PREVENTION  
 10 mg CHANGE how you take these medications Instructions Each Dose to Equal  
 Morning Noon Evening Bedtime  
 acetaminophen 500 mg tablet Commonly known as:  TYLENOL What changed:   
- how much to take - when to take this 
- reasons to take this Your last dose was: Your next dose is: Take 2 Tabs by mouth every six (6) hours. 1000 mg  
    
   
   
   
  
 meloxicam 7.5 mg tablet Commonly known as:  MOBIC What changed:  See the new instructions. Your last dose was: Your next dose is: Take 1 Tab by mouth daily. 7.5 mg  
    
   
   
   
  
  
CONTINUE taking these medications Instructions Each Dose to Equal  
 Morning Noon Evening Bedtime  
 albuterol 90 mcg/actuation inhaler Commonly known as:  PROVENTIL HFA, VENTOLIN HFA, PROAIR HFA Your last dose was: Your next dose is: Take 2 Puffs by inhalation every four (4) hours as needed for Wheezing or Shortness of Breath. Indications: BRONCHOSPASM PREVENTION  
 2 Puff  
    
   
   
   
  
 baclofen 20 mg tablet Commonly known as:  LIORESAL Your last dose was: Your next dose is:    
   
   
 take 1 tablet by mouth NIGHTLY FOR MUSCLE SPASTICITY OF SPINAL ORIGIN  
     
   
   
   
  
 BENADRYL 25 mg capsule Generic drug:  diphenhydrAMINE Your last dose was: Your next dose is: Take 25 mg by mouth nightly as needed. Indications: Insomnia 25 mg  
    
   
   
   
  
 benazepril 20 mg tablet Commonly known as:  LOTENSIN Your last dose was: Your next dose is:    
   
   
 take 1 tablet by mouth once daily  
     
   
   
   
  
 fluticasone 50 mcg/actuation nasal spray Commonly known as:  Lella Solum Your last dose was: Your next dose is: 2 Sprays by Both Nostrils route daily. 2 Spray  
    
   
   
   
  
 melatonin 1 mg tablet Your last dose was: Your next dose is: Take 3 mg by mouth nightly. 3 mg  
    
   
   
   
  
 montelukast 10 mg tablet Commonly known as:  SINGULAIR Your last dose was: Your next dose is: Take 1 Tab by mouth daily. Indications: ALLERGIC RHINITIS, MAINTENANCE THERAPY FOR ASTHMA 10 mg  
    
   
   
   
  
 omeprazole 40 mg capsule Commonly known as:  PRILOSEC Your last dose was: Your next dose is: Take 40 mg by mouth three (3) days a week. AT HS  
 40 mg  
    
   
   
   
  
 rOPINIRole 1 mg tablet Commonly known as:  Kerry Goel Your last dose was: Your next dose is:    
   
   
 take 1 and 1/2 tablets by mouth NIGHTLY Where to Get Your Medications Information on where to get these meds will be given to you by the nurse or doctor. ! Ask your nurse or doctor about these medications  
  acetaminophen 500 mg tablet HYDROmorphone 2 mg tablet  
 meloxicam 7.5 mg tablet  
 rivaroxaban 10 mg tablet

## 2018-08-27 NOTE — BRIEF OP NOTE
BRIEF OPERATIVE NOTE    Date of Procedure: 8/27/2018   Preoperative Diagnosis: DJD RIGHT KNEE   Postoperative Diagnosis: DJD RIGHT KNEE     Procedure(s):  RIGHT TOTAL KNEE REPLACEMENT (CHOICE)  Surgeon(s) and Role:     * Cesilia Browning MD - Primary         Surgical Assistant: Assistant: Yumiko Hernandez, [facility]    Surgical Staff:  Circ-1: Angelia Bolton RN  Physician Assistant: Ksenia Villareal PA-C  Scrub RN-1: Audelia Zelaya RN  Surg Asst-1: Janie Troy  Surg Asst-2: Ramona Monge  Event Time In   Incision Start 5101   Incision Close      Anesthesia: Spinal   Estimated Blood Loss: 100cc  Specimens: * No specimens in log *   Findings: DJD   Complications: none  Implants:   Implant Name Type Inv.  Item Serial No.  Lot No. LRB No. Used Action   CEMENT BNE SMARTSET HV 40GM -- ORDER IN SETS OF 20 - SNA  CEMENT BNE SMARTSET HV 40GM -- ORDER IN SETS OF 20 [employer] [id number] Right 2 Implanted   PLATE TIB STEM PC NXGN SZ 5 --  - [id number]  PLATE TIB STEM PC NXGN SZ 5 --  [id number] [employer]  Right 1 Implanted   PLUG STEM TAPR NEXGEN --  - [id number]  PLUG STEM TAPR NEXGEN --  [id number] [facility]  Right 1 Implanted   COMPNT FEM POST RT SZ-F --  - [id number]  COMPNT FEM POST RT SZ-F --  [id number] [facility]  Right 1 Implanted   PAT INST NXGN 38MM POLYETH --  - [id number]  PAT INST NXGN 38MM POLYETH --  [id number] [employer]  Right 1 Implanted   INSERT TIB LPS-FLX EF 5-6 10MM -- NEXGEN PROLONG ARTC SURF - [id number]   INSERT TIB LPS-FLX EF 5-6 10MM -- NEXGEN PROLONG ARTC SURF [id number] [employer]   Right 1 Implanted

## 2018-08-27 NOTE — ANESTHESIA POSTPROCEDURE EVALUATION
Post-Anesthesia Evaluation and Assessment    Patient: Desire Morales MRN: [id number]  SSN: [id number]    YOB: 1940  Age: 66 y.o. Sex: female       Cardiovascular Function/Vital Signs  Visit Vitals    /71 (BP 1 Location: Right arm, BP Patient Position: At rest)    Pulse (!) 57    Temp 36.3 °C (97.4 °F)    Resp 16    Ht 5' 2\" (1.575 m)    Wt 62.6 kg (138 lb)    SpO2 99%    BMI 25.24 kg/m2       Patient is status post spinal anesthesia for Procedure(s):  RIGHT TOTAL KNEE REPLACEMENT (CHOICE). Nausea/Vomiting: None    Postoperative hydration reviewed and adequate. Pain:  Pain Scale 1: Numeric (0 - 10) (08/27/18 1115)  Pain Intensity 1: 0 (08/27/18 1115)   Managed    Neurological Status:   Neuro (WDL): Exceptions to WDL (08/27/18 1115)  Neuro  Neurologic State: Alert (08/27/18 1212)  LUE Motor Response: Purposeful (08/27/18 1212)  LLE Motor Response: Pharmacologically paralyzed (08/27/18 1212)  RUE Motor Response: Purposeful (08/27/18 1212)  RLE Motor Response: Pharmacologically paralyzed (08/27/18 1212)   At baseline    Mental Status and Level of Consciousness: Arousable    Pulmonary Status:   O2 Device: Nasal cannula (08/27/18 1115)   Adequate oxygenation and airway patent    Complications related to anesthesia: None    Post-anesthesia assessment completed.  No concerns    Signed By: Katerin Gonzales MD     August 27, 2018

## 2018-08-27 NOTE — DIABETES MGMT
DTC Consult Note    Recommendations/ Comments:  S/P rt knee replacement today. BG's in range. Current hospital DM medication: lispro normal correction scale    DTC will continue to follow patient as needed. ____________________________    Consult received for:   []           Hospital Medication Recommendations                 [x]           Hospital Blood Glucose Management    Chart reviewed and initial evaluation complete on [address]. Patient is a 66 y.o. female with hx DM; diet and weight controlled. A1c:   Lab Results   Component Value Date/Time    Hemoglobin A1c 5.9 08/13/2018 12:47 PM       Recent Glucose Results:   Lab Results   Component Value Date/Time    GLUCPOC 104 (H) 08/27/2018 11:18 AM    GLUCPOC 126 (H) 08/27/2018 08:54 AM        Lab Results   Component Value Date/Time    Creatinine 0.80 08/13/2018 12:47 PM       Active Orders   Diet    DIET DIABETIC WITH OPTIONS Consistent Carb 2000kcal; Regular        PO intake: No data found. Thank you.   Cassius Howard RN, CDE

## 2018-08-27 NOTE — PROGRESS NOTES
Primary Nurse Floyd Crews RN and Nadine Kowalski RN performed a dual skin assessment on this patient No impairment noted  Lizandro score is 21

## 2018-08-27 NOTE — ANESTHESIA PROCEDURE NOTES
Spinal Block    Performed by: Kerry Johnson  Authorized by: Kerry Johnson     Pre-procedure:   Indications: at surgeon's request and primary anesthetic  Preanesthetic Checklist: patient identified, risks and benefits discussed, anesthesia consent, site marked, patient being monitored and timeout performed    Timeout Time: 09:10          Spinal Block:   Patient Position:  Seated  Prep Region:  Lumbar  Prep: Betadine      Location:  L1-2  Technique:  Single shot  Local:  Lidocaine 1%  Local Dose (mL):  8    Needle:     Needle Gauge:  24 G  Attempts:  3      Events: CSF confirmed and no blood with aspiration        Assessment:  Insertion:  Uncomplicated  Patient tolerance:  Patient tolerated the procedure well with no immediate complications

## 2018-08-27 NOTE — PROGRESS NOTES
Problem: Falls - Risk of  Goal: *Absence of Falls  Document Nidhi Fall Risk and appropriate interventions in the flowsheet.    Outcome: Progressing Towards Goal  Fall Risk Interventions:  Mobility Interventions: PT Consult for mobility concerns         Medication Interventions: Patient to call before getting OOB    Elimination Interventions: Patient to call for help with toileting needs

## 2018-08-27 NOTE — IP AVS SNAPSHOT
[address] [facility] [place] [address] 
[phone] Patient: Castro Guillen MRN: [id number] DXY:[id number] About your hospitalization You were admitted on:  August 27, 2018 You last received care in the:  [address] [facility] You were discharged on:  August 29, 2018 Why you were hospitalized Your primary diagnosis was:  Primary Osteoarthritis Of Right Knee Follow-up Information Follow up With Details Comments Contact Info [address] [place] [address]   [address] 
1st Floor [facility] [address] 
[phone] Roseann Juarez, [address] [place] [address] 
[address] [facility] [address] 
[phone] Your Scheduled Appointments Thursday October 18, 2018  9:00 AM EDT ROUTINE CARE with DO Aman Castro 74 (HARLAN Boykin) [address] [facility] [address]  
[phone] Discharge Orders None A check bishnu indicates which time of day the medication should be taken. My Medications START taking these medications Instructions Each Dose to Equal  
 Morning Noon Evening Bedtime HYDROmorphone 2 mg tablet Commonly known as:  DILAUDID Your last dose was: Your next dose is: Take 0.5-1 Tabs by mouth every four (4) hours as needed. Max Daily Amount: 12 mg.  
 1-2 mg  
    
   
   
   
  
 rivaroxaban 10 mg tablet Commonly known as:  Rogena Crigler Your last dose was: Your next dose is: Take 1 Tab by mouth daily (with lunch). Indications: KNEE REPLACEMENT DEEP VEIN THROMBOSIS PREVENTION  
 10 mg CHANGE how you take these medications Instructions Each Dose to Equal  
 Morning Noon Evening Bedtime  
 acetaminophen 500 mg tablet Commonly known as:  TYLENOL What changed:   
- how much to take - when to take this - reasons to take this Your last dose was: Your next dose is: Take 2 Tabs by mouth every six (6) hours. 1000 mg  
    
   
   
   
  
 meloxicam 7.5 mg tablet Commonly known as:  MOBIC What changed:  See the new instructions. Your last dose was: Your next dose is: Take 1 Tab by mouth daily. 7.5 mg  
    
   
   
   
  
  
CONTINUE taking these medications Instructions Each Dose to Equal  
 Morning Noon Evening Bedtime  
 albuterol 90 mcg/actuation inhaler Commonly known as:  PROVENTIL HFA, VENTOLIN HFA, PROAIR HFA Your last dose was: Your next dose is: Take 2 Puffs by inhalation every four (4) hours as needed for Wheezing or Shortness of Breath. Indications: BRONCHOSPASM PREVENTION  
 2 Puff  
    
   
   
   
  
 baclofen 20 mg tablet Commonly known as:  LIORESAL Your last dose was: Your next dose is:    
   
   
 take 1 tablet by mouth NIGHTLY FOR MUSCLE SPASTICITY OF SPINAL ORIGIN  
     
   
   
   
  
 BENADRYL 25 mg capsule Generic drug:  diphenhydrAMINE Your last dose was: Your next dose is: Take 25 mg by mouth nightly as needed. Indications: Insomnia 25 mg  
    
   
   
   
  
 benazepril 20 mg tablet Commonly known as:  LOTENSIN Your last dose was: Your next dose is:    
   
   
 take 1 tablet by mouth once daily  
     
   
   
   
  
 fluticasone 50 mcg/actuation nasal spray Commonly known as:  Elisha Shames Your last dose was: Your next dose is: 2 Sprays by Both Nostrils route daily. 2 Spray  
    
   
   
   
  
 melatonin 1 mg tablet Your last dose was: Your next dose is: Take 3 mg by mouth nightly. 3 mg  
    
   
   
   
  
 montelukast 10 mg tablet Commonly known as:  SINGULAIR Your last dose was: Your next dose is: Take 1 Tab by mouth daily. Indications: ALLERGIC RHINITIS, MAINTENANCE THERAPY FOR ASTHMA 10 mg  
    
   
   
   
  
 omeprazole 40 mg capsule Commonly known as:  PRILOSEC Your last dose was: Your next dose is: Take 40 mg by mouth three (3) days a week. AT HS  
 40 mg  
    
   
   
   
  
 rOPINIRole 1 mg tablet Commonly known as:  Chana Crow Your last dose was: Your next dose is:    
   
   
 take 1 and 1/2 tablets by mouth NIGHTLY Where to Get Your Medications Information on where to get these meds will be given to you by the nurse or doctor. ! Ask your nurse or doctor about these medications  
  acetaminophen 500 mg tablet HYDROmorphone 2 mg tablet  
 meloxicam 7.5 mg tablet  
 rivaroxaban 10 mg tablet Opioid Education Prescription Opioids: What You Need to Know: 
 
Prescription opioids can be used to help relieve moderate-to-severe pain and are often prescribed following a surgery or injury, or for certain health conditions. These medications can be an important part of treatment but also come with serious risks. Opioids are strong pain medicines. Examples include hydrocodone, oxycodone, fentanyl, and morphine. Heroin is an example of an illegal opioid. It is important to work with your health care provider to make sure you are getting the safest, most effective care. WHAT ARE THE RISKS AND SIDE EFFECTS OF OPIOID USE? Prescription opioids carry serious risks of addiction and overdose, especially with prolonged use. An opioid overdose, often marked by slow breathing, can cause sudden death. The use of prescription opioids can have a number of side effects as well, even when taken as directed. · Tolerance-meaning you might need to take more of a medication for the same pain relief · Physical dependence-meaning you have symptoms of withdrawal when the medication is stopped. Withdrawal symptoms can include nausea, sweating, chills, diarrhea, stomach cramps, and muscle aches. Withdrawal can last up to several weeks, depending on which drug you took and how long you took it. · Increased sensitivity to pain · Constipation · Nausea, vomiting, and dry mouth · Sleepiness and dizziness · Confusion · Depression · Low levels of testosterone that can result in lower sex drive, energy, and strength · Itching and sweating RISKS ARE GREATER WITH:      
· History of drug misuse, substance use disorder, or overdose · Mental health conditions (such as depression or anxiety) · Sleep apnea · Older age (72 years or older) · Pregnancy Avoid alcohol while taking prescription opioids. Also, unless specifically advised by your health care provider, medications to avoid include: · Benzodiazepines (such as Xanax or Valium) · Muscle relaxants (such as Soma or Flexeril) · Hypnotics (such as Ambien or Lunesta) · Other prescription opioids KNOW YOUR OPTIONS Talk to your health care provider about ways to manage your pain that don't involve prescription opioids. Some of these options may actually work better and have fewer risks and side effects. Options may include: 
· Pain relievers such as acetaminophen, ibuprofen, and naproxen · Some medications that are also used for depression or seizures · Physical therapy and exercise · Counseling to help patients learn how to cope better with triggers of pain and stress. · Application of heat or cold compress · Massage therapy · Relaxation techniques Be Informed Make sure you know the name of your medication, how much and how often to take it, and its potential risks & side effects. IF YOU ARE PRESCRIBED OPIOIDS FOR PAIN: 
· Never take opioids in greater amounts or more often than prescribed. Remember the goal is not to be pain-free but to manage your pain at a tolerable level. · Follow up with your primary care provider to: · Work together to create a plan on how to manage your pain. · Talk about ways to help manage your pain that don't involve prescription opioids. · Talk about any and all concerns and side effects. · Help prevent misuse and abuse. · Never sell or share prescription opioids · Help prevent misuse and abuse. · Store prescription opioids in a secure place and out of reach of others (this may include visitors, children, friends, and family). · Safely dispose of unused/unwanted prescription opioids: Find your community drug take-back program or your pharmacy mail-back program, or flush them down the toilet, following guidance from the Food and Drug Administration (www.fda.gov/Drugs/ResourcesForYou). · Visit www.cdc.gov/drugoverdose to learn about the risks of opioid abuse and overdose. · If you believe you may be struggling with addiction, tell your health care provider and ask for guidance or call [employer] at 9-510-109-KUAK. Discharge Instructions Patient meets criteria for BUNDLED PAYMENT  
for Care Improvement Initiative Criteria Contact Information for Orthopedic Nurse Navigator:     
SHELBIE Elizondo, RN-BC 
Z:[phone] M:[phone] A:[phone] After Hospital Care Plan:   
Discharge Instructions Knee Replacement-Dr. Kris Zarate Patient Name  Doris Tucker Date of procedure  8/27/2018 Procedure  Procedure(s): RIGHT TOTAL KNEE REPLACEMENT (CHOICE) Surgeon  Surgeon(s) and Role: Lane Dodge MD - Primary Date of discharge: No discharge date for patient encounter. PCP: Donna Pena, DO Follow up appointments 
-follow up with Dr. Kris Zarate in 2 weeks. Call [phone] to make an appointment. Home Health Agency: _________________________   phone: ___________________ The agency will contact you to arrange dates/times for visits. Please call them if you do not hear from them within 24 hours after you are discharged. When to call your Orthopaedic Surgeon: 
-unrelieved pain 
-Signs of infection-if your incision is red; continues to have drainage; drainage has a foul odor or if you have a persistent fever over 101 degrees 
-Signs of a blood clot in your leg-calf pain, tenderness, redness, swelling of lower leg When to call your Primary Care Physician: 
-Concerns about medical conditions such as diabetes, high blood pressure, asthma, congestive heart failure 
-Call if blood sugars are elevated, persistent headache or dizziness, coughing or congestion, constipation or diarrhea, burning with urination, abnormal heart rate When to call 911and go to the nearest emergency room 
-acute onset of chest pain, shortness of breath, difficulty breathing Activity 
-weight bearing as tolerated with your walker or crutches. Refer to pages 23-31 of your handbook for instructions and pictures.  
-20 repetitions of each exercise as instructed by therapist 3 times each day. Refer to pages 32-40 of your handbook for exercise instructions and pictures 
-get up every one hour and walk (except at night when sleeping) 
-do not drive or operate heavy machinery Incision Care 
-you will have staples in your knee incision; they will be removed at the surgeons office visit in 2 weeks 
-Keep a dressing (ABD and paper tape) on your incision and change daily. Wash your hands thoroughly before changing the dressing. Once you incision is not draining, you may leave it open to air 
-You may shower and get your incision wet but do not submerge your incision under water in a bath tub, hot tub or swimming pool for 6 weeks after surgery. Preventing blood clots 
-take Xarelto at 12 noon daily as prescribed by Dr. Thomas Cameron Pain management -take pain medication as prescribed; decrease the amount you use as your pain lessens 
-avoid alcoholic beverages while taking pain medication 
-Please be aware that many medications contain Tylenol. We do not want you to over medicate so please read the information below as a guide. Do not take more than 4 Grams of Tylenol in a 24 hour period. (There are 1000 milligrams in one Gram) -You may place an ice bag on your knee for 15-20 minutes after exercising Diet 
-resume usual diet; drink plenty of fluids; eat foods high in fiber 
-you may want to take a stool softener (such as Senokot-S or Colace) to prevent constipation while you are taking pain medication. If constipation occurs, take a laxative (such as Dulcolax tablets, Milk of Magnesia, or a suppository) Home Health Care Protocol (to be followed by [address]) Nursing-per physicians order 
-complete head to toe assessment, vital signs 
-staples will be removed in the surgeons office at 2 week visit 
-medication reconciliation 
-review pain management 
-manage chronic medical conditions Physical Therapy-per physicians order Weight bearing status: 
Precautions at Admission: Fall, WBAT Right Side Weight Bearing: As tolerated ? Mobility Status: 
Supine to Sit: Supervision Sit to Stand: Contact guard assistance Sit to Supine: Stand-by assistance Bed to Chair: Minimum assistance (due to pain) Gait: 
Distance (ft): 150 Feet (ft) Ambulation - Level of Assistance: Contact guard assistance Assistive Device: Gait belt, Walker, rolling Gait Abnormalities: Antalgic, Decreased step clearance ADL status overall composite: Toilet Transfer : Contact guard assistance 
 
-Home Therapy 
-assessment and evaluation-bed mobility; functional transfers (bed, chair, bathroom, stairs); ambulation with equipment, car transfers, shower transfers, safety and ability to get out of house in the event of an emergency -review weight bearing as tolerated, wean from walker or crutches as tolerated 
-discuss pain management 
-review how to do ADLs 
 
-Home Exercise Program-refer to [id number] of the patient handbook for exercises 
-supine exercises-ankle pumps, hamstring sets, quad sets, heel slides, short arc quad sets, long arc quads-prop heel on pillow for stretch, straight leg raise-sitting exercises-active knee flexion, passive knee flexion, active knee extension, ankle pumps, bicep curls (use weights as appropriate), triceps pushups, shoulder flexion (use weights as appropriate) -standing exercises holding onto supportive counter-toe raises, heel raises, mini-squats, heel/toe touches with knee bend, marching in place, hamstring curls [facility] Transitions of Care [place] [address] Cassi Orta offers a voluntary care coordination program to provide high quality service and care to [facility] fee-for-service beneficiaries. James Chavez was designed to help you enhance your health and well-being through the following services: ? Transitions of Care  support for individuals who are transitioning from one care setting to another (example: Hospital to home). ? Chronic and Complex Care Coordination  support for individuals and caregivers of those with serious or chronic illnesses or with more than one chronic (ongoing) condition and those who take a number of different medications. If you meet specific medical criteria, a [facility] [address] may call you directly to coordinate your care with your primary care physician and your other care providers. For questions about the [facility] programs, please, contact your physicians office. For general questions or additional information about Accountable Care Organizations: 
Please visit www.medicare.gov/acos. html or call 1-800-MEDICARE (0-500.507.7151) TTY users should call 6-445.524.4957. NGDATA Announcement We are excited to announce that we are making your provider's discharge notes available to you in NGDATA. You will see these notes when they are completed and signed by the physician that discharged you from your recent hospital stay. If you have any questions or concerns about any information you see in NGDATA, please call the Health Information Department where you were seen or reach out to your Primary Care Provider for more information about your plan of care. Introducing [facility]! Dear Fina Be: 
Thank you for requesting a NGDATA account. Our records indicate that you have previously registered for a NGDATA account but its currently inactive. Please call our NGDATA support line at [phone]. Additional Information If you have questions, please visit the Frequently Asked Questions section of the NGDATA website at [email or web address] Remember, NGDATA is NOT to be used for urgent needs. For medical emergencies, dial 911. Now available from your iPhone and Android! Introducing Get [employer]donado As a [employer] patient, I wanted to make you aware of our electronic visit tool called Get Maldonado. [employer] 24/7 allows you to connect within minutes with a medical provider 24 hours a day, seven days a week via a mobile device or tablet or logging into a secure website from your computer. You can access Get Maldonado from anywhere in the [place]. A virtual visit might be right for you when you have a simple condition and feel like you just dont want to get out of bed, or cant get away from work for an appointment, when your regular [employer] provider is not available (evenings, weekends or holidays), or when youre out of town and need minor care.   Electronic visits cost only $49 and if the [facility] 24/7 provider determines a prescription is needed to treat your condition, one can be electronically transmitted to a nearby pharmacy*. Please take a moment to enroll today if you have not already done so. The enrollment process is free and takes just a few minutes. To enroll, please download the [employer] 24/7 elena to your tablet or phone, or visit [email or web address] to enroll on your computer. And, as an [address] [place] [address] patient with a Mem[employer] account, the results of your visits will be scanned into your electronic medical record and your primary care provider will be able to view the scanned results. We urge you to continue to see your regular [employer] provider for your ongoing medical care. And while your primary care provider may not be the one available when you seek a LocalView virtual visit, the peace of mind you get from getting a real diagnosis real time can be priceless. For more information on LocalView, view our Frequently Asked Questions (FAQs) at [email or web address]. Sincerely, 
 
Apolinar Isaac MD 
Chief Medical Officer [facility] *:  certain medications cannot be prescribed via LocalView Providers Seen During Your Hospitalization Provider Specialty Primary office phone Bridget Allen MD Orthopedic Surgery [phone] Your Primary Care Physician (PCP) Primary Care Physician Office Phone Office Fax Doc Pinky [phone] You are allergic to the following Allergen Reactions Other Food Itching If eats large quantities over several days causes itching: tomatoes, peaches, strawberry, fig  
    
 Bee Sting (Sting, Bee) Swelling Pcn (Penicillins) Hives IN CHILDHOOD Percocet (Oxycodone-Acetaminophen) Other (comments) Other (comments)\"hellish nightmares\" Shellfish Containing Products Itching Zoster Vaccine Live Swelling Severe Right arm swelling with redness after administered by pharmacist in elbow Atarax (Hydroxyzine Hcl) Other (comments)  
 jittery Buspar (Buspirone) Nausea Only Crestor (Rosuvastatin) Myalgia Hydrochlorothiazide Myalgia Other reaction(s): sorethroat Hydroxyzine Other (comments)  
 jittery Norco (Hydrocodone-Acetaminophen) Nausea and Vomiting Recent Documentation Height Weight BMI OB Status Smoking Status 1.575 m 63.7 kg 25.7 kg/m2 Hysterectomy Never Smoker Emergency Contacts Name Discharge Info Relation Home Work Mobile Eric Naqvi DISCHARGE CAREGIVER [3] Spouse [3] [phone] Patient Belongings The following personal items are in your possession at time of discharge: 
  Dental Appliances: None  Visual Aid: Glasses Discharge Instructions Attachments/References MEFS - RIVAROXABAN (XARELTO, XARELTO STARTER PACK) - (BY MOUTH) (ENGLISH) MEFS - MELOXICAM (COMFORT PAC WITH MELOXICAM, MOBIC, TREPOXICAM-7.5) - (BY MOUTH) (ENGLISH) MEFS - HYDROMORPHONE (DILAUDID, EXALGO) - (BY MOUTH) (ENGLISH) Patient Handouts Rivaroxaban (Xarelto, Xarelto Starter Pack) - (By mouth) Why this medicine is used:  
Treats and prevents blood clots. Contact a nurse or doctor right away if you have: 
· Sudden or severe headache · Back pain, numbness, tingling, weakness in your legs or feet · Loss of bladder or bowel control · Bloody vomit or vomit that looks like coffee grounds; bloody or black, tarry stools · Bleeding that does not stop or bruises that do not heal  
 
Common side effects: · Minor bleeding or bruising © 2017 [facility] Information is for End User's use only and may not be sold, redistributed or otherwise used for commercial purposes. Meloxicam (Comfort Pac with Meloxicam, Mobic, Trepoxicam-7.5) - (By mouth) Why this medicine is used:  
Treats symptoms of osteoarthritis (OA) and rheumatoid arthritis (RA). Contact a nurse or doctor right away if you have: 
· Change in how much or how often you urinate · Severe stomach pain, vomiting blood, bloody or black tarry stools · Swelling in your hands, ankles, or feet; rapid weight gain Common side effects: 
· Nausea, diarrhea · Mild skin rash · Headache © 2017 [address] Information is for End User's use only and may not be sold, redistributed or otherwise used for commercial purposes. Hydromorphone (Dilaudid, Exalgo) - (By mouth) Why this medicine is used:  
Treats moderate to severe pain. This medicine is a narcotic pain reliever. Contact a nurse or doctor right away if you have: 
· Extreme weakness, shallow breathing, fast or slow heartbeat · Severe confusion, lightheadedness, dizziness, fainting · Sweating or cold, clammy skin · Anxiety, restlessness, fever, sweating, muscle spasms, twitching, seeing or hearing things that are not there · Severe constipation, stomach pain, vomiting Common side effects: · Mild constipation, nausea, diarrhea · Sleepiness, tiredness · Itching, rash © 2017 [address] Information is for End User's use only and may not be sold, redistributed or otherwise used for commercial purposes. Please provide this summary of care documentation to your next provider. Signatures-by signing, you are acknowledging that this After Visit Summary has been reviewed with you and you have received a copy. Patient Signature:  ____________________________________________________________ Date:  ____________________________________________________________  
  
Ivanna Patricio Provider Signature:  ____________________________________________________________ Date:  ____________________________________________________________

## 2018-08-27 NOTE — PROGRESS NOTES
TRANSFER - IN REPORT:    Verbal report received from Johanne Boas, [facility] (name) on Kat Zuniga  being received from PACU (unit) for routine post - op      Report consisted of patients Situation, Background, Assessment and   Recommendations(SBAR). Information from the following report(s) SBAR was reviewed with the receiving nurse. Opportunity for questions and clarification was provided. Assessment completed upon patients arrival to unit and care assumed.

## 2018-08-27 NOTE — PROGRESS NOTES
Problem: Mobility Impaired (Adult and Pediatric)  Goal: *Acute Goals and Plan of Care (Insert Text)  Physical Therapy Goals  Initiated 8/27/2018    1. Patient will move from supine to sit and sit to supine  in bed with modified independence within 4 days. 2. Patient will perform sit to stand with modified independence within 4 days. 3. Patient will ambulate with modified independence for 250 feet with the least restrictive device within 4 days. 4. Patient will perform home exercise program per protocol with modified independence within 4 days. 5. Patient will demonstrate AROM 0-90 degrees in operative joint within 4 days. physical Therapy knee EVALUATION  Patient: Doris Tucker (18 y.o. female)  Date: 8/27/2018  Primary Diagnosis: DJD RIGHT KNEE   Primary osteoarthritis of right knee  Procedure(s) (LRB):  RIGHT TOTAL KNEE REPLACEMENT (CHOICE) (Right) Day of Surgery   Precautions:   Fall, WBAT    ASSESSMENT :  Based on the objective data described below, the patient presents with decreased functional mobility from baseline level of function. Prior to admit patient was living in a 2 level home but stays on the 1st floor. Has no stairs to enter as it is a 55+ community. States she owns a RW already. Currently needing supervision for bed mobility and uses her opposite leg to hook operated leg. Sit to stand with CGA and cues for technique. Amb approx 30 feet with RW and CGA and cues for technique. Patient returned to supine with cues for technique. Based on current level of function recommend [facility] follow up. Will continue to follow for mobility progression. Patient will benefit from skilled intervention to address the above impairments.   Patients rehabilitation potential is considered to be Good  Factors which may influence rehabilitation potential include:   [x]         None noted  []         Mental ability/status  []         Medical condition  []         Home/family situation and support systems  []         Safety awareness  []         Pain tolerance/management  []         Other:      PLAN :  Recommendations and Planned Interventions:  [x]           Bed Mobility Training             [x]    Neuromuscular Re-Education  [x]           Transfer Training                   []    Orthotic/Prosthetic Training  [x]           Gait Training                         []    Modalities  [x]           Therapeutic Exercises           []    Edema Management/Control  [x]           Therapeutic Activities            [x]    Patient and Family Training/Education  []           Other (comment):    Frequency/Duration: Patient will be followed by physical therapy twice daily to address goals. Discharge Recommendations: Home Health  Further Equipment Recommendations for Discharge: owns     SUBJECTIVE:   Patient stated This is really neat.     OBJECTIVE DATA SUMMARY:   HISTORY:    Past Medical History:   Diagnosis Date    AC (acromioclavicular) joint bone spurs 01/2014    in back. Dr. Bita Mazariegos. Txd with shots x 3    Actinic keratosis 2015    Dr. Pascale Mar. Dr. Suzi Baron.  Allergic rhinitis, cause unspecified     Anxiety state, unspecified     Arthritis     R KNEE BONE-ON-BONE; R HAND-----OSTEO    Asthma     BRONCHITIS IN SPR[place] FALL MOST YEARS    BCC (basal cell carcinoma), face 1980s (nose), 01/28/15 (forehead)    Dr. Eiml Corral. Dr. Barker Masters.  Cataract 2007    Dr. Pema Kebede    Chronic insomnia     Chronic low back pain 01/2014    Dr. Joselyn Garza. DJD and spurs, Narrowing of L 3,4,5. Dr. Bita Mazariegos.  Chronic obstructive pulmonary disease (HCC)     CHRONIC (TWICE-YEARLY) BRONCHITIS    Chronic pain     Colon polyps 11/2005, 01/29/09    benign.   Dr. Jenelle Davis    Constipation     Degenerative lumbar spinal stenosis 01/2014    Dr. Heri Weaver Narrowing of L 3,4,5    Diabetes mellitus type 2, diet-controlled ([facility] [address] 10/13/2017    LOST WEIGHT AND IS NOT DIABETIC    Dyslipidemia     Dysphagia 09/2014    due to Esophagitis. Dr. Regine Muro.  Essential hypertension, benign     Foot fracture, left 2009    stress.  Foot pain, bilateral 11/13/2013    Dr. Jackie Lopez.  GERD (gastroesophageal reflux disease) 09/29/2014    ESOPHAGITIS    Hearing loss     Dr. Whitten .  Knee pain, bilateral 10/28/13    Dr. Abdiaziz Weiss. Right > Left. Severe OA. Txd with PT.    Lumbar stenosis with neurogenic claudication     Menopause 1976    Migraine     NONE SINCE AGE 37    Mixed stress and urge urinary incontinence     NO LEAKAGE; GETS UP SEVERAL TIMES A NIGHT, PT STATES ON 10/3/16    OA (osteoarthritis) of knee     Dr. Abdiaziz Weiss    Osteopenia 08/24/2015    Restless legs syndrome (RLS) 09/2015    Dr. Jignesh Mcqueen.  Shoulder joint dislocation 06/2011    Right. due to fall. Dr. Peter Aquino Sleep apnea     undiagnosed    Syncope 06/25/11    due to fall down 5 steps. Right occipital head trauma.  Tremor of both hands 09/2015    Dr. Siddharth Rebolledo GABAPENTIN     Past Surgical History:   Procedure Laterality Date    HX CARPAL TUNNEL RELEASE Left 04/03/2018    Dr. Carlton Wells     HX CATARACT REMOVAL Bilateral , R 04/10/17    Dr. Jordan Parkinson COLONOSCOPY  11/2005, 01/29/09, 09/16/15    with polypectomy. Dr. Venkat Purvis q5 years    HX GI  09/2014    due to dysphagia. Dr. Regine Muro. ESOPHAGEL DILATATION    HX GI      COLONOSCOPY    HX GYN  2009? A&P REPAIR    HX MALIGNANT SKIN LESION EXCISION  01/28/15    BCC. L Forehead. MOHs SURGERY. Dr. Deidre Hurst.  HX ORTHOPAEDIC  10/18/2016    LUMBAR LAMINECTOMY    HX ORTHOPAEDIC Left 04/03/2018    CARPEL TUNNEL    HX RAIMUNDO AND BSO  1976    due to fibroids    HX TONSILLECTOMY  1948    OR COMBINED ANT/POST COLPORRHAPHY  02/28/05    with enterocele RE.   Dr. Janell Morrison and Dr. Gustavo Guzman     Prior Level of Function/Home Situation: Independent with mobility at baseline  Personal factors and/or comorbidities impacting plan of care:     Home Situation  Home Environment: Private residence  # Steps to Enter: 0  One/Two Story Residence: Two story, live on 1st floor  # of Interior Steps: 0  Living Alone: Yes  Support Systems: Spouse/Significant Other/Partner  Patient Expects to be Discharged to[de-identified] Private residence  Current DME Used/Available at Home: Cane, straight, Grab bars, Raised toilet seat, Walker    EXAMINATION/PRESENTATION/DECISION MAKING:   Critical Behavior:  Neurologic State: Alert  Orientation Level: Oriented X4        Hearing: Auditory  Auditory Impairment: None    Range Of Motion:  AROM: Generally decreased, functional                       Strength:    Strength: Generally decreased, functional     Functional Mobility:  Bed Mobility:     Supine to Sit: Supervision  Sit to Supine: Supervision  Scooting: Supervision  Transfers:  Sit to Stand: Contact guard assistance  Stand to Sit: Contact guard assistance                       Balance:   Sitting: Intact  Standing: Intact; With support  Ambulation/Gait Training:  Distance (ft): 30 Feet (ft)  Assistive Device: Gait belt;Walker, rolling  Ambulation - Level of Assistance: Contact guard assistance     Gait Description (WDL): Exceptions to WDL  Gait Abnormalities: Antalgic;Decreased step clearance; Step to gait  Right Side Weight Bearing: As tolerated     Base of Support: Widened     Speed/Aparna: Pace decreased (<100 feet/min); Slow  Step Length: Left shortened;Right shortened          Functional Measure:  Barthel Index:    Bathin  Bladder: 0  Bowels: 10  Groomin  Dressin  Feeding: 10  Mobility: 10  Stairs: 5  Toilet Use: 5  Transfer (Bed to Chair and Back): 10  Total: 65       Barthel and G-code impairment scale:  Percentage of impairment CH  0% CI  1-19% CJ  20-39% CK  40-59% CL  60-79% CM  80-99% CN  100%   Barthel Score 0-100 100 99-80 79-60 59-40 20-39 1-19   0   Barthel Score 0-20 20 17-19 13-16 9-12 5-8 1-4 0      The Barthel ADL Index: Guidelines  1. The index should be used as a record of what a patient does, not as a record of what a patient could do. 2. The main aim is to establish degree of independence from any help, physical or verbal, however minor and for whatever reason. 3. The need for supervision renders the patient not independent. 4. A patient's performance should be established using the best available evidence. Asking the patient, friends/relatives and nurses are the usual sources, but direct observation and common sense are also important. However direct testing is not needed. 5. Usually the patient's performance over the preceding 24-48 hours is important, but occasionally longer periods will be relevant. 6. Middle categories imply that the patient supplies over 50 per cent of the effort. 7. Use of aids to be independent is allowed. Remington Ulloa., Barthel, D.W. (8707). Functional evaluation: the Barthel Index. [facility] (14)2. MEHRAN Garcia, Karolina Coronel., [facility] [place], [address] [place] [address] (1999). Measuring the change indisability after inpatient rehabilitation; comparison of the responsiveness of the Barthel Index and Functional Wake Measure. Journal of Neurology, Neurosurgery, and Psychiatry, 66(4), 020-558. Miryam Garcia, N.J.A, JEN Motley, & Yasmeen Parker, M.A. (2004.) Assessment of post-stroke quality of life in cost-effectiveness studies: The usefulness of the Barthel Index and the EuroQoL-5D. Quality of Life Research, 13, 488-05         G codes: In compliance with CMSs Claims Based Outcome Reporting, the following G-code set was chosen for this patient based on their primary functional limitation being treated: The outcome measure chosen to determine the severity of the functional limitation was the Barthel with a score of 65/100 which was correlated with the impairment scale.     ? Mobility - Walking and Moving Around:     - CURRENT STATUS: CJ - 20%-39% impaired, limited or restricted    - GOAL STATUS: CI - 1%-19% impaired, limited or restricted    - D/C STATUS:  ---------------To be determined---------------         Pain:  Pain Scale 1: Numeric (0 - 10)  Pain Intensity 1: 0              Activity Tolerance:   VSS  Please refer to the flowsheet for vital signs taken during this treatment. After treatment:   []         Patient left in no apparent distress sitting up in chair  [x]         Patient left in no apparent distress in bed  [x]         Call bell left within reach  [x]         Nursing notified  [x]         Caregiver present  []         Bed alarm activated    COMMUNICATION/EDUCATION:   The patients plan of care was discussed with: Physical Therapist, Occupational Therapist and Registered Nurse. [x]         Fall prevention education was provided and the patient/caregiver indicated understanding. [x]         Patient/family have participated as able in goal setting and plan of care. [x]         Patient/family agree to work toward stated goals and plan of care. []         Patient understands intent and goals of therapy, but is neutral about his/her participation. []         Patient is unable to participate in goal setting and plan of care.     Thank you for this referral.  Domi Briscoe, PT, DPT   Time Calculation: 12 mins

## 2018-08-27 NOTE — OP NOTES
8/27/2018  OPERATIVE REPORT      PREOPERATIVE DIAGNOSIS: Osteoarthritis, right knee. POSTOPERATIVE DIAGNOSIS: Osteoarthritis, right knee. OPERATIVE PROCEDURE: right total knee replacement. SURGEON: Caity Franklin MD    ASSISTANT SURGEON: Selina Mason, [facility]    ANESTHESIA: Spinal.    INDICATIONS: : A 66 y.o.  female  with end stage osteoarthritis to the right knee, not responsive to conservative management. COMPLICATIONS:None    PROCEDURE: The patient was taken to the operating room, underwent  placement of spinal anesthesia. The knee and leg were prepped and  draped in the usual fashion. The leg was exsanguinated with the Esmarch  bandage and tourniquet inflated to 350 mmHg. A longitudinal midline  incision made down through skin and subcutaneous tissue. A medial  parapatellar arthrotomy was performed, and extended proximally along the  medial border of the quadriceps tendon, distally along the medial border of  the insertion of the patella tendon. Medial release was performed. Retropatellar fat pad was sharply excised. The patella was subluxated  laterally, the knee was flexed to 90 degrees. Drill was used to enter the  intramedullary canal of the distal femur. The 5 degree intramedullary guide  was applied and the distal femoral cut was performed. The femur was sized  to a F. A F cutting block was applied, and the anterior, posterior,  chamfer cuts were performed. The posterior stabilized cut was performed. The extramedullary tibial guide was applied, and the tibia was cut in  neutral alignment. The tibia was sized to a 5. Knee was balanced to varus  and valgus stress. Flexion and extension gaps were equal. Trial reduction  was performed, using 10 mm insert. Excellent range of motion and stability  were noted. The patella was everted, and the patella was cut using freehand  technique. Patella was sized to a 38. Drill holes were placed, and patella  button applied.  The patella tracked normally. All trial components were  removed, and surfaces were prepared using drill and punch. All residual  meniscal tissue was sharply excised. Hemostasis was obtained using Bovie  apparatus. All components were cemented in place, taking care to remove all  excess cement. The knee was maintained in full extension while the cement  hardened. The tourniquet was let down after a total tourniquet time of 32  minutes. Hemostasis was obtained using the Bovie apparatus. The joint was  extensively irrigated with antibiotic solution. The arthrotomy was repaired  using #2 Vicryl in interrupted figure-of-eight fashion. Subcutaneous tissue  was approximated using 2-0 Vicryl in interrupted fashion. Skin edges were  approximated using stapling apparatus. Joint was infiltrated with 30 mL of  0.5% Marcaine with epinephrine. Bulky sterile dressing was applied, and the  patient was transferred to recovery room in stable condition. There were no  complications. ESTIMATED BLOOD LOSS: 100 cc.     SPECIMEN: Diamante Ballesteros M.D.  8/27/2018  10:17 AM

## 2018-08-27 NOTE — PERIOP NOTES
TRANSFER - OUT REPORT:    Verbal report given to Sean on [employer]  being transferred to room 556 for routine post - op       Report consisted of patients Situation, Background, Assessment and   Recommendations(SBAR). Time Pre op antibiotic given:  0928  Anesthesia Stop time:  9354  Mixon Present on Transfer to floor: YES  Order for Mixon on Chart:  YES    Information from the following report(s) SBAR and MAR was reviewed with the receiving nurse. Opportunity for questions and clarification was provided. Is the patient on 02? YES       L/Min 2       Other     Is the patient on a monitor? NO    Is the nurse transporting with the patient? NO    Surgical Waiting Area notified of patient's transfer from PACU?  YES    Lines:   Peripheral IV 08/27/18 Left Hand (Active)   Site Assessment Clean, dry, & intact 8/27/2018 10:40 AM   Phlebitis Assessment 0 8/27/2018 11:15 AM   Infiltration Assessment 0 8/27/2018 11:15 AM   Dressing Status Clean, dry, & intact 8/27/2018 10:40 AM   Dressing Type Transparent 8/27/2018 10:40 AM   Hub Color/Line Status Infusing 8/27/2018 11:15 AM        The following personal items collected during your admission accompanied patient upon transfer:   Dental Appliance: Dental Appliances: None  Vision:    Hearing Aid:    Jewelry:    Clothing:    Other Valuables:    Valuables sent to safe:

## 2018-08-27 NOTE — ROUTINE PROCESS
Patient: Samantha Tavares MRN: [id number]  SSN: [id number]   YOB: 1940  Age: 66 y.o. Sex: female     Patient is status post Procedure(s):  RIGHT TOTAL KNEE REPLACEMENT (CHOICE). Surgeon(s) and Role:     * Bertha Pires MD - Primary    Local/Dose/Irrigation: Right knee injection: Surgical pain solution 100 ml                  Peripheral IV 08/27/18 Left Hand (Active)                           Dressing/Packing:  Wound Knee Right-DRESSING TYPE: 4 x 4;ABD pad;Cast padding;Elastic bandage;Non-adherent; Staples (08/27/18 0900)  Splint/Cast:  ]    Other:

## 2018-08-27 NOTE — ANESTHESIA PREPROCEDURE EVALUATION
Anesthetic History               Review of Systems / Medical History  Patient summary reviewed, nursing notes reviewed and pertinent labs reviewed    Pulmonary    COPD    Sleep apnea    Asthma        Neuro/Psych              Cardiovascular    Hypertension                   GI/Hepatic/Renal     GERD           Endo/Other    Diabetes    Arthritis     Other Findings            Physical Exam    Airway  Mallampati: I  TM Distance: > 6 cm  Neck ROM: normal range of motion   Mouth opening: Normal     Cardiovascular    Rhythm: regular  Rate: normal         Dental  No notable dental hx       Pulmonary  Breath sounds clear to auscultation               Abdominal         Other Findings            Anesthetic Plan    ASA: 3  Anesthesia type: spinal          Induction: Intravenous  Anesthetic plan and risks discussed with: Patient

## 2018-08-28 ENCOUNTER — HOME HEALTH ADMISSION (OUTPATIENT)
Dept: HOME HEALTH SERVICES | Facility: HOME HEALTH | Age: 78
End: 2018-08-28
Payer: MEDICARE

## 2018-08-28 LAB
ANION GAP SERPL CALC-SCNC: 6 MMOL/L (ref 5–15)
BUN SERPL-MCNC: 16 MG/DL (ref 6–20)
BUN/CREAT SERPL: 18 (ref 12–20)
CALCIUM SERPL-MCNC: 7.4 MG/DL (ref 8.5–10.1)
CHLORIDE SERPL-SCNC: 105 MMOL/L (ref 97–108)
CO2 SERPL-SCNC: 25 MMOL/L (ref 21–32)
CREAT SERPL-MCNC: 0.87 MG/DL (ref 0.55–1.02)
EST. AVERAGE GLUCOSE BLD GHB EST-MCNC: 120 MG/DL
GLUCOSE BLD STRIP.AUTO-MCNC: 108 MG/DL (ref 65–100)
GLUCOSE BLD STRIP.AUTO-MCNC: 110 MG/DL (ref 65–100)
GLUCOSE BLD STRIP.AUTO-MCNC: 129 MG/DL (ref 65–100)
GLUCOSE BLD STRIP.AUTO-MCNC: 83 MG/DL (ref 65–100)
GLUCOSE SERPL-MCNC: 129 MG/DL (ref 65–100)
HBA1C MFR BLD: 5.8 % (ref 4.2–6.3)
HGB BLD-MCNC: 11.1 G/DL (ref 11.5–16)
POTASSIUM SERPL-SCNC: 4.3 MMOL/L (ref 3.5–5.1)
SERVICE CMNT-IMP: ABNORMAL
SERVICE CMNT-IMP: NORMAL
SODIUM SERPL-SCNC: 136 MMOL/L (ref 136–145)

## 2018-08-28 PROCEDURE — 97535 SELF CARE MNGMENT TRAINING: CPT

## 2018-08-28 PROCEDURE — 82962 GLUCOSE BLOOD TEST: CPT

## 2018-08-28 PROCEDURE — 97530 THERAPEUTIC ACTIVITIES: CPT

## 2018-08-28 PROCEDURE — 74011250637 HC RX REV CODE- 250/637: Performed by: PHYSICIAN ASSISTANT

## 2018-08-28 PROCEDURE — 74011000250 HC RX REV CODE- 250: Performed by: PHYSICIAN ASSISTANT

## 2018-08-28 PROCEDURE — 80048 BASIC METABOLIC PNL TOTAL CA: CPT | Performed by: PHYSICIAN ASSISTANT

## 2018-08-28 PROCEDURE — 83036 HEMOGLOBIN GLYCOSYLATED A1C: CPT | Performed by: ORTHOPAEDIC SURGERY

## 2018-08-28 PROCEDURE — 65270000029 HC RM PRIVATE

## 2018-08-28 PROCEDURE — G8987 SELF CARE CURRENT STATUS: HCPCS

## 2018-08-28 PROCEDURE — 97165 OT EVAL LOW COMPLEX 30 MIN: CPT

## 2018-08-28 PROCEDURE — 97116 GAIT TRAINING THERAPY: CPT

## 2018-08-28 PROCEDURE — 97110 THERAPEUTIC EXERCISES: CPT

## 2018-08-28 PROCEDURE — 74011250636 HC RX REV CODE- 250/636: Performed by: PHYSICIAN ASSISTANT

## 2018-08-28 PROCEDURE — 94760 N-INVAS EAR/PLS OXIMETRY 1: CPT

## 2018-08-28 PROCEDURE — G8988 SELF CARE GOAL STATUS: HCPCS

## 2018-08-28 PROCEDURE — 77010033678 HC OXYGEN DAILY

## 2018-08-28 PROCEDURE — 36415 COLL VENOUS BLD VENIPUNCTURE: CPT | Performed by: PHYSICIAN ASSISTANT

## 2018-08-28 PROCEDURE — 85018 HEMOGLOBIN: CPT | Performed by: PHYSICIAN ASSISTANT

## 2018-08-28 RX ORDER — INSULIN LISPRO 100 [IU]/ML
INJECTION, SOLUTION INTRAVENOUS; SUBCUTANEOUS
Status: DISCONTINUED | OUTPATIENT
Start: 2018-08-28 | End: 2018-08-29 | Stop reason: HOSPADM

## 2018-08-28 RX ORDER — ACETAMINOPHEN 500 MG
1000 TABLET ORAL EVERY 6 HOURS
Qty: 120 TAB | Refills: 0 | Status: SHIPPED | OUTPATIENT
Start: 2018-08-28 | End: 2018-10-18 | Stop reason: ALTCHOICE

## 2018-08-28 RX ORDER — LISINOPRIL 20 MG/1
20 TABLET ORAL DAILY
Status: DISCONTINUED | OUTPATIENT
Start: 2018-08-29 | End: 2018-08-29 | Stop reason: HOSPADM

## 2018-08-28 RX ORDER — HYDROMORPHONE HYDROCHLORIDE 2 MG/1
1-2 TABLET ORAL
Status: DISCONTINUED | OUTPATIENT
Start: 2018-08-28 | End: 2018-08-29 | Stop reason: HOSPADM

## 2018-08-28 RX ORDER — MELOXICAM 7.5 MG/1
7.5 TABLET ORAL DAILY
Qty: 30 TAB | Refills: 0 | Status: SHIPPED | OUTPATIENT
Start: 2018-08-29 | End: 2018-10-18 | Stop reason: SINTOL

## 2018-08-28 RX ORDER — SODIUM CHLORIDE 9 MG/ML
125 INJECTION, SOLUTION INTRAVENOUS CONTINUOUS
Status: DISPENSED | OUTPATIENT
Start: 2018-08-28 | End: 2018-08-28

## 2018-08-28 RX ORDER — TRAMADOL HYDROCHLORIDE 50 MG/1
50-100 TABLET ORAL
Status: DISCONTINUED | OUTPATIENT
Start: 2018-08-28 | End: 2018-08-29 | Stop reason: HOSPADM

## 2018-08-28 RX ADMIN — ACETAMINOPHEN 500 MG TABLET 1000 MG: at 18:32

## 2018-08-28 RX ADMIN — TRAMADOL HYDROCHLORIDE 50 MG: 50 TABLET, FILM COATED ORAL at 11:10

## 2018-08-28 RX ADMIN — PANTOPRAZOLE SODIUM 40 MG: 40 TABLET, DELAYED RELEASE ORAL at 21:11

## 2018-08-28 RX ADMIN — BACLOFEN 20 MG: 10 TABLET ORAL at 21:10

## 2018-08-28 RX ADMIN — RIVAROXABAN 10 MG: 10 TABLET, FILM COATED ORAL at 12:14

## 2018-08-28 RX ADMIN — Medication 2 G: at 02:21

## 2018-08-28 RX ADMIN — Medication 10 ML: at 21:13

## 2018-08-28 RX ADMIN — Medication 10 ML: at 08:02

## 2018-08-28 RX ADMIN — HYDROMORPHONE HYDROCHLORIDE 2 MG: 2 TABLET ORAL at 18:36

## 2018-08-28 RX ADMIN — HYDROMORPHONE HYDROCHLORIDE 1 MG: 2 TABLET ORAL at 12:13

## 2018-08-28 RX ADMIN — ACETAMINOPHEN 500 MG TABLET 1000 MG: at 02:19

## 2018-08-28 RX ADMIN — POLYETHYLENE GLYCOL 3350 17 G: 17 POWDER, FOR SOLUTION ORAL at 11:07

## 2018-08-28 RX ADMIN — Medication 1 TABLET: at 11:07

## 2018-08-28 RX ADMIN — ACETAMINOPHEN 500 MG TABLET 1000 MG: at 12:13

## 2018-08-28 RX ADMIN — MELOXICAM 7.5 MG: 7.5 TABLET ORAL at 11:07

## 2018-08-28 RX ADMIN — ACETAMINOPHEN 500 MG TABLET 1000 MG: at 08:05

## 2018-08-28 RX ADMIN — ROPINIROLE HYDROCHLORIDE 1 MG: 1 TABLET, FILM COATED ORAL at 21:12

## 2018-08-28 RX ADMIN — Medication 1 TABLET: at 18:32

## 2018-08-28 RX ADMIN — ONDANSETRON 4 MG: 2 INJECTION INTRAMUSCULAR; INTRAVENOUS at 08:36

## 2018-08-28 RX ADMIN — SODIUM CHLORIDE 500 ML: 900 INJECTION, SOLUTION INTRAVENOUS at 08:49

## 2018-08-28 NOTE — PROGRESS NOTES
Care Management Interventions PCP Verified by CM: Yes (Dr. Katie Whitehead) Palliative Care Criteria Met (RRAT>21 & CHF Dx)?: No 
Mode of Transport at Discharge: Self Transition of Care Consult (CM Consult): Home Health [address] [place] [address] Road: Yes MyChart Signup: No 
Discharge Durable Medical Equipment: No (owns RW) Health Maintenance Reviewed: Yes Physical Therapy Consult: Yes Occupational Therapy Consult: Yes Speech Therapy Consult: No 
Current Support Network: Lives with Spouse Confirm Follow Up Transport: Family Plan discussed with Pt/Family/Caregiver: Yes Freedom of Choice Offered: Yes [employer] Information Provided?: No 
Discharge Location Discharge Placement: Home with home health Reason for Admission:   Right Total Knee Replacement/Medicare Bundle/ACO 
                
RRAT Score: 18 Plan for utilizing home health:      Yes, referral sent to [facility] Likelihood of Readmission:  low Transition of Care Plan:         Home Health [facility] accepted the case.  RADHA

## 2018-08-28 NOTE — PROGRESS NOTES
Problem: Mobility Impaired (Adult and Pediatric) Goal: *Acute Goals and Plan of Care (Insert Text) Physical Therapy Goals Initiated 8/27/2018 1. Patient will move from supine to sit and sit to supine  in bed with modified independence within 4 days. 2. Patient will perform sit to stand with modified independence within 4 days. 3. Patient will ambulate with modified independence for 250 feet with the least restrictive device within 4 days. 4. Patient will perform home exercise program per protocol with modified independence within 4 days. 5. Patient will demonstrate AROM 0-90 degrees in operative joint within 4 days. physical Therapy TREATMENT Patient: Cherelle Stanley (24 y.o. female) Date: 8/28/2018 Diagnosis: DJD RIGHT KNEE Primary osteoarthritis of right knee Primary osteoarthritis of right knee Procedure(s) (LRB): 
RIGHT TOTAL KNEE REPLACEMENT (CHOICE) (Right) 1 Day Post-Op Precautions: Fall, WBAT Chart, physical therapy assessment, plan of care and goals were reviewed. ASSESSMENT: 
Pt received fowlers in bed agreeable to therapy. Nursing advised that pt experienced orthostatic episode earlier this morning during a trip to the bathroom. Orthostatics monitored as per POD 1 protocol, without incident. Pt able to perform all bed mobility with supervision. Sit to stand, with RW and CGA, no c/o dizziness or lightheadedness upon standing. Pt ambulated approximately 200', with RW and CGA, reporting that IV access in L hand was making it challenging to  and advance RW. Pt demonstrated one brief misstep (which she contributes to increased pain with knee flexion), with good self recovery. Upon return to room, pt able to progress to step through gait pattern. Following assistance to bathroom, pt returned to chair and was positioned to comfort, all needs within reach. Ice applied to R knee.  Plan to review protocol exercises, progress ambulation, and measure ROM (if ace wrap is removed) during afternoon session. Progression toward goals: 
[x]      Improving appropriately and progressing toward goals 
[]      Improving slowly and progressing toward goals 
[]      Not making progress toward goals and plan of care will be adjusted PLAN: 
Patient continues to benefit from skilled intervention to address the above impairments. Continue treatment per established plan of care. Discharge Recommendations:  Home Health Further Equipment Recommendations for Discharge:  Pt owns RW SUBJECTIVE:  
Patient stated I just have to smooth it out.  [pt's description of step through gait pattern] OBJECTIVE DATA SUMMARY:  
Critical Behavior: 
Neurologic State: Alert Orientation Level: Oriented X4 Range of Motion: 
  
  
  
  
  
  
  
  
Functional Mobility Training: 
Bed Mobility: 
  
Supine to Sit: Supervision Sit to Supine:  (NT-pt returned to seated ) Scooting: Supervision Transfers: 
Sit to Stand: Contact guard assistance Stand to Sit: Contact guard assistance Balance: 
Sitting: Intact Standing: Intact; With support Ambulation/Gait Training: 
Distance (ft): 200 Feet (ft) Assistive Device: Gait belt;Walker, rolling Ambulation - Level of Assistance: Contact guard assistance Gait Abnormalities: Antalgic;Decreased step clearance Speed/Aparna: Pace decreased (<100 feet/min) Step Length: Left shortened;Right shortened Therapeutic Exercises:  
 
EXERCISE Sets Reps Active Active Assist  
Passive Self ROM Comments Ankle Pumps   []                                        []                                        []                                        []                                          
Quad Sets   []                                        []                                        []                                        [] Hamstring Sets   []                                        []                                        []                                        []                                          
Short Arc Quads   []                                        []                                        []                                        []                                          
Knee Extension Stretch    
[]                                         
Heel Slides   []                                        []                                        []                                        []                                          
Long Arc Quads   []                                        []                                        []                                        []                                          
Knee Flexion Stretch   []                                        []                                        []                                        []                                          
Straight Leg Raises   []                                        []                                        []                                        []                                          
 
Pain: 
Pain Scale 1: Numeric (0 - 10) Pain Intensity 1: 7 Pain Location 1: Knee Pain Orientation 1: Right Pain Description 1: Aching Pain Intervention(s) 1: Medication (see MAR) Activity Tolerance:  
Good tolerance to activity Please refer to the flowsheet for vital signs taken during this treatment. After treatment:  
[x] Patient left in no apparent distress sitting up in chair 
[] Patient left in no apparent distress in bed 
[x] Call bell left within reach [x] Nursing notified 
[] Caregiver present 
[] Bed alarm activated COMMUNICATION/COLLABORATION:  
 The patients plan of care was discussed with: Registered Nurse Filemon Robles Start: 10:18 End: 10:58 Regarding student involvement in patient care: A student participated in this treatment session. Per CMS Medicare statements and APTA guidelines I certify that the following was true: 1. I was present and directly observed the entire session. 2. I made all skilled judgments and clinical decisions regarding care. 3. I am the practitioner responsible for assessment, treatment, and documentation.

## 2018-08-28 NOTE — PROGRESS NOTES
Bedside shift change report given to Kristy Gutierrez (oncoming nurse) by Frankie Santiago (offgoing nurse). Report included the following information SBAR, Kardex, Intake/Output, MAR and Recent Results.

## 2018-08-28 NOTE — DISCHARGE INSTRUCTIONS
Patient meets criteria for   BUNDLED PAYMENT   for Care Improvement Initiative Criteria    Contact Information for Orthopedic Nurse Navigator:      SHELBIE Valdez, RN-BC  Y:[phone]  V:[phone]  C:[phone]      After Hospital Care Plan:    Discharge Instructions Knee Replacement-Dr. Mendy Franklin    Patient Name  Caitlin Nunes  Date of procedure  8/27/2018    Procedure  Procedure(s):  RIGHT TOTAL KNEE REPLACEMENT (CHOICE)  Surgeon  Surgeon(s) and Role:     * Babita Ma MD - Primary  Date of discharge: No discharge date for patient encounter. PCP: Anatoliy Schaeffer, DO    Follow up appointments  -follow up with Dr. Mendy Franklin in 2 weeks. Call [phone] to make an appointment. [facility]: _________________________   phone: ___________________  The agency will contact you to arrange dates/times for visits. Please call them if you do not hear from them within 24 hours after you are discharged. When to call your Orthopaedic Surgeon:  -unrelieved pain  -Signs of infection-if your incision is red; continues to have drainage; drainage has a foul odor or if you have a persistent fever over 101 degrees  -Signs of a blood clot in your leg-calf pain, tenderness, redness, swelling of lower leg  When to call your Primary Care Physician:  -Concerns about medical conditions such as diabetes, high blood pressure, asthma, congestive heart failure  -Call if blood sugars are elevated, persistent headache or dizziness, coughing or congestion, constipation or diarrhea, burning with urination, abnormal heart rate  When to call 911and go to the nearest emergency room  -acute onset of chest pain, shortness of breath, difficulty breathing    Activity  -weight bearing as tolerated with your walker or crutches. Refer to pages 23-31 of your handbook for instructions and pictures.   -20 repetitions of each exercise as instructed by therapist 3 times each day.   Refer to pages 32-40 of your handbook for exercise instructions and pictures  -get up every one hour and walk (except at night when sleeping)  -do not drive or operate heavy machinery    Incision Care  -you will have staples in your knee incision; they will be removed at the surgeons office visit in 2 weeks  -Keep a dressing (ABD and paper tape) on your incision and change daily. Wash your hands thoroughly before changing the dressing. Once you incision is not draining, you may leave it open to air  -You may shower and get your incision wet but do not submerge your incision under water in a bath tub, hot tub or swimming pool for 6 weeks after surgery. Preventing blood clots  -take Xarelto at 12 noon daily as prescribed by Dr. Viv Nunes    Pain management  -take pain medication as prescribed; decrease the amount you use as your pain lessens  -avoid alcoholic beverages while taking pain medication  -Please be aware that many medications contain Tylenol. We do not want you to over medicate so please read the information below as a guide. Do not take more than 4 Grams of Tylenol in a 24 hour period. (There are 1000 milligrams in one Gram)    -You may place an ice bag on your knee for 15-20 minutes after exercising    Diet  -resume usual diet; drink plenty of fluids; eat foods high in fiber  -you may want to take a stool softener (such as Senokot-S or Colace) to prevent constipation while you are taking pain medication.   If constipation occurs, take a laxative (such as Dulcolax tablets, Milk of Magnesia, or a suppository)    2003 [facility] Protocol (to be followed by [address])  Nursing-per physicians order  -complete head to toe assessment, vital signs  -staples will be removed in the surgeons office at 2 week visit  -medication reconciliation  -review pain management  -manage chronic medical conditions    Physical Therapy-per physicians order  Weight bearing status:  Precautions at Admission: Fall, WBAT     Right Side Weight Bearing: As tolerated  ? Mobility Status:  Supine to Sit: Supervision  Sit to Stand: Contact guard assistance  Sit to Supine: Stand-by assistance  Bed to Chair: Minimum assistance (due to pain)  Gait:  Distance (ft): 150 Feet (ft)  Ambulation - Level of Assistance: Contact guard assistance  Assistive Device: Gait belt, Walker, rolling  Gait Abnormalities: Antalgic, Decreased step clearance  ADL status overall composite:            Toilet Transfer : Contact guard assistance    -Home Therapy  -assessment and evaluation-bed mobility; functional transfers (bed, chair, bathroom, stairs); ambulation with equipment, car transfers, shower transfers, safety and ability to get out of house in the event of an emergency  -review weight bearing as tolerated, wean from walker or crutches as tolerated  -discuss pain management  -review how to do ADLs    -Home Exercise Program-refer to [id number] of the patient handbook for exercises  -supine exercises-ankle pumps, hamstring sets, quad sets, heel slides, short arc quad sets, long arc quads-prop heel on pillow for stretch, straight leg raise-sitting exercises-active knee flexion, passive knee flexion, active knee extension, ankle pumps, bicep curls (use weights as appropriate), triceps pushups, shoulder flexion (use weights as appropriate)  -standing exercises holding onto supportive counter-toe raises, heel raises, mini-squats, heel/toe touches with knee bend, marching in place, hamstring curls

## 2018-08-28 NOTE — PROGRESS NOTES
Problem: Self Care Deficits Care Plan (Adult) Goal: *Acute Goals and Plan of Care (Insert Text) Occupational Therapy Goals Initiated 8/28/2018 1. Patient will perform lower body ADLs with supervision/set-up within 4 day(s). 2.  Patient will perform upper body ADLs standing 5 mins without fatigue or LOB with supervision/set-up within 4 day(s). 3.  Patient will perform all aspects of toileting with supervision/set-up within 4 day(s). 4.  Patient will participate in upper extremity therapeutic exercise/activities with supervision/set-up for 10 minutes within 4 day(s). 5.  Patient will utilize energy conservation techniques during functional activities without cues within 4 day(s). Occupational Therapy EVALUATION Patient: Eric Michelle (45 y.o. female) Date: 8/28/2018 Primary Diagnosis: DJD RIGHT KNEE Primary osteoarthritis of right knee Procedure(s) (LRB): 
RIGHT TOTAL KNEE REPLACEMENT (CHOICE) (Right) 1 Day Post-Op Precautions:   Fall, WBAT 
 
ASSESSMENT : 
Based on the objective data described below, the patient presents with impaired ability to complete ADLs and fair functional mobility limited by pain s/p POD1 R TKR, currently min A for LB dressing to CGA for functional mobility with RW into bathroom, able to stand for 2 minutes for grooming task at sink, educated on modifications with ADLs, safety with joint reach exercise, endurance and standing tolerance as tolerated, recommend continued use of raised toilet seat at home, will see 1 more visit for OT for continued ADL education for LB dressing and standing endurance with ADLs . Patient will benefit from skilled intervention to address the above impairments. Patients rehabilitation potential is considered to be Good Factors which may influence rehabilitation potential include:  
[]                None noted []                Mental ability/status []                Medical condition []                Home/family situation and support systems []                Safety awareness [x]                Pain tolerance/management 
[]                Other: PLAN : 
Recommendations and Planned Interventions: 
[x]                  Self Care Training                  [x]           Therapeutic Activities [x]                  Functional Mobility Training    []           Cognitive Retraining 
[x]                  Therapeutic Exercises           [x]           Endurance Activities [x]                  Balance Training                   []           Neuromuscular Re-Education []                  Visual/Perceptual Training     [x]      Home Safety Training 
[x]                  Patient Education                 []           Family Training/Education []                  Other (comment): Frequency/Duration: Patient will be followed by occupational therapy 5 times a week to address goals. Discharge Recommendations: To Be Determined,expect none Further Equipment Recommendations for Discharge: TBD, has raised toilet seat SUBJECTIVE:  
Patient stated Cale Stable may need to adjust my toilet seat, the back of my knee hurts alot.  OBJECTIVE DATA SUMMARY:  
HISTORY:  
Past Medical History:  
Diagnosis Date  AC (acromioclavicular) joint bone spurs 01/2014 in back. Dr. Ke Peraza. Txd with shots x 3  
 Actinic keratosis 2015 Dr. Marci Morris. Dr. Dipesh Batres.  Allergic rhinitis, cause unspecified  Anxiety state, unspecified  Arthritis R KNEE BONE-ON-BONE; R HAND-----OSTEO  Asthma BRONCHITIS IN [place] MOST YEARS  
 BCC (basal cell carcinoma), face 1980s (nose), 01/28/15 (forehead) Dr. Vitor Yip. Dr. Sammy Munoz.  Cataract 2007 Dr. Cherry Merrill  Chronic insomnia  Chronic low back pain 01/2014 Dr. Joselin Toussaint. DJD and spurs, Narrowing of L 3,4,5. Dr. Ke Peraza.  Chronic obstructive pulmonary disease ([facility] [address] CHRONIC (TWICE-YEARLY) BRONCHITIS  Chronic pain  Colon polyps 11/2005, 01/29/09  
 benign. Dr. Verena Pickett  Constipation  Degenerative lumbar spinal stenosis 01/2014 Dr. Chavez Kil Narrowing of L 3,4,5  
 Diabetes mellitus type 2, diet-controlled ([facility] [address] 10/13/2017 LOST WEIGHT AND IS NOT DIABETIC  Dyslipidemia  Dysphagia 09/2014  
 due to Esophagitis. Dr. Mckay Henry.  Essential hypertension, benign  Foot fracture, left 2009  
 stress.  Foot pain, bilateral 11/13/2013 Dr. Socrates Betancourt.  GERD (gastroesophageal reflux disease) 09/29/2014 ESOPHAGITIS  Hearing loss Dr. Jerson Rodgers.  Knee pain, bilateral 10/28/13 Dr. Lukas Moreno. Right > Left. Severe OA. Txd with PT.  
 Lumbar stenosis with neurogenic claudication [address]  Migraine NONE SINCE AGE 37  
 Mixed stress and urge urinary incontinence NO LEAKAGE; GETS UP SEVERAL TIMES A NIGHT, PT STATES ON 10/3/16  OA (osteoarthritis) of knee Dr. Lukas Moreno  Osteopenia 08/24/2015  Restless legs syndrome (RLS) 09/2015 Dr. Vinicio Vasquez.  Shoulder joint dislocation 06/2011 Right. due to fall. Dr. Chioma Brown  Sleep apnea   
 undiagnosed  Syncope 06/25/11  
 due to fall down 5 steps. Right occipital head trauma.  Tremor of both hands 09/2015 Dr. Getachew Robison GABAPENTIN Past Surgical History:  
Procedure Laterality Date  HX CARPAL TUNNEL RELEASE Left 04/03/2018 Dr. Ivet Turner  HX CATARACT REMOVAL Bilateral , R 04/10/17 Dr. Heath Fried  HX COLONOSCOPY  11/2005, 01/29/09, 09/16/15  
 with polypectomy. Dr. Verena Pickett q5 years  HX GI  09/2014  
 due to dysphagia. Dr. Mckay Henry. ESOPHAGEL DILATATION  
 HX GI    
 COLONOSCOPY  
 HX GYN  2009? A&P REPAIR  
 HX MALIGNANT SKIN LESION EXCISION  01/28/15 BCC.  L Forehead. MOHs SURGERY. Dr. Nathan Fontaine.  HX ORTHOPAEDIC  10/18/2016 LUMBAR LAMINECTOMY  HX ORTHOPAEDIC Left 04/03/2018 CARPEL TUNNEL  
 HX RAIMUNDO AND BSO  1976  
 due to fibroids [address]  MO COMBINED ANT/POST COLPORRHAPHY  02/28/05  
 with enterocele RE. Dr. Ruddy Victoria and Dr. Blayne Okeefe Prior Level of Function/Environment/Context: lives with , mod I for ADls and functional mobility, limited by pain Occupations in which the patient is/was successful, what are the barriers preventing that success:  
Performance Patterns (routines, roles, habits, and rituals):  
Personal Interests and/or values:  
Expanded or extensive additional review of patient history:  
 
Home Situation Home Environment: Private residence # Steps to Enter: 0 One/Two Story Residence: Two story, live on 1st floor # of Interior Steps: 0 Living Alone: Yes Support Systems: Spouse/Significant Other/Partner Patient Expects to be Discharged to[de-identified] Private residence Current DME Used/Available at Home: Commode, bedside, Cane, straight, Grab bars, Elwanda Radha Tub or Shower Type: Shower Hand dominance: Right EXAMINATION OF PERFORMANCE DEFICITS: 
Cognitive/Behavioral Status: 
Neurologic State: Alert Safety/Judgement: Insight into deficits Skin: intact Edema:  
 
Hearing: Auditory Auditory Impairment: None Vision/Perceptual:   
    
    
    
  
    
Acuity: Able to read employee name badge without difficulty Range of Motion: 
B UE 
AROM: Within functional limits Strength: 
B UE  
Strength: Generally decreased, functional 
  
  
  
  
 
Coordination: 
  
Fine Motor Skills-Upper: Left Intact; Right Intact Tone & Sensation: 
B UE Balance: 
Sitting: Intact Standing: Intact; With support Functional Mobility and Transfers for ADLs: 
Bed Mobility: 
Supine to Sit: Supervision Sit to Supine:  (NT-pt returned to seated ) Scooting: Supervision Transfers: 
Sit to Stand: Contact guard assistance Stand to Sit: Contact guard assistance Bed to Chair: Minimum assistance (due to pain) Toilet Transfer : Contact guard assistance ADL Assessment: 
Feeding: Independent Oral Facial Hygiene/Grooming: Setup Bathing: Minimum assistance Upper Body Dressing: Setup Lower Body Dressing: Minimum assistance Toileting: Contact guard assistance Completed OT evaluation and ADLs seated EOB and standing as able with RW for balance. Educated on safety and endurance training with encouragement for full participation in ADLs while in hospital. Good understanding noted. ADL Intervention and task modifications: 
Feeding Feeding Assistance: Independent Grooming Washing Hands: Supervision/set-up (at sink, standing) Lower Body Dressing Assistance Socks: Minimum assistance (Donning/doffing R sock) Position Performed: Seated in chair Cues: Doff;Don;Tactile cues provided;Verbal cues provided Toileting Bladder Hygiene: Supervision/set-up Clothing Management: Contact guard assistance Cues: Verbal cues provided Adaptive Equipment: Walker;Elevated seat Cognitive Retraining Safety/Judgement: Insight into deficits Educated patient on energy conservation techniques, strategies to maximize quality of life and decrease stress/anxiety. 1. Deep breathing 2. Educated on pacing and making sure he/she takes short frequent breaks (e.g. In the shower wash the upper body, rest for 1 minute, then wash the lower body, etc) Bathing: Patient instructed and indicated understanding when bathing to not submerge wound in water, stand to shower or sponge bathe, cover wound with plastic and tape to ensure no water reaches bandage/wound without cues.    
Dressing joint: Patient instructed and demonstrated understanding to don/doff Right LE first/last with Minimum assistance. Patient instructed and demonstrated to don all clothing while sitting prior to standing, doff all clothing to knees while standing, then sit to doff clothing off from knees to feet in order to facilitate fall prevention, pain management, and energy conservation with Minimum assistance. Dressing joint reach exercise: To increase independence with lower body dressing, patient instructed and demonstrated to reach down Right LE in a seated position slowly to prevent tearing/shearing until slight pull is felt, hold at end range for 10 seconds, then return to starting upright position with Supervision. Patient instructed to complete three sets of three repetitions each daily. Home safety: Patient instructed and indicated understanding on home modifications and safety (raise height of ADL objects, appropriate height of chair surfaces, recliner safety, change of floor surfaces, clear pathways) to increase independence and fall prevention. Standing: Patient instructed and demonstrated during ADLs to walk up to sink/counter top/surfaces, step into walker to increase safety of joint and fall prevention with Contact guard assistance. Patient educated about knee anatomy verbally and with pictures and educated to avoid rotation of Right LE. Instructed to apply concept to ADLs within the home (no twisting of knee during reaching across body, square off while using objects, slide objects along surfaces). Patient instructed and indicated understanding to increase amount of time standing, observe standing position during ADLs in order to increase even weight bearing through bilateral LEs in order to increase independence with ADLs. Goal to be reached 30 days post - op, per orthopedic surgeon or per PT. Therapeutic Exercise: 
 
 
Functional Measure: 
Barthel Index: 
 
Bathin Bladder: 10 Bowels: 10 
Groomin Dressin Feeding: 10 Mobility: 10 
 Stairs: 5 Toilet Use: 5 Transfer (Bed to Chair and Back): 10 Total: 75 Barthel and G-code impairment scale: 
Percentage of impairment CH 
0% CI 
1-19% CJ 
20-39% CK 
40-59% CL 
60-79% CM 
80-99% CN 
100% Barthel Score 0-100 100 99-80 79-60 59-40 20-39 1-19 
 0 Barthel Score 0-20 20 17-19 13-16 9-12 5-8 1-4 0 The Barthel ADL Index: Guidelines 1. The index should be used as a record of what a patient does, not as a record of what a patient could do. 2. The main aim is to establish degree of independence from any help, physical or verbal, however minor and for whatever reason. 3. The need for supervision renders the patient not independent. 4. A patient's performance should be established using the best available evidence. Asking the patient, friends/relatives and nurses are the usual sources, but direct observation and common sense are also important. However direct testing is not needed. 5. Usually the patient's performance over the preceding 24-48 hours is important, but occasionally longer periods will be relevant. 6. Middle categories imply that the patient supplies over 50 per cent of the effort. 7. Use of aids to be independent is allowed. Porsha Barry., Barthel, D.W. ([id number]). Functional evaluation: the Barthel Index. [address] [facility] (14)2. TESHA WaggonerF, Michael Beltran., Hui Sotelo., [place], [address] [place] [address] (1999). Measuring the change indisability after inpatient rehabilitation; comparison of the responsiveness of the Barthel Index and Functional [place] Measure. Journal of Neurology, Neurosurgery, and Psychiatry, 66(4), 283-190. Tayo Collins, N.J.A, DARIO Motley.LUIS, & Julianne Puente M.A. (2004.) Assessment of post-stroke quality of life in cost-effectiveness studies: The usefulness of the Barthel Index and the EuroQoL-5D. [facility], 13, 293-09 G codes: In compliance with CMSs Claims Based Outcome Reporting, the following G-code set was chosen for this patient based on their primary functional limitation being treated: The outcome measure chosen to determine the severity of the functional limitation was the barthel index with a score of / which was correlated with the impairment scale. ? Self Care:  
  - CURRENT STATUS: CJ - 20%-39% impaired, limited or restricted  - GOAL STATUS: CI - 1%-19% impaired, limited or restricted  - D/C STATUS:  ---------------To be determined--------------- Occupational Therapy Evaluation Charge Determination History Examination Decision-Making LOW Complexity : Brief history review  LOW Complexity : 1-3 performance deficits relating to physical, cognitive , or psychosocial skils that result in activity limitations and / or participation restrictions  LOW Complexity : No comorbidities that affect functional and no verbal or physical assistance needed to complete eval tasks Based on the above components, the patient evaluation is determined to be of the following complexity level: LOW Pain: 
Pain Scale 1: Numeric (0 - 10) Pain Intensity 1: 7 Pain Location 1: Knee Pain Orientation 1: Right Pain Description 1: Aching Pain Intervention(s) 1: Medication (see MAR) Activity Tolerance:  
Good, pain limiting at times Please refer to the flowsheet for vital signs taken during this treatment. After treatment:  
[x] Patient left in no apparent distress sitting up in chair 
[] Patient left in no apparent distress in bed 
[x] Call bell left within reach [x] Nursing notified 
[x] Caregiver present 
[] Bed alarm activated COMMUNICATION/EDUCATION:  
The patients plan of care was discussed with: Physical Therapist and Registered Nurse. [x]    Home safety education was provided and the patient/caregiver indicated understanding. [x]    Patient/family have participated as able in goal setting and plan of care. [x]    Patient/family agree to work toward stated goals and plan of care. []    Patient understands intent and goals of therapy, but is neutral about his/her participation. []    Patient is unable to participate in goal setting and plan of care. This patients plan of care is appropriate for delegation to JAMES. Thank you for this referral. 
Jordan Llanos, OT Time Calculation: 19 mins

## 2018-08-28 NOTE — PROGRESS NOTES
Problem: Discharge Planning Goal: *Discharge to safe environment Outcome: Progressing Towards Goal 
Home health

## 2018-08-28 NOTE — PROGRESS NOTES
Problem: Mobility Impaired (Adult and Pediatric) Goal: *Acute Goals and Plan of Care (Insert Text) Physical Therapy Goals Initiated 8/27/2018 1. Patient will move from supine to sit and sit to supine  in bed with modified independence within 4 days. 2. Patient will perform sit to stand with modified independence within 4 days. 3. Patient will ambulate with modified independence for 250 feet with the least restrictive device within 4 days. 4. Patient will perform home exercise program per protocol with modified independence within 4 days. 5. Patient will demonstrate AROM 0-90 degrees in operative joint within 4 days. physical Therapy TREATMENT Patient: Desire Morales (8 y.o. female) Date: 8/28/2018 Diagnosis: DJD RIGHT KNEE Primary osteoarthritis of right knee Primary osteoarthritis of right knee Procedure(s) (LRB): 
RIGHT TOTAL KNEE REPLACEMENT (CHOICE) (Right) 1 Day Post-Op Precautions: Fall, WBAT Chart, physical therapy assessment, plan of care and goals were reviewed. ASSESSMENT: 
Pt received fowlers in bed, c/o increased soreness in R knee, and agreeable to therapy. Pt performed supine therex, as per protocol, minimal cueing for proper form with good carryover. Pt performed supine to sit (with HOB elevated), with assistance of bedrails and LLE. Pt ambulated approximately 150', with RW and CGA, distance limited by c/o pain. Pt returned to supine in bed, and positioned to comfort, ice applied to R knee. Anticipate discharge to home with spouse tomorrow with HHPT. Progression toward goals: 
[x]      Improving appropriately and progressing toward goals 
[]      Improving slowly and progressing toward goals 
[]      Not making progress toward goals and plan of care will be adjusted PLAN: 
Patient continues to benefit from skilled intervention to address the above impairments. Continue treatment per established plan of care. Discharge Recommendations:  Home Health Further Equipment Recommendations for Discharge:  Pt owns  and [facility] SUBJECTIVE:  
Patient stated It's been really sore.  OBJECTIVE DATA SUMMARY:  
Critical Behavior: 
Neurologic State: Alert Orientation Level: Oriented X4 Safety/Judgement: Insight into deficits Range of Motion: 
AROM: Within functional limits Functional Mobility Training: 
Bed Mobility: 
  
Supine to Sit: Supervision Sit to Supine: Stand-by assistance Scooting: Supervision Transfers: 
Sit to Stand: Contact guard assistance Stand to Sit: Contact guard assistance Balance: 
Sitting: Intact Standing: Intact; With support Ambulation/Gait Training: 
Distance (ft): 150 Feet (ft) Assistive Device: Gait belt;Walker, rolling Ambulation - Level of Assistance: Contact guard assistance Gait Abnormalities: Antalgic;Decreased step clearance Speed/Aparna: Pace decreased (<100 feet/min) Step Length: Left shortened Therapeutic Exercises:  
 
EXERCISE Sets Reps Active Active Assist  
Passive Self ROM Comments Ankle Pumps  10 [x]                                        []                                        []                                        []                                          
Quad Sets  10 [x]                                        []                                        []                                        []                                          
Hamstring Sets   []                                        []                                        []                                        []                                          
Short Arc Quads  10 []                                        [x]                                        []                                        []                                          
Knee Extension Stretch    
[] []                                         
[]                                         
[]                                          
Heel Slides  10 []                                        [x]                                        []                                        []                                          
Long Arc Quads   []                                        []                                        []                                        []                                          
Knee Flexion Stretch   []                                        []                                        []                                        []                                          
Straight Leg Raises   []                                        []                                        []                                        []                                          
 
Pain: 
Pain Scale 1: Numeric (0 - 10) Pain Intensity 1: 4 Pain Location 1: Knee Pain Orientation 1: Right Pain Description 1: Aching Pain Intervention(s) 1: Ice;Elevation Activity Tolerance:  
Good tolerance for activity After treatment:  
[] Patient left in no apparent distress sitting up in chair 
[x] Patient left in no apparent distress in bed 
[x] Call bell left within reach 
[] Nursing notified 
[] Caregiver present 
[] Bed alarm activated COMMUNICATION/COLLABORATION:  
The patients plan of care was discussed with: Registered Nurse Carissa Zuleta Start: 14:54 End: 15:23 Regarding student involvement in patient care: A student participated in this treatment session. Per CMS Medicare statements and APTA guidelines I certify that the following was true: 1. I was present and directly observed the entire session. 2. I made all skilled judgments and clinical decisions regarding care. 3. I am the practitioner responsible for assessment, treatment, and documentation.

## 2018-08-28 NOTE — PROGRESS NOTES
Ortho Progress Note 1 Day Post-Op Procedure(s): RIGHT TOTAL KNEE REPLACEMENT (CHOICE) Subjective: Pt is doing very well today, having some pain, but well controlled. Pt worked with PT and did quite well. Had some hypotension initially, but BP has improved. Pt hoping to go home tomorrow after working with PT. Pt denies N/V, lightheadedness, dizziness, SOB, or abdominal pain. Physical Exam:  
Visit Vitals  /86 (BP Patient Position: Standing)  Pulse 76  Temp 97.6 °F (36.4 °C)  Resp 15  Ht 5' 2\" (1.575 m)  Wt 62.6 kg (138 lb)  SpO2 96%  BMI 25.24 kg/m2 General appearance: alert, cooperative, no distress, appears stated age Abdomen: soft, non-tender. Bowel sounds normal. No masses,  no organomegaly Extremities: Homans sign is negative, no sign of DVT, ROM R knee to 90 degrees, limited by pain and bulky dressing, calf nontender and soft Pulses: 2+ and symmetric Wound: bulky dressing c/d/i, no evidence of drainage Neurologic: Grossly normal, ankle dorsi/plantar flexion intact, heel raise intact Recent Results (from the past 24 hour(s)) GLUCOSE, POC Collection Time: 08/27/18 11:18 AM  
Result Value Ref Range Glucose (POC) 104 (H) 65 - 100 mg/dL Performed by Hugo Lopez GLUCOSE, POC Collection Time: 08/27/18  4:42 PM  
Result Value Ref Range Glucose (POC) 120 (H) 65 - 100 mg/dL Performed by ESTHER Gomez   
GLUCOSE, POC Collection Time: 08/27/18  9:44 PM  
Result Value Ref Range Glucose (POC) 89 65 - 100 mg/dL Performed by Abdirizak Montero METABOLIC PANEL, BASIC Collection Time: 08/28/18  2:47 AM  
Result Value Ref Range Sodium 136 136 - 145 mmol/L Potassium 4.3 3.5 - 5.1 mmol/L Chloride 105 97 - 108 mmol/L  
 CO2 25 21 - 32 mmol/L Anion gap 6 5 - 15 mmol/L Glucose 129 (H) 65 - 100 mg/dL BUN 16 6 - 20 MG/DL Creatinine 0.87 0.55 - 1.02 MG/DL  
 BUN/Creatinine ratio 18 12 - 20 GFR est AA >60 >60 ml/min/1.73m2 GFR est non-AA >60 >60 ml/min/1.73m2 Calcium 7.4 (L) 8.5 - 10.1 MG/DL  
HEMOGLOBIN Collection Time: 08/28/18  2:47 AM  
Result Value Ref Range HGB 11.1 (L) 11.5 - 16.0 g/dL HEMOGLOBIN A1C WITH EAG Collection Time: 08/28/18  2:47 AM  
Result Value Ref Range Hemoglobin A1c 5.8 4.2 - 6.3 % Est. average glucose 120 mg/dL GLUCOSE, POC Collection Time: 08/28/18  6:42 AM  
Result Value Ref Range Glucose (POC) 129 (H) 65 - 100 mg/dL Performed by Isaiah Velásquez Assessment: 
Stable Post Op Plan: DVT Prophylaxis:Xarelto Physical Therapy: WBAT Discharge Planning: home with home health, possibly tomorrow if cleared by PT 
                                   F/u in office in 2 weeks Pt will require extensive PT prior to discharge home due to pain, gait instability, and limited social support. Pain control: tylenol, mobic, tramadol Acute blood loss anemia: hgb 11.1, normal post op finding, pt asymptomatic, will continue to monitor

## 2018-08-28 NOTE — PROGRESS NOTES
Bedside and Verbal shift change report given to Adiel Singletary (oncoming nurse) by Josh Tavares (offgoing nurse). Report included the following information SBAR, Kardex, Intake/Output, MAR and Recent Results.

## 2018-08-28 NOTE — PROGRESS NOTES
Bedside and Verbal shift change report given to Adolfo Greer RN (oncoming nurse) by Pankaj So RN (offgoing nurse). Report included the following information SBAR.

## 2018-08-29 VITALS
TEMPERATURE: 98.4 F | WEIGHT: 140.5 LBS | OXYGEN SATURATION: 98 % | SYSTOLIC BLOOD PRESSURE: 139 MMHG | BODY MASS INDEX: 25.86 KG/M2 | DIASTOLIC BLOOD PRESSURE: 72 MMHG | HEIGHT: 62 IN | HEART RATE: 101 BPM | RESPIRATION RATE: 16 BRPM

## 2018-08-29 LAB
GLUCOSE BLD STRIP.AUTO-MCNC: 117 MG/DL (ref 65–100)
GLUCOSE BLD STRIP.AUTO-MCNC: 96 MG/DL (ref 65–100)
HGB BLD-MCNC: 10.3 G/DL (ref 11.5–16)
SERVICE CMNT-IMP: ABNORMAL
SERVICE CMNT-IMP: NORMAL

## 2018-08-29 PROCEDURE — 82962 GLUCOSE BLOOD TEST: CPT

## 2018-08-29 PROCEDURE — 97110 THERAPEUTIC EXERCISES: CPT

## 2018-08-29 PROCEDURE — 74011250637 HC RX REV CODE- 250/637: Performed by: NURSE PRACTITIONER

## 2018-08-29 PROCEDURE — 97116 GAIT TRAINING THERAPY: CPT

## 2018-08-29 PROCEDURE — 74011000250 HC RX REV CODE- 250: Performed by: PHYSICIAN ASSISTANT

## 2018-08-29 PROCEDURE — 74011250637 HC RX REV CODE- 250/637: Performed by: PHYSICIAN ASSISTANT

## 2018-08-29 PROCEDURE — 85018 HEMOGLOBIN: CPT | Performed by: PHYSICIAN ASSISTANT

## 2018-08-29 PROCEDURE — 97535 SELF CARE MNGMENT TRAINING: CPT | Performed by: OCCUPATIONAL THERAPIST

## 2018-08-29 PROCEDURE — 36415 COLL VENOUS BLD VENIPUNCTURE: CPT | Performed by: PHYSICIAN ASSISTANT

## 2018-08-29 RX ORDER — HYDROMORPHONE HYDROCHLORIDE 2 MG/1
1-2 TABLET ORAL
Qty: 40 TAB | Refills: 0 | Status: SHIPPED | OUTPATIENT
Start: 2018-08-29 | End: 2018-10-18 | Stop reason: ALTCHOICE

## 2018-08-29 RX ADMIN — HYDROMORPHONE HYDROCHLORIDE 2 MG: 2 TABLET ORAL at 12:05

## 2018-08-29 RX ADMIN — ACETAMINOPHEN 500 MG TABLET 1000 MG: at 04:07

## 2018-08-29 RX ADMIN — POLYETHYLENE GLYCOL 3350 17 G: 17 POWDER, FOR SOLUTION ORAL at 10:12

## 2018-08-29 RX ADMIN — Medication 1 TABLET: at 10:12

## 2018-08-29 RX ADMIN — MELOXICAM 7.5 MG: 7.5 TABLET ORAL at 10:12

## 2018-08-29 RX ADMIN — HYDROMORPHONE HYDROCHLORIDE 2 MG: 2 TABLET ORAL at 08:03

## 2018-08-29 RX ADMIN — ACETAMINOPHEN 500 MG TABLET 1000 MG: at 12:05

## 2018-08-29 RX ADMIN — RIVAROXABAN 10 MG: 10 TABLET, FILM COATED ORAL at 12:05

## 2018-08-29 RX ADMIN — LISINOPRIL 20 MG: 20 TABLET ORAL at 10:12

## 2018-08-29 RX ADMIN — Medication 10 ML: at 06:46

## 2018-08-29 NOTE — PROGRESS NOTES
Problem: Mobility Impaired (Adult and Pediatric) Goal: *Acute Goals and Plan of Care (Insert Text) Physical Therapy Goals Initiated 8/27/2018 1. Patient will move from supine to sit and sit to supine  in bed with modified independence within 4 days. 2. Patient will perform sit to stand with modified independence within 4 days. 3. Patient will ambulate with modified independence for 250 feet with the least restrictive device within 4 days. 4. Patient will perform home exercise program per protocol with modified independence within 4 days. 5. Patient will demonstrate AROM 0-90 degrees in operative joint within 4 days. physical Therapy TREATMENT Patient: Slim Martinez (60 y.o. female) Date: 8/29/2018 Diagnosis: DJD RIGHT KNEE Primary osteoarthritis of right knee Primary osteoarthritis of right knee Procedure(s) (LRB): 
RIGHT TOTAL KNEE REPLACEMENT (CHOICE) (Right) 2 Days Post-Op Precautions: Fall, WBAT Chart, physical therapy assessment, plan of care and goals were reviewed. ASSESSMENT: 
Patient has made good progress towards goals with appropriately progression of gait distance and activity tolerance. She was able to gait train 200' today and ascend/descend 4 stairs modified independently. Reviewed exercises for home and encouraged to complete 3x daily. Discussed frequent gait and need for early ROM for best outcomes. She has met her acute goals and is clear for discharge home with family and HHPT. Progression toward goals: 
[x]      Improving appropriately and progressing toward goals 
[]      Improving slowly and progressing toward goals 
[]      Not making progress toward goals and plan of care will be adjusted PLAN: 
Patient had met her acute goals and is clear for discharge home. Discharge Recommendations:  Home Health Further Equipment Recommendations for Discharge:  None SUBJECTIVE:  
Patient stated I'm not sure if I have had my pain medicine or not.  OBJECTIVE DATA SUMMARY:  
Critical Behavior: 
Neurologic State: Alert Orientation Level: Oriented X4 Safety/Judgement: Insight into deficits Range of Motion: 
  
  
RLE Assessment (WDL): Exceptions to WDL 
  
RLE PROM 
R Knee Flexion: 75 R Knee Extension: -10 Functional Mobility Training: 
Bed Mobility: 
  
Supine to Sit: Supervision Sit to Supine: Stand-by assistance Scooting: Supervision Transfers: 
Sit to Stand: Modified independent Stand to Sit: Supervision Balance: 
Sitting: Intact Standing: Intact; With support Ambulation/Gait Training: 
Distance (ft): 200 Feet (ft) Assistive Device: Gait belt;Walker, rolling Ambulation - Level of Assistance: Contact guard assistance Gait Abnormalities: Antalgic;Decreased step clearance; Step to gait Base of Support: Widened Speed/Aparna: Pace decreased (<100 feet/min) Step Length: Left shortened Stairs: 
Number of Stairs Trained: 4 Stairs - Level of Assistance: Modified independent Rail Use: Right  (cane) Therapeutic Exercises:  
 
EXERCISE Sets Reps Active Active Assist  
Passive Self ROM Comments Ankle Pumps 1 10 [x]                                        []                                        []                                        []                                          
Knee Extension Stretch 1 []                                         
[]                                         
[]                                         
[]                                        On stool upon arrival 
  
Heel Slides   []                                        []                                        []                                        []                                          
Long Arc Quads 1 10 [x]                                        []                                        []                                        [] Knee Flexion Stretch 1 10 [x]                                        []                                        []                                        []                                          
Straight Leg Raises   []                                        []                                        []                                        []                                          
 
Pain: 
Pain Scale 1: Numeric (0 - 10) Pain Intensity 1: 5 Pain Location 1: Knee Pain Orientation 1: Right Pain Description 1: Aching Pain Intervention(s) 1: Repositioned; Ice Activity Tolerance:  
 
Please refer to the flowsheet for vital signs taken during this treatment. After treatment:  
[x] Patient left in no apparent distress sitting up in chair 
[] Patient left in no apparent distress in bed 
[x] Call bell left within reach [x] Nursing notified 
[] Caregiver present 
[] Bed alarm activated COMMUNICATION/COLLABORATION:  
The patients plan of care was discussed with: Registered Nurse Sebastien De La Cruz PT, DPT Time Calculation: 24 mins

## 2018-08-29 NOTE — PROGRESS NOTES
Bedside shift change report given to Susan Arevalo (oncoming nurse) by Ata Mccall (offgoing nurse). Report included the following information SBAR, Kardex, Intake/Output, MAR and Recent Results.

## 2018-08-29 NOTE — PROGRESS NOTES
The Joint Replacement Discharge Education video was reviewed by the patient/family. The content was discussed utilizing teach back, questions were answered. The patient verbalized an understanding of the instructions. I have reviewed discharge instructions with the patient and spouse. The patient and spouse verbalized understanding. Pt received hard rxs. Pt was d/c home via wheelchair by volunteer.

## 2018-08-29 NOTE — DISCHARGE SUMMARY
Orthopedic Service Discharge Summary    Patient ID:  Sandra Fernando  [id number]  female  66 y.o.  1940    Admit date: 8/27/2018    Discharge date and time: No discharge date for patient encounter. Admitting Physician: Flores Cordova MD     Discharge Physician: Flores Cordova MD    Consulting Physician(s): Treatment Team: Attending Provider: Flores Cordova MD; Utilization Review: Ko Talley RN; Care Manager: CHIKI Gunderson    Date of Surgery: 8/27/2018     Preoperative Diagnosis:  DJD RIGHT KNEE     Postoperative Diagnosis: DJD RIGHT KNEE     Procedure(s): Procedure(s):  RIGHT TOTAL KNEE REPLACEMENT (CHOICE)    Surgeon: Kanika Zamudio) and Role:     Lisseth De La Fuente MD - Primary      Anesthesia:  Spinal    Preoperative Medical Clearance:                           HPI:  Pt is a 66 y.o. female who has a history of DJD RIGHT KNEE   Primary osteoarthritis of right knee  with pain and limitations of activities of daily living who presents at this time for a right TKR following the failure of conservative management. PMH:   Past Medical History:   Diagnosis Date    AC (acromioclavicular) joint bone spurs 01/2014    in back. Dr. Cheryl Roman. Txd with shots x 3    Actinic keratosis 2015    Dr. Pa Williamson. Dr. Duglas Paz.  Allergic rhinitis, cause unspecified     Anxiety state, unspecified     Arthritis     R KNEE BONE-ON-BONE; R HAND-----OSTEO    Asthma     BRONCHITIS IN [place] MOST YEARS    BCC (basal cell carcinoma), face 1980s (nose), 01/28/15 (forehead)    Dr. Whit Gerber. Dr. Armida Kwon.  Cataract 2007    Dr. Jacky Luther    Chronic insomnia     Chronic low back pain 01/2014    Dr. Carlee Manuel. DJD and spurs, Narrowing of L 3,4,5. Dr. Cheryl Roman.  Chronic obstructive pulmonary disease (HCC)     CHRONIC (TWICE-YEARLY) BRONCHITIS    Chronic pain     Colon polyps 11/2005, 01/29/09    benign.   Dr. Clay Wood    Constipation  Degenerative lumbar spinal stenosis 01/2014    Dr. Az Waldrop Narrowing of L 3,4,5    Diabetes mellitus type 2, diet-controlled ([facility] [place] [address] 10/13/2017    LOST WEIGHT AND IS NOT DIABETIC    Dyslipidemia     Dysphagia 09/2014    due to Esophagitis. Dr. Leeanna Faith.  Essential hypertension, benign     Foot fracture, left 2009    stress.  Foot pain, bilateral 11/13/2013    Dr. Pawel Carreon.  GERD (gastroesophageal reflux disease) 09/29/2014    ESOPHAGITIS    Hearing loss     Dr. Kylah Kim.  Knee pain, bilateral 10/28/13    Dr. Bravo Devlin. Right > Left. Severe OA. Txd with PT.    Lumbar stenosis with neurogenic claudication     Menopause 1976    Migraine     NONE SINCE AGE 37    Mixed stress and urge urinary incontinence     NO LEAKAGE; GETS UP SEVERAL TIMES A NIGHT, PT STATES ON 10/3/16    OA (osteoarthritis) of knee     Dr. Bravo Devlin    Osteopenia 08/24/2015    Restless legs syndrome (RLS) 09/2015    Dr. Shashank Worthy.  Shoulder joint dislocation 06/2011    Right. due to fall. Dr. Rahul Archer Sleep apnea     undiagnosed    Syncope 06/25/11    due to fall down 5 steps. Right occipital head trauma.  Tremor of both hands 09/2015    Dr. Norvell Meckel GABAPENTIN       Medications upon admission :   Prior to Admission Medications   Prescriptions Last Dose Informant Patient Reported? Taking?   acetaminophen (TYLENOL EXTRA STRENGTH) 500 mg tablet 8/22/2018  Yes No   Sig: Take  by mouth every six (6) hours as needed for Pain. albuterol (PROVENTIL HFA, VENTOLIN HFA) 90 mcg/actuation inhaler 4/27/2017  No No   Sig: Take 2 Puffs by inhalation every four (4) hours as needed for Wheezing or Shortness of Breath.  Indications: BRONCHOSPASM PREVENTION   baclofen (LIORESAL) 20 mg tablet 8/26/2018 at Unknown time  No Yes   Sig: take 1 tablet by mouth NIGHTLY FOR MUSCLE SPASTICITY OF SPINAL ORIGIN   benazepril (LOTENSIN) 20 mg tablet 8/26/2018 at Unknown time  No Yes   Sig: take 1 tablet by mouth once daily   diphenhydrAMINE (BENADRYL) 25 mg capsule 2018 at Unknown time  Yes Yes   Sig: Take 25 mg by mouth nightly as needed. Indications: Insomnia   fluticasone (FLONASE) 50 mcg/actuation nasal spray 2018  No No   Si Sprays by Both Nostrils route daily. melatonin 1 mg tablet 3/27/2018  Yes No   Sig: Take 3 mg by mouth nightly. meloxicam (MOBIC) 7.5 mg tablet 2018 at Unknown time  Yes Yes   Sig: take 1 tablet by mouth twice a day if needed   montelukast (SINGULAIR) 10 mg tablet 2018  No No   Sig: Take 1 Tab by mouth daily. Indications: ALLERGIC RHINITIS, MAINTENANCE THERAPY FOR ASTHMA   omeprazole (PRILOSEC) 40 mg capsule 2018 at Unknown time  Yes Yes   Sig: Take 40 mg by mouth three (3) days a week. AT HS   rOPINIRole (REQUIP) 1 mg tablet 2018 at Unknown time  No Yes   Sig: take 1 and 1/2 tablets by mouth NIGHTLY      Facility-Administered Medications: None        Allergies: Allergies   Allergen Reactions    Other Food Itching     If eats large quantities over several days causes itching: tomatoes, peaches, strawberry, fig    Bee Sting [Sting, Bee] Swelling    Pcn [Penicillins] Hives     IN CHILDHOOD    Percocet [Oxycodone-Acetaminophen] Other (comments)      Other (comments)\"hellish nightmares\"      Shellfish Containing Products Itching    Zoster Vaccine Live Swelling     Severe Right arm swelling with redness after administered by pharmacist in elbow    Atarax [Hydroxyzine Hcl] Other (comments)     jittery    Buspar [Buspirone] Nausea Only    Crestor [Rosuvastatin] Myalgia    Hydrochlorothiazide Myalgia     Other reaction(s): sorethroat    Hydroxyzine Other (comments)     jittery    Norco [Hydrocodone-Acetaminophen] Nausea and Vomiting                 Hospital Course: The patient underwent surgery. Complications:  None; patient tolerated the procedure well.  Was taken to the PACU in stable condition and then transferred to the Orthopedics floor. Condition on discharge:  Stable    Postoperative Pain Management:  Oxycodone, Meloxicam    DVT Prophylaxis: Xarelto Sequential Compression Devices    Postoperative transfusions:     none Banked PRBCs     Post Op complications: none    Hemoglobin at discharge:    Lab Results   Component Value Date/Time    HGB 10.3 (L) 08/29/2018 04:05 AM    INR 1.0 08/13/2018 12:47 PM         Incision - clean, dry and intact. No significant erythema or swelling. Neurovascular exam within normal limits. Wound appears to be healing without any evidence of infection. Physical Therapy started on the day following surgery and progressed to ambulation with the aid of a rolling walker for distances of **50 feet with modified independence. Range of motion  limited by pain. At the time of discharge, able to go up and down stairs and had understanding of precautions needed following surgery. Discharged to: Home. Discharge instructions:  -See Full Summary of discharge instructions attached  -Anticoagulate with Xarelto  -Resume pre hospital diet            -Resume home medications   Current Discharge Medication List      START taking these medications    Details   HYDROmorphone (DILAUDID) 2 mg tablet Take 0.5-1 Tabs by mouth every four (4) hours as needed. Max Daily Amount: 12 mg.  Qty: 40 Tab, Refills: 0    Associated Diagnoses: Primary osteoarthritis of right knee      rivaroxaban (XARELTO) 10 mg tablet Take 1 Tab by mouth daily (with lunch). Indications: KNEE REPLACEMENT DEEP VEIN THROMBOSIS PREVENTION  Qty: 13 Tab, Refills: 0    Associated Diagnoses: Primary osteoarthritis of right knee         CONTINUE these medications which have CHANGED    Details   acetaminophen (TYLENOL) 500 mg tablet Take 2 Tabs by mouth every six (6) hours.   Qty: 120 Tab, Refills: 0    Associated Diagnoses: Primary osteoarthritis of right knee      meloxicam (MOBIC) 7.5 mg tablet Take 1 Tab by mouth daily.  Qty: 30 Tab, Refills: 0    Associated Diagnoses: Primary osteoarthritis of right knee         CONTINUE these medications which have NOT CHANGED    Details   diphenhydrAMINE (BENADRYL) 25 mg capsule Take 25 mg by mouth nightly as needed. Indications: Insomnia      benazepril (LOTENSIN) 20 mg tablet take 1 tablet by mouth once daily  Qty: 90 Tab, Refills: 2      baclofen (LIORESAL) 20 mg tablet take 1 tablet by mouth NIGHTLY FOR MUSCLE SPASTICITY OF SPINAL ORIGIN  Qty: 90 Tab, Refills: 1    Associated Diagnoses: Muscle cramps; Degenerative lumbar spinal stenosis      rOPINIRole (REQUIP) 1 mg tablet take 1 and 1/2 tablets by mouth NIGHTLY  Qty: 45 Tab, Refills: 5    Associated Diagnoses: Restless leg syndrome; History of anemia      omeprazole (PRILOSEC) 40 mg capsule Take 40 mg by mouth three (3) days a week. AT HS  Refills: 0    Associated Diagnoses: Dysphagia      fluticasone (FLONASE) 50 mcg/actuation nasal spray 2 Sprays by Both Nostrils route daily. Qty: 1 Bottle, Refills: 5    Associated Diagnoses: Seasonal allergic rhinitis due to other allergic trigger      montelukast (SINGULAIR) 10 mg tablet Take 1 Tab by mouth daily. Indications: ALLERGIC RHINITIS, MAINTENANCE THERAPY FOR ASTHMA  Qty: 30 Tab, Refills: 11    Associated Diagnoses: Mild persistent asthma with acute exacerbation; Seasonal allergic rhinitis due to other allergic trigger      melatonin 1 mg tablet Take 3 mg by mouth nightly. Associated Diagnoses: Insomnia secondary to chronic pain      albuterol (PROVENTIL HFA, VENTOLIN HFA) 90 mcg/actuation inhaler Take 2 Puffs by inhalation every four (4) hours as needed for Wheezing or Shortness of Breath.  Indications: BRONCHOSPASM PREVENTION  Qty: 1 Inhaler, Refills: 5    Associated Diagnoses: Mild persistent asthma with acute exacerbation          per medical continuation form  -Discharge activity: Activity as tolerated  -Ambulate with Walkers, Type:  Walker, appropriate total joint protocol  -Wound Care Keep wound clean and dry, Reinforce dressing PRN, Ice to area for comfort and As directed  -Follow up in office in 2 weeks      Signed:  William Layton MD  8/29/2018  8:07 Kalyn Brantley MD

## 2018-08-29 NOTE — PROGRESS NOTES
Problem: Self Care Deficits Care Plan (Adult) Goal: *Acute Goals and Plan of Care (Insert Text) Occupational Therapy Goals Initiated 8/28/2018 1. Patient will perform lower body ADLs with supervision/set-up within 4 day(s). 2.  Patient will perform upper body ADLs standing 5 mins without fatigue or LOB with supervision/set-up within 4 day(s). 3.  Patient will perform all aspects of toileting with supervision/set-up within 4 day(s). 4.  Patient will participate in upper extremity therapeutic exercise/activities with supervision/set-up for 10 minutes within 4 day(s). 5.  Patient will utilize energy conservation techniques during functional activities without cues within 4 day(s). Occupational Therapy TREATMENT Patient: Anushka Lund (24 y.o. female) Date: 8/29/2018 Diagnosis: DJD RIGHT KNEE Primary osteoarthritis of right knee Primary osteoarthritis of right knee Procedure(s) (LRB): 
RIGHT TOTAL KNEE REPLACEMENT (CHOICE) (Right) 2 Days Post-Op Precautions: Fall, WBAT Chart, occupational therapy assessment, plan of care, and goals were reviewed. ASSESSMENT: 
Pt is making steady progress towards goals. She was able to perform LE dressing task with supervision and set-up, as well as toileting and functional mobility amb with RW. No OT needs required after discharge. Progression toward goals: 
[x]          Improving appropriately and progressing toward goals 
[]          Improving slowly and progressing toward goals 
[]          Not making progress toward goals and plan of care will be adjusted PLAN: 
Patient continues to benefit from skilled intervention to address the above impairments. Continue treatment per established plan of care. Discharge Recommendations:  None for OT Further Equipment Recommendations for Discharge:  None for OT SUBJECTIVE:  
Patient stated I am going home soon.  OBJECTIVE DATA SUMMARY:  
Cognitive/Behavioral Status: Neurologic State: Alert, Appropriate for age Orientation Level: Oriented X4 Cognition: Appropriate decision making, Appropriate for age attention/concentration, Appropriate safety awareness, Follows commands Safety/Judgement: Awareness of environment, Driving appropriateness, Insight into deficits Functional Mobility and Transfers for ADLs: 
Bed Mobility: 
Supine to Sit: Supervision Sit to Supine: Stand-by assistance Scooting: Supervision Transfers: 
Sit to Stand: Modified independent Balance: 
Sitting: Intact Standing: Intact; With support ADL Intervention and Instruction: 
Grooming Grooming Assistance: Supervision/set up (standing at sink) Washing Hands: Supervision/set-up Cues: Verbal cues provided Upper Body Dressing Assistance Pullover Shirt: Independent Lower Body Dressing Assistance Dressing Assistance: Supervision/set up Underpants: Supervision/set-up Pants With Elastic Waist: Supervision/set-up Socks: Supervision/set-up Position Performed: Bending forward method;Seated edge of bed Cues: Don;Verbal cues provided;Visual cues provided Adaptive Equipment Used: Merary James Cognitive Retraining Safety/Judgement: Awareness of environment;Driving appropriateness; Insight into deficits Bathing: Patient instructed when bathing to not submerge wound in water, stand to shower or sponge bathe, cover wound with plastic and tape to ensure no water reaches bandage/wound without cues. Patient indicated understanding. Dressing joint: Patient instructed and demonstrated to don/doff Right LE first/last verbal cues. Patient instructed and demonstrated to don all clothing while sitting prior to standing, doff all clothing to knees while standing, then sit to doff clothing off from knees to feet in order to facilitate fall prevention, pain management, and energy conservation with Supervision. Dressing joint reach exercise:  To increase independence with lower body dressing, patient instructed and demonstrated to reach down Right LE in a seated position slowly to prevent tearing/shearing until slight pull is felt, hold at end range for 10 seconds, then return to starting upright position with Supervision. Patient instructed to complete three sets of three repetitions each daily. Home safety: Patient instructed on home modifications and safety (raise height of ADL objects, appropriate height of chair surfaces, recliner safety, change of floor surfaces, clear pathways) to increase independence and fall prevention. Patient indicated understanding. Standing: Patient instructed and demonstrated during ADLs to walk up to sink/counter top/surfaces, step into walker to increase safety of joint and fall prevention with Supervision. Patient educated about knee anatomy verbally and with pictures and educated to avoid rotation of Right LE. Instructed to apply concept to ADLs within the home (no twisting of knee during reaching across body, square off while using objects, slide objects along surfaces). Patient instructed to increase amount of time standing, observe standing position during ADLs in order to increase even weight bearing through bilateral LEs in order to increase independence with ADLs. Goal to be reached 30 days post - op, per orthopedic surgeon or per PT. Patient indicated understanding. Tub transfer: Patient instructed regarding when it is safe to begin transfer into tub (complete stairs with PT, advance exercises with PT high enough to clear tub height). Patient instructed to use the same technique as used with stairs when entering and exiting tub (\"up with the non-surgical, down with the surgical leg\"). Patient indicated understanding. Pain: 
Pain Scale 1: Numeric (0 - 10) Pain Intensity 1: 7 Pain Location 1: Knee Pain Orientation 1: Right Pain Description 1: Aching Pain Intervention(s) 1: Medication (see MAR) Activity Tolerance:  
Fair Please refer to the flowsheet for vital signs taken during this treatment. After treatment:  
[] Patient left in no apparent distress sitting up in chair 
[x] Patient left in no apparent distress in bed 
[x] Call bell left within reach [x] Nursing notified 
[] Caregiver present 
[] Bed alarm activated COMMUNICATION/COLLABORATION:  
The patients plan of care was discussed with: Registered Nurse Ashok Rose OT Time Calculation: 10 mins

## 2018-08-29 NOTE — PROGRESS NOTES
Ortho Progress Note 2 Days Post-Op Procedure(s): RIGHT TOTAL KNEE REPLACEMENT (CHOICE) Subjective: No complaints of pain Physical Exam:  
Visit Vitals  /72 (BP 1 Location: Right arm, BP Patient Position: At rest)  Pulse 98  Temp 97.6 °F (36.4 °C)  Resp 16  
 Ht 5' 2\" (1.575 m)  Wt 63.7 kg (140 lb 8 oz)  SpO2 96%  BMI 25.7 kg/m2 General appearance: alert, cooperative, no distress, appears stated age Abdomen: soft, non-tender. Bowel sounds normal. No masses,  no organomegaly Extremities: no edema, redness or tenderness in the calves or thighs, 
Pulses: 2+ and symmetric Wound: Dressing intact Neurologic: Grossly normal 
 
Recent Results (from the past 24 hour(s)) GLUCOSE, POC Collection Time: 08/28/18 11:27 AM  
Result Value Ref Range Glucose (POC) 108 (H) 65 - 100 mg/dL Performed by Magui Lopez   
GLUCOSE, POC Collection Time: 08/28/18  4:47 PM  
Result Value Ref Range Glucose (POC) 110 (H) 65 - 100 mg/dL Performed by Paulino Watson, POC Collection Time: 08/28/18  9:10 PM  
Result Value Ref Range Glucose (POC) 83 65 - 100 mg/dL Performed by Jessica James HEMOGLOBIN Collection Time: 08/29/18  4:05 AM  
Result Value Ref Range HGB 10.3 (L) 11.5 - 16.0 g/dL GLUCOSE, POC Collection Time: 08/29/18  6:29 AM  
Result Value Ref Range Glucose (POC) 117 (H) 65 - 100 mg/dL Performed by Jose Carlos Aponte Assessment: 
Stable Post Op Plan: DVT Prophylaxis:Xarelto Physical Therapy: WBAT Discharge Planning Pain control: Good D/C home

## 2018-08-29 NOTE — PROGRESS NOTES
[facility]  SBAR Bundled Payment Handoff FROM:                                TO: [address] ([address] [facility] or Facility name) [address] 
[address] [address] [facility] [address] Dept: [phone] Loc: [phone] Room#:  556/01 Discharging Nurse:  Katy Leyva Unit Ph#:[phone] SITUATION  
  
ASAScore: ASA 3 - Patient with moderate systemic disease with functional limitations Admitted:  8/27/2018  Hospital Day: 3      Attending Provider:  Bridget Allen MD    
Consultations:  None PCP:  Jayde Oakes, [address] Admitting Dx:  DJD RIGHT KNEE Primary osteoarthritis of right knee Principal Problem: 
  Primary osteoarthritis of right knee (8/27/2018) 2 Days Post-Op of  
Procedure(s): RIGHT TOTAL KNEE REPLACEMENT (CHOICE) BY: Bridget Allen MD             ON: 8/27/2018 Code Status: Full Code             Advance Directive? Verified (Send w/patient) Isolation:  There are currently no Active Isolations       MDRO: No current active infections BACKGROUND Allergies: Allergies Allergen Reactions  Other Food Itching If eats large quantities over several days causes itching: tomatoes, peaches, strawberry, fig  
 Bee Sting [Sting, Bee] Swelling  Pcn [Penicillins] Hives IN CHILDHOOD  Percocet [Oxycodone-Acetaminophen] Other (comments) Other (comments)\"hellish nightmares\"  Shellfish Containing Products Itching  Zoster Vaccine Live Swelling Severe Right arm swelling with redness after administered by pharmacist in elbow  Atarax [Hydroxyzine Hcl] Other (comments)  
  jittery  Buspar [Buspirone] Nausea Only  Crestor [Rosuvastatin] Myalgia  Hydrochlorothiazide Myalgia Other reaction(s): sorethroat  Hydroxyzine Other (comments)  
  jittery  Norco [Hydrocodone-Acetaminophen] Nausea and Vomiting Past Medical History:  
Diagnosis Date  AC (acromioclavicular) joint bone spurs 01/2014 in back. Dr. Frederick Ceballos. Txd with shots x 3  
 Actinic keratosis 2015 Dr. Clementina Bolaños. Dr. Tj Wood.  Allergic rhinitis, cause unspecified  Anxiety state, unspecified  Arthritis R KNEE BONE-ON-BONE; R HAND-----OSTEO  Asthma BRONCHITIS IN SPRING & FALL MOST YEARS  
 BCC (basal cell carcinoma), face 1980s (nose), 01/28/15 (forehead) Dr. Ana Lion. Dr. Emely Loredo.  Cataract 2007 Dr. Bridger Galloway  Chronic insomnia  Chronic low back pain 01/2014 Dr. Donald De Luna. DJD and spurs, Narrowing of L 3,4,5. Dr. Frederick Ceballos.  Chronic obstructive pulmonary disease ([facility] [address] CHRONIC (TWICE-YEARLY) BRONCHITIS  Chronic pain  Colon polyps 11/2005, 01/29/09  
 benign. Dr. Reynolds Mis  Constipation  Degenerative lumbar spinal stenosis 01/2014 Dr. Mejía Maximo Narrowing of L 3,4,5  
 Diabetes mellitus type 2, diet-controlled ([facility] [address] 10/13/2017 LOST WEIGHT AND IS NOT DIABETIC  Dyslipidemia  Dysphagia 09/2014  
 due to Esophagitis. Dr. Kaity Cottrell.  Essential hypertension, benign  Foot fracture, left 2009  
 stress.  Foot pain, bilateral 11/13/2013 Dr. Magdiel Damon.  GERD (gastroesophageal reflux disease) 09/29/2014 ESOPHAGITIS  Hearing loss Dr. Magali Umanzor.  Knee pain, bilateral 10/28/13 Dr. Yunior Jackson. Right > Left. Severe OA. Txd with PT.  
 Lumbar stenosis with neurogenic claudication [address]  Migraine NONE SINCE AGE 37  
 Mixed stress and urge urinary incontinence NO LEAKAGE; GETS UP SEVERAL TIMES A NIGHT, PT STATES ON 10/3/16  OA (osteoarthritis) of knee Dr. Yunior Jackson  Osteopenia 08/24/2015  Restless legs syndrome (RLS) 09/2015 Dr. Carrillo Riggs.  Shoulder joint dislocation 06/2011 Right. due to fall. Dr. Mala Fabian  Sleep apnea   
 undiagnosed  Syncope 06/25/11  
 due to fall down 5 steps. Right occipital head trauma.  Tremor of both hands 09/2015 Dr. Stew Bro GABAPENTIN Past Surgical History:  
Procedure Laterality Date  HX CARPAL TUNNEL RELEASE Left 04/03/2018 Dr. Anna Marcelo  HX CATARACT REMOVAL Bilateral , R 04/10/17 Dr. Mel Ruelas  HX COLONOSCOPY  11/2005, 01/29/09, 09/16/15  
 with polypectomy. Dr. Ta Mclean q5 years  HX GI  09/2014  
 due to dysphagia. Dr. Gera Delatorre. ESOPHAGEL DILATATION  
 HX GI    
 COLONOSCOPY  
 HX GYN  2009? A&P REPAIR  
 HX MALIGNANT SKIN LESION EXCISION  01/28/15 BCC. L Forehead. MOHs SURGERY. Dr. Oneida Fox.  HX ORTHOPAEDIC  10/18/2016 LUMBAR LAMINECTOMY  HX ORTHOPAEDIC Left 04/03/2018 CARPEL TUNNEL  
 HX RAIMUNDO AND BSO  1976  
 due to fibroids [address]  DE COMBINED ANT/POST COLPORRHAPHY  02/28/05  
 with enterocele RE. Dr. Faviola Pelletier and Dr. Devang Feliciano Prior to Admission Medications Prescriptions Last Dose Informant Patient Reported? Taking?  
acetaminophen (TYLENOL EXTRA STRENGTH) 500 mg tablet 8/22/2018  Yes No  
Sig: Take  by mouth every six (6) hours as needed for Pain. albuterol (PROVENTIL HFA, VENTOLIN HFA) 90 mcg/actuation inhaler 4/27/2017  No No  
Sig: Take 2 Puffs by inhalation every four (4) hours as needed for Wheezing or Shortness of Breath. Indications: BRONCHOSPASM PREVENTION  
baclofen (LIORESAL) 20 mg tablet 8/26/2018 at Unknown time  No Yes Sig: take 1 tablet by mouth NIGHTLY FOR MUSCLE SPASTICITY OF SPINAL ORIGIN  
benazepril (LOTENSIN) 20 mg tablet 8/26/2018 at Unknown time  No Yes Sig: take 1 tablet by mouth once daily diphenhydrAMINE (BENADRYL) 25 mg capsule 2018 at Unknown time  Yes Yes Sig: Take 25 mg by mouth nightly as needed. Indications: Insomnia  
fluticasone (FLONASE) 50 mcg/actuation nasal spray 2018  No No  
Si Sprays by Both Nostrils route daily. melatonin 1 mg tablet 3/27/2018  Yes No  
Sig: Take 3 mg by mouth nightly. meloxicam (MOBIC) 7.5 mg tablet 2018 at Unknown time  Yes Yes Sig: take 1 tablet by mouth twice a day if needed  
montelukast (SINGULAIR) 10 mg tablet 2018  No No  
Sig: Take 1 Tab by mouth daily. Indications: ALLERGIC RHINITIS, MAINTENANCE THERAPY FOR ASTHMA  
omeprazole (PRILOSEC) 40 mg capsule 2018 at Unknown time  Yes Yes Sig: Take 40 mg by mouth three (3) days a week. AT HS  
rOPINIRole (REQUIP) 1 mg tablet 2018 at Unknown time  No Yes Sig: take 1 and 1/2 tablets by mouth NIGHTLY Facility-Administered Medications: None Vaccinations:   
Immunization History Administered Date(s) Administered  Influenza High Dose Vaccine PF 10/29/2013, 2014, 2015, 10/07/2016, 10/13/2017  Pneumococcal Conjugate (PCV-13) 10/13/2017  TD Vaccine 2004  ZZZ-RETIRED (DO NOT USE) Pneumococcal Vaccine (Unspecified Type) 1997, 10/22/2004  Zoster 2009 ASSESSMENT Age: 66 y.o. Gender: female Height: Height: 5' 2\" (157.5 cm)                    Weight:Weight: 63.7 kg (140 lb 8 oz) Patient Vitals for the past 8 hrs: 
 Temp Pulse Resp BP SpO2  
18 0904 98.4 °F (36.9 °C) (!) 101 16 139/72 98 % Active Orders Diet DIET DIABETIC WITH OPTIONS Consistent Carb 2000kcal; Regular Orientation: Orientation Level: Oriented X4 Active Lines/Drains:  (Peg Tube / Mixon / CL or S/L?):no 
 
Urinary Status: Voiding      Last BM: Last Bowel Movement Date: 18 Skin Integrity: Incision (comment) (right knee) Wound Knee Right-DRESSING STATUS: Changed per order Wound Knee Right-DRESSING TYPE: Elastic bandage Mobility: Slightly limited Weight Bearing Status: WBAT (Weight Bearing as Tolerated) Gait Training Assistive Device: Gait belt, Walker, rolling Ambulation - Level of Assistance: Contact guard assistance Distance (ft): 200 Feet (ft) Stairs - Level of Assistance: Modified independent Number of Stairs Trained: 4 Rail Use: Right  (cane) On Anticoagulation? YES  Xarelto Last dose:  8/29/2018at 12:05 Pain Medications given:  Oxycodone Last dose: 8/29/2018 at  12:05 Lab Results Component Value Date/Time Glucose 129 (H) 08/28/2018 02:47 AM  
 Hemoglobin A1c 5.8 08/28/2018 02:47 AM  
 INR 1.0 08/13/2018 12:47 PM  
 INR 1.0 10/03/2016 03:55 PM  
 HGB 10.3 (L) 08/29/2018 04:05 AM  
 HGB 11.1 (L) 08/28/2018 02:47 AM  
 HGB 14.0 08/13/2018 12:47 PM  
 HGB 13.7 04/17/2018 02:27 PM  
 
 
Readmission Risks: Pt is greater than 72years old, COPD, diabetes Score: 18 
  RECOMMENDATION See After Visit Summary (AVS) for: · Discharge instructions · [facility] · Medication Reconciliation [facility] [place] [facility] Nurse Navigator SHELBIE Wagner, RN-BC Office  [phone] Cell      [phone] Fax      [phone] 
[email or web address]

## 2018-08-30 ENCOUNTER — PATIENT OUTREACH (OUTPATIENT)
Dept: OTHER | Age: 78
End: 2018-08-30

## 2018-08-30 ENCOUNTER — PATIENT OUTREACH (OUTPATIENT)
Dept: FAMILY MEDICINE CLINIC | Age: 78
End: 2018-08-30

## 2018-08-30 ENCOUNTER — HOME CARE VISIT (OUTPATIENT)
Dept: SCHEDULING | Facility: HOME HEALTH | Age: 78
End: 2018-08-30
Payer: MEDICARE

## 2018-08-30 VITALS
OXYGEN SATURATION: 98 % | DIASTOLIC BLOOD PRESSURE: 63 MMHG | SYSTOLIC BLOOD PRESSURE: 117 MMHG | RESPIRATION RATE: 16 BRPM | HEART RATE: 83 BPM | TEMPERATURE: 97.8 F

## 2018-08-30 VITALS
TEMPERATURE: 98 F | RESPIRATION RATE: 16 BRPM | HEART RATE: 90 BPM | SYSTOLIC BLOOD PRESSURE: 110 MMHG | DIASTOLIC BLOOD PRESSURE: 60 MMHG | OXYGEN SATURATION: 96 %

## 2018-08-30 PROCEDURE — 3331090001 HH PPS REVENUE CREDIT

## 2018-08-30 PROCEDURE — G0151 HHCP-SERV OF PT,EA 15 MIN: HCPCS

## 2018-08-30 PROCEDURE — G0299 HHS/HOSPICE OF RN EA 15 MIN: HCPCS

## 2018-08-30 PROCEDURE — 3331090002 HH PPS REVENUE DEBIT

## 2018-08-30 PROCEDURE — 400013 HH SOC

## 2018-08-30 NOTE — PROGRESS NOTES
This note will not be viewable in [address] Post Discharge Follow-up contact after Joint Replacement Patient discharged on 8/29/18  By  Tammie Henry  
following  right knee Arthroplasty. Spoke with patient's  today, who reports that \" the home health nurse is here right now. \" 
Denies Fever, Shortness of Breath or Chest Pain. Home Health has visited. Patient also reports:. Incision  clean, dry, intact Calf is non-tender,  
operative extremity has moderate swelling. Pain is well managed. Discussed use of ice & elevation. is progressing with therapy and is exercising independently. Taking Xarelto for anticoagulation, oxycodone for pain. Patient  
is not experiencing symptoms of constipation & urinating without difficulty. Discussed side effects of anticoagulants & pain medications (bleeding/bruising, constipation, lightheaded/dizziness) Follow up appointment is scheduled for 2 weeks. Discussed calling surgeon Dr Kris Zarate  for drainage, bleeding, swelling in operative extremity, fever or pain. Discussed calling PCP Dr Emma Hallman with other medical issues.

## 2018-08-30 NOTE — PROGRESS NOTES
Hospital Discharge Follow-Up Date/Time:  2018 3:43 PM 
 
Patient was admitted to [address] on 18 and discharged on 18 for DJD RIGHT KNEE, RIGHT TOTAL KNEE REPLACEMENT. The physician discharge summary was available at the time of outreach. Patient was contacted within 2 business days of discharge. Top Challenges reviewed with the provider - S/P right TKR. A1C 5.8 on 18. Hgb 10.3 on 18. 
 
- BS LORETTA SN/PT initial visits on 18. 
 
- Ortho f/u 18. - Last PCP f/u 8/10/18. Next f/u on 10/18/18. Method of communication with provider :chart routing Inpatient RRAT score: 18 Was this a readmission? no  
Patient stated reason for the readmission: n/a Nurse Navigator (NN) contacted the patient by telephone to perform post hospital discharge assessment. Verified name and  with patient as identifiers. Provided introduction to self, and explanation of the Nurse Navigator role. Reviewed discharge instructions and red flags with patient who verbalized understanding. Patient given an opportunity to ask questions and does not have any further questions or concerns at this time. The patient agrees to contact the PCP office for questions related to their healthcare. NN provided contact information for future reference. Disease Specific:   N/A Summary of patient's top problems: 
1. S/P Right TKR-  S/P right TKR. Hgb 10.3 on 18. KRIS BURGOS SN/PT initial visits on 18. Ortho f/u 18. 2. Diabetes mellitus type 2, diet-controlled - A1C 5.8 on 18. PCP f/u 8/10/18. Next f/u scheduled for - Home Health orders at discharge: SN GERTRUDE Home Health company: [id number] Date of initial visit: 18 Durable Medical Equipment ordered/company: none Durable Medical Equipment received: n/a Barriers to care? none identified @ present time. Advance Care Planning:  
Does patient have an Advance Directive:  reviewed and current Medication(s):  
New Medications at Discharge:  
HYDROmorphone (DILAUDID) 2 mg tablet Take 0.5-1 Tabs by mouth every four (4) hours as needed. Max Daily Amount: 12 mg. 
Qty: 40 Tab, Refills: 0  
  Associated Diagnoses: Primary osteoarthritis of right knee  
   
rivaroxaban (XARELTO) 10 mg tablet Take 1 Tab by mouth daily (with lunch). Indications: KNEE REPLACEMENT DEEP VEIN THROMBOSIS PREVENTION Qty: 13 Tab, Refills: 0 Changed Medications at Discharge:  
acetaminophen (TYLENOL) 500 mg tablet Take 2 Tabs by mouth every six (6) hours. Qty: 120 Tab, Refills: 0  
  Associated Diagnoses: Primary osteoarthritis of right knee  
   
meloxicam (MOBIC) 7.5 mg tablet Take 1 Tab by mouth daily. Qty: 30 Tab, Refills: 0 Discontinued Medications at Discharge: none Medication reconciliation was performed with patient, who verbalizes understanding of administration of home medications. There were no barriers to obtaining medications identified at this time. Referral to Pharm D needed: no  
 
Current Outpatient Prescriptions Medication Sig  
 HYDROmorphone (DILAUDID) 2 mg tablet Take 0.5-1 Tabs by mouth every four (4) hours as needed. Max Daily Amount: 12 mg.  acetaminophen (TYLENOL) 500 mg tablet Take 2 Tabs by mouth every six (6) hours.  meloxicam (MOBIC) 7.5 mg tablet Take 1 Tab by mouth daily.  rivaroxaban (XARELTO) 10 mg tablet Take 1 Tab by mouth daily (with lunch). Indications: KNEE REPLACEMENT DEEP VEIN THROMBOSIS PREVENTION  
 diphenhydrAMINE (BENADRYL) 25 mg capsule Take 25 mg by mouth nightly as needed (Pt states takes nightly. ). Indications: Insomnia  benazepril (LOTENSIN) 20 mg tablet take 1 tablet by mouth once daily  baclofen (LIORESAL) 20 mg tablet take 1 tablet by mouth NIGHTLY FOR MUSCLE SPASTICITY OF SPINAL ORIGIN  
 rOPINIRole (REQUIP) 1 mg tablet take 1 and 1/2 tablets by mouth NIGHTLY (Patient taking differently: 1 mg. take 1 and 1/2 tablets by mouth NIGHTLY)  fluticasone (FLONASE) 50 mcg/actuation nasal spray 2 Sprays by Both Nostrils route daily. (Patient taking differently: 2 Sprays by Both Nostrils route daily. Uses Seasonal- Spring & Fall)  montelukast (SINGULAIR) 10 mg tablet Take 1 Tab by mouth daily. Indications: ALLERGIC RHINITIS, MAINTENANCE THERAPY FOR ASTHMA (Patient taking differently: Take 10 mg by mouth daily. Uses Seasonal- Spring & Fall. Indications: Allergic Rhinitis, MAINTENANCE THERAPY FOR ASTHMA)  albuterol (PROVENTIL HFA, VENTOLIN HFA) 90 mcg/actuation inhaler Take 2 Puffs by inhalation every four (4) hours as needed for Wheezing or Shortness of Breath. Indications: BRONCHOSPASM PREVENTION (Patient taking differently: Take 2 Puffs by inhalation every four (4) hours as needed for Wheezing or Shortness of Breath (Uses seasonal- Spring & Fall). Indications: BRONCHOSPASM PREVENTION)  omeprazole (PRILOSEC) 40 mg capsule Take 40 mg by mouth daily.  docusate sodium (COLACE) 100 mg capsule Take 100 mg by mouth two (2) times a day.  melatonin 1 mg tablet Take 3 mg by mouth nightly. No current facility-administered medications for this visit. There are no discontinued medications. [facility] follow up appointment(s):  
Future Appointments Date Time Provider Whitney Chacko 9/4/2018 To Be Determined Cassius Peterson PT Replaced by [facility] [address]  
9/7/2018 To Be Determined Cassius Peterson PT [facility] [address]  
10/18/2018 9:00 AM DO WILMAN DunhamFP HARLAN SCHED  
14/69/9890 08:60 AM Colleen Alvarado  [address] [facility] follow up appointment(s): Ortho- 9/11/18 @ 2:40 PM 
 
[facility]:  information provided as a resource Goals  Transitions of Care- collaboration & care coordination to prevent complications post hospitalization. 8/30/18- spoke w/ pt. Reported \"doing well\". BS  SN [address] visited today. Meds reviewed. Using IS.  \"Up to 1250 level & held it for a couple seconds\". Reported dsg to right knee dry & intact. Ambulating w/ walker. Applying ice. Voiding w/o difficulty. +BM. Taking stool softener. Appetite good. Eating vegetables & protein. \"Queasy episode x2\". Will monitor if continues. Red flag s/s & action plan reviewed. Patient advised providers on call 24 hours a day / 7 days a week (M-F 5 PM to 8 AM, and from Friday 5 PM until Monday 8 AM for the weekend) should the patient have questions or concerns. [facility] information provided. Ortho f/u on 9/11/18 @ 2:40 PM. Staples to be removed. Last PCP f/u 8/10/18. Next scheduled for 10/18/18. Plan- pt to continue HEP as per [facility] PT. Pt to attend Ortho f/u as scheduled. NN to collaborate w/ pt, [facility] staff, PCP & other Care Team Members as needed for care coordination. Next NN f/u ~ 1 week-ID.

## 2018-08-31 PROCEDURE — 3331090002 HH PPS REVENUE DEBIT

## 2018-08-31 PROCEDURE — 3331090001 HH PPS REVENUE CREDIT

## 2018-09-01 PROCEDURE — 3331090001 HH PPS REVENUE CREDIT

## 2018-09-01 PROCEDURE — 3331090002 HH PPS REVENUE DEBIT

## 2018-09-02 PROCEDURE — 3331090002 HH PPS REVENUE DEBIT

## 2018-09-02 PROCEDURE — 3331090001 HH PPS REVENUE CREDIT

## 2018-09-03 PROCEDURE — 3331090001 HH PPS REVENUE CREDIT

## 2018-09-03 PROCEDURE — 3331090002 HH PPS REVENUE DEBIT

## 2018-09-04 ENCOUNTER — HOME CARE VISIT (OUTPATIENT)
Dept: SCHEDULING | Facility: HOME HEALTH | Age: 78
End: 2018-09-04
Payer: MEDICARE

## 2018-09-04 VITALS
RESPIRATION RATE: 16 BRPM | TEMPERATURE: 97.7 F | DIASTOLIC BLOOD PRESSURE: 70 MMHG | SYSTOLIC BLOOD PRESSURE: 115 MMHG | HEART RATE: 78 BPM | OXYGEN SATURATION: 98 %

## 2018-09-04 PROCEDURE — 3331090001 HH PPS REVENUE CREDIT

## 2018-09-04 PROCEDURE — G0151 HHCP-SERV OF PT,EA 15 MIN: HCPCS

## 2018-09-04 PROCEDURE — 3331090002 HH PPS REVENUE DEBIT

## 2018-09-05 PROCEDURE — 3331090002 HH PPS REVENUE DEBIT

## 2018-09-05 PROCEDURE — 3331090001 HH PPS REVENUE CREDIT

## 2018-09-06 PROCEDURE — 3331090002 HH PPS REVENUE DEBIT

## 2018-09-06 PROCEDURE — 3331090001 HH PPS REVENUE CREDIT

## 2018-09-07 ENCOUNTER — HOME CARE VISIT (OUTPATIENT)
Dept: SCHEDULING | Facility: HOME HEALTH | Age: 78
End: 2018-09-07
Payer: MEDICARE

## 2018-09-07 VITALS
DIASTOLIC BLOOD PRESSURE: 70 MMHG | OXYGEN SATURATION: 98 % | RESPIRATION RATE: 16 BRPM | HEART RATE: 70 BPM | TEMPERATURE: 98 F | SYSTOLIC BLOOD PRESSURE: 120 MMHG

## 2018-09-07 PROCEDURE — G0151 HHCP-SERV OF PT,EA 15 MIN: HCPCS

## 2018-09-07 PROCEDURE — 3331090003 HH PPS REVENUE ADJ

## 2018-09-07 PROCEDURE — 3331090001 HH PPS REVENUE CREDIT

## 2018-09-07 PROCEDURE — 3331090002 HH PPS REVENUE DEBIT

## 2018-09-23 DIAGNOSIS — G25.81 RESTLESS LEG SYNDROME: ICD-10-CM

## 2018-09-23 DIAGNOSIS — Z86.2 HISTORY OF ANEMIA: ICD-10-CM

## 2018-09-24 RX ORDER — ROPINIROLE 1 MG/1
TABLET, FILM COATED ORAL
Qty: 45 TAB | Refills: 5 | Status: SHIPPED | OUTPATIENT
Start: 2018-09-24 | End: 2018-10-18 | Stop reason: SDUPTHER

## 2018-10-18 ENCOUNTER — OFFICE VISIT (OUTPATIENT)
Dept: FAMILY MEDICINE CLINIC | Age: 78
End: 2018-10-18

## 2018-10-18 VITALS
WEIGHT: 135.7 LBS | TEMPERATURE: 97.4 F | RESPIRATION RATE: 18 BRPM | BODY MASS INDEX: 24.97 KG/M2 | HEART RATE: 89 BPM | DIASTOLIC BLOOD PRESSURE: 74 MMHG | SYSTOLIC BLOOD PRESSURE: 110 MMHG | HEIGHT: 62 IN | OXYGEN SATURATION: 98 %

## 2018-10-18 DIAGNOSIS — E55.9 VITAMIN D DEFICIENCY: ICD-10-CM

## 2018-10-18 DIAGNOSIS — I10 ESSENTIAL HYPERTENSION, BENIGN: ICD-10-CM

## 2018-10-18 DIAGNOSIS — E78.5 DYSLIPIDEMIA: ICD-10-CM

## 2018-10-18 DIAGNOSIS — Z96.651 HISTORY OF TOTAL RIGHT KNEE REPLACEMENT: ICD-10-CM

## 2018-10-18 DIAGNOSIS — R05.8 ACE-INHIBITOR COUGH: ICD-10-CM

## 2018-10-18 DIAGNOSIS — G25.81 RESTLESS LEGS SYNDROME (RLS): ICD-10-CM

## 2018-10-18 DIAGNOSIS — G47.01 INSOMNIA SECONDARY TO CHRONIC PAIN: ICD-10-CM

## 2018-10-18 DIAGNOSIS — K59.09 CHRONIC CONSTIPATION: ICD-10-CM

## 2018-10-18 DIAGNOSIS — Z86.2 HISTORY OF ANEMIA: ICD-10-CM

## 2018-10-18 DIAGNOSIS — T46.4X5A ACE-INHIBITOR COUGH: ICD-10-CM

## 2018-10-18 DIAGNOSIS — R25.2 NOCTURNAL MUSCLE CRAMP: ICD-10-CM

## 2018-10-18 DIAGNOSIS — G89.29 INSOMNIA SECONDARY TO CHRONIC PAIN: ICD-10-CM

## 2018-10-18 DIAGNOSIS — E11.9 DIABETES MELLITUS TYPE 2, DIET-CONTROLLED (HCC): Primary | ICD-10-CM

## 2018-10-18 DIAGNOSIS — Z78.9 ASPIRIN INTOLERANCE: ICD-10-CM

## 2018-10-18 DIAGNOSIS — Z78.9 STATIN INTOLERANCE: ICD-10-CM

## 2018-10-18 PROBLEM — Z96.1 PSEUDOPHAKIA OF BOTH EYES: Status: ACTIVE | Noted: 2017-12-04

## 2018-10-18 RX ORDER — ZOLPIDEM TARTRATE 10 MG/1
10 TABLET ORAL
COMMUNITY
Start: 2018-09-28 | End: 2018-10-28

## 2018-10-18 RX ORDER — ROPINIROLE 1 MG/1
TABLET, FILM COATED ORAL
Qty: 60 TAB | Refills: 5 | Status: SHIPPED | OUTPATIENT
Start: 2018-10-18 | End: 2019-04-16 | Stop reason: SDUPTHER

## 2018-10-18 NOTE — PROGRESS NOTES
Bridget Naqvi  Identified pt with two pt identifiers(name and ). Chief Complaint Patient presents with  Diabetes Rm 13  [facility] Annual Wellness Visit 1. Have you been to the ER, urgent care clinic since your last visit? Hospitalized since your last visit? NO 
 
2. Have you seen or consulted any other health care providers outside of the [address] [place] [address] since your last visit? Include any pap smears or colon screening. NO Advance Care Planning In the event something were to happen to you and you were unable to speak on your behalf, do you have an Advance Directive/ Living Will in place stating your wishes? YES If yes, do we have a copy on file YES If no, would you like information NO 
 
My Chart My chart gives you direct online access to portions of the electronic medical record (EMR) where your doctor stores your health information (ie, lab results, appointment information, medications, immunizations, and more. It is free. Would you like to set up your my chart? NO Today's provider has been notified of reason for visit, vitals and flowsheets obtained on patients. Reviewed record In preparation for visit, huddled with provider and have obtained necessary documentation Health Maintenance Due Topic  
 EYE EXAM RETINAL OR DILATED Q1   
 Shingrix Vaccine Age 50> (1 of 2)  GLAUCOMA SCREENING Q2Y  Influenza Age 5 to Adult  Pneumococcal 65+ Low/Medium Risk (2 of 2 - PPSV23) [address] [place] [address]   
 
 
[id number] Wt Readings from Last 3 Encounters:  
10/18/18 135 lb 11.2 oz (61.6 kg) 18 140 lb 8 oz (63.7 kg) 18 138 lb (62.6 kg) Temp Readings from Last 3 Encounters:  
10/18/18 97.4 °F (36.3 °C)  
18 98 °F (36.7 °C)  
18 97.7 °F (36.5 °C) BP Readings from Last 3 Encounters:  
10/18/18 110/74  
18 120/70  
18 115/70 Pulse Readings from Last 3 Encounters:  
10/18/18 89 09/07/18 70  
09/04/18 78 Vitals:  
 10/18/18 8078 BP: 110/74 Pulse: 89 Resp: 18 Temp: 97.4 °F (36.3 °C) SpO2: 98% Weight: 135 lb 11.2 oz (61.6 kg) Height: 5' 2\" (1.575 m) PainSc:   0 - No pain Learning Assessment: 
:  
 
Learning Assessment 11/3/2016 4/4/2016 9/29/2015 8/19/2014 5/2/2014 PRIMARY LEARNER Patient Patient Patient Patient Patient HIGHEST LEVEL OF EDUCATION - PRIMARY LEARNER  - - - - 4 YEARS OF COLLEGE  
BARRIERS PRIMARY LEARNER - - - NONE NONE  
CO-LEARNER CAREGIVER - - - No - PRIMARY LANGUAGE ENGLISH ENGLISH ENGLISH ENGLISH ENGLISH  
LEARNER PREFERENCE PRIMARY DEMONSTRATION DEMONSTRATION DEMONSTRATION DEMONSTRATION READING  
  - - - LISTENING PICTURES  
ANSWERED BY patient patient patient patient patient RELATIONSHIP SELF SELF SELF SELF SELF Depression Screening: 
:  
 
PHQ over the last two weeks 10/18/2018 Little interest or pleasure in doing things Not at all Feeling down, depressed, irritable, or hopeless Not at all Total Score PHQ 2 0 Fall Risk Assessment: 
:  
 
Fall Risk Assessment, last 12 mths 10/18/2018 Able to walk? Yes Fall in past 12 months? No  
Fall with injury? -  
Number of falls in past 12 months - Fall Risk Score -  
 
 
Abuse Screening: 
:  
 
Abuse Screening Questionnaire 10/18/2018 8/10/2018 10/13/2017 Do you ever feel afraid of your partner? N N N Are you in a relationship with someone who physically or mentally threatens you? N N N Is it safe for you to go home? Tobias Height  
 
 
ADL Screening: 
:  
 
ADL Assessment 10/18/2018 Feeding yourself No Help Needed Getting from bed to chair No Help Needed Getting dressed No Help Needed Bathing or showering No Help Needed Walk across the room (includes cane/walker) No Help Needed Using the telphone No Help Needed Taking your medications No Help Needed Preparing meals No Help Needed Managing money (expenses/bills) No Help Needed Moderately strenuous housework (laundry) No Help Needed Shopping for personal items (toiletries/medicines) No Help Needed Shopping for groceries No Help Needed Driving No Help Needed Climbing a flight of stairs No Help Needed Getting to places beyond walking distances No Help Needed Medication reconciliation up to date and corrected with patient at this time.

## 2018-10-18 NOTE — PROGRESS NOTES
HISTORY OF PRESENT ILLNESS Alla Azar is a 66 y.o. female presents with Diabetes (Rm 13); Blood Pressure Check; Leg Problem (RLS); Medication Refill; Surgical Follow-up; and Referral Follow Up Agree with nurse note. Pt with type 2 DM, hypertension, dyslipidemia, vit d deficiency, aspirin and statin intolerance, and hx of anemia presents to the office with a BP of 110/74. For BP, she uses Lotensin 20 mg daily, tolerating well. Weight is 135 lbs, down 5 lbs since 8/2018. She has decreased her sugar and carbs. Pt wonders why she is considered diabetic. To recall, in 5/2015, her Hgb A1c was 6.5 and in 11/2015 it was 6.4. It has been well controlled since that time. On 8/28/2018, Hgb A1c was 5.8. Patient was admitted to [facility] on 8/27/18 and discharged on 8/29/18 for DJD of R knee and had R TKR with Dr. Josh Ahuja. Rx'd Dilaudid 2 mg, Xarelto 10 mg, Tylenol 500 mg, and Mobic 7.5 mg. She notes Mobic caused easy bruising. She has finished all of these medications and her PT finishes tomorrow. She complains of trouble sleeping. Dr. Josh Ahuja rx'd Trazodone, which she used it with relief for 1 night, but then she was wide awake the next night. He then rx'd Ambien 10 mg nightly. She also uses Chamomile tea with tumeric, benadryl, and melatonin with some relief. She has RLS and uses Requip 1 mg, 1.5 tabs nightly, that was rx'd by neurologist, Dr. Sanjuanita Moralez. She has not seen Dr. Meryl Clark since she relocated to [place]. Her  has told her she jerks her legs in the night. She also complains of feet and leg cramps. She eats 3 teaspoons of mustard with relief. She notes she doesn't drink enough water. Pt with chronic constipation. She uses Miralax 2-3 times weekly with relief. Health Maintenance Pt reports she had a recent eye exam with Dr. India Sousa. Pt's most recent mammogram was on 9/19/2018 with [facility], normal findings, F/U 1 year. Written by allen Duncan, as dictated by Dr. Michael Holt DO. 
 
ROS Review of Systems negative except as noted above in HPI. ALLERGIES:   
Allergies Allergen Reactions  Other Food Itching If eats large quantities over several days causes itching: tomatoes, peaches, strawberry, fig  
 Bee Sting [Sting, Bee] Swelling  Pcn [Penicillins] Hives IN CHILDHOOD  Percocet [Oxycodone-Acetaminophen] Other (comments) Other (comments)\"hellish nightmares\"  Shellfish Containing Products Itching  Zoster Vaccine Live Swelling Severe Right arm swelling with redness after administered by pharmacist in elbow  Atarax [Hydroxyzine Hcl] Other (comments)  
  jittery  Buspar [Buspirone] Nausea Only  Crestor [Rosuvastatin] Myalgia  Hydrochlorothiazide Myalgia Other reaction(s): sorethroat  Hydroxyzine Other (comments)  
  jittery  Mobic [Meloxicam] Other (comments) Easy bruising  Norco [Hydrocodone-Acetaminophen] Nausea and Vomiting CURRENT MEDICATIONS:   
Outpatient Medications Marked as Taking for the 10/18/18 encounter (Office Visit) with Archana Andrews DO Medication Sig Dispense Refill  ascorbic acid, vitamin C, (VITAMIN C) 100 mg tab Take 100 mg by mouth.  Multivit-Iron-Min-Folic Acid (CENTRUM) 5,341-59-3.5 unit-mg-mg chewable tablet Take 1 Tab by mouth.  zolpidem (AMBIEN) 10 mg tablet Take 10 mg by mouth.  rOPINIRole (REQUIP) 1 mg tablet Take 1/2 tablet in morning and at lunch and a full tablet at bedtime 60 Tab 5  
 omeprazole (PRILOSEC) 40 mg capsule Take 40 mg by mouth daily. 0  
 docusate sodium (COLACE) 100 mg capsule Take 100 mg by mouth two (2) times a day.  benazepril (LOTENSIN) 20 mg tablet take 1 tablet by mouth once daily 90 Tab 2 PAST MEDICAL HISTORY:   
Past Medical History:  
Diagnosis Date  AC (acromioclavicular) joint bone spurs 01/2014 in back. Dr. Ttaa Joyce. Txd with shots x 3  
 Actinic keratosis 2015 Dr. Danya Tinajero. Dr. Monica Mcclelland.  Allergic rhinitis, cause unspecified  Anxiety state, unspecified  Arthritis R KNEE BONE-ON-BONE; R HAND-----OSTEO  Asthma BRONCHITIS IN SPRING & FALL MOST YEARS  
 BCC (basal cell carcinoma), face 1980s (nose), 01/28/15 (forehead) Dr. Gwynn Bence. Dr. Laureen Fontenot.  Cataract 2007 Dr. Nick Daugherty  Chronic insomnia  Chronic low back pain 01/2014 Dr. Dann Murcia. DJD and spurs, Narrowing of L 3,4,5. Dr. Tata Joyce.  Chronic obstructive pulmonary disease ([facility] [address] CHRONIC (TWICE-YEARLY) BRONCHITIS  Chronic pain  Colon polyps 11/2005, 01/29/09  
 benign. Dr. Mali Beaulieu  Constipation  Degenerative lumbar spinal stenosis 01/2014 Dr. Cecelia Hannah Narrowing of L 3,4,5  
 Diabetes mellitus type 2, diet-controlled ([facility] [address] 10/13/2017 LOST WEIGHT AND IS NOT DIABETIC  Dyslipidemia  Dysphagia 09/2014  
 due to Esophagitis. Dr. Paul Santoyo.  Esophagitis 09/29/2014 ESOPHAGITIS  Essential hypertension, benign  Foot fracture, left 2009  
 stress.  Foot pain, bilateral 11/13/2013 Dr. Neida Murphy.  Hearing loss Dr. Lynette Gilbert.  Knee pain, bilateral 10/28/13 Dr. Shawna Cardenas. Right > Left. Severe OA. Txd with PT.  
 Lumbar stenosis with neurogenic claudication [address]  Migraine NONE SINCE AGE 37  
 Mixed stress and urge urinary incontinence NO LEAKAGE; GETS UP SEVERAL TIMES A NIGHT, PT STATES ON 10/3/16  OA (osteoarthritis) of knee Dr. Shawna Cardenas  Osteopenia 08/24/2015  Restless legs syndrome (RLS) 09/2015 Dr. Morenita Escobar.  Shoulder joint dislocation 06/2011 Right. due to fall. Dr. Tena Muniz  Sleep apnea   
 undiagnosed  Syncope 06/25/11  
 due to fall down 5 steps. Right occipital head trauma.  Tremor of both hands 09/2015 Dr. Velazquez Certain GABAPENTIN  
 
 
PAST SURGICAL HISTORY:   
Past Surgical History:  
Procedure Laterality Date  HX CARPAL TUNNEL RELEASE Left 2018 Dr. Kriss Pena  HX CATARACT REMOVAL Bilateral , R 04/10/17 Dr. Reyes Ripple  HX COLONOSCOPY  2005, 09, 09/16/15  
 with polypectomy. Dr. Bowen Dodge q5 years  HX ENDOSCOPY  2014  
 with Esophageal dilation. due to dysphagia. Dr. Juanito Shukla.  HX KNEE REPLACEMENT Right 2018 Dr. Mathilda Canavan  HX LUMBAR LAMINECTOMY  10/18/2016  HX MALIGNANT SKIN LESION EXCISION  01/28/15 BCC. L Forehead. MOHs SURGERY. Dr. Bridgette Herrera.  HX RAIMUNDO AND BSO    
 due to fibroids [address].  UT COMBINED ANT/POST COLPORRHAPHY  05  
 with enterocele RE. Dr. Perlita Black and Dr. Gia Fortune FAMILY HISTORY:   
Family History Problem Relation Age of Onset  Hypertension Mother  Dementia Mother   
     alzheimers  Hypertension Father  High Cholesterol Father  Kidney Disease Father 1 kidney  Emphysema Father O2 requiring  Hypertension Sister  Diabetes Maternal Grandmother  Dementia Maternal Grandmother  Thyroid Disease Maternal Grandmother  Thyroid Disease Daughter   
     goiter  Heart Attack Maternal Uncle 51  
     massive  Heart Attack Maternal Uncle 45  
     massive  Heart Attack Maternal Aunt 51  
     massive  Thyroid Disease Daughter   
     thyroid cyst  
 Other Brother  AT 1DAYS OLD  Anesth Problems Neg Hx SOCIAL HISTORY:   
Social History Socioeconomic History  Marital status:  Spouse name: Not on file  Number of children: Not on file  Years of education: Not on file  Highest education level: Not on file Social Needs  Financial resource strain: Not on file  Food insecurity - worry: Not on file  Food insecurity - inability: Not on file  Transportation needs - medical: Not on file  Transportation needs - non-medical: Not on file Occupational History  Not on file Tobacco Use  Smoking status: Never Smoker  Smokeless tobacco: Never Used Substance and Sexual Activity  Alcohol use: No  
 Drug use: No  
 Sexual activity: Yes  
  Partners: Male Birth control/protection: Surgical  
Other Topics Concern  Not on file Social History Narrative  Not on file IMMUNIZATIONS:   
Immunization History Administered Date(s) Administered  Influenza High Dose Vaccine PF 10/29/2013, 09/20/2014, 09/02/2015, 10/07/2016, 10/13/2017  Pneumococcal Conjugate (PCV-13) 10/13/2017  TD Vaccine 09/07/2004  ZZZ-RETIRED (DO NOT USE) Pneumococcal Vaccine (Unspecified Type) 09/17/1997, 10/22/2004  Zoster 01/21/2009 PHYSICAL EXAMINATION Vital Signs Visit Vitals /74 (BP 1 Location: Left arm, BP Patient Position: Sitting) Pulse 89 Temp 97.4 °F (36.3 °C) Resp 18 Ht 5' 2\" (1.575 m) Wt 135 lb 11.2 oz (61.6 kg) SpO2 98% BMI 24.82 kg/m² Weight Metrics 10/18/2018 8/29/2018 8/27/2018 8/13/2018 8/10/2018 4/17/2018 4/3/2018 Weight 135 lb 11.2 oz 140 lb 8 oz - 138 lb 141 lb 6.4 oz 143 lb 1.6 oz 146 lb 13.2 oz  
BMI 24.82 kg/m2 - 25.7 kg/m2 25.24 kg/m2 25.69 kg/m2 23.81 kg/m2 24.43 kg/m2 General appearance - Well nourished. Well appearing. Well developed. No acute distress. Head - Normocephalic. Atraumatic. Eyes - pupils equal and reactive. Extraocular eye movements intact. Sclera anicteric. Mildly injected sclera. Ears - Hearing is grossly normal bilaterally. Nose - normal and patent. No polyps noted. No erythema. No discharge. Mouth - mucous membranes with adequate moisture. Posterior pharynx normal with cobblestone appearance. No erythema, white exudate or obstruction. Neck - supple. Midline trachea. No carotid bruits noted bilaterally. No thyromegaly noted. Chest - clear to auscultation bilaterally anteriorly and posteriorly. No wheezes. No rales or rhonchi. Breath sounds are symmetrical bilaterally. Unlabored respirations. Heart - normal rate. Regular rhythm. Normal S1, S2. No murmur noted. No rubs, clicks or gallops noted. Abdomen - soft and nondistended. No masses or organomegaly. No rebound, rigidity or guarding. Bowel sounds normal x 4 quadrants. No tenderness noted. Neurological - awake, alert and oriented to person, place, and time and event. Cranial nerves II through XII intact. Clear speech. Muscle strength is +5/5 x 4 extremities. Sensation is intact to light touch bilaterally. Steady gait. Heme/Lymph - peripheral pulses normal x 4 extremities. Edema noted at R knee, mildly tender. Non tender calf. Musculoskeletal - Intact x 4 extremities. Full ROM x 4 extremities. No pain with movement. No tenderness in the pelvis, pubic bone, bilateral hips, ankles. Well-healed linear incision noted at R knee. Back exam - normal range of motion. No pain on palpation of the spinous processes in the cervical, thoracic, lumbar, sacral regions. No CVA tenderness. Skin - no rashes, erythema, ecchymosis, lacerations, abrasions, suspicious moles noted Psychological -   normal behavior, dress and thought processes. Good insight. Good eye contact. Normal affect. Appropriate mood. Normal speech. DATA REVIEWED Lab Results Component Value Date/Time WBC 5.4 08/13/2018 12:47 PM  
 HGB 10.3 (L) 08/29/2018 04:05 AM  
 HCT 42.4 08/13/2018 12:47 PM  
 PLATELET 398 63/04/3699 12:47 PM  
 MCV 93.4 08/13/2018 12:47 PM  
 
Lab Results Component Value Date/Time  Sodium 136 08/28/2018 02:47 AM  
 Potassium 4.3 08/28/2018 02:47 AM  
 Chloride 105 08/28/2018 02:47 AM  
 CO2 25 08/28/2018 02:47 AM  
 Anion gap 6 08/28/2018 02:47 AM  
 Glucose 129 (H) 08/28/2018 02:47 AM  
 BUN 16 08/28/2018 02:47 AM  
 Creatinine 0.87 08/28/2018 02:47 AM  
 BUN/Creatinine ratio 18 08/28/2018 02:47 AM  
 GFR est AA >60 08/28/2018 02:47 AM  
 GFR est non-AA >60 08/28/2018 02:47 AM  
 Calcium 7.4 (L) 08/28/2018 02:47 AM  
 Bilirubin, total 0.6 04/17/2018 02:27 PM  
 AST (SGOT) 17 04/17/2018 02:27 PM  
 Alk. phosphatase 75 04/17/2018 02:27 PM  
 Protein, total 7.1 04/17/2018 02:27 PM  
 Albumin 4.8 04/17/2018 02:27 PM  
 Globulin 3.4 07/10/2014 02:13 PM  
 A-G Ratio 2.1 04/17/2018 02:27 PM  
 ALT (SGPT) 10 04/17/2018 02:27 PM  
 
Lab Results Component Value Date/Time Cholesterol, total 212 (H) 04/17/2018 02:27 PM  
 HDL Cholesterol 68 04/17/2018 02:27 PM  
 LDL, calculated 127 (H) 04/17/2018 02:27 PM  
 VLDL, calculated 17 04/17/2018 02:27 PM  
 Triglyceride 86 04/17/2018 02:27 PM  
 CHOL/HDL Ratio 3.9 09/10/2010 01:43 PM  
 
Lab Results Component Value Date/Time Vitamin D 25-Hydroxy 23.7 (L) 09/28/2011 12:17 PM  
 VITAMIN D, 25-HYDROXY 32.4 04/17/2018 02:27 PM  
   
Lab Results Component Value Date/Time Hemoglobin A1c 5.8 08/28/2018 02:47 AM  
 Hemoglobin A1c (POC) 5.5 04/17/2018 12:02 PM  
 
Lab Results Component Value Date/Time TSH 1.210 04/17/2018 02:27 PM  
 TSH 1.73 08/21/2014 09:00 AM  
 
 
Lab Results Component Value Date/Time Microalb/Creat ratio (ug/mg creat.) <6.4 04/17/2018 02:27 PM  
 
 
 
ASSESSMENT and PLAN 
 
  ICD-10-CM ICD-9-CM 1. Diabetes mellitus type 2, diet-controlled (HCC) E11.9 250.00 URINALYSIS W/ RFLX MICROSCOPIC  
   LIPID PANEL  
   METABOLIC PANEL, COMPREHENSIVE  
   TSH 3RD GENERATION  
   VITAMIN D, 25 HYDROXY  
   MICROALBUMIN, UR, RAND W/ MICROALB/CREAT RATIO  
   HEMOGLOBIN A1C WITH EAG 2. Nocturnal muscle cramp R25.2 729.82 Bilateral legs and feet, improving with eating mustard 2-3 times weekly due to mild dehydration vs other 3. Essential hypertension, benign I10 401.1 URINALYSIS W/ RFLX MICROSCOPIC  
   LIPID PANEL  
   METABOLIC PANEL, COMPREHENSIVE  
   TSH 3RD GENERATION  
   VITAMIN D, 25 HYDROXY  
   MICROALBUMIN, UR, RAND W/ MICROALB/CREAT RATIO  
4. Insomnia secondary to chronic pain G89.29 338.29   
 G47.01 327.01   
5. Restless legs syndrome (RLS) G25.81 333.94 rOPINIRole (REQUIP) 1 mg tablet 6. Dyslipidemia E78.5 272.4 LIPID PANEL  
   METABOLIC PANEL, COMPREHENSIVE 7. Chronic constipation K59.09 564.00 TSH 3RD GENERATION  
 improved with Miralax 2-3x weekly 8. Vitamin D deficiency E55.9 268.9 VITAMIN D, 25 HYDROXY 9. ACE-inhibitor cough R05 786.2 T46.4X5A E942.6 10. Statin intolerance Z78.9 995.27   
11. Aspirin intolerance Z78.9 995.27 E935.3 12. History of total right knee replacement Z96.651 V43.65   
 08/27/18  
13. History of anemia Z86.2 V12.3 rOPINIRole (REQUIP) 1 mg tablet  
 stable Discussed the patient's BMI with her. The BMI follow up plan is as follows: Pt not eligible for BMI calculation due to normal BMI. Decrease carbohydrates (white foods, sweet foods, sweet drinks and alcohol), increase green leafy vegetables and protein (lean meats and beans) with each meal.  Avoid fried foods. Eat 3-5 small meals daily. Do not skip meals. Increase water intake. Increase physical activity to 30 minutes daily for health benefit or 60 minutes daily to prevent weight regain, as tolerated. Get 7-8 hours uninterrupted sleep nightly. Chart reviewed and updated. Continue current medications and care. Increase Requip 1/2 tab qam, 1/2 tab at lunch, and 1 tab qhs. Prescriptions written and sent to pharmacy; medication side effects discussed. Requip 1 mg. Postpone medicare wellness visit due to time spent on pt concerns. Refer lipid check with rest of lab work in 6 months. Recent office visit notes from Dr. Deepthi Merino reviewed. Counseled patient on health concerns:  RLS, sleep hygiene, cholesterol, DM, constipation, cramping, vit d deficiency, and hx of anemia. Immunizations noted; Advise flu vaccine between the months September to December. Offered empathy, support, legitimation, prayers, partnership to patient. Praised patient for progress. Follow-up Disposition: 
Return in about 6 months (around 4/18/2019) for blood pressure, diabetes, referral follow up. Patient was offered a choice/choices in the treatment plan today. Patient expresses understanding of the plan and agrees with recommendations. More than 40 mins spent face to face with patient and more than 50% of this time spent in counseling and coordinating care. Written by allen Sexton, as dictated by Dr. Zachary Samuel DO. Documentation True and Accepted by Zeinab Stuart. Madelin Marinelli. Patient Instructions Increase Requip 1 mg to a 1/2 tablet with breakfast, 1/2 tablet with lunch and a full tablet at bedtime.

## 2018-10-18 NOTE — PATIENT INSTRUCTIONS
Increase Requip 1 mg to a 1/2 tablet with breakfast, 1/2 tablet with lunch and a full tablet at bedtime.

## 2018-12-19 ENCOUNTER — OFFICE VISIT (OUTPATIENT)
Dept: OBGYN CLINIC | Age: 78
End: 2018-12-19

## 2018-12-19 VITALS
HEIGHT: 62 IN | SYSTOLIC BLOOD PRESSURE: 148 MMHG | DIASTOLIC BLOOD PRESSURE: 92 MMHG | WEIGHT: 137.5 LBS | BODY MASS INDEX: 25.3 KG/M2

## 2018-12-19 DIAGNOSIS — N81.89 PELVIC RELAXATION: Primary | ICD-10-CM

## 2018-12-19 RX ORDER — ZOLPIDEM TARTRATE 10 MG/1
TABLET ORAL
Refills: 0 | COMMUNITY
Start: 2018-11-14 | End: 2019-04-16 | Stop reason: ALTCHOICE

## 2018-12-19 RX ORDER — ASPIRIN 81 MG/1
TABLET ORAL
COMMUNITY
End: 2020-09-02

## 2018-12-19 NOTE — PROGRESS NOTES
Pooja De La Rosa is a 66 y.o. female who complains of protruding rectum and problems associated with rectocele and cystocele. Having trouble with bowel movements and urination; denies pain. Had vaginal and rectal tacking in 2004. Also reports decreased libido. Used to take premarin but became too expensive with insurance.  with prostate concerns. Reviewed options to improve intimacy      She developed this problem approximately 2 years ago. Has lost tremendous weight through appropriate diet over past several years  Monitors BPs at home; excellent  Her relevant past medical history:   Past Medical History:   Diagnosis Date    AC (acromioclavicular) joint bone spurs 01/2014    in back. Dr. Wilmer Soni. Txd with shots x 3    Actinic keratosis 2015    Dr. Natasha Welch. Dr. Lupillo Golden.  Allergic rhinitis, cause unspecified     Anxiety state, unspecified     Arthritis     R KNEE BONE-ON-BONE; R HAND-----OSTEO    Asthma     BRONCHITIS IN SPRING & FALL MOST YEARS    BCC (basal cell carcinoma), face 1980s (nose), 01/28/15 (forehead)    Dr. Miguel A Norman. Dr. Sahra Hinton.  Cataract 2007    Dr. Margarita Mittal    Chronic insomnia     Chronic low back pain 01/2014    Dr. Kamaljit Mcconnell. DJD and spurs, Narrowing of L 3,4,5. Dr. Wilmer Soni.  Chronic obstructive pulmonary disease (HCC)     CHRONIC (TWICE-YEARLY) BRONCHITIS    Chronic pain     Colon polyps 11/2005, 01/29/09    benign. Dr. Benny Bernheim    Constipation     Degenerative lumbar spinal stenosis 01/2014    Dr. Bing Durand Narrowing of L 3,4,5    Diabetes mellitus type 2, diet-controlled ([facility] [address] 10/13/2017    LOST WEIGHT AND IS NOT DIABETIC    Dyslipidemia     Dysphagia 09/2014    due to Esophagitis. Dr. Merlin Spearman.  Esophagitis 09/29/2014    ESOPHAGITIS    Essential hypertension, benign     Foot fracture, left 2009    stress.  Foot pain, bilateral 11/13/2013    Dr. Kalyan Arias.     Hearing loss      Pamela Carrillo.  Knee pain, bilateral 10/28/13    Dr. Ramiro Iraheta. Right > Left. Severe OA. Txd with PT.    Lumbar stenosis with neurogenic claudication     Menopause 1976    Migraine     NONE SINCE AGE 37    Mixed stress and urge urinary incontinence     NO LEAKAGE; GETS UP SEVERAL TIMES A NIGHT, PT STATES ON 10/3/16    OA (osteoarthritis) of knee     Dr. Ramiro Iraheta    Osteopenia 08/24/2015    Restless legs syndrome (RLS) 09/2015    Dr. Jihan Aragon.  Shoulder joint dislocation 06/2011    Right. due to fall. Dr. Kathy Ruggiero Sleep apnea     undiagnosed    Syncope 06/25/11    due to fall down 5 steps. Right occipital head trauma.  Tremor of both hands 09/2015    Dr. Blas Brain GABAPENTIN        Past Surgical History:   Procedure Laterality Date    HX CARPAL TUNNEL RELEASE Left 04/03/2018    Dr. Glenys Benjamin     HX CATARACT REMOVAL Bilateral , R 04/10/17    Dr. Mason Rodriguez COLONOSCOPY  11/2005, 01/29/09, 09/16/15    with polypectomy. Dr. Joselin Arnold q5 years    HX ENDOSCOPY  09/2014    with Esophageal dilation. due to dysphagia. Dr. Samantha Luna.  HX KNEE REPLACEMENT Right 08/27/2018    Dr. Jane Curry  10/18/2016    HX MALIGNANT SKIN LESION EXCISION  01/28/15    BCC. L Forehead. MOHs SURGERY. Dr. Zavaleta Hem.  HX RAIMUNDO AND BSO  1976    due to fibroids    HX TONSILLECTOMY  1948    [place] COMBINED ANT/POST COLPORRHAPHY  02/28/05    with enterocele RE.   Dr. Thor Camp and Dr. Marleny Crenshaw Not on file   Tobacco Use    Smoking status: Never Smoker    Smokeless tobacco: Never Used   Substance and Sexual Activity    Alcohol use: No    Drug use: No    Sexual activity: Yes     Partners: Male     Birth control/protection: Surgical     Family History   Problem Relation Age of Onset    Hypertension Mother     Dementia Mother alzheimers    Hypertension Father     High Cholesterol Father     Kidney Disease Father         1 kidney    Emphysema Father         O2 requiring    Hypertension Sister     Diabetes Maternal Grandmother     Dementia Maternal Grandmother     Thyroid Disease Maternal Grandmother     Thyroid Disease Daughter         goiter    Heart Attack Maternal Uncle 46        massive    Heart Attack Maternal Uncle 39        massive    Heart Attack Maternal Aunt 51        massive    Thyroid Disease Daughter         thyroid cyst    Other Brother          AT 1DAYS OLD    Anesth Problems Neg Hx        Allergies   Allergen Reactions    Other Food Itching     If eats large quantities over several days causes itching: tomatoes, peaches, strawberry, fig    Bee Sting [Sting, Bee] Swelling    Pcn [Penicillins] Hives     IN CHILDHOOD    Percocet [Oxycodone-Acetaminophen] Other (comments)      Other (comments)\"hellish nightmares\"      Shellfish Containing Products Itching    Zoster Vaccine Live Swelling     Severe Right arm swelling with redness after administered by pharmacist in elbow    Atarax [Hydroxyzine Hcl] Other (comments)     jittery    Buspar [Buspirone] Nausea Only    Crestor [Rosuvastatin] Myalgia    Hydrochlorothiazide Myalgia     Other reaction(s): sorethroat    Hydroxyzine Other (comments)     jittery    Mobic [Meloxicam] Other (comments)     Easy bruising    Norco [Hydrocodone-Acetaminophen] Nausea and Vomiting              Prior to Admission medications    Medication Sig Start Date End Date Taking? Authorizing Provider   benazepril (LOTENSIN) 20 mg tablet take 1 tablet by mouth once daily 18  Yes Carmenza Crowe, DO   fluticasone (FLONASE) 50 mcg/actuation nasal spray 2 Sprays by Both Nostrils route daily. Patient taking differently: 2 Sprays by Both Nostrils route daily.  Uses Seasonal- Spring & Fall 3/7/17  Yes Carmenza Crowe DO   ascorbic acid, vitamin C, (VITAMIN C) 100 mg tab Take 100 mg by mouth. Provider, Historical   Multivit-Iron-Min-Folic Acid (CENTRUM) 8,137-96-2.2 unit-mg-mg chewable tablet Take 1 Tab by mouth. Provider, Historical   rOPINIRole (REQUIP) 1 mg tablet Take 1/2 tablet in morning and at lunch and a full tablet at bedtime 10/18/18   Lili GREENE DO   omeprazole (PRILOSEC) 40 mg capsule Take 40 mg by mouth daily. 3/31/15   Provider, Historical   docusate sodium (COLACE) 100 mg capsule Take 100 mg by mouth two (2) times a day. Provider, Historical   diphenhydrAMINE (BENADRYL) 25 mg capsule Take 25 mg by mouth nightly as needed (Pt states takes nightly. ). Indications: Insomnia    Provider, Historical   montelukast (SINGULAIR) 10 mg tablet Take 1 Tab by mouth daily. Indications: ALLERGIC RHINITIS, MAINTENANCE THERAPY FOR ASTHMA  Patient taking differently: Take 10 mg by mouth daily. Uses Seasonal- Spring & Fall. Indications: Allergic Rhinitis, MAINTENANCE THERAPY FOR ASTHMA 2/27/17   Lili GREENE DO   melatonin 1 mg tablet Take 3 mg by mouth nightly. Provider, Historical   albuterol (PROVENTIL HFA, VENTOLIN HFA) 90 mcg/actuation inhaler Take 2 Puffs by inhalation every four (4) hours as needed for Wheezing or Shortness of Breath. Indications: BRONCHOSPASM PREVENTION  Patient taking differently: Take 2 Puffs by inhalation every four (4) hours as needed for Wheezing or Shortness of Breath (Uses seasonal- Spring & Fall).  Indications: BRONCHOSPASM PREVENTION 5/2/14   Lili GREENE DO        Review of Systems - History obtained from the patient  Constitutional: negative for weight loss, fever, night sweats  HEENT: negative for hearing loss, earache, congestion, snoring, sorethroat  CV: negative for chest pain, palpitations, edema  Resp: negative for cough, shortness of breath, wheezing  Breast: negative for breast lumps, nipple discharge, galactorrhea  GI: negative for change in bowel habits, abdominal pain, black or bloody stools  : negative for frequency, dysuria, hematuria  MSK: negative for back pain, joint pain, muscle pain  Skin: negative for itching, rash, hives  Neuro: negative for dizziness, headache, confusion, weakness  Psych: negative for anxiety, depression, change in mood  Heme/lymph: negative for bleeding, bruising, pallor      Objective:  Visit Vitals  Ht 5' 2\" (1.575 m)   Wt 137 lb 8 oz (62.4 kg)   BMI 25.15 kg/m²          PHYSICAL EXAMINATION    Constitutional  · Appearance: well-nourished, well developed, alert, in no acute distress    HENT  · Head and Face: appears normal    Neck  · Inspection/Palpation: normal appearance, no masses or tenderness  · Lymph Nodes: no lymphadenopathy present  · Thyroid: gland size normal, nontender, no nodules or masses present on palpation  ·   Breasts  · Inspection of Breasts: breasts symmetrical, no skin changes, no discharge present, nipple appearance normal, no skin retraction present  · Palpation of Breasts and Axillae: no masses present on palpation, no breast tenderness  · Axillary Lymph Nodes: no lymphadenopathy present    Gastrointestinal  · Abdominal Examination: abdomen non-tender to palpation, normal bowel sounds, no masses present  · Liver and spleen: no hepatomegaly present, spleen not palpable  · Hernias: no hernias identified    Genitourinary  · External Genitalia: normal appearance for age, no discharge present, no tenderness present, no inflammatory lesions present, no masses present,  atrophy present  · Vagina: normal vaginal vault without central or paravaginal defects, no discharge present, no inflammatory lesions present, no masses present  · Bladder: non-tender to palpation  · Urethra: appears normal  · Cervix: normal   · Uterus: normal size, shape and consistency  · Adnexa: no adnexal tenderness present, no adnexal masses present  · Perineum: perineum within normal limits, no evidence of trauma, no rashes or skin lesions present  · Anus: anus within normal limits, no hemorrhoids present  · Inguinal Lymph Nodes: no lymphadenopathy present    Skin  · General Inspection: no rash, no lesions identified    Neurologic/Psychiatric  · Mental Status:  · Orientation: grossly oriented to person, place and time  · Mood and Affect: mood normal, affect appropriate    Assessment:    pelvic relaxation  decreased libido    Plan:   Reassured patient that pelvic anatomy well supported  Has seen GI regarding rectal concerns with recent negative colonoscopy  Conservative management fully reviewed and all questions answered.   Reviewed hormonal options and wants to try alternatives first

## 2019-04-16 ENCOUNTER — OFFICE VISIT (OUTPATIENT)
Dept: FAMILY MEDICINE CLINIC | Age: 79
End: 2019-04-16

## 2019-04-16 VITALS
BODY MASS INDEX: 25.58 KG/M2 | DIASTOLIC BLOOD PRESSURE: 86 MMHG | SYSTOLIC BLOOD PRESSURE: 128 MMHG | HEIGHT: 62 IN | HEART RATE: 69 BPM | OXYGEN SATURATION: 98 % | TEMPERATURE: 97.7 F | WEIGHT: 139 LBS | RESPIRATION RATE: 18 BRPM

## 2019-04-16 DIAGNOSIS — R25.2 NOCTURNAL MUSCLE CRAMP: ICD-10-CM

## 2019-04-16 DIAGNOSIS — Z96.651 HISTORY OF TOTAL RIGHT KNEE REPLACEMENT: ICD-10-CM

## 2019-04-16 DIAGNOSIS — E78.5 DYSLIPIDEMIA: ICD-10-CM

## 2019-04-16 DIAGNOSIS — I10 ESSENTIAL HYPERTENSION, BENIGN: ICD-10-CM

## 2019-04-16 DIAGNOSIS — R05.8 ACE-INHIBITOR COUGH: ICD-10-CM

## 2019-04-16 DIAGNOSIS — E55.9 VITAMIN D DEFICIENCY: ICD-10-CM

## 2019-04-16 DIAGNOSIS — Z86.2 HISTORY OF ANEMIA: ICD-10-CM

## 2019-04-16 DIAGNOSIS — Z78.9 STATIN INTOLERANCE: ICD-10-CM

## 2019-04-16 DIAGNOSIS — Z23 ENCOUNTER FOR IMMUNIZATION: ICD-10-CM

## 2019-04-16 DIAGNOSIS — E11.9 DIABETES MELLITUS TYPE 2, DIET-CONTROLLED (HCC): ICD-10-CM

## 2019-04-16 DIAGNOSIS — Z13.31 SCREENING FOR DEPRESSION: ICD-10-CM

## 2019-04-16 DIAGNOSIS — Z00.00 MEDICARE ANNUAL WELLNESS VISIT, SUBSEQUENT: Primary | ICD-10-CM

## 2019-04-16 DIAGNOSIS — Z13.39 SCREENING FOR ALCOHOLISM: ICD-10-CM

## 2019-04-16 DIAGNOSIS — T46.4X5A ACE-INHIBITOR COUGH: ICD-10-CM

## 2019-04-16 DIAGNOSIS — H91.8X3 OTHER SPECIFIED HEARING LOSS OF BOTH EARS: ICD-10-CM

## 2019-04-16 DIAGNOSIS — G25.81 RESTLESS LEGS SYNDROME (RLS): ICD-10-CM

## 2019-04-16 DIAGNOSIS — M85.88 OSTEOPENIA OF OTHER SITE: ICD-10-CM

## 2019-04-16 DIAGNOSIS — Z78.9 ASPIRIN INTOLERANCE: ICD-10-CM

## 2019-04-16 DIAGNOSIS — Z86.010 PERSONAL HISTORY OF COLONIC POLYPS: ICD-10-CM

## 2019-04-16 RX ORDER — LANOLIN ALCOHOL/MO/W.PET/CERES
1 CREAM (GRAM) TOPICAL DAILY
Status: ON HOLD | COMMUNITY
End: 2020-09-02

## 2019-04-16 RX ORDER — GARLIC 1000 MG
CAPSULE ORAL
COMMUNITY
End: 2020-09-02

## 2019-04-16 RX ORDER — BENAZEPRIL HYDROCHLORIDE 20 MG/1
TABLET ORAL
Qty: 90 TAB | Refills: 3 | Status: SHIPPED | OUTPATIENT
Start: 2019-04-16 | End: 2020-04-20 | Stop reason: SDUPTHER

## 2019-04-16 RX ORDER — ASCORBIC ACID 500 MG
TABLET ORAL
COMMUNITY
End: 2022-02-18 | Stop reason: SDUPTHER

## 2019-04-16 RX ORDER — LANOLIN ALCOHOL/MO/W.PET/CERES
500 CREAM (GRAM) TOPICAL DAILY
COMMUNITY
End: 2021-08-18

## 2019-04-16 RX ORDER — GUAIFENESIN AND PHENYLEPHRINE HCL 400; 10 MG/1; MG/1
TABLET ORAL
COMMUNITY
End: 2022-02-23

## 2019-04-16 RX ORDER — ROPINIROLE 1 MG/1
TABLET, FILM COATED ORAL
Qty: 60 TAB | Refills: 5 | Status: SHIPPED | OUTPATIENT
Start: 2019-04-16 | End: 2019-05-07 | Stop reason: SDUPTHER

## 2019-04-16 RX ORDER — DIPHENHYDRAMINE HCL 25 MG
25 CAPSULE ORAL
COMMUNITY
End: 2021-08-18

## 2019-04-16 NOTE — PATIENT INSTRUCTIONS
Medicare Wellness Visit, Female The best way to live healthy is to have a lifestyle where you eat a well-balanced diet, exercise regularly, limit alcohol use, and quit all forms of tobacco/nicotine, if applicable. Regular preventive services are another way to keep healthy. Preventive services (vaccines, screening tests, monitoring & exams) can help personalize your care plan, which helps you manage your own care. Screening tests can find health problems at the earliest stages, when they are easiest to treat. [facility] follows the current, evidence-based guidelines published by the [facility] ([facility]) when recommending preventive services for our patients. Because we follow these guidelines, sometimes recommendations change over time as research supports it. (For example, mammograms used to be recommended annually. Even though Medicare will still pay for an annual mammogram, the newer guidelines recommend a mammogram every two years for women of average risk.) Of course, you and your doctor may decide to screen more often for some diseases, based on your risk and your health status. Preventive services for you include: - Medicare offers their members a free annual wellness visit, which is time for you and your primary care provider to discuss and plan for your preventive service needs. Take advantage of this benefit every year! 
-All adults over the age of 72 should receive the recommended pneumonia vaccines. Current USPSTF guidelines recommend a series of two vaccines for the best pneumonia protection.  
-All adults should have a flu vaccine yearly and a tetanus vaccine every 10 years. All adults age 61 and older should receive a shingles vaccine once in their lifetime.   
-A bone mass density test is recommended when a woman turns 65 to screen for osteoporosis. This test is only recommended one time, as a screening. Some providers will use this same test as a disease monitoring tool if you already have osteoporosis. -All adults age 38-68 who are overweight should have a diabetes screening test once every three years.  
-Other screening tests and preventive services for persons with diabetes include: an eye exam to screen for diabetic retinopathy, a kidney function test, a foot exam, and stricter control over your cholesterol.  
-Cardiovascular screening for adults with routine risk involves an electrocardiogram (ECG) at intervals determined by your doctor.  
-Colorectal cancer screenings should be done for adults age 54-65 with no increased risk factors for colorectal cancer. There are a number of acceptable methods of screening for this type of cancer. Each test has its own benefits and drawbacks. Discuss with your doctor what is most appropriate for you during your annual wellness visit. The different tests include: colonoscopy (considered the best screening method), a fecal occult blood test, a fecal DNA test, and sigmoidoscopy. -Breast cancer screenings are recommended every other year for women of normal risk, age 54-69. 
-Cervical cancer screenings for women over age 72 are only recommended with certain risk factors.  
-All adults born between [facility] should be screened once for Hepatitis C. Here is a list of your current Health Maintenance items (your personalized list of preventive services) with a due date: 
Health Maintenance Due Topic Date Due  
 Annual Well Visit  10/14/2018  Hemoglobin A1C    02/28/2019 [address] [place] [address] Diabetic Foot Care  04/17/2019  Albumin Urine Test  04/17/2019  Cholesterol Test   04/17/2019

## 2019-04-16 NOTE — PROGRESS NOTES
Bridget Naqvi  Identified pt with two pt identifiers(name and ). Chief Complaint Patient presents with  Hypertension Rm13/fasting  Diabetes 1. Have you been to the ER, urgent care clinic since your last visit? Hospitalized since your last visit? no 
 
2. Have you seen or consulted any other health care providers outside of the [address] [place] [address] since your last visit? Include any pap smears or colon screening. no 
 
 
Would you like to sign up for MyChart today, if you have not already done so? no 
If not, would you like information on MyChart, and how to sign up at a later time? No 
 
Living Will - yes Medication reconciliation up to date and corrected with patient at this time. Today's provider has been notified of reason for visit, vitals and flowsheets obtained on patients. Reviewed record in preparation for visit, huddled with provider and have obtained necessary documentation. Health Maintenance Due Topic  
 EYE EXAM RETINAL OR DILATED  Shingrix Vaccine Age 50> (1 of 2)  Pneumococcal 65+ years (2 of 2 - PPSV23)  MEDICARE YEARLY EXAM   
 HEMOGLOBIN A1C Q6M   
 FOOT EXAM Q1   
 MICROALBUMIN Q1   
 LIPID PANEL Q1 Wt Readings from Last 3 Encounters:  
18 137 lb 8 oz (62.4 kg) 10/18/18 135 lb 11.2 oz (61.6 kg) 18 140 lb 8 oz (63.7 kg) Temp Readings from Last 3 Encounters:  
10/18/18 97.4 °F (36.3 °C)  
18 98 °F (36.7 °C)  
18 97.7 °F (36.5 °C) BP Readings from Last 3 Encounters:  
18 (!) 148/92  
10/18/18 110/74  
18 120/70 Pulse Readings from Last 3 Encounters:  
10/18/18 89  
18 70  
18 78 There were no vitals filed for this visit. Learning Assessment: 
:  
 
Learning Assessment 11/3/2016 2016 2015 2014 2014 PRIMARY LEARNER Patient Patient Patient Patient Patient HIGHEST LEVEL OF EDUCATION - PRIMARY LEARNER  - - - - [address] [place] [address]  
 BARRIERS PRIMARY LEARNER - - - NONE NONE  
CO-LEARNER CAREGIVER - - - No - PRIMARY LANGUAGE ENGLISH ENGLISH ENGLISH ENGLISH ENGLISH  
LEARNER PREFERENCE PRIMARY DEMONSTRATION DEMONSTRATION DEMONSTRATION DEMONSTRATION READING  
  - - - LISTENING PICTURES  
ANSWERED BY patient patient patient patient patient RELATIONSHIP SELF SELF SELF SELF SELF Depression Screening: 
:  
 
3 most recent PHQ Screens 4/16/2019 Little interest or pleasure in doing things Not at all Feeling down, depressed, irritable, or hopeless Not at all Total Score PHQ 2 0 Fall Risk Assessment: 
:  
 
Fall Risk Assessment, last 12 mths 4/16/2019 Able to walk? Yes Fall in past 12 months? No  
Fall with injury? -  
Number of falls in past 12 months - Fall Risk Score -  
 
 
Abuse Screening: 
:  
 
Abuse Screening Questionnaire 10/18/2018 8/10/2018 10/13/2017 Do you ever feel afraid of your partner? N N N Are you in a relationship with someone who physically or mentally threatens you? N N N Is it safe for you to go home? Trupti Fallen  
 
 
ADL Screening: 
:  
 

## 2019-04-16 NOTE — PROGRESS NOTES
HISTORY OF PRESENT ILLNESS Cherelle Stanley is a 66 y.o. female presents with Hypertension (Rm13/fasting); Diabetes; Annual Wellness Visit; Labs; and Referral Follow Up Agree with nurse note. Pt with type 2 DM, hypertension, dyslipidemia, vit d deficiency, ACE inhibitor cough, statin intolerance, aspirin intolerance presents to the office with a BP of 128/86. For BP, she uses Lotensin 20 mg daily, tolerating well and she requests a refill today. She shares she would to have access to [employer] but has not been able to access her account. She has tried contacting the help number but was still not able to. She had R TKR on 2018 with Dr. Mayuri Weber and has been doing well. She uses Tumeric 500 mg for muscle cramping with relief. Pt with RLS uses Requip 1/2 tab TID with relief. Pt with heartburn. She is uses Omeprazole 3 times a week, rx'd by Dr. Anival Waddell. If she does not use it 3 times a week, her sxs will recur. He will no longer refill her medication because she has not seen him in a long time. She wonders if our office can take over the rx. Pt saw GYN, Dr. Vazquez Given on 07/10/2937 for protruding rectum and problems associated with rectocele and cystocele. She is having trouble with bm and urination. S/p vaginal and rectal tacking in . Has  libido. Premarin became too expensive. Reassured pt that pelvic anatomy is well support and has had neg recent colonoscopy. Declined hormonal options and will try alternatives. She would like to see ENT, Dr. Rosey Velarde to reevaluate hearing loss. She feels it is worsening. Health Maintenance Annual [address] completed today. Pt's most recent colonoscopy with polypectomy was on 2015 with Dr. Cody Moura. F/U 5 years.   
 
Pt reports she had a recent eye exam with Dr. Vidal Rodgers. 
  
Pt's most recent mammogram was on 2018 with [facility], normal findings, F/U 1 year. DEXA on 11/2/2017 showed osteopenia. Written by allen Monroe, as dictated by Dr. Mere Madera DO. 
ROS Review of Systems negative except as noted above in HPI. ALLERGIES:   
Allergies Allergen Reactions  Other Food Itching If eats large quantities over several days causes itching: tomatoes, peaches, strawberry, fig  
 Bee Sting [Sting, Bee] Swelling  Pcn [Penicillins] Hives IN CHILDHOOD  Percocet [Oxycodone-Acetaminophen] Other (comments) Other (comments)\"hellish nightmares\"  Shellfish Containing Products Itching  Zoster Vaccine Live Swelling Severe Right arm swelling with redness after administered by pharmacist in elbow  Atarax [Hydroxyzine Hcl] Other (comments)  
  jittery  Buspar [Buspirone] Nausea Only  Crestor [Rosuvastatin] Myalgia  Hydrochlorothiazide Myalgia Other reaction(s): sorethroat  Hydroxyzine Other (comments)  
  jittery  Mobic [Meloxicam] Other (comments) Easy bruising  Norco [Hydrocodone-Acetaminophen] Nausea and Vomiting CURRENT MEDICATIONS:   
Outpatient Medications Marked as Taking for the 4/16/19 encounter (Office Visit) with Gissel Rivera DO Medication Sig Dispense Refill  glucosamine-chondroitin (ARTHX) 500-400 mg cap Take 1 Cap by mouth daily.  niacin/vit B2/vits A,C,E/min (VIT A-VIT X5-H8-L-E-MINERALS PO) Take  by mouth.  cyanocobalamin (VITAMIN B-12) 500 mcg tablet Take 500 mcg by mouth daily.  OTHER Take 500 mg by mouth daily. Indications: Tumeric Curcumin  ascorbic acid, vitamin C, (VITAMIN C) 500 mg tablet Take  by mouth.  diphenhydrAMINE (BENADRYL) 25 mg capsule Take 25 mg by mouth every six (6) hours as needed.  turmeric root extract 500 mg cap Take  by mouth.  APPLE CIDER VINEGAR PO Take  by mouth.  garlic 5,532 mg cap Take  by mouth.  omega-3s/dha/epa/fish oil/D3 (VITAMIN-D + OMEGA-3 PO) Take  by mouth.  benazepril (LOTENSIN) 20 mg tablet take 1 tablet by mouth once daily  Indications: high blood pressure 90 Tab 3  varicella-zoster recombinant, PF, (SHINGRIX, PF,) 50 mcg/0.5 mL susr injection 0.5mL by IntraMUSCular route once now and then repeat in 2-6 months 0.5 mL 1  rOPINIRole (REQUIP) 1 mg tablet Take 1/2 tablet in morning and at lunch and a full tablet at bedtime  Indications: Extreme Discomfort in Calves when Sitting or Lying Down 60 Tab 5  
 aspirin delayed-release 81 mg tablet Take  by mouth.  omeprazole (PRILOSEC) 40 mg capsule Take 40 mg by mouth daily. 0  
 fluticasone (FLONASE) 50 mcg/actuation nasal spray 2 Sprays by Both Nostrils route daily. (Patient taking differently: 2 Sprays by Both Nostrils route daily. Uses Seasonal- Spring & Fall) 1 Bottle 5  
 albuterol (PROVENTIL HFA, VENTOLIN HFA) 90 mcg/actuation inhaler Take 2 Puffs by inhalation every four (4) hours as needed for Wheezing or Shortness of Breath. Indications: BRONCHOSPASM PREVENTION (Patient taking differently: Take 2 Puffs by inhalation every four (4) hours as needed for Wheezing or Shortness of Breath (Uses seasonal- Spring & Fall). Indications: BRONCHOSPASM PREVENTION) 1 Inhaler 5 PAST MEDICAL HISTORY:   
Past Medical History:  
Diagnosis Date  AC (acromioclavicular) joint bone spurs 01/2014 in back. Dr. Gertrude Wilson. Txd with shots x 3  
 Actinic keratosis 2015 Dr. Joe Mark. Dr. João Rojas.  Allergic rhinitis, cause unspecified  Anxiety state, unspecified  Arthritis R KNEE BONE-ON-BONE; R HAND-----OSTEO  Asthma BRONCHITIS IN SPRING & FALL MOST YEARS  
 BCC (basal cell carcinoma), face 1980s (nose), 01/28/15 (forehead) Dr. Luzmaria Yañez. Dr. Irish Lyons.  Cataract 2007 Dr. Delon Ashton  Chronic insomnia  Chronic low back pain 01/2014 Dr. Martinez Sites. DJD and spurs, Narrowing of L 3,4,5. Dr. Chantal Grajeda.  Chronic obstructive pulmonary disease ([facility] [address] CHRONIC (TWICE-YEARLY) BRONCHITIS  Chronic pain  Colon polyps 11/2005, 01/29/09  
 benign. Dr. Verena Pickett  Constipation  Degenerative lumbar spinal stenosis 01/2014 Dr. Chavez Kil Narrowing of L 3,4,5  
 Diabetes mellitus type 2, diet-controlled ([facility] [address] 10/13/2017 LOST WEIGHT AND IS NOT DIABETIC  Dyslipidemia  Dysphagia 09/2014  
 due to Esophagitis. Dr. Mckay Henry.  Esophagitis 09/29/2014 ESOPHAGITIS  Essential hypertension, benign  Foot fracture, left 2009  
 stress.  Foot pain, bilateral 11/13/2013 Dr. Socrates Betancourt.  Hearing loss Dr. Jerson Rodgers.  Knee pain, bilateral 10/28/13 Dr. Lukas Moreno. Right > Left. Severe OA. Txd with PT.  
 Lumbar stenosis with neurogenic claudication [address]  Migraine NONE SINCE AGE 37  
 Mixed stress and urge urinary incontinence NO LEAKAGE; GETS UP SEVERAL TIMES A NIGHT, PT STATES ON 10/3/16  OA (osteoarthritis) of knee Dr. Lukas Moreno  Osteopenia 08/24/2015  Restless legs syndrome (RLS) 09/2015 Dr. Vinicio Vasquez.  Shoulder joint dislocation 06/2011 Right. due to fall. Dr. Chioma Brown  Sleep apnea   
 undiagnosed  Syncope 06/25/11  
 due to fall down 5 steps. Right occipital head trauma.  Tremor of both hands 09/2015 Dr. Getachew WILKERSON  
 
 
PAST SURGICAL HISTORY:   
Past Surgical History:  
Procedure Laterality Date  HX CARPAL TUNNEL RELEASE Left 04/03/2018 Dr. Ivet Turner  HX CATARACT REMOVAL Bilateral , R 04/10/17 Dr. Heath Fried  HX COLONOSCOPY  11/2005, 01/29/09, 09/16/15  
 with polypectomy. Dr. Verena Pickett q5 years  HX ENDOSCOPY  09/2014  
 with Esophageal dilation. due to dysphagia. Dr. Mckay Henry.  HX KNEE REPLACEMENT Right 2018 Dr. Robi Pang  HX LUMBAR LAMINECTOMY  10/18/2016  HX MALIGNANT SKIN LESION EXCISION  01/28/15 BCC. L Forehead. MOHs SURGERY. Dr. Xiang Irwin.  HX RAIMUNDO AND BSO  1976  
 due to fibroids [address].  AR COMBINED ANT/POST COLPORRHAPHY  05  
 with enterocele RE. Dr. Yarely Hobbs and Dr. Ed Torrez FAMILY HISTORY:   
Family History Problem Relation Age of Onset  Hypertension Mother  Dementia Mother   
     alzheimers  Hypertension Father  High Cholesterol Father  Kidney Disease Father 1 kidney  Emphysema Father O2 requiring  Hypertension Sister  Diabetes Maternal Grandmother  Dementia Maternal Grandmother  Thyroid Disease Maternal Grandmother  Thyroid Disease Daughter   
     goiter  Heart Attack Maternal Uncle 51  
     massive  Heart Attack Maternal Uncle 45  
     massive  Heart Attack Maternal Aunt 51  
     massive  Thyroid Disease Daughter   
     thyroid cyst  
 Other Brother  AT 1DAYS OLD  Anesth Problems Neg Hx SOCIAL HISTORY:   
Social History Socioeconomic History  Marital status:  Spouse name: Not on file  Number of children: Not on file  Years of education: Not on file  Highest education level: Not on file Tobacco Use  Smoking status: Never Smoker  Smokeless tobacco: Never Used Substance and Sexual Activity  Alcohol use: No  
 Drug use: No  
 Sexual activity: Not Currently Partners: Male Birth control/protection: Surgical  
 
 
IMMUNIZATIONS:   
Immunization History Administered Date(s) Administered  (RETIRED) Pneumococcal Vaccine (Unspecified Type) 1997, 10/22/2004  Influenza High Dose Vaccine PF 10/29/2013, 2014, 2015, 10/07/2016, 10/13/2017, 10/25/2018  Pneumococcal Conjugate (PCV-13) 10/13/2017  Pneumococcal Polysaccharide (PPSV-23) 2019  TD Vaccine 09/07/2004  Zoster 01/21/2009 PHYSICAL EXAMINATION Vital Signs Visit Vitals /86 (BP 1 Location: Left arm, BP Patient Position: Sitting) Pulse 69 Temp 97.7 °F (36.5 °C) (Oral) Resp 18 Ht 5' 2\" (1.575 m) Wt 139 lb (63 kg) SpO2 98% BMI 25.42 kg/m² Weight Metrics 4/16/2019 12/19/2018 10/18/2018 8/29/2018 8/27/2018 8/13/2018 8/10/2018 Weight 139 lb 137 lb 8 oz 135 lb 11.2 oz 140 lb 8 oz - 138 lb 141 lb 6.4 oz BMI 25.42 kg/m2 25.15 kg/m2 24.82 kg/m2 - 25.7 kg/m2 25.24 kg/m2 25.69 kg/m2 General appearance - Well nourished. Well appearing. Well developed. No acute distress. Sits comfortably with legs crossed. Head - Normocephalic. Atraumatic. Eyes - pupils equal and reactive. Extraocular eye movements intact. Sclera anicteric. Mildly injected sclera. Ears - Hearing is grossly normal bilaterally. Nose - normal and patent. No polyps noted. No erythema. No discharge. Mouth - mucous membranes with adequate moisture. Posterior pharynx normal with cobblestone appearance. No erythema, white exudate or obstruction. Neck - supple. Midline trachea. No carotid bruits noted bilaterally. No thyromegaly noted. Chest - clear to auscultation bilaterally anteriorly and posteriorly. No wheezes. No rales or rhonchi. Breath sounds are symmetrical bilaterally. Unlabored respirations. Heart - normal rate. Regular rhythm. Normal S1, S2. No rubs, clicks or gallops noted. 2/6 murmur noted. Abdomen - soft and nondistended. No masses or organomegaly. No rebound, rigidity or guarding. Bowel sounds normal x 4 quadrants. No tenderness noted. Neurological - awake, alert and oriented to person, place, and time and event. Cranial nerves II through XII intact. Clear speech. Muscle strength is +5/5 x 4 extremities. Sensation is intact to light touch bilaterally. Steady gait. Heme/Lymph - peripheral pulses normal x 4 extremities.   No peripheral edema is noted. Musculoskeletal - Intact x 4 extremities. Full ROM x 4 extremities. No pain with movement. No tenderness in the pelvis, pubic bone, bilateral hips, knees, ankles. Arthritic changes in L knee. Back exam - normal range of motion. No pain on palpation of the spinous processes in the cervical, thoracic, lumbar, sacral regions. No CVA tenderness. Skin - no rashes, erythema, ecchymosis, lacerations, abrasions, suspicious moles noted Psychological -   normal behavior, dress and thought processes. Good insight. Good eye contact. Normal affect. Appropriate mood. Normal speech. DATA REVIEWED Lab Results Component Value Date/Time WBC 5.4 08/13/2018 12:47 PM  
 HGB 10.3 (L) 08/29/2018 04:05 AM  
 HCT 42.4 08/13/2018 12:47 PM  
 PLATELET 577 40/18/0723 12:47 PM  
 MCV 93.4 08/13/2018 12:47 PM  
 
Lab Results Component Value Date/Time Sodium 142 04/16/2019 12:38 PM  
 Potassium 4.8 04/16/2019 12:38 PM  
 Chloride 101 04/16/2019 12:38 PM  
 CO2 26 04/16/2019 12:38 PM  
 Anion gap 6 08/28/2018 02:47 AM  
 Glucose 87 04/16/2019 12:38 PM  
 BUN 12 04/16/2019 12:38 PM  
 Creatinine 0.78 04/16/2019 12:38 PM  
 BUN/Creatinine ratio 15 04/16/2019 12:38 PM  
 GFR est AA 84 04/16/2019 12:38 PM  
 GFR est non-AA 73 04/16/2019 12:38 PM  
 Calcium 9.5 04/16/2019 12:38 PM  
 Bilirubin, total 0.6 04/16/2019 12:38 PM  
 AST (SGOT) 20 04/16/2019 12:38 PM  
 Alk. phosphatase 80 04/16/2019 12:38 PM  
 Protein, total 7.1 04/16/2019 12:38 PM  
 Albumin 4.7 04/16/2019 12:38 PM  
 Globulin 3.4 07/10/2014 02:13 PM  
 A-G Ratio 2.0 04/16/2019 12:38 PM  
 ALT (SGPT) 15 04/16/2019 12:38 PM  
 
Lab Results Component Value Date/Time  Cholesterol, total 203 (H) 04/16/2019 12:38 PM  
 HDL Cholesterol 65 04/16/2019 12:38 PM  
 LDL, calculated 121 (H) 04/16/2019 12:38 PM  
 VLDL, calculated 17 04/16/2019 12:38 PM  
 Triglyceride 86 04/16/2019 12:38 PM  
 CHOL/HDL Ratio 3.9 09/10/2010 01:43 PM  
 
 Lab Results Component Value Date/Time Vitamin D 25-Hydroxy 23.7 (L) 09/28/2011 12:17 PM  
 VITAMIN D, 25-HYDROXY 34.7 04/16/2019 12:38 PM  
   
Lab Results Component Value Date/Time Hemoglobin A1c 5.6 04/16/2019 12:38 PM  
 Hemoglobin A1c (POC) 5.5 04/17/2018 12:02 PM  
 
Lab Results Component Value Date/Time TSH 1.460 04/16/2019 12:38 PM  
 TSH 1.73 08/21/2014 09:00 AM  
 
 
Lab Results Component Value Date/Time Microalb/Creat ratio (ug/mg creat.) 6.6 04/16/2019 12:38 PM  
 
 
 
ASSESSMENT and PLAN 
 
  ICD-10-CM ICD-9-CM 1. Medicare annual wellness visit, subsequent Z00.00 V70.0 varicella-zoster recombinant, PF, (SHINGRIX, PF,) 50 mcg/0.5 mL susr injection 2. Diabetes mellitus type 2, diet-controlled (HCC) E11.9 250.00 AMB POC HEMOGLOBIN A1C  
   LIPID PANEL  
   METABOLIC PANEL, COMPREHENSIVE  
   TSH 3RD GENERATION  
   MICROALBUMIN, UR, RAND W/ MICROALB/CREAT RATIO  
   URINALYSIS W/ RFLX MICROSCOPIC HEMOGLOBIN A1C WITH EAG  
   LIPID PANEL  
   METABOLIC PANEL, COMPREHENSIVE  
   TSH 3RD GENERATION  
   MICROALBUMIN, UR, RAND W/ MICROALB/CREAT RATIO  
   URINALYSIS W/ RFLX MICROSCOPIC HEMOGLOBIN A1C WITH EAG 3. Essential hypertension, benign I10 401.1 benazepril (LOTENSIN) 20 mg tablet LIPID PANEL  
   METABOLIC PANEL, COMPREHENSIVE  
   TSH 3RD GENERATION  
   MICROALBUMIN, UR, RAND W/ MICROALB/CREAT RATIO  
   URINALYSIS W/ RFLX MICROSCOPIC  
   LIPID PANEL  
   METABOLIC PANEL, COMPREHENSIVE  
   TSH 3RD GENERATION  
   MICROALBUMIN, UR, RAND W/ MICROALB/CREAT RATIO  
   URINALYSIS W/ RFLX MICROSCOPIC 4. Nocturnal muscle cramp L06.1 160.93 METABOLIC PANEL, COMPREHENSIVE 5. Dyslipidemia E78.5 272.4 LIPID PANEL  
   METABOLIC PANEL, COMPREHENSIVE  
   LIPID PANEL  
   METABOLIC PANEL, COMPREHENSIVE 6. Restless legs syndrome (RLS) G25.81 333.94 rOPINIRole (REQUIP) 1 mg tablet 7. Vitamin D deficiency E55.9 268.9 VITAMIN D, 25 HYDROXY  
   VITAMIN D, 25 HYDROXY 8. ACE-inhibitor cough R05 786.2 T46.4X5A E942.6 9. Statin intolerance Z78.9 995.27   
10. Aspirin intolerance Z78.9 995.27 E935.3 11. History of total right knee replacement Z96.651 V43.65   
12. Personal history of colonic polyps Z86.010 V12.72   
13. Osteopenia of other site M85.88 733.90   
14. Other specified hearing loss of both ears H91.8X3 389.8 REFERRAL TO ENT-OTOLARYNGOLOGY 15. Screening for alcoholism Z13.39 V79.1 DC ANNUAL ALCOHOL SCREEN 15 MIN 16. Screening for depression [facility]13.31 [facility].0 [facility] 17. History of anemia Z86.2 V12.3 rOPINIRole (REQUIP) 1 mg tablet  
 stable 18. Encounter for immunization Z23 V03.89 PNEUMOCOCCAL POLYSACCHARIDE VACCINE, 23-VALENT, ADULT OR IMMUNOSUPPRESSED PT DOSE, ADMIN PNEUMOCOCCAL VACCINE Discussed the patient's BMI with her. The BMI follow up plan is as follows: I have counseled this patient on diet and exercise regimens. Decrease carbohydrates (white foods, sweet foods, sweet drinks and alcohol), increase green leafy vegetables and protein (lean meats and beans) with each meal.  Avoid fried foods. Eat 3-5 small meals daily. Do not skip meals. Increase water intake. Increase physical activity to 30 minutes daily for health benefit or 60 minutes daily to prevent weight regain, as tolerated. Get 7-8 hours uninterrupted sleep nightly. Chart reviewed and updated. Continue current medications and care. Prescriptions written and sent to pharmacy; medication side effects discussed. Lotensin 20 mg. Requip 1 mg. Follow up with GI, Dr. Sofiya Jimenez for additional refill of Prilosec since pt has been on it for years and is symptomatic if does not use. Recheck pertinent labs today. Encouraged pt to sign up for [employer] to view lab results electronically. Advised pt to contact me via [employer] if there are any concerns regarding their results. Had nurse assist pt in signing up for [address] [place] [address] [address]. Recent office visit notes from Dr. Altha Aschoff reviewed. Referrals given; patient urged to keep appointments with specialists. ENT. Counseled patient on health concerns:  DM, BP, knee care, osteopenia, hearing loss, colon polyps, muscle cramps, and RLS. Relevant handouts given and discussed with patient. Immunizations noted; Pneumococcal 23 administered today. Advised pt to discuss Shingrix vaccine with insurance company and local pharmacy. Offered empathy, support, legitimation, prayers, partnership to patient. Praised patient for progress. Follow-up and Dispositions · Return in about 6 months (around 10/16/2019) for BP, DM, Results. Patient was offered a choice/choices in the treatment plan today. Patient expresses understanding of the plan and agrees with recommendations. More than 40 mins spent face to face with patient and more than 50% of this time spent in counseling and coordinating care. Written by allen Quezada, as dictated by Dr. Aníbal Saavedra DO. Documentation True and Accepted by Marquita Salgado. Latonya Pena. Patient Instructions Medicare Wellness Visit, Female The best way to live healthy is to have a lifestyle where you eat a well-balanced diet, exercise regularly, limit alcohol use, and quit all forms of tobacco/nicotine, if applicable. Regular preventive services are another way to keep healthy. Preventive services (vaccines, screening tests, monitoring & exams) can help personalize your care plan, which helps you manage your own care. Screening tests can find health problems at the earliest stages, when they are easiest to treat. [facility] follows the current, evidence-based guidelines published by the [place] (USPSTF) when recommending preventive services for our patients.  Because we follow these guidelines, sometimes recommendations change over time as research supports it. (For example, mammograms used to be recommended annually. Even though Medicare will still pay for an annual mammogram, the newer guidelines recommend a mammogram every two years for women of average risk.) Of course, you and your doctor may decide to screen more often for some diseases, based on your risk and your health status. Preventive services for you include: - Medicare offers their members a free annual wellness visit, which is time for you and your primary care provider to discuss and plan for your preventive service needs. Take advantage of this benefit every year! 
-All adults over the age of 72 should receive the recommended pneumonia vaccines. Current USPSTF guidelines recommend a series of two vaccines for the best pneumonia protection.  
-All adults should have a flu vaccine yearly and a tetanus vaccine every 10 years. All adults age 61 and older should receive a shingles vaccine once in their lifetime.   
-A bone mass density test is recommended when a woman turns 65 to screen for osteoporosis. This test is only recommended one time, as a screening. Some providers will use this same test as a disease monitoring tool if you already have osteoporosis. -All adults age 38-68 who are overweight should have a diabetes screening test once every three years.  
-Other screening tests and preventive services for persons with diabetes include: an eye exam to screen for diabetic retinopathy, a kidney function test, a foot exam, and stricter control over your cholesterol.  
-Cardiovascular screening for adults with routine risk involves an electrocardiogram (ECG) at intervals determined by your doctor.  
-Colorectal cancer screenings should be done for adults age 54-65 with no increased risk factors for colorectal cancer. There are a number of acceptable methods of screening for this type of cancer. Each test has its own benefits and drawbacks.  Discuss with your doctor what is most appropriate for you during your annual wellness visit. The different tests include: colonoscopy (considered the best screening method), a fecal occult blood test, a fecal DNA test, and sigmoidoscopy. -Breast cancer screenings are recommended every other year for women of normal risk, age 54-69. 
-Cervical cancer screenings for women over age 72 are only recommended with certain risk factors.  
-All adults born between [facility] should be screened once for Hepatitis C. Here is a list of your current Health Maintenance items (your personalized list of preventive services) with a due date: 
Health Maintenance Due Topic Date Due  
 Annual Well Visit  10/14/2018  Hemoglobin A1C    02/28/2019 [facility] Diabetic Foot Care  04/17/2019  Albumin Urine Test  04/17/2019  Cholesterol Test   04/17/2019

## 2019-04-16 NOTE — PROGRESS NOTES
This is the Subsequent Medicare Annual Wellness Exam, performed 12 months or more after the Initial AWV or the last Subsequent AWV I have reviewed the patient's medical history in detail and updated the computerized patient record. History Past Medical History:  
Diagnosis Date  AC (acromioclavicular) joint bone spurs 01/2014 in back. Dr. Cheryl Roman. Txd with shots x 3  
 Actinic keratosis 2015 Dr. Pa Williamson. Dr. Duglas Paz.  Allergic rhinitis, cause unspecified  Anxiety state, unspecified  Arthritis R KNEE BONE-ON-BONE; R HAND-----OSTEO  Asthma BRONCHITIS IN SPRING & FALL MOST YEARS  
 BCC (basal cell carcinoma), face 1980s (nose), 01/28/15 (forehead) Dr. Whit Gerber. Dr. Armida Kwon.  Cataract 2007 Dr. Jacky Luther  Chronic insomnia  Chronic low back pain 01/2014 Dr. Carlee Manuel. DJD and spurs, Narrowing of L 3,4,5. Dr. Cheryl Roman.  Chronic obstructive pulmonary disease ([facility] [address] CHRONIC (TWICE-YEARLY) BRONCHITIS  Chronic pain  Colon polyps 11/2005, 01/29/09  
 benign. Dr. Walls Comment  Constipation  Degenerative lumbar spinal stenosis 01/2014 Dr. Gabe Shelley Narrowing of L 3,4,5  
 Diabetes mellitus type 2, diet-controlled ([facility] [address] 10/13/2017 LOST WEIGHT AND IS NOT DIABETIC  Dyslipidemia  Dysphagia 09/2014  
 due to Esophagitis. Dr. Charley Colunga.  Esophagitis 09/29/2014 ESOPHAGITIS  Essential hypertension, benign  Foot fracture, left 2009  
 stress.  Foot pain, bilateral 11/13/2013 Dr. Asad Harmon.  Hearing loss Dr. Chepe Caceres.  Knee pain, bilateral 10/28/13 Dr. Jacquelyn Tan. Right > Left. Severe OA. Txd with PT.  
 Lumbar stenosis with neurogenic claudication [address]  Migraine NONE SINCE AGE 37  
 Mixed stress and urge urinary incontinence NO LEAKAGE; GETS UP SEVERAL TIMES A NIGHT, PT STATES ON 10/3/16  OA (osteoarthritis) of knee Dr. Cali Willingham  Osteopenia 08/24/2015  Restless legs syndrome (RLS) 09/2015 Dr. Luis Enrique Lew.  Shoulder joint dislocation 06/2011 Right. due to fall. Dr. Byers Last  Sleep apnea   
 undiagnosed  Syncope 06/25/11  
 due to fall down 5 steps. Right occipital head trauma.  Tremor of both hands 09/2015 Dr. Purvi Puri GABAPENTIN Past Surgical History:  
Procedure Laterality Date  HX CARPAL TUNNEL RELEASE Left 04/03/2018 Dr. Anne-Marie Salas  HX CATARACT REMOVAL Bilateral , R 04/10/17 Dr. Lucie Moreno  HX COLONOSCOPY  11/2005, 01/29/09, 09/16/15  
 with polypectomy. Dr. Karis Crenshaw q5 years  HX ENDOSCOPY  09/2014  
 with Esophageal dilation. due to dysphagia. Dr. Yanelis Martines.  HX KNEE REPLACEMENT Right 08/27/2018 Dr. Lisa aZrate  HX LUMBAR LAMINECTOMY  10/18/2016  HX MALIGNANT SKIN LESION EXCISION  01/28/15 BCC. L Forehead. MOHs SURGERY. Dr. Brenda Saucedo.  HX RAIMUNDO AND BSO  1976  
 due to fibroids [address].  ND COMBINED ANT/POST COLPORRHAPHY  02/28/05  
 with enterocele RE. Dr. Jordy Pittman and Dr. Edi Childress Current Outpatient Medications Medication Sig Dispense Refill  glucosamine-chondroitin (ARTHX) 500-400 mg cap Take 1 Cap by mouth daily.  niacin/vit B2/vits A,C,E/min (VIT A-VIT T7-Y4-X-E-MINERALS PO) Take  by mouth.  cyanocobalamin (VITAMIN B-12) 500 mcg tablet Take 500 mcg by mouth daily.  OTHER Take 500 mg by mouth daily. Indications: Tumeric Curcumin  ascorbic acid, vitamin C, (VITAMIN C) 500 mg tablet Take  by mouth.  diphenhydrAMINE (BENADRYL) 25 mg capsule Take 25 mg by mouth every six (6) hours as needed.  turmeric root extract 500 mg cap Take  by mouth.  APPLE CIDER VINEGAR PO Take  by mouth.  garlic 9,852 mg cap Take  by mouth.  omega-3s/dha/epa/fish oil/D3 (VITAMIN-D + OMEGA-3 PO) Take  by mouth.  benazepril (LOTENSIN) 20 mg tablet take 1 tablet by mouth once daily  Indications: high blood pressure 90 Tab 3  varicella-zoster recombinant, PF, (SHINGRIX, PF,) 50 mcg/0.5 mL susr injection 0.5mL by IntraMUSCular route once now and then repeat in 2-6 months 0.5 mL 1  
 aspirin delayed-release 81 mg tablet Take  by mouth.  rOPINIRole (REQUIP) 1 mg tablet Take 1/2 tablet in morning and at lunch and a full tablet at bedtime 60 Tab 5  
 omeprazole (PRILOSEC) 40 mg capsule Take 40 mg by mouth daily. 0  
 fluticasone (FLONASE) 50 mcg/actuation nasal spray 2 Sprays by Both Nostrils route daily. (Patient taking differently: 2 Sprays by Both Nostrils route daily. Uses Seasonal- Spring & Fall) 1 Bottle 5  
 albuterol (PROVENTIL HFA, VENTOLIN HFA) 90 mcg/actuation inhaler Take 2 Puffs by inhalation every four (4) hours as needed for Wheezing or Shortness of Breath. Indications: BRONCHOSPASM PREVENTION (Patient taking differently: Take 2 Puffs by inhalation every four (4) hours as needed for Wheezing or Shortness of Breath (Uses seasonal- Spring & Fall). Indications: BRONCHOSPASM PREVENTION) 1 Inhaler 5  Multivitamins with Fluoride (MULTI-VITAMIN PO) Take 1 Tab by mouth daily (after breakfast). Allergies Allergen Reactions  Other Food Itching If eats large quantities over several days causes itching: tomatoes, peaches, strawberry, fig  
 Bee Sting [Sting, Bee] Swelling  Pcn [Penicillins] Hives IN CHILDHOOD  Percocet [Oxycodone-Acetaminophen] Other (comments) Other (comments)\"hellish nightmares\"  Shellfish Containing Products Itching  Zoster Vaccine Live Swelling Severe Right arm swelling with redness after administered by pharmacist in elbow  Atarax [Hydroxyzine Hcl] Other (comments)  
  jittery  Buspar [Buspirone] Nausea Only  Crestor [Rosuvastatin] Myalgia  Hydrochlorothiazide Myalgia Other reaction(s): sorethroat  Hydroxyzine Other (comments)  
  jittery  Mobic [Meloxicam] Other (comments) Easy bruising  Norco [Hydrocodone-Acetaminophen] Nausea and Vomiting Family History Problem Relation Age of Onset  Hypertension Mother  Dementia Mother   
     alzheimers  Hypertension Father  High Cholesterol Father  Kidney Disease Father 1 kidney  Emphysema Father O2 requiring  Hypertension Sister  Diabetes Maternal Grandmother  Dementia Maternal Grandmother  Thyroid Disease Maternal Grandmother  Thyroid Disease Daughter   
     goiter  Heart Attack Maternal Uncle 51  
     massive  Heart Attack Maternal Uncle 45  
     massive  Heart Attack Maternal Aunt 51  
     massive  Thyroid Disease Daughter   
     thyroid cyst  
 Other Brother  AT 1DAYS OLD  Anesth Problems Neg Hx Social History Tobacco Use  Smoking status: Never Smoker  Smokeless tobacco: Never Used Substance Use Topics  Alcohol use: No  
 
Patient Active Problem List  
Diagnosis Code  Menopause Z78.0  
 Essential hypertension, benign I10  
 Fine tremor G25.2  Dysphagia R13.10  Actinic keratosis L57.0  Family history of thyroid disease Z83.49  Family history of diabetes mellitus Z83.3  Family history of heart attack Z82.49  Dyslipidemia E78.5  Vitamin D deficiency E55.9  Asthma J45.909  Hyperinsulinemia E16.1  Muscle cramps R25.2  Degenerative lumbar spinal stenosis M48.061  
 Personal history of colonic polyps Z86.010  
 Lactose intolerance E73.9  Restless legs syndrome (RLS) G25.81  Chronic pain of both knees M25.561, M25.562, G89.29  
 Osteopenia M85.80  Statin intolerance Z78.9  Insomnia secondary to chronic pain G89.29, G47.01  
 Advance care planning Z71.89  Family history of thyroid disease in grandmother Z80.51  
  Jerky body movements R25.8  Lumbar stenosis with neurogenic claudication M48.062  
 ACE-inhibitor cough R05, T46.4X5A  Diabetes mellitus type 2, diet-controlled ([address] E11.9  Carpal tunnel syndrome of left wrist G56.02  
 Primary osteoarthritis of right knee M17.11  
 Pseudophakia of both eyes Z96.1 Depression Risk Factor Screening:  
 
3 most recent PHQ Screens 4/16/2019 Little interest or pleasure in doing things Not at all Feeling down, depressed, irritable, or hopeless Not at all Total Score PHQ 2 0 Alcohol Risk Factor Screening: You do not drink alcohol or very rarely. Functional Ability and Level of Safety:  
Hearing Loss The patient needs further evaluation. Activities of Daily Living The home contains: handrails and grab bars Patient does total self care Fall Risk Fall Risk Assessment, last 12 mths 4/16/2019 Able to walk? Yes Fall in past 12 months? No  
Fall with injury? -  
Number of falls in past 12 months - Fall Risk Score -  
 
 
Abuse Screen Patient is not abused Cognitive Screening Evaluation of Cognitive Function: 
Has your family/caregiver stated any concerns about your memory: no 
Normal  
AAOX4 Patient Care Team  
Patient Care Team: 
Kenny Silva DO as PCP - Brooke Briscoe MD as Physician (Radiology) Stu Mcgovern MD (Pain Management) Maia Byrd Self Ernesto Barbour MD (Dermatology) Lucas Leahy MD (Neurology) Joe Cano MD (Colon and Rectal Surgery) Deborah Belle MD (Otolaryngology) Jerry Lisa MD (Orthopedic Surgery) Jonna Gallardo MD (Ophthalmology) Kathia Lopez MD (Gastroenterology) Latoya Gonzalez MD (Orthopedic Surgery) Assessment/Plan Education and counseling provided: 
Are appropriate based on today's review and evaluation End-of-Life planning (with patient's consent) Pneumococcal Vaccine Influenza Vaccine Screening Mammography Screening Pap and pelvic (covered once every 2 years) Colorectal cancer screening tests Cardiovascular screening blood test 
Bone mass measurement (DEXA) Screening for glaucoma Diabetes screening test 
 
Diagnoses and all orders for this visit: 
 
1. Medicare annual wellness visit, subsequent 
-     varicella-zoster recombinant, PF, (SHINGRIX, PF,) 50 mcg/0.5 mL susr injection; 0.5mL by IntraMUSCular route once now and then repeat in 2-6 months 2. Diabetes mellitus type 2, diet-controlled ([facility] [address] -     AMB POC HEMOGLOBIN A1C 
-     LIPID PANEL 
-     METABOLIC PANEL, COMPREHENSIVE 
-     TSH 3RD GENERATION 
-     MICROALBUMIN, UR, RAND W/ MICROALB/CREAT RATIO 
-     URINALYSIS W/ RFLX MICROSCOPIC 
-     HEMOGLOBIN A1C WITH EAG 
-     LIPID PANEL; Future -     METABOLIC PANEL, COMPREHENSIVE; Future 
-     TSH 3RD GENERATION; Future -     MICROALBUMIN, UR, RAND W/ MICROALB/CREAT RATIO; Future -     URINALYSIS W/ RFLX MICROSCOPIC; Future 
-     HEMOGLOBIN A1C WITH EAG; Future 3. Essential hypertension, benign 
-     benazepril (LOTENSIN) 20 mg tablet; take 1 tablet by mouth once daily  Indications: high blood pressure -     LIPID PANEL 
-     METABOLIC PANEL, COMPREHENSIVE 
-     TSH 3RD GENERATION 
-     MICROALBUMIN, UR, RAND W/ MICROALB/CREAT RATIO 
-     URINALYSIS W/ RFLX MICROSCOPIC 
-     LIPID PANEL; Future -     METABOLIC PANEL, COMPREHENSIVE; Future 
-     TSH 3RD GENERATION; Future -     MICROALBUMIN, UR, RAND W/ MICROALB/CREAT RATIO; Future -     URINALYSIS W/ RFLX MICROSCOPIC; Future 4. Nocturnal muscle cramp -     METABOLIC PANEL, COMPREHENSIVE; Future 5. Dyslipidemia -     LIPID PANEL 
-     METABOLIC PANEL, COMPREHENSIVE 
-     LIPID PANEL; Future -     METABOLIC PANEL, COMPREHENSIVE; Future 6. Restless legs syndrome (RLS) 
-     rOPINIRole (REQUIP) 1 mg tablet;  Take 1/2 tablet in morning and at lunch and a full tablet at bedtime  Indications: Extreme Discomfort in Calves when Sitting or Lying Down 7. Vitamin D deficiency 
-     VITAMIN D, 25 HYDROXY 
-     VITAMIN D, 25 HYDROXY; Future 8. ACE-inhibitor cough 9. Statin intolerance 10. Aspirin intolerance 11. History of total right knee replacement 12. Personal history of colonic polyps 13. Osteopenia of other site 14. Other specified hearing loss of both ears 
-     REFERRAL TO ENT-OTOLARYNGOLOGY 15. Screening for alcoholism -     [place] ANNUAL ALCOHOL SCREEN 15 MIN 16. Screening for depression 
-     [place] [id number] 17. History of anemia Comments: 
stable Orders: 
-     rOPINIRole (REQUIP) 1 mg tablet; Take 1/2 tablet in morning and at lunch and a full tablet at bedtime  Indications: Extreme Discomfort in Calves when Sitting or Lying Down 18. Encounter for immunization 
-     PNEUMOCOCCAL POLYSACCHARIDE VACCINE, 23-VALENT, ADULT OR IMMUNOSUPPRESSED PT DOSE, 
-     ADMIN PNEUMOCOCCAL VACCINE Health Maintenance Due Topic Date Due  MEDICARE YEARLY EXAM  10/14/2018  HEMOGLOBIN A1C Q6M  02/28/2019  
 FOOT EXAM Q1  04/17/2019  MICROALBUMIN Q1  04/17/2019  LIPID PANEL Q1  04/17/2019

## 2019-04-17 LAB
25(OH)D3+25(OH)D2 SERPL-MCNC: 34.7 NG/ML (ref 30–100)
ALBUMIN SERPL-MCNC: 4.7 G/DL (ref 3.5–4.8)
ALBUMIN/CREAT UR: 6.6 MG/G CREAT (ref 0–30)
ALBUMIN/GLOB SERPL: 2 {RATIO} (ref 1.2–2.2)
ALP SERPL-CCNC: 80 IU/L (ref 39–117)
ALT SERPL-CCNC: 15 IU/L (ref 0–32)
APPEARANCE UR: CLEAR
AST SERPL-CCNC: 20 IU/L (ref 0–40)
BILIRUB SERPL-MCNC: 0.6 MG/DL (ref 0–1.2)
BILIRUB UR QL STRIP: NEGATIVE
BUN SERPL-MCNC: 12 MG/DL (ref 8–27)
BUN/CREAT SERPL: 15 (ref 12–28)
CALCIUM SERPL-MCNC: 9.5 MG/DL (ref 8.7–10.3)
CHLORIDE SERPL-SCNC: 101 MMOL/L (ref 96–106)
CHOLEST SERPL-MCNC: 203 MG/DL (ref 100–199)
CO2 SERPL-SCNC: 26 MMOL/L (ref 20–29)
COLOR UR: YELLOW
CREAT SERPL-MCNC: 0.78 MG/DL (ref 0.57–1)
CREAT UR-MCNC: 57.5 MG/DL
EST. AVERAGE GLUCOSE BLD GHB EST-MCNC: 114 MG/DL
GLOBULIN SER CALC-MCNC: 2.4 G/DL (ref 1.5–4.5)
GLUCOSE SERPL-MCNC: 87 MG/DL (ref 65–99)
GLUCOSE UR QL: NEGATIVE
HBA1C MFR BLD: 5.6 % (ref 4.8–5.6)
HDLC SERPL-MCNC: 65 MG/DL
HGB UR QL STRIP: NEGATIVE
INTERPRETATION, 910389: NORMAL
KETONES UR QL STRIP: NEGATIVE
LDLC SERPL CALC-MCNC: 121 MG/DL (ref 0–99)
LEUKOCYTE ESTERASE UR QL STRIP: NEGATIVE
Lab: NORMAL
MICRO URNS: NORMAL
MICROALBUMIN UR-MCNC: 3.8 UG/ML
NITRITE UR QL STRIP: NEGATIVE
PH UR STRIP: 6.5 [PH] (ref 5–7.5)
POTASSIUM SERPL-SCNC: 4.8 MMOL/L (ref 3.5–5.2)
PROT SERPL-MCNC: 7.1 G/DL (ref 6–8.5)
PROT UR QL STRIP: NEGATIVE
SODIUM SERPL-SCNC: 142 MMOL/L (ref 134–144)
SP GR UR: 1.02 (ref 1–1.03)
TRIGL SERPL-MCNC: 86 MG/DL (ref 0–149)
TSH SERPL DL<=0.005 MIU/L-ACNC: 1.46 UIU/ML (ref 0.45–4.5)
UROBILINOGEN UR STRIP-MCNC: 0.2 MG/DL (ref 0.2–1)
VLDLC SERPL CALC-MCNC: 17 MG/DL (ref 5–40)

## 2019-04-17 NOTE — PROGRESS NOTES
After obtaining consent, and per orders of Dr. Fadi Cardenas, injection of Pneumovax 23 given by Diamante Hernandez LPN.

## 2019-05-07 DIAGNOSIS — Z86.2 HISTORY OF ANEMIA: ICD-10-CM

## 2019-05-07 DIAGNOSIS — G25.81 RESTLESS LEGS SYNDROME (RLS): ICD-10-CM

## 2019-05-07 NOTE — TELEPHONE ENCOUNTER
Called [facility] pharmacy to inquiring why ropiniorole 90 day supply for the patient. Resubmitted patient rx.   PCP: Gissel Rivera DO    Last appt: 4/16/2019  Future Appointments   Date Time Provider Whitney Ginna   10/15/2019  9:30 AM Gissel Rivera DO [facility]       Requested Prescriptions     Pending Prescriptions Disp Refills    rOPINIRole (REQUIP) 1 mg tablet 180 Tab 3     Sig: Take 1/2 tablet in morning and at lunch and a full tablet at bedtime  Indications: Extreme Discomfort in Calves when Sitting or Lying Down       Prior labs and Blood pressures:  BP Readings from Last 3 Encounters:   04/16/19 128/86   12/19/18 (!) 148/92   10/18/18 110/74     Lab Results   Component Value Date/Time    Sodium 142 04/16/2019 12:38 PM    Potassium 4.8 04/16/2019 12:38 PM    Chloride 101 04/16/2019 12:38 PM    CO2 26 04/16/2019 12:38 PM    Anion gap 6 08/28/2018 02:47 AM    Glucose 87 04/16/2019 12:38 PM    BUN 12 04/16/2019 12:38 PM    Creatinine 0.78 04/16/2019 12:38 PM    BUN/Creatinine ratio 15 04/16/2019 12:38 PM    GFR est AA 84 04/16/2019 12:38 PM    GFR est non-AA 73 04/16/2019 12:38 PM    Calcium 9.5 04/16/2019 12:38 PM     Lab Results   Component Value Date/Time    Hemoglobin A1c 5.6 04/16/2019 12:38 PM    Hemoglobin A1c (POC) 5.5 04/17/2018 12:02 PM     Lab Results   Component Value Date/Time    Cholesterol, total 203 (H) 04/16/2019 12:38 PM    HDL Cholesterol 65 04/16/2019 12:38 PM    LDL, calculated 121 (H) 04/16/2019 12:38 PM    VLDL, calculated 17 04/16/2019 12:38 PM    Triglyceride 86 04/16/2019 12:38 PM    CHOL/HDL Ratio 3.9 09/10/2010 01:43 PM     Lab Results   Component Value Date/Time    Vitamin D 25-Hydroxy 23.7 (L) 09/28/2011 12:17 PM    VITAMIN D, 25-HYDROXY 34.7 04/16/2019 12:38 PM       Lab Results   Component Value Date/Time    TSH 1.460 04/16/2019 12:38 PM    TSH 1.73 08/21/2014 09:00 AM

## 2019-05-07 NOTE — TELEPHONE ENCOUNTER
[facility] request for Rop[facility]role 1mg. Pharmacy is formerly known as [facility].

## 2019-05-10 RX ORDER — ROPINIROLE 1 MG/1
TABLET, FILM COATED ORAL
Qty: 180 TAB | Refills: 3 | Status: SHIPPED | OUTPATIENT
Start: 2019-05-10 | End: 2020-04-20 | Stop reason: SDUPTHER

## 2019-10-15 ENCOUNTER — OFFICE VISIT (OUTPATIENT)
Dept: FAMILY MEDICINE CLINIC | Age: 79
End: 2019-10-15

## 2019-10-15 VITALS
BODY MASS INDEX: 26.43 KG/M2 | RESPIRATION RATE: 18 BRPM | HEIGHT: 62 IN | OXYGEN SATURATION: 98 % | SYSTOLIC BLOOD PRESSURE: 136 MMHG | HEART RATE: 56 BPM | TEMPERATURE: 97.6 F | WEIGHT: 143.6 LBS | DIASTOLIC BLOOD PRESSURE: 81 MMHG

## 2019-10-15 DIAGNOSIS — E78.5 DYSLIPIDEMIA: ICD-10-CM

## 2019-10-15 DIAGNOSIS — E11.9 DIABETES MELLITUS TYPE 2, DIET-CONTROLLED (HCC): Primary | ICD-10-CM

## 2019-10-15 DIAGNOSIS — E55.9 VITAMIN D DEFICIENCY: ICD-10-CM

## 2019-10-15 DIAGNOSIS — G25.81 RESTLESS LEGS SYNDROME (RLS): ICD-10-CM

## 2019-10-15 DIAGNOSIS — Z78.9 STATIN INTOLERANCE: ICD-10-CM

## 2019-10-15 DIAGNOSIS — Z78.9 ASPIRIN INTOLERANCE: ICD-10-CM

## 2019-10-15 DIAGNOSIS — R25.2 LEG CRAMPS: ICD-10-CM

## 2019-10-15 DIAGNOSIS — J45.31 MILD PERSISTENT ASTHMA WITH ACUTE EXACERBATION: ICD-10-CM

## 2019-10-15 DIAGNOSIS — R23.3 EASY BRUISING: ICD-10-CM

## 2019-10-15 DIAGNOSIS — I10 ESSENTIAL HYPERTENSION, BENIGN: ICD-10-CM

## 2019-10-15 LAB — HBA1C MFR BLD HPLC: 5.3 %

## 2019-10-15 RX ORDER — ALBUTEROL SULFATE 90 UG/1
2 AEROSOL, METERED RESPIRATORY (INHALATION)
Qty: 1 INHALER | Refills: 5 | Status: SHIPPED | OUTPATIENT
Start: 2019-10-15 | End: 2021-08-18

## 2019-10-15 NOTE — PROGRESS NOTES
HISTORY OF PRESENT ILLNESS  Kel Rodarte is a 78 y.o. female presents with Blood Pressure Check (Rm 12 pt is ); Diabetes; and Results    Agree with nurse note. Pt with hypertension, dyslipidemia, mild persistent asthma, vit d deficiency, aspirin intolerance, and statin intolerance presents to the office with a BP of 136/81. She takes benazepril 20 mg daily for BP; tolerating well. She takes aspirin 81 mg daily. Pt requests to review results from 4/16/2019. Vit d 34.7, TSH 1.46, urine microalbumin 3.8, Cr 0.78, LFTs wnl, , HDL 65, trigs 86. Pt shares \"I have a lot of energy and I feel good. \"      Pt with controlled type 2 DM. A1C was 5.6 on 4/16/2019. Per chart review, pt's A1C was 6.5 on 5/5/2015 and 6.4 on 11/25/2015. Her last diabetic eye exam was in 11/29/2018 with Dr. Juancho Chu. Pt is up 4 lbs since 4/2019. She has started snacking on sweets like ice cream and chocolate. Pt with RLS complains of leg cramps. She notices they are worse when she has salt. She takes 3 big tablespoons of mustard when she experiences cramping with relief. She reports she does not drink any water. She takes Requip 1 mg 1/2 tab daily for RLS; tolerating well. Pt complains of easy bruising. Pt saw Dr. Giorgi Leiva's NP Guillermina Burton on 6/24/2019 to get her Prilosec refilled. Pt complains of RLQ abdominal pain that is not present today. She denies fever or chills. Health Maintenance    Diabetic foot exam performed today. Pt's most recent mammogram was on 10/3/2019 with [facility]. Negative for any signs of ca. Written by allen Lamas, as dictated by Dr. Martha Odell DO.    ROS    Review of Systems negative except as noted above in HPI.     ALLERGIES:    Allergies   Allergen Reactions    Other Food Itching     If eats large quantities over several days causes itching: tomatoes, peaches, strawberry, fig    Bee Sting [Sting, Bee] Swelling    Pcn [Penicillins] Hives     IN CHILDHOOD    Percocet [Oxycodone-Acetaminophen] Other (comments)      Other (comments)\"hellish nightmares\"      Shellfish Containing Products Itching    Zoster Vaccine Live Swelling     Severe Right arm swelling with redness after administered by pharmacist in elbow    Atarax [Hydroxyzine Hcl] Other (comments)     jittery    Buspar [Buspirone] Nausea Only    Crestor [Rosuvastatin] Myalgia    Hydrochlorothiazide Myalgia     Other reaction(s): sorethroat    Hydroxyzine Other (comments)     jittery    Mobic [Meloxicam] Other (comments)     Easy bruising    Norco [Hydrocodone-Acetaminophen] Nausea and Vomiting                CURRENT MEDICATIONS:    Outpatient Medications Marked as Taking for the 10/15/19 encounter (Office Visit) with Jed Renae, DO   Medication Sig Dispense Refill    albuterol (PROVENTIL HFA, VENTOLIN HFA, PROAIR HFA) 90 mcg/actuation inhaler Take 2 Puffs by inhalation every four (4) hours as needed for Wheezing or Shortness of Breath. Indications: bronchospasm prevention 1 Inhaler 5    rOPINIRole (REQUIP) 1 mg tablet Take 1/2 tablet in morning and at lunch and a full tablet at bedtime  Indications: Extreme Discomfort in Calves when Sitting or Lying Down 180 Tab 3    OTHER Take 500 mg by mouth daily. Indications: Tumeric Curcumin      ascorbic acid, vitamin C, (VITAMIN C) 500 mg tablet Take  by mouth.  diphenhydrAMINE (BENADRYL) 25 mg capsule Take 25 mg by mouth every six (6) hours as needed.  benazepril (LOTENSIN) 20 mg tablet take 1 tablet by mouth once daily  Indications: high blood pressure 90 Tab 3    aspirin delayed-release 81 mg tablet Take  by mouth.  omeprazole (PRILOSEC) 40 mg capsule Take 20 mg by mouth daily. 0       PAST MEDICAL HISTORY:    Past Medical History:   Diagnosis Date    AC (acromioclavicular) joint bone spurs 01/2014    in back. Dr. Ondina Zarate. Txd with shots x 3    Actinic keratosis 2015    Dr. Chacha Mathis. Dr. Karlee Car.  Allergic rhinitis, cause unspecified     Anxiety state, unspecified     Arthritis     R KNEE BONE-ON-BONE; R HAND-----OSTEO    Asthma     BRONCHITIS IN SPRING & FALL MOST YEARS    BCC (basal cell carcinoma), face 1980s (nose), 01/28/15 (forehead)    Dr. Satnam oRse. Dr. Tang Kerr.  Cataract 2007    Dr. Marylee Leash    Chronic insomnia     Chronic low back pain 01/2014    Dr. Bry Rivas. DJD and spurs, Narrowing of L 3,4,5. Dr. Yasmeen Riley.  Chronic obstructive pulmonary disease (HCC)     CHRONIC (TWICE-YEARLY) BRONCHITIS    Chronic pain     Colon polyps 11/2005, 01/29/09    benign. Dr. Toby Wasserman    Constipation     Degenerative lumbar spinal stenosis 01/2014    Dr. Mckayla Wahl Narrowing of L 3,4,5    Diabetes mellitus type 2, diet-controlled ([facility] [address] 10/13/2017    LOST WEIGHT AND IS NOT DIABETIC    Dyslipidemia     Dysphagia 09/2014    due to Esophagitis. Dr. Sherlyn Martinez.  Esophagitis 09/29/2014    ESOPHAGITIS    Essential hypertension, benign     Foot fracture, left 2009    stress.  Foot pain, bilateral 11/13/2013    Dr. Elke Malave.  Hearing loss     Dr. Nan Brito.  Knee pain, bilateral 10/28/13    Dr. Marin Has. Right > Left. Severe OA. Txd with PT.    Lumbar stenosis with neurogenic claudication     Menopause 1976    Migraine     NONE SINCE AGE 37    Mixed stress and urge urinary incontinence     NO LEAKAGE; GETS UP SEVERAL TIMES A NIGHT, PT STATES ON 10/3/16    OA (osteoarthritis) of knee     Dr. Marin Has    Osteopenia 08/24/2015    Restless legs syndrome (RLS) 09/2015    Dr. Brenda Fernandes.  Shoulder joint dislocation 06/2011    Right. due to fall. Dr. Efrain Penn Sleep apnea     undiagnosed    Syncope 06/25/11    due to fall down 5 steps. Right occipital head trauma.     Tremor of both hands 09/2015    Dr. Christian WILKERSON       PAST SURGICAL HISTORY:    Past Surgical History:   Procedure Laterality Date    HX CARPAL TUNNEL RELEASE Left 2018    Dr. Alethea Jaimes     HX CATARACT REMOVAL Bilateral , R 04/10/17    Dr. Sidney Barker COLONOSCOPY  2005, 09, 09/16/15    with polypectomy. Dr. Carlton Rad q5 years    HX ENDOSCOPY  2014    with Esophageal dilation. due to dysphagia. Dr. Frandy Faith.  HX KNEE REPLACEMENT Right 2018    Dr. Emili Valadez  10/18/2016    HX MALIGNANT SKIN LESION EXCISION  01/28/15    BCC. L Forehead. MOHs SURGERY. Dr. Matthew Caicedo.  HX RAIMUNDO AND BSO      due to fibroids    HX TONSILLECTOMY      OH COMBINED ANT/POST COLPORRHAPHY  05    with enterocele RE.   Dr. Melyssa Fernandez and Dr. Richard Cordova:    Family History   Problem Relation Age of Onset    Hypertension Mother     Dementia Mother         alzheimers    Hypertension Father     High Cholesterol Father     Kidney Disease Father         1 kidney    Emphysema Father         O2 requiring    Hypertension Sister     Diabetes Maternal Grandmother     Dementia Maternal Grandmother     Thyroid Disease Maternal Grandmother     Thyroid Disease Daughter         goiter    Heart Attack Maternal Uncle 51        massive    Heart Attack Maternal Uncle 39        massive    Heart Attack Maternal Aunt 51        massive    Thyroid Disease Daughter         thyroid cyst    Other Brother          AT 1DAYS OLD    Anesth Problems Neg Hx        SOCIAL HISTORY:    Social History     Socioeconomic History    Marital status:      Spouse name: Not on file    Number of children: Not on file    Years of education: Not on file    Highest education level: Not on file   Tobacco Use    Smoking status: Never Smoker    Smokeless tobacco: Never Used   Substance and Sexual Activity    Alcohol use: No    Drug use: No    Sexual activity: Not Currently     Partners: Male     Birth control/protection: Surgical       IMMUNIZATIONS:    Immunization History   Administered Date(s) Administered    (RETIRED) Pneumococcal Vaccine (Unspecified Type) 09/17/1997, 10/22/2004    Influenza High Dose Vaccine PF 10/29/2013, 09/20/2014, 09/02/2015, 10/07/2016, 10/13/2017, 10/25/2018, 09/23/2019    Pneumococcal Conjugate (PCV-13) 10/13/2017    Pneumococcal Polysaccharide (PPSV-23) 04/17/2019    TD Vaccine 09/07/2004    Zoster 01/21/2009         PHYSICAL EXAMINATION    Vital Signs    Visit Vitals  /81   Pulse (!) 56   Temp 97.6 °F (36.4 °C)   Resp 18   Ht 5' 2\" (1.575 m)   Wt 143 lb 9.6 oz (65.1 kg)   SpO2 98%   BMI 26.26 kg/m²       Weight Metrics 10/15/2019 4/16/2019 12/19/2018 10/18/2018 8/29/2018 8/27/2018 8/13/2018   Weight 143 lb 9.6 oz 139 lb 137 lb 8 oz 135 lb 11.2 oz 140 lb 8 oz - 138 lb   BMI 26.26 kg/m2 25.42 kg/m2 25.15 kg/m2 24.82 kg/m2 - 25.7 kg/m2 25.24 kg/m2       General appearance - Well nourished. Well appearing. Well developed. No acute distress. Overweight. Head - Normocephalic. Atraumatic. Eyes - pupils equal and reactive. Extraocular eye movements intact. Sclera anicteric. Mildly injected sclera. Ears - Hearing is grossly normal bilaterally. Nose - normal and patent. No polyps noted. No erythema. No discharge. Mouth - mucous membranes with adequate moisture. Posterior pharynx normal with cobblestone appearance. No erythema, white exudate or obstruction. Neck - supple. Midline trachea. No carotid bruits noted bilaterally. No thyromegaly noted. Chest - clear to auscultation bilaterally anteriorly and posteriorly. No wheezes. No rales or rhonchi. Breath sounds are symmetrical bilaterally. Unlabored respirations. Heart - bradycardia. Regular rhythm. Normal S1, S2. No rubs, clicks or gallops noted. 3/6 murmur  Abdomen - soft and distended. No masses or organomegaly. No rebound, rigidity or guarding. Bowel sounds normal x 4 quadrants.   No tenderness noted. Neurological - awake, alert and oriented to person, place, and time and event. Cranial nerves II through XII intact. Clear speech. Muscle strength is +5/5 x 4 extremities. Sensation is intact to light touch bilaterally. Steady gait. Heme/Lymph - peripheral pulses normal x 4 extremities. No peripheral edema is noted. Musculoskeletal - Intact x 4 extremities. Full ROM x 4 extremities. No pain with movement. Back exam - normal range of motion. No pain on palpation of the spinous processes in the cervical, thoracic, lumbar, sacral regions. No CVA tenderness. Skin - no rashes, erythema, ecchymosis, lacerations, abrasions, suspicious moles noted. Sparse Violet flat small ecchymotic patches noted on forearms. Psychological -   normal behavior, dress and thought processes. Good insight. Good eye contact. Normal affect. Appropriate mood. Normal speech. DATA REVIEWED    Lab Results   Component Value Date/Time    WBC 5.4 08/13/2018 12:47 PM    HGB 10.3 (L) 08/29/2018 04:05 AM    HCT 42.4 08/13/2018 12:47 PM    PLATELET 110 68/06/7634 12:47 PM    MCV 93.4 08/13/2018 12:47 PM     Lab Results   Component Value Date/Time    Sodium 142 04/16/2019 12:38 PM    Potassium 4.8 04/16/2019 12:38 PM    Chloride 101 04/16/2019 12:38 PM    CO2 26 04/16/2019 12:38 PM    Anion gap 6 08/28/2018 02:47 AM    Glucose 87 04/16/2019 12:38 PM    BUN 12 04/16/2019 12:38 PM    Creatinine 0.78 04/16/2019 12:38 PM    BUN/Creatinine ratio 15 04/16/2019 12:38 PM    GFR est AA 84 04/16/2019 12:38 PM    GFR est non-AA 73 04/16/2019 12:38 PM    Calcium 9.5 04/16/2019 12:38 PM    Bilirubin, total 0.6 04/16/2019 12:38 PM    AST (SGOT) 20 04/16/2019 12:38 PM    Alk.  phosphatase 80 04/16/2019 12:38 PM    Protein, total 7.1 04/16/2019 12:38 PM    Albumin 4.7 04/16/2019 12:38 PM    Globulin 3.4 07/10/2014 02:13 PM    A-G Ratio 2.0 04/16/2019 12:38 PM    ALT (SGPT) 15 04/16/2019 12:38 PM     Lab Results   Component Value Date/Time    Cholesterol, total 203 (H) 04/16/2019 12:38 PM    HDL Cholesterol 65 04/16/2019 12:38 PM    LDL, calculated 121 (H) 04/16/2019 12:38 PM    VLDL, calculated 17 04/16/2019 12:38 PM    Triglyceride 86 04/16/2019 12:38 PM    CHOL/HDL Ratio 3.9 09/10/2010 01:43 PM     Lab Results   Component Value Date/Time    Vitamin D 25-Hydroxy 23.7 (L) 09/28/2011 12:17 PM    VITAMIN D, 25-HYDROXY 34.7 04/16/2019 12:38 PM       Lab Results   Component Value Date/Time    Hemoglobin A1c 5.6 04/16/2019 12:38 PM    Hemoglobin A1c (POC) 5.3 10/15/2019 11:00 AM     Lab Results   Component Value Date/Time    TSH 1.460 04/16/2019 12:38 PM    TSH 1.73 08/21/2014 09:00 AM       Lab Results   Component Value Date/Time    Microalb/Creat ratio (ug/mg creat.) 6.6 04/16/2019 12:38 PM         ASSESSMENT and PLAN      ICD-10-CM ICD-9-CM    1. Diabetes mellitus type 2, diet-controlled (HCC) E11.9 250.00 HM DIABETES FOOT EXAM      AMB POC HEMOGLOBIN A1C      LIPID PANEL      METABOLIC PANEL, COMPREHENSIVE      TSH 3RD GENERATION      MICROALBUMIN, UR, RAND W/ MICROALB/CREAT RATIO      URINALYSIS W/ RFLX MICROSCOPIC      HEMOGLOBIN A1C WITH EAG    resolved with diet changes   2. Essential hypertension, benign I10 401.1 LIPID PANEL      METABOLIC PANEL, COMPREHENSIVE      TSH 3RD GENERATION      MICROALBUMIN, UR, RAND W/ MICROALB/CREAT RATIO      URINALYSIS W/ RFLX MICROSCOPIC    stable on Benazepril   3. Dyslipidemia E78.5 272.4 LIPID PANEL      METABOLIC PANEL, COMPREHENSIVE    slightly elevated   4. Leg cramps L83.9 988.96 METABOLIC PANEL, COMPREHENSIVE    due to mild dehydration vs other   5. Easy bruising R23.8 782.9 CBC W/O DIFF      METABOLIC PANEL, COMPREHENSIVE   6. Restless legs syndrome (RLS) G25.81 333.94     stable on reduced Requip 3 nights/week   7. Mild persistent asthma with acute exacerbation J45.31 493.92 albuterol (PROVENTIL HFA, VENTOLIN HFA, PROAIR HFA) 90 mcg/actuation inhaler    stable without inhalers   8. Vitamin D deficiency E55.9 268.9 VITAMIN D, 25 HYDROXY    stable   9. Aspirin intolerance Z78.9 995.27      E935.3    10. Statin intolerance Z78.9 995.27        Discussed the patient's BMI with her. The BMI follow up plan is as follows: I have counseled this patient on diet and exercise regimens. Decrease carbohydrates (white foods, sweet foods, sweet drinks and alcohol), increase green leafy vegetables and protein (lean meats and beans) with each meal.  Avoid fried foods. Eat 3-5 small meals daily. Do not skip meals. Increase water intake. Increase physical activity to 30 minutes daily for health benefit, as tolerated. Get 7-8 hours uninterrupted sleep nightly. Chart reviewed and updated. Continue current medications and care. Prescriptions written and sent to pharmacy; medication side effects discussed. Albuterol inhaler. Most recent tests reviewed from 4/16/2019. Recheck pertinent labs today. Counseled patient on health concerns:  Cholesterol, blood pressure, diabetes, leg cramps, RLS, easy bruising. Relevant handouts given and discussed with patient. Immunizations noted. Offered empathy, support, legitimation, prayers, partnership to patient. Praised patient for progress. Follow-up and Dispositions    · Return in about 6 months (around 4/15/2020) for diabetes, results. Patient was offered a choice/choices in the treatment plan today. Patient expresses understanding of the plan and agrees with recommendations. Written by allen Corado, as dictated by Dr. Wyatt Doyle DO. Documentation True and Accepted by Agus Castle. Yoly Quintanilla. There are no Patient Instructions on file for this visit.

## 2019-10-15 NOTE — PROGRESS NOTES
Bridget Naqvi  Identified pt with two pt identifiers(name and ). Chief Complaint   Patient presents with    Blood Pressure Check     Rm 12 pt is     Diabetes         1. Have you been to the ER, urgent care clinic since your last visit? Hospitalized since your last visit? NO    2. Have you seen or consulted any other health care providers outside of the [address] [place] [address] since your last visit? Include any pap smears or colon screening. NO          Weight Metrics 10/15/2019 2019 2018 10/18/2018 2018 2018 2018   Weight 143 lb 9.6 oz 139 lb 137 lb 8 oz 135 lb 11.2 oz 140 lb 8 oz - 138 lb   BMI 26.26 kg/m2 25.42 kg/m2 25.15 kg/m2 24.82 kg/m2 - 25.7 kg/m2 25.24 kg/m2         Medication reconciliation up to date and correct with patient at this time. My Chart     My chart gives you direct online access to portions of the electronic medical record (EMR) where your doctor stores your health information (ie, lab results, appointment information, medications, immunizations, and more. It is free. Would you like to set up your my chart? NO      Advance Care Planning    In the event something were to happen to you and you were unable to speak on your behalf, do you have an Advance Directive/ Living Will in place stating your wishes? YES    If yes, do we have a copy on file YES    If no, would you like information NO      ====Advance Care Planning Invitation====    Patient was invited to begin or continue Advance Care Planning on this date and was provided an ACP information folder for review. Recommended appointment with a [facility]  facilitator for ACP conversation regarding advance directives. [x] Yes  [] No  Referral sent to [facility] ACP team member or Coordinator for follow-up    [] Yes  [x] No  Patient scheduled an appointment. Site of Referral: [facility]      Today's provider has been notified of reason for visit, vitals and flowsheets obtained on patients. Reviewed record in preparation for visit, huddled with provider and have obtained necessary documentation. Health Maintenance Due   Topic    FOOT EXAM Q1     HEMOGLOBIN A1C Q6M        Wt Readings from Last 3 Encounters:   10/15/19 143 lb 9.6 oz (65.1 kg)   04/16/19 139 lb (63 kg)   12/19/18 137 lb 8 oz (62.4 kg)     Temp Readings from Last 3 Encounters:   10/15/19 97.6 °F (36.4 °C)   04/16/19 97.7 °F (36.5 °C) (Oral)   10/18/18 97.4 °F (36.3 °C)     BP Readings from Last 3 Encounters:   10/15/19 136/81   04/16/19 128/86   12/19/18 (!) 148/92     Pulse Readings from Last 3 Encounters:   10/15/19 (!) 56   04/16/19 69   10/18/18 89     Vitals:    10/15/19 1024   BP: 136/81   Pulse: (!) 56   Resp: 18   Temp: 97.6 °F (36.4 °C)   SpO2: 98%   Weight: 143 lb 9.6 oz (65.1 kg)   Height: 5' 2\" (1.575 m)   PainSc:   0 - No pain         Learning Assessment:  :     Learning Assessment 11/3/2016 4/4/2016 9/29/2015 8/19/2014 5/2/2014   PRIMARY LEARNER Patient Patient Patient Patient Patient   HIGHEST LEVEL OF EDUCATION - PRIMARY LEARNER  - - - - 4 YEARS OF COLLEGE   BARRIERS PRIMARY LEARNER - - - [facility] CAREGIVER - - - No -   PRIMARY LANGUAGE ENGLISH ENGLISH ENGLISH ENGLISH ENGLISH   LEARNER PREFERENCE PRIMARY DEMONSTRATION DEMONSTRATION DEMONSTRATION DEMONSTRATION READING     - - - LISTENING PICTURES   ANSWERED BY patient patient patient patient patient   RELATIONSHIP SELF SELF SELF SELF SELF       Depression Screening:  :     3 most recent PHQ Screens 4/16/2019   Little interest or pleasure in doing things Not at all   Feeling down, depressed, irritable, or hopeless Not at all   Total Score PHQ 2 0       Fall Risk Assessment:  :     Fall Risk Assessment, last 12 mths 4/16/2019   Able to walk? Yes   Fall in past 12 months?  No   Fall with injury? -   Number of falls in past 12 months -   Fall Risk Score -       Abuse Screening:  :     Abuse Screening Questionnaire 4/16/2019 10/18/2018 8/10/2018 10/13/2017 Do you ever feel afraid of your partner? N N N N   Are you in a relationship with someone who physically or mentally threatens you? N N N N   Is it safe for you to go home?  Y Y Y Y       ADL Screening:  :     ADL Assessment 10/18/2018   Feeding yourself No Help Needed   Getting from bed to chair No Help Needed   Getting dressed No Help Needed   Bathing or showering No Help Needed   Walk across the room (includes cane/walker) No Help Needed   Using the telphone No Help Needed   Taking your medications No Help Needed   Preparing meals No Help Needed   Managing money (expenses/bills) No Help Needed   Moderately strenuous housework (laundry) No Help Needed   Shopping for personal items (toiletries/medicines) No Help Needed   Shopping for groceries No Help Needed   Driving No Help Needed   Climbing a flight of stairs No Help Needed   Getting to places beyond walking distances No Help Needed

## 2019-10-15 NOTE — PROGRESS NOTES
Diabetic foot exam:     Left Foot:   Visual Exam: normal   Pulse DP: 2+ (normal)   Filament test: normal sensation    Vibratory sensation: normal      Right Foot:   Visual Exam: normal    Pulse DP: 2+ (normal)   Filament test: normal sensation    Vibratory sensation: normal

## 2020-04-20 ENCOUNTER — VIRTUAL VISIT (OUTPATIENT)
Dept: FAMILY MEDICINE CLINIC | Age: 80
End: 2020-04-20

## 2020-04-20 VITALS
HEART RATE: 81 BPM | SYSTOLIC BLOOD PRESSURE: 143 MMHG | HEIGHT: 62 IN | WEIGHT: 142 LBS | DIASTOLIC BLOOD PRESSURE: 81 MMHG | BODY MASS INDEX: 26.13 KG/M2

## 2020-04-20 DIAGNOSIS — Z86.010 PERSONAL HISTORY OF COLONIC POLYPS: ICD-10-CM

## 2020-04-20 DIAGNOSIS — Z13.39 SCREENING FOR ALCOHOLISM: ICD-10-CM

## 2020-04-20 DIAGNOSIS — R05.8 ACE-INHIBITOR COUGH: ICD-10-CM

## 2020-04-20 DIAGNOSIS — R29.898 WEAKNESS OF BOTH LEGS: ICD-10-CM

## 2020-04-20 DIAGNOSIS — T46.4X5A ACE-INHIBITOR COUGH: ICD-10-CM

## 2020-04-20 DIAGNOSIS — Z13.31 SCREENING FOR DEPRESSION: ICD-10-CM

## 2020-04-20 DIAGNOSIS — M21.41 BILATERAL PES PLANUS: ICD-10-CM

## 2020-04-20 DIAGNOSIS — Z78.0 POSTMENOPAUSAL STATE: ICD-10-CM

## 2020-04-20 DIAGNOSIS — Z78.9 STATIN INTOLERANCE: ICD-10-CM

## 2020-04-20 DIAGNOSIS — Z78.9 ASPIRIN INTOLERANCE: ICD-10-CM

## 2020-04-20 DIAGNOSIS — Z85.828 HISTORY OF BASAL CELL CARCINOMA (BCC): ICD-10-CM

## 2020-04-20 DIAGNOSIS — M48.061 DEGENERATIVE LUMBAR SPINAL STENOSIS: ICD-10-CM

## 2020-04-20 DIAGNOSIS — M85.88 OSTEOPENIA OF OTHER SITE: ICD-10-CM

## 2020-04-20 DIAGNOSIS — Z86.2 HISTORY OF ANEMIA: ICD-10-CM

## 2020-04-20 DIAGNOSIS — Z96.651 HISTORY OF TOTAL RIGHT KNEE REPLACEMENT: ICD-10-CM

## 2020-04-20 DIAGNOSIS — I10 ESSENTIAL HYPERTENSION, BENIGN: ICD-10-CM

## 2020-04-20 DIAGNOSIS — H91.8X1 OTHER SPECIFIED HEARING LOSS OF RIGHT EAR, UNSPECIFIED HEARING STATUS ON CONTRALATERAL SIDE: ICD-10-CM

## 2020-04-20 DIAGNOSIS — E11.9 DIABETES MELLITUS TYPE 2, DIET-CONTROLLED (HCC): ICD-10-CM

## 2020-04-20 DIAGNOSIS — Z00.00 MEDICARE ANNUAL WELLNESS VISIT, SUBSEQUENT: Primary | ICD-10-CM

## 2020-04-20 DIAGNOSIS — M21.42 BILATERAL PES PLANUS: ICD-10-CM

## 2020-04-20 DIAGNOSIS — G25.81 RESTLESS LEGS SYNDROME (RLS): ICD-10-CM

## 2020-04-20 DIAGNOSIS — Z12.31 ENCOUNTER FOR SCREENING MAMMOGRAM FOR MALIGNANT NEOPLASM OF BREAST: ICD-10-CM

## 2020-04-20 DIAGNOSIS — E55.9 VITAMIN D DEFICIENCY: ICD-10-CM

## 2020-04-20 DIAGNOSIS — H53.2 DIPLOPIA: ICD-10-CM

## 2020-04-20 DIAGNOSIS — R25.2 LEG CRAMPS: ICD-10-CM

## 2020-04-20 DIAGNOSIS — E78.5 DYSLIPIDEMIA: ICD-10-CM

## 2020-04-20 RX ORDER — BENAZEPRIL HYDROCHLORIDE 20 MG/1
TABLET ORAL
Qty: 90 TAB | Refills: 1
Start: 2020-04-20 | End: 2020-10-18

## 2020-04-20 RX ORDER — ROPINIROLE 1 MG/1
TABLET, FILM COATED ORAL
Qty: 180 TAB | Refills: 3 | Status: SHIPPED | OUTPATIENT
Start: 2020-04-20 | End: 2020-06-08

## 2020-04-20 RX ORDER — INSULIN PUMP SYRINGE, 3 ML
EACH MISCELLANEOUS
Qty: 1 KIT | Refills: 0 | Status: SHIPPED | OUTPATIENT
Start: 2020-04-20 | End: 2021-08-18

## 2020-04-20 NOTE — PATIENT INSTRUCTIONS
Here is a comparison of A1C to blood glucose levels. From the [address] 7 Tips to Get An Accurate Blood Pressure Readin. No talking 2. Empty bladder first 
3. Support back/feet 4. Keep legs uncrossed 5. Support arm at heart level 6. Put cuff on bare skin with palm upwards 7. Use correct cuff size From the American [employer] Association Learning About Tests When You Have Diabetes Why do you need regular tests? Diabetes can lead to other health problems if it's not well controlled. You'll need tests to monitor how well your diabetes is controlled and to check for other things like high cholesterol or kidney problems. Having tests on a regular schedule can help your doctor find problems early, when it's easier to manage them. What tests do you need? These are the tests you may need and how often you should have them. A1c blood test. 
This test shows the average level of blood sugar over the past 2 to 3 months. It helps your doctor see whether blood sugar levels have been staying within your target range. How often: Every 3 to 6 months Goal: A blood sugar level in your target range Blood pressure test. 
This test measures the pressure of blood flow in the arteries. Controlling blood pressure can help prevent damage to nerves and blood vessels. How often: Every 3 to 6 months Goal: A blood pressure level in your target range Cholesterol test. 
This test measures the amount of a type of fat in the blood. It is common for people with diabetes to also have high cholesterol. Too much cholesterol in the blood can build up inside the blood vessels and raise the risk for heart attack and stroke. How often: At the time of your diabetes diagnosis, and as often as your doctor recommends after that Goal: A cholesterol level in your target range Albumin-creatinine ratio test. 
This test checks for kidney damage by looking for the protein albumin (say \"al-BYOO-andrew\") in the urine. Albumin is normally found in the blood. Kidney damage can let small amounts of it (microalbumin) leak into the urine. How often: Once a year Goal: No protein in the urine Blood creatinine test/estimated glomerular filtration (eGFR). The blood creatinine (say \"bxub-QD-da-neen\") level shows how well your kidneys are working. Creatinine is a waste product that muscles release into the blood. Blood creatinine is used to estimate the glomerular filtration rate. A high level of creatinine and/or a low eGFR may mean your kidneys are not working as well as they should. How often: Once a year Goal: Normal level of creatinine in the blood. The eGFR goal is greater than 60 mL/min/1.73 m². Complete foot exam. 
The doctor checks for foot sores and whether any sensation has been lost. 
How often: Once a year Goal: Healthy feet with no foot ulcers or loss of feeling Dental exam and cleaning. The dentist checks for gum disease and tooth decay. People with high blood sugar are more likely to have these problems. How often: Every 6 months Goal: Healthy teeth and gums Complete eye exam. 
High blood sugar levels can damage the eyes. This exam is done by an ophthalmologist or optometrist. It includes a dilated eye exam. The exam shows whether there's damage to the back of the eye (diabetic retinopathy). How often: Once a year. If you don't have any signs of diabetic retinopathy, your doctor may recommend an exam every 2 years. Goal: No damage to the back of the eye Thyroid-stimulating hormone (TSH) blood test. 
This test checks for thyroid disease. Too little thyroid hormone can cause some medicines (like insulin) to stay in the body longer. This can cause low blood sugar. You may be tested if you have high cholesterol or are a woman over 48years old. How often: As part of your diabetes diagnosis, and as often as your doctor recommends after that Goal: Normal level of TSH in the blood Follow-up care is a key part of your treatment and safety. Be sure to make and go to all appointments, and call your doctor if you are having problems. It's also a good idea to know your test results and keep a list of the medicines you take. Where can you learn more? Go to http://newton-briana.info/ Enter [id number] in the search box to learn more about \"Learning About Tests When You Have Diabetes. \" Current as of: December 19, 2019Content Version: 12.4 © 1946-0321 [employer]. Care instructions adapted under license by [facility] (which disclaims liability or warranty for this information). If you have questions about a medical condition or this instruction, always ask your healthcare professional. [address] any warranty or liability for your use of this information. DASH Diet: Care Instructions Your Care Instructions The DASH diet is an eating plan that can help lower your blood pressure. DASH stands for Dietary Approaches to Stop Hypertension. Hypertension is high blood pressure. The DASH diet focuses on eating foods that are high in calcium, potassium, and magnesium. These nutrients can lower blood pressure. The foods that are highest in these nutrients are fruits, vegetables, low-fat dairy products, nuts, seeds, and legumes. But taking calcium, potassium, and magnesium supplements instead of eating foods that are high in those nutrients does not have the same effect. The DASH diet also includes whole grains, fish, and poultry. The DASH diet is one of several lifestyle changes your doctor may recommend to lower your high blood pressure. Your doctor may also want you to decrease the amount of sodium in your diet. Lowering sodium while following the DASH diet can lower blood pressure even further than just the DASH diet alone. Follow-up care is a key part of your treatment and safety.  Be sure to make and go to all appointments, and call your doctor if you are having problems. It's also a good idea to know your test results and keep a list of the medicines you take. How can you care for yourself at home? Following the DASH diet · Eat 4 to 5 servings of fruit each day. A serving is 1 medium-sized piece of fruit, ½ cup chopped or canned fruit, 1/4 cup dried fruit, or 4 ounces (½ cup) of fruit juice. Choose fruit more often than fruit juice. · Eat 4 to 5 servings of vegetables each day. A serving is 1 cup of lettuce or raw leafy vegetables, ½ cup of chopped or cooked vegetables, or 4 ounces (½ cup) of vegetable juice. Choose vegetables more often than vegetable juice. · Get 2 to 3 servings of low-fat and fat-free dairy each day. A serving is 8 ounces of milk, 1 cup of yogurt, or 1 ½ ounces of cheese. · Eat 6 to 8 servings of grains each day. A serving is 1 slice of bread, 1 ounce of dry cereal, or ½ cup of cooked rice, pasta, or cooked cereal. Try to choose whole-grain products as much as possible. · Limit lean meat, poultry, and fish to 2 servings each day. A serving is 3 ounces, about the size of a deck of cards. · Eat 4 to 5 servings of nuts, seeds, and legumes (cooked dried beans, lentils, and split peas) each week. A serving is 1/3 cup of nuts, 2 tablespoons of seeds, or ½ cup of cooked beans or peas. · Limit fats and oils to 2 to 3 servings each day. A serving is 1 teaspoon of vegetable oil or 2 tablespoons of salad dressing. · Limit sweets and added sugars to 5 servings or less a week. A serving is 1 tablespoon jelly or jam, ½ cup sorbet, or 1 cup of lemonade. · Eat less than 2,300 milligrams (mg) of sodium a day. If you limit your sodium to 1,500 mg a day, you can lower your blood pressure even more. Tips for success · Start small. Do not try to make dramatic changes to your diet all at once.  You might feel that you are missing out on your favorite foods and then be more likely to not follow the plan. Make small changes, and stick with them. Once those changes become habit, add a few more changes. · Try some of the following: ? Make it a goal to eat a fruit or vegetable at every meal and at snacks. This will make it easy to get the recommended amount of fruits and vegetables each day. ? Try yogurt topped with fruit and nuts for a snack or healthy dessert. ? Add lettuce, tomato, cucumber, and onion to sandwiches. ? Combine a ready-made pizza crust with low-fat mozzarella cheese and lots of vegetable toppings. Try using tomatoes, squash, spinach, broccoli, carrots, cauliflower, and onions. ? Have a variety of cut-up vegetables with a low-fat dip as an appetizer instead of chips and dip. ? Sprinkle sunflower seeds or chopped almonds over salads. Or try adding chopped walnuts or almonds to cooked vegetables. ? Try some vegetarian meals using beans and peas. Add garbanzo or kidney beans to salads. Make burritos and tacos with mashed keenan beans or black beans. Where can you learn more? Go to http://newton-briana.info/ Enter N868 in the search box to learn more about \"DASH Diet: Care Instructions. \" Current as of: December 15, 2019Content Version: 12.4 © 0569-1212 [employer]. Care instructions adapted under license by [employer] (which disclaims liability or warranty for this information). If you have questions about a medical condition or this instruction, always ask your healthcare professional. [place] [address] any warranty or liability for your use of this information. Medicare Wellness Visit, Female The best way to live healthy is to have a lifestyle where you eat a well-balanced diet, exercise regularly, limit alcohol use, and quit all forms of tobacco/nicotine, if applicable. Regular preventive services are another way to keep healthy.  Preventive services (vaccines, screening tests, monitoring & exams) can help personalize your care plan, which helps you manage your own care. Screening tests can find health problems at the earliest stages, when they are easiest to treat. [facility] follows the current, evidence-based guidelines published by the [facility] Lavelle Ferreira ([facility]) when recommending preventive services for our patients. Because we follow these guidelines, sometimes recommendations change over time as research supports it. (For example, mammograms used to be recommended annually. Even though Medicare will still pay for an annual mammogram, the newer guidelines recommend a mammogram every two years for women of average risk). Of course, you and your doctor may decide to screen more often for some diseases, based on your risk and your co-morbidities (chronic disease you are already diagnosed with). Preventive services for you include: - Medicare offers their members a free annual wellness visit, which is time for you and your primary care provider to discuss and plan for your preventive service needs. Take advantage of this benefit every year! 
-All adults over the age of 72 should receive the recommended pneumonia vaccines. Current USPSTF guidelines recommend a series of two vaccines for the best pneumonia protection.  
-All adults should have a flu vaccine yearly and a tetanus vaccine every 10 years.  
-All adults age 48 and older should receive the shingles vaccines (series of two vaccines). -All adults age 38-68 who are overweight should have a diabetes screening test once every three years.  
-All adults born between [birth year] and 1965 should be screened once for Hepatitis C. 
-Other screening tests and preventive services for persons with diabetes include: an eye exam to screen for diabetic retinopathy, a kidney function test, a foot exam, and stricter control over your cholesterol. -Cardiovascular screening for adults with routine risk involves an electrocardiogram (ECG) at intervals determined by your doctor.  
-Colorectal cancer screenings should be done for adults age 54-65 with no increased risk factors for colorectal cancer. There are a number of acceptable methods of screening for this type of cancer. Each test has its own benefits and drawbacks. Discuss with your doctor what is most appropriate for you during your annual wellness visit. The different tests include: colonoscopy (considered the best screening method), a fecal occult blood test, a fecal DNA test, and sigmoidoscopy. 
 
-A bone mass density test is recommended when a woman turns 65 to screen for osteoporosis. This test is only recommended one time, as a screening. Some providers will use this same test as a disease monitoring tool if you already have osteoporosis. -Breast cancer screenings are recommended every other year for women of normal risk, age 54-69. 
-Cervical cancer screenings for women over age 72 are only recommended with certain risk factors. Here is a list of your current Health Maintenance items (your personalized list of preventive services) with a due date: 
Health Maintenance Due Topic Date Due  
 Hemoglobin A1C    10/16/2019  Albumin Urine Test  04/16/2020 [address] [place] [address] Annual Well Visit  04/16/2020  Cholesterol Test   04/16/2020 Osteoporosis: Care Instructions Your Care Instructions Osteoporosis causes bones to become thin and weak. It is much more common in women than in men. Osteoporosis may be very advanced before you know you have it. Sometimes the first sign is a broken bone in the hip, spine, or wrist or sudden pain in your middle or lower back. Follow-up care is a key part of your treatment and safety. Be sure to make and go to all appointments, and call your doctor if you are having problems.  It's also a good idea to know your test results and keep a list of the medicines you take. How can you care for yourself at home? · Your doctor may prescribe a bisphosphonate, such as risedronate (Actonel) or alendronate (Fosamax), for osteoporosis. If you are taking one of these medicines by mouth: 
? Take your medicine with a full glass of water when you first get up in the morning. ? Do not lie down, eat, drink a beverage, or take any other medicine for at least 30 minutes after taking the drug. This helps prevent stomach problems. ? Do not take your medicine late in the day if you forgot to take it in the morning. Skip it, and take the usual dose the next morning. ? If you have side effects, tell your doctor. He or she may prescribe another medicine. · Get enough calcium and vitamin D. The [place] of Medicine recommends adults younger than age 46 need 1,000 mg of calcium and 600 IU of vitamin D each day. Women ages 46 to 79 need 1,200 mg of calcium and 600 IU of vitamin D each day. Men ages 46 to 79 need 1,000 mg of calcium and 600 IU of vitamin D each day. Adults 71 and older need 1,200 mg of calcium and 800 IU of vitamin D each day. It's not clear if people who already have osteoporosis need more calcium and vitamin D than this. Talk to your doctor about what's right for you. 
? Eat foods rich in calcium, like yogurt, cheese, milk, and dark green vegetables. This is a good way to get the calcium you need. You can get vitamin D from eggs, fatty fish, cereal, and milk. ? Ask your doctor if you need to take a calcium plus vitamin D supplement. You may be able to get enough calcium and vitamin D through your diet. Be careful with supplements. Adults ages [address] [facility] to 48 should not get more than 2,500 mg of calcium and 4,000 IU of vitamin D each day, whether it is from supplements and/or food. Adults ages 46 and older should not get more than 2,000 mg of calcium and 4,000 IU of vitamin D each day from supplements and/or food. · Limit alcohol to 2 drinks a day for men and 1 drink a day for women. Too much alcohol can cause health problems. · Do not smoke. Smoking puts you at a much higher risk for osteoporosis. If you need help quitting, talk to your doctor about stop-smoking programs and medicines. These can increase your chances of quitting for good. · Get regular bone-building exercise. Weight-bearing and resistance exercises keep bones healthy by working the muscles and bones against gravity. Start out at an exercise level that feels right for you. Add a little at a time until you can do the following: ? Do 30 minutes of weight-bearing exercise on most days of the week. Walking, jogging, stair climbing, and dancing are good choices. ? Do resistance exercises with weights or elastic bands 2 to 3 days a week. · Reduce your risk of falls: 
? Wear supportive shoes with low heels and nonslip soles. ? Use a cane or walker, if you need it. Use shower chairs and bath benches. Put in handrails on stairways, around your shower or tub area, and near the toilet. ? Keep stairs, porches, and walkways well lit. Use night-lights. ? Remove throw rugs and other objects that are in the way. ? Avoid icy, wet, or slippery surfaces. ? Keep a cordless phone and a flashlight with new batteries by your bed. When should you call for help? Watch closely for changes in your health, and be sure to contact your doctor if you have any problems. Where can you learn more? Go to http://newton-briana.info/ Enter K100 in the search box to learn more about \"Osteoporosis: Care Instructions. \" Current as of: August 6, 2019Content Version: 12.4 © 2359-1543 Healthwise, [employer]orporated. Care instructions adapted under license by [employer] (which disclaims liability or warranty for this information).  If you have questions about a medical condition or this instruction, always ask your healthcare professional. [place] [address] any warranty or liability for your use of this information. [address] What is a living will? A living will is a legal form you use to write down the kind of care you want at the end of your life. It is used by the health professionals who will treat you if you aren't able to decide for yourself. If you put your wishes in writing, your loved ones and others will know what kind of care you want. They won't need to guess. This can ease your mind and be helpful to others. A living will is not the same as an estate or property will. An estate will explains what you want to happen with your money and property after you die. Is a living will a legal document? A living will is a legal document. Each state has its own laws about living booth. If you move to another state, make sure that your living will is legal in the state where you now live. Or you might use a universal form that has been approved by many states. This kind of form can sometimes be completed and stored online. Your electronic copy will then be available wherever you have a connection to the Internet. In most cases, doctors will respect your wishes even if you have a form from a different state. · You don't need an  to complete a living will. But legal advice can be helpful if your state's laws are unclear, your health history is complicated, or your family can't agree on what should be in your living will. · You can change your living will at any time. Some people find that their wishes about end-of-life care change as their health changes. · In addition to making a living will, think about completing a medical power of  form. This form lets you name the person you want to make end-of-life treatment decisions for you (your \"health care agent\") if you're not able to.  Many hospitals and nursing homes will give you the forms you need to complete a living will and a medical power of . · Your living will is used only if you can't make or communicate decisions for yourself anymore. If you become able to make decisions again, you can accept or refuse any treatment, no matter what you wrote in your living will. · Your state may offer an online registry. This is a place where you can store your living will online so the doctors and nurses who need to treat you can find it right away. What should you think about when creating a living will? Talk about your end-of-life wishes with your family members and your doctor. Let them know what you want. That way the people making decisions for you won't be surprised by your choices. Think about these questions as you make your living will: · Do you know enough about life support methods that might be used? If not, talk to your doctor so you know what might be done if you can't breathe on your own, your heart stops, or you're unable to swallow. · What things would you still want to be able to do after you receive life-support methods? Would you want to be able to walk? To speak? To eat on your own? To live without the help of machines? · If you have a choice, where do you want to be cared for? In your home? At a hospital or nursing home? · Do you want certain Yazidism practices performed if you become very ill? · If you have a choice at the end of your life, where would you prefer to die? At home? In a hospital or nursing home? Somewhere else? · Would you prefer to be buried or cremated? · Do you want your organs to be donated after you die? What should you do with your living will? · Make sure that your family members and your health care agent have copies of your living will. · Give your doctor a copy of your living will to keep in your medical record. If you have more than one doctor, make sure that each one has a copy. · You may want to put a copy of your living will where it can be easily found. Where can you learn more? Go to http://newton-briana.info/. Enter W269 in the search box to learn more about \"Learning About Living [place]. \" Current as of: August 8, 2016 Content Version: 11.3 © 3583-8592 [employer]. Care instructions adapted under license by [employer] (which disclaims liability or warranty for this information). If you have questions about a medical condition or this instruction, always ask your healthcare professional. [address] any warranty or liability for your use of this information. Preventing Falls: Care Instructions Your Care Instructions Getting around your home safely can be a challenge if you have injuries or health problems that make it easy for you to fall. Loose rugs and furniture in walkways are among the dangers for many older people who have problems walking or who have poor eyesight. People who have conditions such as arthritis, osteoporosis, or dementia also have to be careful not to fall. You can make your home safer with a few simple measures. Follow-up care is a key part of your treatment and safety. Be sure to make and go to all appointments, and call your doctor if you are having problems. It's also a good idea to know your test results and keep a list of the medicines you take. How can you care for yourself at home? Taking care of yourself · You may get dizzy if you do not drink enough water. To prevent dehydration, drink plenty of fluids, enough so that your urine is light yellow or clear like water. Choose water and other caffeine-free clear liquids. If you have kidney, heart, or liver disease and have to limit fluids, talk with your doctor before you increase the amount of fluids you drink. · Exercise regularly to improve your strength, muscle tone, and balance. Walk if you can. Swimming may be a good choice if you cannot walk easily. · Have your vision and hearing checked each year or any time you notice a change. If you have trouble seeing and hearing, you might not be able to avoid objects and could lose your balance. · Know the side effects of the medicines you take. Ask your doctor or pharmacist whether the medicines you take can affect your balance. Sleeping pills or sedatives can affect your balance. · Limit the amount of alcohol you drink. Alcohol can impair your balance and other senses. · Ask your doctor whether calluses or corns on your feet need to be removed. If you wear loose-fitting shoes because of calluses or corns, you can lose your balance and fall. · Talk to your doctor if you have numbness in your feet. Preventing falls at home · Remove raised doorway thresholds, throw rugs, and clutter. Repair loose carpet or raised areas in the floor. · Move furniture and electrical cords to keep them out of walking paths. · Use nonskid floor wax, and wipe up spills right away, especially on ceramic tile floors. · If you use a walker or cane, put rubber tips on it. If you use crutches, clean the bottoms of them regularly with an abrasive pad, such as steel wool. · Keep your house well lit, especially [place], and outside walkways. Use night-lights in areas such as hallways and bathrooms. Add extra light switches or use remote switches (such as switches that go on or off when you clap your hands) to make it easier to turn lights on if you have to get up during the night. · Install sturdy handrails on stairways. · Move items in your cabinets so that the things you use a lot are on the lower shelves (about waist level). · Keep a cordless phone and a flashlight with new batteries by your bed. If possible, put a phone in each of the main rooms of your house, or carry a cell phone in case you fall and cannot reach a phone.  Or, you can wear a device around your neck or wrist. You push a button that sends a signal for help. · Wear low-heeled shoes that fit well and give your feet good support. Use footwear with nonskid soles. Check the heels and soles of your shoes for wear. Repair or replace worn heels or soles. · Do not wear socks without shoes on wood floors. · Walk on the grass when the sidewalks are slippery. If you live in an area that gets snow and ice in the winter, sprinkle salt on slippery steps and sidewalks. Preventing falls in the bath · Install grab bars and nonskid mats inside and outside your shower or tub and near the toilet and sinks. · Use shower chairs and bath benches. · Use a hand-held shower head that will allow you to sit while showering. · Get into a tub or shower by putting the weaker leg in first. Get out of a tub or shower with your strong side first. 
· Repair loose toilet seats and consider installing a raised toilet seat to make getting on and off the toilet easier. · Keep your bathroom door unlocked while you are in the shower. Where can you learn more? Go to http://newton-briana.info/. Enter [phone] [id number] [phone] in the search box to learn more about \"Preventing Falls: Care Instructions. \" Current as of: March 16, 2018 Content Version: 11.8 © 4245-9685 [employer] Care instructions adapted under license by [employer] (which disclaims liability or warranty for this information). If you have questions about a medical condition or this instruction, always ask your healthcare professional. [place] [address] any warranty or liability for your use of this information.

## 2020-04-20 NOTE — PROGRESS NOTES
Mateo Dupree is a 78 y.o. female  Chief Complaint   Patient presents with    Diabetes    Blood Pressure Check    Annual Wellness Visit    Referral Follow Up     Health Maintenance Due   Topic Date Due    A1C test (Diabetic or Prediabetic)  10/16/2019    MICROALBUMIN Q1  04/16/2020    Medicare Yearly Exam  04/16/2020    Lipid Screen  04/16/2020     Visit Vitals  /81   Pulse 81   Ht 5' 2\" (1.575 m)   Wt 142 lb (64.4 kg)   BMI 25.97 kg/m²     1. Have you been to the ER, urgent care clinic since your last visit? Hospitalized since your last visit? No    2. Have you seen or consulted any other health care providers outside of the [address] [place] [address] since your last visit? Include any pap smears or colon screening. Yes  dermatology - facetime VV.     [facility] [place]

## 2020-04-20 NOTE — PROGRESS NOTES
Addendum was created due to premature closure of this document caused by a glitch in [facility] as upgrades were being implemented during Covid pandemic./ Vita Haines    This is the Subsequent Medicare Annual Wellness Exam, performed 12 months or more after the Initial AWV or the last Subsequent AWV    Consent: Linnea Manzano, who was seen by synchronous (real-time) audio-video technology, and/or her healthcare decision maker, is aware that this patient-initiated, Telehealth encounter on 4/20/2020 is a billable service. While AWVs are fully covered by Medicare, any services rendered on this date that are not included in an AWV are subject to additional billing, with coverage as determined by her insurance carrier. She is aware that she may receive a bill for any such additional services and has provided verbal consent to proceed: Yes. I have reviewed the patient's medical history in detail and updated the computerized patient record.      History     Patient Active Problem List   Diagnosis Code    Menopause Z78.0    Essential hypertension, benign I10    Fine tremor G25.2    Dysphagia R13.10    Actinic keratosis L57.0    Family history of thyroid disease Z80.51    Family history of diabetes mellitus Z83.3    Family history of heart attack Z82.49    Dyslipidemia E78.5    Vitamin D deficiency E55.9    Asthma J45.909    Hyperinsulinemia E16.1    Muscle cramps R25.2    Degenerative lumbar spinal stenosis M48.061    Personal history of colonic polyps Z86.010    Lactose intolerance E73.9    Restless legs syndrome (RLS) G25.81    Chronic pain of both knees M25.561, M25.562, G89.29    Osteopenia M85.80    Statin intolerance Z78.9    Insomnia secondary to chronic pain G89.29, G47.01    Advance care planning Z71.89    Family history of thyroid disease in grandmother Z80.51   [facility] Jerky body movements R25.8    Lumbar stenosis with neurogenic claudication M48.062    ACE-inhibitor cough R05, T46.4X5A    Diabetes mellitus type 2, diet-controlled (HCC) E11.9    Carpal tunnel syndrome of left wrist G56.02    Primary osteoarthritis of right knee M17.11    Pseudophakia of both eyes Z96.1    Bilateral pes planus M21.41, M21.42     Past Medical History:   Diagnosis Date    AC (acromioclavicular) joint bone spurs 01/2014    in back. Dr. Mario Alberto Lopez. Txd with shots x 3    Actinic keratosis 2015    Dr. Curry Muro. Dr. Con Brar.  Allergic rhinitis, cause unspecified     Anxiety state, unspecified     Arthritis     R KNEE BONE-ON-BONE; R HAND-----OSTEO    Asthma     BRONCHITIS IN SPRING & FALL MOST YEARS    BCC (basal cell carcinoma), face 1980s (nose), 01/28/15 (forehead)    Dr. Celestino Mcclendon. Dr. Kb Graham.  Bilateral pes planus 03/10/2020    Dr. David Dietrich 2007    Dr. Nicolas Thomas    Chronic insomnia     Chronic low back pain 01/2014    Dr. Maximo Bose. DJD and spurs, Narrowing of L 3,4,5. Dr. Mario Alberto Lopez.  Chronic obstructive pulmonary disease (HCC)     CHRONIC (TWICE-YEARLY) BRONCHITIS    Chronic pain     Colon polyps 11/2005, 01/29/09    benign. Dr. Anshul Reddy    Constipation     Degenerative lumbar spinal stenosis 01/2014    Dr. Arabella Pérez Narrowing of L 3,4,5    Diabetes mellitus type 2, diet-controlled ([address] 10/13/2017    LOST WEIGHT AND IS NOT DIABETIC    Dyslipidemia     Dysphagia 09/2014    due to Esophagitis. Dr. Marcello La.  Esophagitis 09/29/2014    ESOPHAGITIS    Essential hypertension, benign     Foot fracture, left 2009    stress.  Foot pain, bilateral 11/13/2013    Dr. Luigi Leblanc.  Hearing loss     Dr. Sapna Mosley.  Knee pain, bilateral 10/28/13    Dr. Abel Owusu. Right > Left. Severe OA. Txd with PT.    Lumbar stenosis with neurogenic claudication     Menopause 1976    Migraine     NONE SINCE AGE 37    Mixed stress and urge urinary incontinence     NO LEAKAGE; GETS UP SEVERAL TIMES A NIGHT, PT STATES ON 10/3/16    OA (osteoarthritis) of knee     Dr. Kingsley Brought    Osteopenia 08/24/2015    Restless legs syndrome (RLS) 09/2015    Dr. Sheila Portillo.  Shoulder joint dislocation 06/2011    Right. due to fall. Dr. Purcell Ice Sleep apnea     undiagnosed    Syncope 06/25/11    due to fall down 5 steps. Right occipital head trauma.  Tremor of both hands 09/2015    Dr. Anjali Pérez GABAPENTIN      Past Surgical History:   Procedure Laterality Date    HX CARPAL TUNNEL RELEASE Left 04/03/2018    Dr. Chuck Prather     HX CATARACT REMOVAL Bilateral , R 04/10/17    Dr. Rosa Elena Linares COLONOSCOPY  11/2005, 01/29/09, 09/16/15    with polypectomy. Dr. Mariah Tan q5 years    HX ENDOSCOPY  09/2014    with Esophageal dilation. due to dysphagia. Dr. Shamika Hernandez.  HX KNEE REPLACEMENT Right 08/27/2018    Dr. Dali Shah  10/18/2016    HX MALIGNANT SKIN LESION EXCISION  01/28/15    BCC. L Forehead. MOHs SURGERY. Dr. Leonarda Chapa.  HX RAIMUNDO AND BSO  1976    due to fibroids    HX TONSILLECTOMY  1948    OR COMBINED ANT/POST COLPORRHAPHY  02/28/05    with enterocele RE. Dr. Mansi Frank and Dr. Bran Main     Current Outpatient Medications   Medication Sig Dispense Refill    HYLAN G-F 20 IX by Intra artICUlar route.  Blood-Glucose Meter monitoring kit With Diabetic Supplies 1 Kit 0    benazepriL (LOTENSIN) 20 mg tablet take 1 tablet by mouth once daily  Indications: high blood pressure 90 Tab 1    albuterol (PROVENTIL HFA, VENTOLIN HFA, PROAIR HFA) 90 mcg/actuation inhaler Take 2 Puffs by inhalation every four (4) hours as needed for Wheezing or Shortness of Breath.  Indications: bronchospasm prevention 1 Inhaler 5    rOPINIRole (REQUIP) 1 mg tablet Take 1/2 tablet in morning and at lunch and a full tablet at bedtime  Indications: Extreme Discomfort in Calves when Sitting or Lying Down 180 Tab 3    Multivitamins with Fluoride (MULTI-VITAMIN PO) Take 1 Tab by mouth daily (after breakfast).  glucosamine-chondroitin (ARTHX) 500-400 mg cap Take 1 Cap by mouth daily.  niacin/vit B2/vits A,C,E/min (VIT A-VIT Y9-Z8-X-E-MINERALS PO) Take  by mouth.  cyanocobalamin (VITAMIN B-12) 500 mcg tablet Take 500 mcg by mouth daily.  ascorbic acid, vitamin C, (VITAMIN C) 500 mg tablet Take  by mouth.  diphenhydrAMINE (BENADRYL) 25 mg capsule Take 25 mg by mouth every six (6) hours as needed.  turmeric root extract 500 mg cap Take  by mouth.  garlic 8,673 mg cap Take  by mouth.  aspirin delayed-release 81 mg tablet Take  by mouth.  omeprazole (PRILOSEC) 40 mg capsule Take 20 mg by mouth daily. 0    fluticasone (FLONASE) 50 mcg/actuation nasal spray 2 Sprays by Both Nostrils route daily. (Patient taking differently: 2 Sprays by Both Nostrils route daily. Uses Seasonal- Spring & Fall) 1 Bottle 5    omega-3s/dha/epa/fish oil/D3 (VITAMIN-D + OMEGA-3 PO) Take  by mouth.        Allergies   Allergen Reactions    Other Food Itching     If eats large quantities over several days causes itching: tomatoes, peaches, strawberry, fig    Bee Sting [Sting, Bee] Swelling    Pcn [Penicillins] Hives     IN CHILDHOOD    Percocet [Oxycodone-Acetaminophen] Other (comments)      Other (comments)\"hellish nightmares\"      Shellfish Containing Products Itching    Zoster Vaccine Live Swelling     Severe Right arm swelling with redness after administered by pharmacist in elbow    Atarax [Hydroxyzine Hcl] Other (comments)     jittery    Buspar [Buspirone] Nausea Only    Crestor [Rosuvastatin] Myalgia    Hydrochlorothiazide Myalgia     Other reaction(s): sorethroat    Hydroxyzine Other (comments)     jittery    Mobic [Meloxicam] Other (comments)     Easy bruising    Norco [Hydrocodone-Acetaminophen] Nausea and Vomiting                Family History   Problem Relation Age of Onset    Hypertension Mother     Dementia Mother         alzheimers    Hypertension Father     High Cholesterol Father     Kidney Disease Father         1 kidney    Emphysema Father         O2 requiring    Hypertension Sister     Diabetes Maternal Grandmother     Dementia Maternal Grandmother     Thyroid Disease Maternal Grandmother     Thyroid Disease Daughter         goiter    Heart Attack Maternal Uncle 46        massive    Heart Attack Maternal Uncle 39        massive    Heart Attack Maternal Aunt 51        massive    Thyroid Disease Daughter         thyroid cyst    Other Brother          AT 1DAYS OLD    Anesth Problems Neg Hx      Social History     Tobacco Use    Smoking status: Never Smoker    Smokeless tobacco: Never Used   Substance Use Topics    Alcohol use: No       Depression Risk Factor Screening:     3 most recent PHQ Screens 2020   Little interest or pleasure in doing things Not at all   Feeling down, depressed, irritable, or hopeless Not at all   Total Score PHQ 2 0       Alcohol Risk Factor Screening:   Do you average 1 drink per night or more than 7 drinks a week:  No    On any one occasion in the past three months have you have had more than 3 drinks containing alcohol:  No      Functional Ability and Level of Safety:   Hearing: The patient needs further evaluation. she has slight hearing loss in one ear. she wants a referral    Activities of Daily Living: The home contains: handrails and grab bars  Patient does total self care    Ambulation: with mild difficulty and due to BL leg weakness    Fall Risk:  Fall Risk Assessment, last 12 mths 2020   Able to walk? Yes   Fall in past 12 months?  No   Fall with injury? -   Number of falls in past 12 months -   Fall Risk Score -       Abuse Screen:  Patient is not abused    Cognitive Screening   Has your family/caregiver stated any concerns about your memory: no  Cognitive Screening: AAOx4    Patient Care Team   Patient Care Team:  Angelia Stoll DO as PCP - General  Gavino Villalobos DO as PCP - [facility] Empaneled Provider  Pam Dubois MD as Physician (Radiology)  Elizabeth De La Fuente MD (Pain Management)  Toan Esparza MD (Dermatology)  London Nuñez MD (Neurology)  Ruthanne Lennox, MD (Colon and Rectal Surgery)  Aaron Constantino MD (Otolaryngology)  Paulino Maurer MD (Orthopedic Surgery)  Casa Manzanares MD (Ophthalmology)  Theodore Hurtado MD (Gastroenterology)  Pilar Gandhi MD (Orthopedic Surgery)  Carline Flower DPM (Podiatry)  Maribeth Esparza MD (Ophthalmology)    Assessment/Plan   Education and counseling provided:  Are appropriate based on today's review and evaluation  End-of-Life planning (with patient's consent)  Pneumococcal Vaccine  Influenza Vaccine  Screening Mammography  Screening Pap and pelvic (covered once every 2 years)  Colorectal cancer screening tests  Cardiovascular screening blood test  Bone mass measurement (DEXA)  Screening for glaucoma  Diabetes screening test    Diagnoses and all orders for this visit:    1. Medicare annual wellness visit, subsequent    2. Diabetes mellitus type 2, diet-controlled (HCC)  -     Blood-Glucose Meter monitoring kit; With Diabetic Supplies  -     [facility] [employer] GLUCOSE FLOWSHEET    3. Essential hypertension, benign  -     [facility] [employer] BP FLOWSHEET  -     benazepriL (LOTENSIN) 20 mg tablet; take 1 tablet by mouth once daily  Indications: high blood pressure    4. Leg cramps  Comments:  due to knees vs pes planus vs vit d vs other    5. Vitamin D deficiency    6. Bilateral pes planus  Comments:  improved with shoe inserts    7. Dyslipidemia    8. Osteopenia of other site    9. History of basal cell carcinoma (BCC)    10. Diplopia  Comments:  unilateral with reading    11. Degenerative lumbar spinal stenosis    12. Aspirin intolerance    13. Statin intolerance    14. ACE-inhibitor cough    15. History of total right knee replacement    16.  Weakness of both legs  Comments:  due to knees vs under use vs other    17. Personal history of colonic polyps  -     REFERRAL TO COLON AND RECTAL SURGERY    18. Screening for alcoholism  -     [place] ANNUAL ALCOHOL SCREEN 15 MIN    19. Screening for depression  -     [place] [id number]    20. Encounter for screening mammogram for malignant neoplasm of breast  -     [facility] MAMMO BI SCREENING INCL CAD; Future    21. Postmenopausal state  -     DEXA BONE DENSITY STUDY AXIAL; Future    22. Other specified hearing loss of right ear, unspecified hearing status on contralateral side  -     REFERRAL TO ENT-OTOLARYNGOLOGY    23. History of anemia  Comments:  stable    24. Restless legs syndrome (RLS)  -     rOPINIRole (REQUIP) 1 mg tablet; Take 1/2 tablet in morning and at lunch and a full tablet at bedtime  Indications: restless legs syndrome, an extreme discomfort in the calf muscles when sitting or lying down        Health Maintenance Due   Topic Date Due    A1C test (Diabetic or Prediabetic)  10/16/2019    MICROALBUMIN Q1  04/16/2020    Medicare Yearly Exam  04/16/2020    Lipid Screen  04/16/2020       Ciro Burns is a 78 y.o. female being evaluated by a video visit encounter for concerns as above. A caregiver was present when appropriate. Due to this being a TeleHealth encounter (During [place] [address] public health emergency), evaluation of the following organ systems was limited: Vitals/Constitutional/EENT/Resp/CV/GI//MS/Neuro/Skin/Heme-Lymph-Imm. Pursuant to the emergency declaration under the [facility], [facility] waiver authority and the [employer] Act, this Virtual  Visit was conducted, with patient's (and/or legal guardian's) consent, to reduce the patient's risk of exposure to COVID-19 and provide necessary medical care.      Services were provided through a video synchronous discussion virtually to substitute for in-person clinic visit. Patient and provider were located at their individual homes.     Latrice Weber DO

## 2020-04-20 NOTE — PROGRESS NOTES
VIRTUAL VISIT      Pursuant to the emergency declaration under the [address] and [employer], 1135 waiver authority and the [employer] and [employer] Act, this Virtual Visit was conducted, with patient's consent, to reduce the patient's risk of exposure to COVID-19 and provide continuity of care for an established patient. Services were provided through a video synchronous discussion virtually to substitute for in-person appointment. Consent:  She and/or her healthcare decision maker is aware that this patient-initiated Telehealth encounter is a billable service, with coverage as determined by her insurance carrier. She is aware that she may receive a bill and has provided verbal consent to proceed: Yes    HISTORY OF PRESENT ILLNESS  Jeni Chand is a 78 y.o. female presents with Diabetes; Blood Pressure Check; Annual Wellness Visit; and Referral Follow Up    Agree with nurse note. Pt with type 2 DM, hypertension, aspirin intolerance, statin intolerance, ACE-inhibitor cough, lumbar spinal stenosis, dyslipidemia, postmenopausal state, and vit d deficiency with a BP of 143/81, per pt. Patient denies vision changes, headaches, dizziness, chest pain, SOB, or swelling. intolerance, and statin intolerance presents to the office with a BP of 136/81. She takes Lotensin 20 mg daily for BP; tolerating well. She notes Dori has given her two 90 days supplies in the last month. She takes aspirin 81 mg daily. A1C was 5.3 on 10/15/2019. To recall results from 4/16/2019. Vit d 34.7, TSH 1.46, urine microalbumin 3.8, Cr 0.78, LFTs wnl, , HDL 65, trigs 86. Pt saw podiatrist Dr. Nehemias Weldon on 3/10/2020 for leg cramps. Dx'd with BL pes planus. Bought arches for her shoes, which helped. She takes Requip 1 mg qPM for RLS and Hylan for leg cramps. Pt complains of BL leg weakness. She has to push herself up with her arms to stand from the toilet.  She is s/p total right knee replacement. She notes her gym has been closed for a few weeks so she is not exercising as much. Pt with a hx of basal cell carcinoma in the 1980s on the nose and 2018 on the forehead. Pt saw derm Dr. Amelia Gaston on 4/14/2020 for a biopsy of a mole on the forehead that she thinks is potentially cancerous. Pt with hearing loss of the R ear requests a new referral to Dr. Michelle Mederos who she has seen in the past.     Health Maintenance    Subsequent MWV completed today. Pt's most recent colonoscopy with polypectomy was on 9/16/2015 with Dr. Marques Dave. F/U 5 year. Pt had an eye exam on 1/20/2020 with Dr. Ric Khan for diplopia. Recommending prisms but she is not interested in prisms currently. F/u prn. Pt's most recent mammogram was on 10/3/2019 with [facility]. Negative. F/U 1 year. DEXA on 10/13/2017 showed osteopenia. Written by Michela Cowden, scribe, as dictated by DO. ANAHI Perez    Review of Systems negative except as noted above in HPI.     ALLERGIES:    Allergies   Allergen Reactions    Other Food Itching     If eats large quantities over several days causes itching: tomatoes, peaches, strawberry, fig    Bee Sting [Sting, Bee] Swelling    Pcn [Penicillins] Hives     IN CHILDHOOD    Percocet [Oxycodone-Acetaminophen] Other (comments)      Other (comments)\"hellish nightmares\"      Shellfish Containing Products Itching    Zoster Vaccine Live Swelling     Severe Right arm swelling with redness after administered by pharmacist in elbow    Atarax [Hydroxyzine Hcl] Other (comments)     jittery    Buspar [Buspirone] Nausea Only    Crestor [Rosuvastatin] Myalgia    Hydrochlorothiazide Myalgia     Other reaction(s): sorethroat    Hydroxyzine Other (comments)     jittery    Mobic [Meloxicam] Other (comments)     Easy bruising    Norco [Hydrocodone-Acetaminophen] Nausea and Vomiting                CURRENT MEDICATIONS: Outpatient Medications Marked as Taking for the 4/20/20 encounter (Virtual Visit) with Maribell Janet, DO   Medication Sig Dispense Refill    HYLAN G-F 20 IX by Intra artICUlar route.  Blood-Glucose Meter monitoring kit With Diabetic Supplies 1 Kit 0    benazepriL (LOTENSIN) 20 mg tablet take 1 tablet by mouth once daily  Indications: high blood pressure 90 Tab 1    rOPINIRole (REQUIP) 1 mg tablet Take 1/2 tablet in morning and at lunch and a full tablet at bedtime  Indications: restless legs syndrome, an extreme discomfort in the calf muscles when sitting or lying down 180 Tab 3    albuterol (PROVENTIL HFA, VENTOLIN HFA, PROAIR HFA) 90 mcg/actuation inhaler Take 2 Puffs by inhalation every four (4) hours as needed for Wheezing or Shortness of Breath. Indications: bronchospasm prevention 1 Inhaler 5    Multivitamins with Fluoride (MULTI-VITAMIN PO) Take 1 Tab by mouth daily (after breakfast).  glucosamine-chondroitin (ARTHX) 500-400 mg cap Take 1 Cap by mouth daily.  niacin/vit B2/vits A,C,E/min (VIT A-VIT W6-P8-M-E-MINERALS PO) Take  by mouth.  cyanocobalamin (VITAMIN B-12) 500 mcg tablet Take 500 mcg by mouth daily.  ascorbic acid, vitamin C, (VITAMIN C) 500 mg tablet Take  by mouth.  diphenhydrAMINE (BENADRYL) 25 mg capsule Take 25 mg by mouth every six (6) hours as needed.  turmeric root extract 500 mg cap Take  by mouth.  garlic 3,405 mg cap Take  by mouth.  aspirin delayed-release 81 mg tablet Take  by mouth.  omeprazole (PRILOSEC) 40 mg capsule Take 20 mg by mouth daily. 0    fluticasone (FLONASE) 50 mcg/actuation nasal spray 2 Sprays by Both Nostrils route daily. (Patient taking differently: 2 Sprays by Both Nostrils route daily. Uses Seasonal- Spring & Fall) 1 Bottle 5       PAST MEDICAL HISTORY:    Past Medical History:   Diagnosis Date    AC (acromioclavicular) joint bone spurs 01/2014    in back. Dr. Joe High.   Txd with shots x 3    Actinic keratosis 2015    Dr. Felipe Ramsey. Dr. Siomara Mcfarland.  Allergic rhinitis, cause unspecified     Anxiety state, unspecified     Arthritis     R KNEE BONE-ON-BONE; R HAND-----OSTEO    Asthma     BRONCHITIS IN [place] MOST YEARS    BCC (basal cell carcinoma), face 1980s (nose), 01/28/15 (forehead)    Dr. Luis Tomas. Dr. Tiburcio Iraheta.  Bilateral pes planus 03/10/2020    Dr. Karrie Wall 2007    Dr. Lc Polk    Chronic insomnia     Chronic low back pain 01/2014    Dr. Patricio Hampton. DJD and spurs, Narrowing of L 3,4,5. Dr. David Cronin.  Chronic obstructive pulmonary disease (HCC)     CHRONIC (TWICE-YEARLY) BRONCHITIS    Chronic pain     Colon polyps 11/2005, 01/29/09    benign. Dr. Long Pizarro    Constipation     Degenerative lumbar spinal stenosis 01/2014    Dr. Suhail Basilio Narrowing of L 3,4,5    Diabetes mellitus type 2, diet-controlled ([address] 10/13/2017    LOST WEIGHT AND IS NOT DIABETIC    Dyslipidemia     Dysphagia 09/2014    due to Esophagitis. Dr. Rad Magaña.  Esophagitis 09/29/2014    ESOPHAGITIS    Essential hypertension, benign     Foot fracture, left 2009    stress.  Foot pain, bilateral 11/13/2013    Dr. Lauren Cooper.  Hearing loss     Dr. Lacy Sorenson.  Knee pain, bilateral 10/28/13    Dr. Aryan Pereira. Right > Left. Severe OA. Txd with PT.    Lumbar stenosis with neurogenic claudication     Menopause 1976    Migraine     NONE SINCE AGE 37    Mixed stress and urge urinary incontinence     NO LEAKAGE; GETS UP SEVERAL TIMES A NIGHT, PT STATES ON 10/3/16    OA (osteoarthritis) of knee     Dr. Aryan Pereira    Osteopenia 08/24/2015    Restless legs syndrome (RLS) 09/2015    Dr. Julissa Michelle.  Shoulder joint dislocation 06/2011    Right. due to fall. Dr. Artemio Collins Sleep apnea     undiagnosed    Syncope 06/25/11    due to fall down 5 steps. Right occipital head trauma.     Tremor of both hands 09/2015    Dr. David Brito PT STATES THIS CAME ON AFTER STARTING GABAPENTIN       PAST SURGICAL HISTORY:    Past Surgical History:   Procedure Laterality Date    HX CARPAL TUNNEL RELEASE Left 2018    Dr. Wayne Beck     HX CATARACT REMOVAL Bilateral , R 04/10/17    Dr. Promise Scruggs COLONOSCOPY  2005, 09, 09/16/15    with polypectomy. Dr. Jeff Long q5 years    HX ENDOSCOPY  2014    with Esophageal dilation. due to dysphagia. Dr. Alea Winn.  HX KNEE REPLACEMENT Right 2018    Dr. Kelvin Horne  10/18/2016    HX MALIGNANT SKIN LESION EXCISION  01/28/15    BCC. L Forehead. MOHs SURGERY. Dr. Ninoska Mccord.  HX RAIMUNDO AND BSO      due to fibroids    HX TONSILLECTOMY      RI COMBINED ANT/POST COLPORRHAPHY  05    with enterocele RE.   Dr. Karina Matamoros and Dr. Brooke Davis:    Family History   Problem Relation Age of Onset    Hypertension Mother     Dementia Mother         alzheimers    Hypertension Father     High Cholesterol Father     Kidney Disease Father         1 kidney    Emphysema Father         O2 requiring    Hypertension Sister     Diabetes Maternal Grandmother     Dementia Maternal Grandmother     Thyroid Disease Maternal Grandmother     Thyroid Disease Daughter         goiter    Heart Attack Maternal Uncle 51        massive    Heart Attack Maternal Uncle 39        massive    Heart Attack Maternal Aunt 51        massive    Thyroid Disease Daughter         thyroid cyst    Other Brother          AT 1DAYS OLD    Anesth Problems Neg Hx        SOCIAL HISTORY:    Social History     Socioeconomic History    Marital status:      Spouse name: Not on file    Number of children: Not on file    Years of education: Not on file    Highest education level: Not on file   Tobacco Use    Smoking status: Never Smoker    Smokeless tobacco: Never Used   Substance and Sexual Activity    Alcohol use: No    Drug use: No    Sexual activity: Not Currently     Partners: Male     Birth control/protection: Surgical       IMMUNIZATIONS:    Immunization History   Administered Date(s) Administered    (RETIRED) Pneumococcal Vaccine (Unspecified Type) 09/17/1997, 10/22/2004    Influenza High Dose Vaccine PF 10/29/2013, 09/20/2014, 09/02/2015, 10/07/2016, 10/13/2017, 10/25/2018, 09/23/2019    Pneumococcal Conjugate (PCV-13) 10/13/2017    Pneumococcal Polysaccharide (PPSV-23) 04/17/2019    TD Vaccine 09/07/2004    Zoster 01/21/2009    Zoster Recombinant 10/15/2019, 01/08/2020         PHYSICAL EXAMINATION (PER VISUAL INSPECTION AND INSTRUCTIONS GIVEN TO PATIENT TO PERFORM)    Due to this being a TeleHealth encounter (During [facility]- public health emergency), evaluation of the following organ systems was limited to:     Vital Signs    Visit Vitals  /81   Pulse 81   Ht 5' 2\" (1.575 m)   Wt 142 lb (64.4 kg)   BMI 25.97 kg/m²       Weight Metrics 4/20/2020 10/15/2019 4/16/2019 12/19/2018 10/18/2018 8/29/2018 8/27/2018   Weight 142 lb 143 lb 9.6 oz 139 lb 137 lb 8 oz 135 lb 11.2 oz 140 lb 8 oz -   BMI 25.97 kg/m2 26.26 kg/m2 25.42 kg/m2 25.15 kg/m2 24.82 kg/m2 - 25.7 kg/m2       General appearance - Well nourished. Well appearing. Well developed. No acute distress. Overweight. Head - Normocephalic. Atraumatic. Eyes - Extraocular eye movements intact. Sclera anicteric. Mildly injected sclera. Ears - Hearing is grossly normal bilaterally. Nose - normal and patent. No discharge. Chest - Unlabored respirations. Heart - normal rate. Neurological - awake, alert and oriented to person, place, and time and event. Cranial nerves II through XII intact. Clear speech. Steady gait including stair climbing. Musculoskeletal - No pain with movement. Psychological -   normal behavior, dress and thought processes. Good insight. Good eye contact. Normal affect. Appropriate mood.   Normal speech. DATA REVIEWED    Lab Results   Component Value Date/Time    WBC 5.4 08/13/2018 12:47 PM    HGB 10.3 (L) 08/29/2018 04:05 AM    HCT 42.4 08/13/2018 12:47 PM    PLATELET 074 54/95/6937 12:47 PM    MCV 93.4 08/13/2018 12:47 PM     Lab Results   Component Value Date/Time    Sodium 142 04/16/2019 12:38 PM    Potassium 4.8 04/16/2019 12:38 PM    Chloride 101 04/16/2019 12:38 PM    CO2 26 04/16/2019 12:38 PM    Anion gap 6 08/28/2018 02:47 AM    Glucose 87 04/16/2019 12:38 PM    BUN 12 04/16/2019 12:38 PM    Creatinine 0.78 04/16/2019 12:38 PM    BUN/Creatinine ratio 15 04/16/2019 12:38 PM    GFR est AA 84 04/16/2019 12:38 PM    GFR est non-AA 73 04/16/2019 12:38 PM    Calcium 9.5 04/16/2019 12:38 PM    Bilirubin, total 0.6 04/16/2019 12:38 PM    AST (SGOT) 20 04/16/2019 12:38 PM    Alk. phosphatase 80 04/16/2019 12:38 PM    Protein, total 7.1 04/16/2019 12:38 PM    Albumin 4.7 04/16/2019 12:38 PM    Globulin 3.4 07/10/2014 02:13 PM    A-G Ratio 2.0 04/16/2019 12:38 PM    ALT (SGPT) 15 04/16/2019 12:38 PM     Lab Results   Component Value Date/Time    Cholesterol, total 203 (H) 04/16/2019 12:38 PM    HDL Cholesterol 65 04/16/2019 12:38 PM    LDL, calculated 121 (H) 04/16/2019 12:38 PM    VLDL, calculated 17 04/16/2019 12:38 PM    Triglyceride 86 04/16/2019 12:38 PM    CHOL/HDL Ratio 3.9 09/10/2010 01:43 PM     Lab Results   Component Value Date/Time    Vitamin D 25-Hydroxy 23.7 (L) 09/28/2011 12:17 PM    VITAMIN D, 25-HYDROXY 34.7 04/16/2019 12:38 PM       Lab Results   Component Value Date/Time    Hemoglobin A1c 5.6 04/16/2019 12:38 PM    Hemoglobin A1c (POC) 5.3 10/15/2019 11:00 AM     Lab Results   Component Value Date/Time    TSH 1.460 04/16/2019 12:38 PM    TSH 1.73 08/21/2014 09:00 AM       Lab Results   Component Value Date/Time    Microalb/Creat ratio (ug/mg creat.) 6.6 04/16/2019 12:38 PM         ASSESSMENT and PLAN      ICD-10-CM ICD-9-CM    1.  Medicare annual wellness visit, subsequent Z00.00 V70.0 2. Diabetes mellitus type 2, diet-controlled (HCC) E11.9 250.00 Blood-Glucose Meter monitoring kit      [facility] [employer] GLUCOSE FLOWSHEET   3. Essential hypertension, benign I10 401.1 [email or web address] BP FLOWSHEET      benazepriL (LOTENSIN) 20 mg tablet   4. Leg cramps R25.2 729.82     due to knees vs pes planus vs vit d vs other   5. Vitamin D deficiency E55.9 268.9    6. Bilateral pes planus M21.41 734     M21.42      improved with shoe inserts   7. Dyslipidemia E78.5 272.4    8. Osteopenia of other site M85.88 733.90    9. History of basal cell carcinoma (BCC) Z85.828 V10.83    10. Diplopia H53.2 368.2     unilateral with reading   11. Degenerative lumbar spinal stenosis M48.061 724.02    12. Aspirin intolerance Z78.9 995.27      E935.3    13. Statin intolerance Z78.9 995.27    14. ACE-inhibitor cough R05 786.2     T46.4X5A E942.6    15. History of total right knee replacement Z96.651 V43.65    16. Weakness of both legs R29.898 729.89     due to knees vs under use vs other   17. Personal history of colonic polyps Z86.010 V12.72 REFERRAL TO COLON AND RECTAL SURGERY   18. Screening for alcoholism Z13.39 V79.1 [place] ANNUAL ALCOHOL SCREEN 15 MIN   19. Screening for depression Z13.31 V79.0 [facility] 68   20. Encounter for screening mammogram for malignant neoplasm of breast Z12.31 V76.12 [facility] MAMMO BI SCREENING INCL CAD   21. Postmenopausal state Z78.0 V49.81 DEXA BONE DENSITY STUDY AXIAL   22. Other specified hearing loss of right ear, unspecified hearing status on contralateral side H91.8X1 389.8 REFERRAL TO ENT-OTOLARYNGOLOGY   23. History of anemia Z86.2 V12.3     stable   24. Restless legs syndrome (RLS) G25.81 333.94 rOPINIRole (REQUIP) 1 mg tablet     Chart reviewed and updated. Continue current medications and care. Advised pt to restart knee strengthening exercises from previous PT given after knee replacement to strengthen legs.    Prescriptions written and sent to pharmacy; medication side effects discussed. Lotensin 20 mg  Lab orders delayed due to COVID-19. Will reprint lab orders from 10/15/2019 for her to get when convenient. Get recent office visit notes from Dr. Sofia Gomez, Dr. Sophia Ulloa. Advised pt to sign release. Referrals given; patient urged to keep appointments with specialists. GI, ENT, mammo  Counseled patient on health concerns:  Blood pressure, diabetes, leg weakness, leg cramps  Relevant handouts given and discussed with patient. Immunizations noted; Pt reports she had Shingrix on 10/15/2019 and 2020 at [facility]. Offered empathy, support, legitimation, prayers, partnership to patient. Praised patient for progress. Follow-up and Dispositions    · Return in about 6 months (around 10/20/2020) for blood pressure, diabetes. Encouraged the pt to sign up for MyChart to be able to view results and send me any questions or concerns prior to the next visit where we will go over results in detail. Patient was offered a choice/choices in the treatment plan today. Patient expresses understanding of the plan and agrees with recommendations. 50 mins spent face to face with patient and more than 50% of this time spent in counseling and coordinating care. Written by allen Serrato, as dictated by Amber Shepard DO. Documentation True and Accepted by Alfonso Elizabeth. Bonifacio Hunt. Patient Instructions       Here is a comparison of A1C to blood glucose levels. From the [address]. 7 Tips to Get An Accurate Blood Pressure Readin. No talking  2. Empty bladder first  3. Support back/feet  4. Keep legs uncrossed  5. Support arm at heart level  6. Put cuff on bare skin with palm upwards  7. Use correct cuff size    From the American Heart Association       Learning About Tests When You Have Diabetes  Why do you need regular tests? Diabetes can lead to other health problems if it's not well controlled.  You'll need tests to monitor how well your diabetes is controlled and to check for other things like high cholesterol or kidney problems. Having tests on a regular schedule can help your doctor find problems early, when it's easier to manage them. What tests do you need? These are the tests you may need and how often you should have them. A1c blood test.  This test shows the average level of blood sugar over the past 2 to 3 months. It helps your doctor see whether blood sugar levels have been staying within your target range. How often: Every 3 to 6 months  Goal: A blood sugar level in your target range  Blood pressure test.  This test measures the pressure of blood flow in the arteries. Controlling blood pressure can help prevent damage to nerves and blood vessels. How often: Every 3 to 6 months  Goal: A blood pressure level in your target range  Cholesterol test.  This test measures the amount of a type of fat in the blood. It is common for people with diabetes to also have high cholesterol. Too much cholesterol in the blood can build up inside the blood vessels and raise the risk for heart attack and stroke. How often: At the time of your diabetes diagnosis, and as often as your doctor recommends after that  Goal: A cholesterol level in your target range  Albumin-creatinine ratio test.  This test checks for kidney damage by looking for the protein albumin (say \"al-BYOO-andrew\") in the urine. Albumin is normally found in the blood. Kidney damage can let small amounts of it (microalbumin) leak into the urine. How often: Once a year  Goal: No protein in the urine  Blood creatinine test/estimated glomerular filtration (eGFR). The blood creatinine (say \"ietz-EU-fw-neen\") level shows how well your kidneys are working. Creatinine is a waste product that muscles release into the blood. Blood creatinine is used to estimate the glomerular filtration rate.  A high level of creatinine and/or a low eGFR may mean your kidneys are not working as well as they should. How often: Once a year  Goal: Normal level of creatinine in the blood. The eGFR goal is greater than 60 mL/min/1.73 m². Complete foot exam.  The doctor checks for foot sores and whether any sensation has been lost.  How often: Once a year  Goal: Healthy feet with no foot ulcers or loss of feeling  Dental exam and cleaning. The dentist checks for gum disease and tooth decay. People with high blood sugar are more likely to have these problems. How often: Every 6 months  Goal: Healthy teeth and gums  Complete eye exam.  High blood sugar levels can damage the eyes. This exam is done by an ophthalmologist or optometrist. It includes a dilated eye exam. The exam shows whether there's damage to the back of the eye (diabetic retinopathy). How often: Once a year. If you don't have any signs of diabetic retinopathy, your doctor may recommend an exam every 2 years. Goal: No damage to the back of the eye  Thyroid-stimulating hormone (TSH) blood test.  This test checks for thyroid disease. Too little thyroid hormone can cause some medicines (like insulin) to stay in the body longer. This can cause low blood sugar. You may be tested if you have high cholesterol or are a woman over 48years old. How often: As part of your diabetes diagnosis, and as often as your doctor recommends after that  Goal: Normal level of TSH in the blood  Follow-up care is a key part of your treatment and safety. Be sure to make and go to all appointments, and call your doctor if you are having problems. It's also a good idea to know your test results and keep a list of the medicines you take. Where can you learn more? Go to http://newton-briana.info/  Enter A243 in the search box to learn more about \"Learning About Tests When You Have Diabetes. \"  Current as of: December 19, 2019Content Version: 12.4  © 7757-8377 Healthwise, [employer].   Care instructions adapted under license by [employer] (which disclaims liability or warranty for this information). If you have questions about a medical condition or this instruction, always ask your healthcare professional. [address] any warranty or liability for your use of this information. DASH Diet: Care Instructions  Your Care Instructions    The DASH diet is an eating plan that can help lower your blood pressure. DASH stands for Dietary Approaches to Stop Hypertension. Hypertension is high blood pressure. The DASH diet focuses on eating foods that are high in calcium, potassium, and magnesium. These nutrients can lower blood pressure. The foods that are highest in these nutrients are fruits, vegetables, low-fat dairy products, nuts, seeds, and legumes. But taking calcium, potassium, and magnesium supplements instead of eating foods that are high in those nutrients does not have the same effect. The DASH diet also includes whole grains, fish, and poultry. The DASH diet is one of several lifestyle changes your doctor may recommend to lower your high blood pressure. Your doctor may also want you to decrease the amount of sodium in your diet. Lowering sodium while following the DASH diet can lower blood pressure even further than just the DASH diet alone. Follow-up care is a key part of your treatment and safety. Be sure to make and go to all appointments, and call your doctor if you are having problems. It's also a good idea to know your test results and keep a list of the medicines you take. How can you care for yourself at home? Following the DASH diet  · Eat 4 to 5 servings of fruit each day. A serving is 1 medium-sized piece of fruit, ½ cup chopped or canned fruit, 1/4 cup dried fruit, or 4 ounces (½ cup) of fruit juice. Choose fruit more often than fruit juice. · Eat 4 to 5 servings of vegetables each day.  A serving is 1 cup of lettuce or raw leafy vegetables, ½ cup of chopped or cooked vegetables, or 4 ounces (½ cup) of vegetable juice. Choose vegetables more often than vegetable juice. · Get 2 to 3 servings of low-fat and fat-free dairy each day. A serving is 8 ounces of milk, 1 cup of yogurt, or 1 ½ ounces of cheese. · Eat 6 to 8 servings of grains each day. A serving is 1 slice of bread, 1 ounce of dry cereal, or ½ cup of cooked rice, pasta, or cooked cereal. Try to choose whole-grain products as much as possible. · Limit lean meat, poultry, and fish to 2 servings each day. A serving is 3 ounces, about the size of a deck of cards. · Eat 4 to 5 servings of nuts, seeds, and legumes (cooked dried beans, lentils, and split peas) each week. A serving is 1/3 cup of nuts, 2 tablespoons of seeds, or ½ cup of cooked beans or peas. · Limit fats and oils to 2 to 3 servings each day. A serving is 1 teaspoon of vegetable oil or 2 tablespoons of salad dressing. · Limit sweets and added sugars to 5 servings or less a week. A serving is 1 tablespoon jelly or jam, ½ cup sorbet, or 1 cup of lemonade. · Eat less than 2,300 milligrams (mg) of sodium a day. If you limit your sodium to 1,500 mg a day, you can lower your blood pressure even more. Tips for success  · Start small. Do not try to make dramatic changes to your diet all at once. You might feel that you are missing out on your favorite foods and then be more likely to not follow the plan. Make small changes, and stick with them. Once those changes become habit, add a few more changes. · Try some of the following:  ? Make it a goal to eat a fruit or vegetable at every meal and at snacks. This will make it easy to get the recommended amount of fruits and vegetables each day. ? Try yogurt topped with fruit and nuts for a snack or healthy dessert. ? Add lettuce, tomato, cucumber, and onion to sandwiches. ? Combine a ready-made pizza crust with low-fat mozzarella cheese and lots of vegetable toppings.  Try using tomatoes, squash, spinach, broccoli, carrots, cauliflower, and onions. ? Have a variety of cut-up vegetables with a low-fat dip as an appetizer instead of chips and dip. ? Sprinkle sunflower seeds or chopped almonds over salads. Or try adding chopped walnuts or almonds to cooked vegetables. ? Try some vegetarian meals using beans and peas. Add garbanzo or kidney beans to salads. Make burritos and tacos with mashed keenan beans or black beans. Where can you learn more? Go to http://newton-briana.info/  Enter H967 in the search box to learn more about \"DASH Diet: Care Instructions. \"  Current as of: December 15, 2019Content Version: 12.4  © 4614-1966 Healthwise, Incorporated. Care instructions adapted under license by [employer] (which disclaims liability or warranty for this information). If you have questions about a medical condition or this instruction, always ask your healthcare professional. [place] [address] any warranty or liability for your use of this information. Medicare Wellness Visit, Female     The best way to live healthy is to have a lifestyle where you eat a well-balanced diet, exercise regularly, limit alcohol use, and quit all forms of tobacco/nicotine, if applicable. Regular preventive services are another way to keep healthy. Preventive services (vaccines, screening tests, monitoring & exams) can help personalize your care plan, which helps you manage your own care. Screening tests can find health problems at the earliest stages, when they are easiest to treat. [facility] follows the current, evidence-based guidelines published by the [facility] Lavelle Hanna (USPSTF) when recommending preventive services for our patients. Because we follow these guidelines, sometimes recommendations change over time as research supports it. (For example, mammograms used to be recommended annually.  Even though Medicare will still pay for an annual mammogram, the newer guidelines recommend a mammogram every two years for women of average risk). Of course, you and your doctor may decide to screen more often for some diseases, based on your risk and your co-morbidities (chronic disease you are already diagnosed with). Preventive services for you include:  - Medicare offers their members a free annual wellness visit, which is time for you and your primary care provider to discuss and plan for your preventive service needs. Take advantage of this benefit every year!  -All adults over the age of 72 should receive the recommended pneumonia vaccines. Current USPSTF guidelines recommend a series of two vaccines for the best pneumonia protection.   -All adults should have a flu vaccine yearly and a tetanus vaccine every 10 years.   -All adults age 48 and older should receive the shingles vaccines (series of two vaccines). -All adults age 38-68 who are overweight should have a diabetes screening test once every three years.   -All adults born between [birth year] and 1965 should be screened once for Hepatitis C.  -Other screening tests and preventive services for persons with diabetes include: an eye exam to screen for diabetic retinopathy, a kidney function test, a foot exam, and stricter control over your cholesterol.   -Cardiovascular screening for adults with routine risk involves an electrocardiogram (ECG) at intervals determined by your doctor.   -Colorectal cancer screenings should be done for adults age 54-65 with no increased risk factors for colorectal cancer. There are a number of acceptable methods of screening for this type of cancer. Each test has its own benefits and drawbacks. Discuss with your doctor what is most appropriate for you during your annual wellness visit.  The different tests include: colonoscopy (considered the best screening method), a fecal occult blood test, a fecal DNA test, and sigmoidoscopy.    -A bone mass density test is recommended when a woman turns 65 to screen for osteoporosis. This test is only recommended one time, as a screening. Some providers will use this same test as a disease monitoring tool if you already have osteoporosis. -Breast cancer screenings are recommended every other year for women of normal risk, age 54-69.  -Cervical cancer screenings for women over age 72 are only recommended with certain risk factors. Here is a list of your current Health Maintenance items (your personalized list of preventive services) with a due date:  Health Maintenance Due   Topic Date Due    Hemoglobin A1C    10/16/2019    Albumin Urine Test  04/16/2020    Annual Well Visit  04/16/2020    Cholesterol Test   04/16/2020              Osteoporosis: Care Instructions  Your Care Instructions    Osteoporosis causes bones to become thin and weak. It is much more common in women than in men. Osteoporosis may be very advanced before you know you have it. Sometimes the first sign is a broken bone in the hip, spine, or wrist or sudden pain in your middle or lower back. Follow-up care is a key part of your treatment and safety. Be sure to make and go to all appointments, and call your doctor if you are having problems. It's also a good idea to know your test results and keep a list of the medicines you take. How can you care for yourself at home? · Your doctor may prescribe a bisphosphonate, such as risedronate (Actonel) or alendronate (Fosamax), for osteoporosis. If you are taking one of these medicines by mouth:  ? Take your medicine with a full glass of water when you first get up in the morning. ? Do not lie down, eat, drink a beverage, or take any other medicine for at least 30 minutes after taking the drug. This helps prevent stomach problems. ? Do not take your medicine late in the day if you forgot to take it in the morning. Skip it, and take the usual dose the next morning. ? If you have side effects, tell your doctor.  He or she may prescribe another medicine. · Get enough calcium and vitamin D. The [employer] recommends adults younger than age 46 need 1,000 mg of calcium and 600 IU of vitamin D each day. Women ages 46 to 79 need 1,200 mg of calcium and 600 IU of vitamin D each day. Men ages 46 to 79 need 1,000 mg of calcium and 600 IU of vitamin D each day. Adults 71 and older need 1,200 mg of calcium and 800 IU of vitamin D each day. It's not clear if people who already have osteoporosis need more calcium and vitamin D than this. Talk to your doctor about what's right for you.  ? Eat foods rich in calcium, like yogurt, cheese, milk, and dark green vegetables. This is a good way to get the calcium you need. You can get vitamin D from eggs, fatty fish, cereal, and milk. ? Ask your doctor if you need to take a calcium plus vitamin D supplement. You may be able to get enough calcium and vitamin D through your diet. Be careful with supplements. Adults ages 23 to 48 should not get more than 2,500 mg of calcium and 4,000 IU of vitamin D each day, whether it is from supplements and/or food. Adults ages 46 and older should not get more than 2,000 mg of calcium and 4,000 IU of vitamin D each day from supplements and/or food. · Limit alcohol to 2 drinks a day for men and 1 drink a day for women. Too much alcohol can cause health problems. · Do not smoke. Smoking puts you at a much higher risk for osteoporosis. If you need help quitting, talk to your doctor about stop-smoking programs and medicines. These can increase your chances of quitting for good. · Get regular bone-building exercise. Weight-bearing and resistance exercises keep bones healthy by working the muscles and bones against gravity. Start out at an exercise level that feels right for you. Add a little at a time until you can do the following:  ? Do 30 minutes of weight-bearing exercise on most days of the week. Walking, jogging, stair climbing, and dancing are good choices.   ? Do resistance exercises with weights or elastic bands 2 to 3 days a week. · Reduce your risk of falls:  ? Wear supportive shoes with low heels and nonslip soles. ? Use a cane or walker, if you need it. Use shower chairs and bath benches. Put in handrails on stairways, around your shower or tub area, and near the toilet. ? Keep stairs, porches, and walkways well lit. Use night-lights. ? Remove throw rugs and other objects that are in the way. ? Avoid icy, wet, or slippery surfaces. ? Keep a cordless phone and a flashlight with new batteries by your bed. When should you call for help? Watch closely for changes in your health, and be sure to contact your doctor if you have any problems. Where can you learn more? Go to http://newtonUniteam Communicationbriana.info/  Enter K100 in the search box to learn more about \"Osteoporosis: Care Instructions. \"  Current as of: August 6, 2019Content Version: 12.4  © 9242-2412 [employer]. Care instructions adapted under license by [employer] (which disclaims liability or warranty for this information). If you have questions about a medical condition or this instruction, always ask your healthcare professional. [address] any warranty or liability for your use of this information. Learning About Living Elana   What is a living will? A living will is a legal form you use to write down the kind of care you want at the end of your life. It is used by the health professionals who will treat you if you aren't able to decide for yourself. If you put your wishes in writing, your loved ones and others will know what kind of care you want. They won't need to guess. This can ease your mind and be helpful to others. A living will is not the same as an estate or property will. An estate will explains what you want to happen with your money and property after you die. Is a living will a legal document? A living will is a legal document. Each state has its own laws about living booth. If you move to another state, make sure that your living will is legal in the state where you now live. Or you might use a universal form that has been approved by many states. This kind of form can sometimes be completed and stored online. Your electronic copy will then be available wherever you have a connection to the Internet. In most cases, doctors will respect your wishes even if you have a form from a different state. · You don't need an  to complete a living will. But legal advice can be helpful if your state's laws are unclear, your health history is complicated, or your family can't agree on what should be in your living will. · You can change your living will at any time. Some people find that their wishes about end-of-life care change as their health changes. · In addition to making a living will, think about completing a medical power of  form. This form lets you name the person you want to make end-of-life treatment decisions for you (your \"health care agent\") if you're not able to. Many hospitals and nursing homes will give you the forms you need to complete a living will and a medical power of . · Your living will is used only if you can't make or communicate decisions for yourself anymore. If you become able to make decisions again, you can accept or refuse any treatment, no matter what you wrote in your living will. · Your state may offer an online registry. This is a place where you can store your living will online so the doctors and nurses who need to treat you can find it right away. What should you think about when creating a living will? Talk about your end-of-life wishes with your family members and your doctor. Let them know what you want. That way the people making decisions for you won't be surprised by your choices.   Think about these questions as you make your living will:  · Do you know enough about life support methods that might be used? If not, talk to your doctor so you know what might be done if you can't breathe on your own, your heart stops, or you're unable to swallow. · What things would you still want to be able to do after you receive life-support methods? Would you want to be able to walk? To speak? To eat on your own? To live without the help of machines? · If you have a choice, where do you want to be cared for? In your home? At a hospital or nursing home? · Do you want certain Scientology practices performed if you become very ill? · If you have a choice at the end of your life, where would you prefer to die? At home? In a hospital or nursing home? Somewhere else? · Would you prefer to be buried or cremated? · Do you want your organs to be donated after you die? What should you do with your living will? · Make sure that your family members and your health care agent have copies of your living will. · Give your doctor a copy of your living will to keep in your medical record. If you have more than one doctor, make sure that each one has a copy. · You may want to put a copy of your living will where it can be easily found. Where can you learn more? Go to http://newton-briana.info/. Enter Z843 in the search box to learn more about \"Learning About Living [place]. \"  Current as of: August 8, 2016  Content Version: 11.3  © 1430-2786 [employer]. Care instructions adapted under license by [employer] (which disclaims liability or warranty for this information). If you have questions about a medical condition or this instruction, always ask your healthcare professional. [place] [address] any warranty or liability for your use of this information. Preventing Falls: Care Instructions  Your Care Instructions    Getting around your home safely can be a challenge if you have injuries or health problems that make it easy for you to fall. Loose rugs and furniture in walkways are among the dangers for many older people who have problems walking or who have poor eyesight. People who have conditions such as arthritis, osteoporosis, or dementia also have to be careful not to fall. You can make your home safer with a few simple measures. Follow-up care is a key part of your treatment and safety. Be sure to make and go to all appointments, and call your doctor if you are having problems. It's also a good idea to know your test results and keep a list of the medicines you take. How can you care for yourself at home? Taking care of yourself  · You may get dizzy if you do not drink enough water. To prevent dehydration, drink plenty of fluids, enough so that your urine is light yellow or clear like water. Choose water and other caffeine-free clear liquids. If you have kidney, heart, or liver disease and have to limit fluids, talk with your doctor before you increase the amount of fluids you drink. · Exercise regularly to improve your strength, muscle tone, and balance. Walk if you can. Swimming may be a good choice if you cannot walk easily. · Have your vision and hearing checked each year or any time you notice a change. If you have trouble seeing and hearing, you might not be able to avoid objects and could lose your balance. · Know the side effects of the medicines you take. Ask your doctor or pharmacist whether the medicines you take can affect your balance. Sleeping pills or sedatives can affect your balance. · Limit the amount of alcohol you drink. Alcohol can impair your balance and other senses. · Ask your doctor whether calluses or corns on your feet need to be removed. If you wear loose-fitting shoes because of calluses or corns, you can lose your balance and fall. · Talk to your doctor if you have numbness in your feet. Preventing falls at home  · Remove raised doorway thresholds, throw rugs, and clutter.  Repair loose carpet or raised areas in the floor. · Move furniture and electrical cords to keep them out of walking paths. · Use nonskid floor wax, and wipe up spills right away, especially on ceramic tile floors. · If you use a walker or cane, put rubber tips on it. If you use crutches, clean the bottoms of them regularly with an abrasive pad, such as steel wool. · Keep your house well lit, especially [place], and outside walkways. Use night-lights in areas such as hallways and bathrooms. Add extra light switches or use remote switches (such as switches that go on or off when you clap your hands) to make it easier to turn lights on if you have to get up during the night. · Install sturdy handrails on stairways. · Move items in your cabinets so that the things you use a lot are on the lower shelves (about waist level). · Keep a cordless phone and a flashlight with new batteries by your bed. If possible, put a phone in each of the main rooms of your house, or carry a cell phone in case you fall and cannot reach a phone. Or, you can wear a device around your neck or wrist. You push a button that sends a signal for help. · Wear low-heeled shoes that fit well and give your feet good support. Use footwear with nonskid soles. Check the heels and soles of your shoes for wear. Repair or replace worn heels or soles. · Do not wear socks without shoes on wood floors. · Walk on the grass when the sidewalks are slippery. If you live in an area that gets snow and ice in the winter, sprinkle salt on slippery steps and sidewalks. Preventing falls in the bath  · Install grab bars and nonskid mats inside and outside your shower or tub and near the toilet and sinks. · Use shower chairs and bath benches. · Use a hand-held shower head that will allow you to sit while showering.   · Get into a tub or shower by putting the weaker leg in first. Get out of a tub or shower with your strong side first.  · Repair loose toilet seats and consider installing a raised toilet seat to make getting on and off the toilet easier. · Keep your bathroom door unlocked while you are in the shower. Where can you learn more? Go to http://newton-briana.info/. Enter [id number] [id number] in the search box to learn more about \"Preventing Falls: Care Instructions. \"  Current as of: March 16, 2018  Content Version: 11.8  © 6026-0104 Healthwise, Incorporated. Care instructions adapted under license by [employer] (which disclaims liability or warranty for this information). If you have questions about a medical condition or this instruction, always ask your healthcare professional. [place] [address] any warranty or liability for your use of this information.

## 2020-05-05 DIAGNOSIS — I10 ESSENTIAL HYPERTENSION, BENIGN: ICD-10-CM

## 2020-05-06 RX ORDER — BENAZEPRIL HYDROCHLORIDE 20 MG/1
TABLET ORAL
Qty: 90 TAB | Refills: 1 | OUTPATIENT
Start: 2020-05-06

## 2020-05-08 NOTE — TELEPHONE ENCOUNTER
----- Message from Henrry Ruff sent at 5/8/2020 12:36 PM EDT -----  Regarding: Dr. Sushant Hi Medication Refill    Caller (if not patient): Annette Zaman with ([address])      Relationship of caller (if not patient):      Best contact number(s): [phone]      Name of medication and dosage if known: Benazepril 20mg       Is patient out of this medication (yes/no): Yes       Pharmacy name: [facility] listed in chart? (yes/no): Yes     Pharmacy phone number: [phone]      Details to clarify the request:      Henrry Ruff

## 2020-05-12 NOTE — TELEPHONE ENCOUNTER
----- Message from Naoma Planemi sent at 5/11/2020  3:52 PM EDT -----  Regarding: Dr. Carolina Garcia General Message/Vendor Calls    Caller's first and last name:       Reason for call: Regarding [facility] hasn't received her Rx benazepriL 20 mg tablet order .        Call back required yes/no and why: Yes       Best contact number(s): [phone]      Details to clarify the request: Pt is completley out Rx, there has been several attempts for request.        [employer]

## 2020-05-13 DIAGNOSIS — I10 ESSENTIAL HYPERTENSION, BENIGN: ICD-10-CM

## 2020-05-13 RX ORDER — BENAZEPRIL HYDROCHLORIDE 20 MG/1
TABLET ORAL
Qty: 90 TAB | Refills: 1 | Status: CANCELLED | OUTPATIENT
Start: 2020-05-13

## 2020-06-06 DIAGNOSIS — G25.81 RESTLESS LEGS SYNDROME (RLS): ICD-10-CM

## 2020-06-08 RX ORDER — ROPINIROLE 1 MG/1
TABLET, FILM COATED ORAL
Qty: 180 TAB | Refills: 3 | Status: SHIPPED | OUTPATIENT
Start: 2020-06-08 | End: 2021-08-18

## 2020-06-23 ENCOUNTER — HOSPITAL ENCOUNTER (OUTPATIENT)
Dept: MRI IMAGING | Age: 80
Discharge: HOME | End: 2020-06-23
Attending: OTOLARYNGOLOGY
Payer: MEDICARE

## 2020-06-23 VITALS — WEIGHT: 143 LBS | BODY MASS INDEX: 26.16 KG/M2

## 2020-06-23 DIAGNOSIS — H90.A21 SENSORINEURAL HEARING LOSS, UNILATERAL, RIGHT EAR, WITH RESTRICTED HEARING ON THE CONTRALATERAL SIDE: ICD-10-CM

## 2020-06-23 PROCEDURE — A9575 INJ GADOTERATE MEGLUMI 0.1ML: HCPCS | Performed by: OTOLARYNGOLOGY

## 2020-06-23 PROCEDURE — 74011250636 HC RX REV CODE- 250/636: Performed by: OTOLARYNGOLOGY

## 2020-06-23 PROCEDURE — 70553 MRI BRAIN STEM W/O & W/DYE: CPT

## 2020-06-23 RX ORDER — GADOTERATE MEGLUMINE 376.9 MG/ML
13 INJECTION INTRAVENOUS
Status: COMPLETED | OUTPATIENT
Start: 2020-06-23 | End: 2020-06-23

## 2020-06-23 RX ADMIN — GADOTERATE MEGLUMINE 13 ML: 376.9 INJECTION INTRAVENOUS at 10:26

## 2020-07-01 ENCOUNTER — TELEPHONE (OUTPATIENT)
Dept: FAMILY MEDICINE CLINIC | Age: 80
End: 2020-07-01

## 2020-07-01 NOTE — TELEPHONE ENCOUNTER
[facility] breast center is closed, patient stating she can not get her images from this canter, she needs to make an appointment with [facility] for mamogram,but does not have records from [facility].

## 2020-07-02 NOTE — TELEPHONE ENCOUNTER
Please inform patient that she does not need mammograms from [facility] to schedule them anywhere else. It is nice to have the comparison but not necessary. As always I am happy to further discuss her new mammogram results at the time of her next appointment with me.

## 2020-07-06 ENCOUNTER — TELEPHONE (OUTPATIENT)
Dept: FAMILY MEDICINE CLINIC | Age: 80
End: 2020-07-06

## 2020-07-06 NOTE — TELEPHONE ENCOUNTER
Called pt to inform that she does not need the imaging from [facility] to make a new appointment for a Mammogram. Left voice message for pt to return phone call to office to notify.

## 2020-07-16 ENCOUNTER — HOSPITAL ENCOUNTER (OUTPATIENT)
Dept: MAMMOGRAPHY | Age: 80
End: 2020-07-16
Attending: FAMILY MEDICINE
Payer: MEDICARE

## 2020-07-16 ENCOUNTER — HOSPITAL ENCOUNTER (OUTPATIENT)
Dept: MAMMOGRAPHY | Age: 80
Discharge: HOME | End: 2020-07-16
Attending: FAMILY MEDICINE
Payer: MEDICARE

## 2020-07-16 DIAGNOSIS — Z78.0 POSTMENOPAUSAL STATE: ICD-10-CM

## 2020-07-16 PROCEDURE — 77080 DXA BONE DENSITY AXIAL: CPT

## 2020-08-29 ENCOUNTER — HOSPITAL ENCOUNTER (OUTPATIENT)
Dept: PREADMISSION TESTING | Age: 80
Discharge: HOME | End: 2020-08-29
Payer: MEDICARE

## 2020-08-29 DIAGNOSIS — Z01.812 PRE-PROCEDURE LAB EXAM: ICD-10-CM

## 2020-08-29 PROCEDURE — 87635 SARS-COV-2 COVID-19 AMP PRB: CPT

## 2020-08-30 LAB — SARS-COV-2, COV2NT: NOT DETECTED

## 2020-09-02 ENCOUNTER — ANESTHESIA (OUTPATIENT)
Dept: ENDOSCOPY | Age: 80
End: 2020-09-02
Payer: MEDICARE

## 2020-09-02 ENCOUNTER — ANESTHESIA EVENT (OUTPATIENT)
Dept: ENDOSCOPY | Age: 80
End: 2020-09-02
Payer: MEDICARE

## 2020-09-02 ENCOUNTER — HOSPITAL ENCOUNTER (OUTPATIENT)
Age: 80
Setting detail: OUTPATIENT SURGERY
Discharge: HOME | End: 2020-09-02
Attending: COLON & RECTAL SURGERY | Admitting: COLON & RECTAL SURGERY
Payer: MEDICARE

## 2020-09-02 VITALS
OXYGEN SATURATION: 98 % | RESPIRATION RATE: 19 BRPM | HEART RATE: 68 BPM | DIASTOLIC BLOOD PRESSURE: 79 MMHG | SYSTOLIC BLOOD PRESSURE: 110 MMHG | TEMPERATURE: 97.5 F

## 2020-09-02 PROCEDURE — 74011000250 HC RX REV CODE- 250: Performed by: NURSE ANESTHETIST, CERTIFIED REGISTERED

## 2020-09-02 PROCEDURE — 77030009426 HC FCPS BIOP ENDOSC BSC -B: Performed by: COLON & RECTAL SURGERY

## 2020-09-02 PROCEDURE — 88305 TISSUE EXAM BY PATHOLOGIST: CPT

## 2020-09-02 PROCEDURE — 74011250636 HC RX REV CODE- 250/636: Performed by: NURSE ANESTHETIST, CERTIFIED REGISTERED

## 2020-09-02 PROCEDURE — 76060000032 HC ANESTHESIA 0.5 TO 1 HR: Performed by: COLON & RECTAL SURGERY

## 2020-09-02 PROCEDURE — 76040000007: Performed by: COLON & RECTAL SURGERY

## 2020-09-02 RX ORDER — CHOLECALCIFEROL (VITAMIN D3) 125 MCG
CAPSULE ORAL
COMMUNITY
End: 2021-08-18

## 2020-09-02 RX ORDER — NALOXONE HYDROCHLORIDE 0.4 MG/ML
0.4 INJECTION, SOLUTION INTRAMUSCULAR; INTRAVENOUS; SUBCUTANEOUS
Status: DISCONTINUED | OUTPATIENT
Start: 2020-09-02 | End: 2020-09-02 | Stop reason: HOSPADM

## 2020-09-02 RX ORDER — EPINEPHRINE 0.1 MG/ML
1 INJECTION INTRACARDIAC; INTRAVENOUS
Status: DISCONTINUED | OUTPATIENT
Start: 2020-09-02 | End: 2020-09-02 | Stop reason: HOSPADM

## 2020-09-02 RX ORDER — DEXTROMETHORPHAN/PSEUDOEPHED 2.5-7.5/.8
1.2 DROPS ORAL
Status: DISCONTINUED | OUTPATIENT
Start: 2020-09-02 | End: 2020-09-02 | Stop reason: HOSPADM

## 2020-09-02 RX ORDER — ATROPINE SULFATE 0.1 MG/ML
0.5 INJECTION INTRAVENOUS
Status: DISCONTINUED | OUTPATIENT
Start: 2020-09-02 | End: 2020-09-02 | Stop reason: HOSPADM

## 2020-09-02 RX ORDER — SODIUM CHLORIDE 0.9 % (FLUSH) 0.9 %
5-40 SYRINGE (ML) INJECTION EVERY 8 HOURS
Status: DISCONTINUED | OUTPATIENT
Start: 2020-09-02 | End: 2020-09-02 | Stop reason: HOSPADM

## 2020-09-02 RX ORDER — FENTANYL CITRATE 50 UG/ML
12.5-25 INJECTION, SOLUTION INTRAMUSCULAR; INTRAVENOUS
Status: DISCONTINUED | OUTPATIENT
Start: 2020-09-02 | End: 2020-09-02 | Stop reason: HOSPADM

## 2020-09-02 RX ORDER — MIDAZOLAM HYDROCHLORIDE 1 MG/ML
.25-5 INJECTION, SOLUTION INTRAMUSCULAR; INTRAVENOUS
Status: DISCONTINUED | OUTPATIENT
Start: 2020-09-02 | End: 2020-09-02 | Stop reason: HOSPADM

## 2020-09-02 RX ORDER — PROPOFOL 10 MG/ML
INJECTION, EMULSION INTRAVENOUS AS NEEDED
Status: DISCONTINUED | OUTPATIENT
Start: 2020-09-02 | End: 2020-09-02 | Stop reason: HOSPADM

## 2020-09-02 RX ORDER — LIDOCAINE HYDROCHLORIDE 20 MG/ML
INJECTION, SOLUTION EPIDURAL; INFILTRATION; INTRACAUDAL; PERINEURAL AS NEEDED
Status: DISCONTINUED | OUTPATIENT
Start: 2020-09-02 | End: 2020-09-02 | Stop reason: HOSPADM

## 2020-09-02 RX ORDER — SODIUM CHLORIDE 0.9 % (FLUSH) 0.9 %
5-40 SYRINGE (ML) INJECTION AS NEEDED
Status: DISCONTINUED | OUTPATIENT
Start: 2020-09-02 | End: 2020-09-02 | Stop reason: HOSPADM

## 2020-09-02 RX ORDER — CHOLECALCIFEROL (VITAMIN D3) 125 MCG
5 CAPSULE ORAL
COMMUNITY
End: 2022-02-23

## 2020-09-02 RX ORDER — SODIUM CHLORIDE 9 MG/ML
INJECTION, SOLUTION INTRAVENOUS
Status: DISCONTINUED | OUTPATIENT
Start: 2020-09-02 | End: 2020-09-02 | Stop reason: HOSPADM

## 2020-09-02 RX ORDER — FLUMAZENIL 0.1 MG/ML
0.2 INJECTION INTRAVENOUS
Status: DISCONTINUED | OUTPATIENT
Start: 2020-09-02 | End: 2020-09-02 | Stop reason: HOSPADM

## 2020-09-02 RX ORDER — SODIUM CHLORIDE 9 MG/ML
100 INJECTION, SOLUTION INTRAVENOUS CONTINUOUS
Status: DISCONTINUED | OUTPATIENT
Start: 2020-09-02 | End: 2020-09-02 | Stop reason: HOSPADM

## 2020-09-02 RX ADMIN — PROPOFOL 30 MG: 10 INJECTION, EMULSION INTRAVENOUS at 09:23

## 2020-09-02 RX ADMIN — PROPOFOL 20 MG: 10 INJECTION, EMULSION INTRAVENOUS at 09:15

## 2020-09-02 RX ADMIN — PROPOFOL 30 MG: 10 INJECTION, EMULSION INTRAVENOUS at 09:17

## 2020-09-02 RX ADMIN — PROPOFOL 30 MG: 10 INJECTION, EMULSION INTRAVENOUS at 09:13

## 2020-09-02 RX ADMIN — PROPOFOL 30 MG: 10 INJECTION, EMULSION INTRAVENOUS at 09:16

## 2020-09-02 RX ADMIN — PROPOFOL 30 MG: 10 INJECTION, EMULSION INTRAVENOUS at 09:26

## 2020-09-02 RX ADMIN — PROPOFOL 20 MG: 10 INJECTION, EMULSION INTRAVENOUS at 09:14

## 2020-09-02 RX ADMIN — SODIUM CHLORIDE: 900 INJECTION, SOLUTION INTRAVENOUS at 08:47

## 2020-09-02 RX ADMIN — PROPOFOL 50 MG: 10 INJECTION, EMULSION INTRAVENOUS at 09:11

## 2020-09-02 RX ADMIN — PROPOFOL 20 MG: 10 INJECTION, EMULSION INTRAVENOUS at 09:28

## 2020-09-02 RX ADMIN — PROPOFOL 30 MG: 10 INJECTION, EMULSION INTRAVENOUS at 09:31

## 2020-09-02 RX ADMIN — PROPOFOL 30 MG: 10 INJECTION, EMULSION INTRAVENOUS at 09:35

## 2020-09-02 RX ADMIN — LIDOCAINE HYDROCHLORIDE 40 MG: 20 INJECTION, SOLUTION EPIDURAL; INFILTRATION; INTRACAUDAL; PERINEURAL at 09:11

## 2020-09-02 RX ADMIN — PROPOFOL 20 MG: 10 INJECTION, EMULSION INTRAVENOUS at 09:21

## 2020-09-02 RX ADMIN — PROPOFOL 20 MG: 10 INJECTION, EMULSION INTRAVENOUS at 09:19

## 2020-09-02 NOTE — PROGRESS NOTES
Received report from anesthesia staff on vital signs and status of patient.     Scope precleaned at bedside by Leopold Calkin, RN

## 2020-09-02 NOTE — PROCEDURES
Lilo White  [id number]  1940    Colonoscopy Operative Report    Procedure Type:   Colonoscopy with hot forceps polypectomies. Pre-operative Diagnosis:  Need for colorectal cancer screening. History of colonic polyps. Post-operative Diagnosis:  Colonic polyps. Surgeon:  Celina Weaver MD    Assistant:  None    Referring Provider: Ramiro Shen DO    Sedation and Analgesia:  MAC anesthesia Propofol (400 mg)    Specimens Removed:  Polyps (2)    EBL:  0 mL. Complications: None apparent. Implants:  None. Indication For Procedure: The patient is an asymptomatic 80-year-old female with a history of colonic polyps. Three tubular adenomas and three hyperplastic polyps were excised colonoscopically on 7/27/2004. A single tubular adenoma was excised during another colonoscopy on 1/29/2009. Her last colonoscopy was performed on 9/16/2015, and it was remarkable for a single diminutive ascending colon polyp that was removed and that proved to have been a tubular adenoma. Another colonoscopy is now indicated for colorectal cancer screening/post-polypectomy surveillance. Findings: There was one diminutive polyp located in the cecum near the appendiceal orifice and one in the distal ascending colon. The distal ileum was normal.  The rectum and the other parts of the colon were also normal.    Procedure Details:  After informed consent was obtained, the patient was taken to the endoscopy suite where standard monitoring devices were attached and intravenous access was established. Sedation was administered by the anesthetist as needed throughout the procedure. The patient was placed in the left lateral decubitus position, and inspection of the perineum revealed no significant external lesions. Digital rectal examination revealed no masses. The Olympus videocolonoscope was lubricated and inserted transanally into the rectum.  It was advanced into the colon and without difficulty to the cecum, which was identified by the presence of the ileocecal valve and the appendiceal orifice. The ileum was intubated and examined for short distance. The quality of the bowel preparation was good, as was the overall visualization. Careful inspection was performed during withdrawal of the colonoscope. The two polyp noted above were excised using the hot forceps with excellent hemostasis. There were no apparent complications, and the patient appeared to have tolerated the procedure well. At its conclusion, she was transported to the recovery area in good condition. Impression:   The polyps appeared to have been benign. Recommendations:   I will contact the patient with the pathology results and make a follow-up recommendation at that time.

## 2020-09-02 NOTE — H&P
History and Physical (outpatient)    Patient: Alen Miner [de-identified] y.o. female     Referring Physician:  No ref. provider found    Chief Complaint: Need for colorectal cancer screening. History of Present Illness: The patient is an asymptomatic 55-year-old female with a history of colonic polyps. Three tubular adenomas and three hyperplastic polyps were excised colonoscopically on 7/27/2004. A single tubular adenoma was excised during her last colonoscopy on 1/29/2009. Her last colonoscopy was performed on 9/16/2015, and it was remarkable for single diminutive ascending colon polyp that was removed and that proved to have been a tubular adenoma. History:  Past Medical History:   Diagnosis Date    AC (acromioclavicular) joint bone spurs 01/2014    in back. Dr. Sachin Quispe. Txd with shots x 3    Actinic keratosis 2015    Dr. Deloris Cho. Dr. Can Meade.  Allergic rhinitis, cause unspecified     Anxiety state, unspecified     Arthritis     R KNEE BONE-ON-BONE; R HAND-----OSTEO    Asthma     BRONCHITIS IN SPRING & FALL MOST YEARS    BCC (basal cell carcinoma), face 1980s (nose), 01/28/15 (forehead)    Dr. Ramona Norris. Dr. Edison Childers.  Bilateral pes planus 03/10/2020    Dr. Josue Brown 2007    Dr. Maribell Paredes    Chronic insomnia     Chronic low back pain 01/2014    Dr. Sherri Pena. DJD and spurs, Narrowing of L 3,4,5. Dr. Sachin Quispe.  Chronic obstructive pulmonary disease (HCC)     CHRONIC (TWICE-YEARLY) BRONCHITIS    Chronic pain     Colon polyps 11/2005, 01/29/09, 9/16/2015    benign. Dr. Elisa Cunningham    Constipation     Degenerative lumbar spinal stenosis 01/2014    Dr. Adrianne Cotton Narrowing of L 3,4,5    Diabetes mellitus type 2, diet-controlled ([facility] [address] 10/13/2017    LOST WEIGHT AND IS NOT DIABETIC    Dyslipidemia     Dysphagia 09/2014    due to Esophagitis. Dr. Nicholas Hall.     Esophagitis 09/29/2014    ESOPHAGITIS    Essential hypertension, benign     Foot fracture, left 2009    stress.  Foot pain, bilateral 11/13/2013    Dr. Porfirio Hanley.  Hearing loss     Dr. Freedman Ards.  Knee pain, bilateral 10/28/13    Dr. Sebastien Sanchez. Right > Left. Severe OA. Txd with PT.    Lumbar stenosis with neurogenic claudication     Menopause 1976    Migraine     NONE SINCE AGE 37    Mixed stress and urge urinary incontinence     NO LEAKAGE; GETS UP SEVERAL TIMES A NIGHT, PT STATES ON 10/3/16    OA (osteoarthritis) of knee     Dr. Sebastien Sanchez    Osteopenia 08/24/2015    Restless legs syndrome (RLS) 09/2015    Dr. Jennifer Castro.  Shoulder joint dislocation 06/2011    Right. due to fall. Dr. Laisha Cortes Sleep apnea     undiagnosed    Syncope 06/25/11    due to fall down 5 steps. Right occipital head trauma.  Tremor of both hands 09/2015    Dr. Haritha WILKERSON       Past Surgical History:   Procedure Laterality Date    HX CARPAL TUNNEL RELEASE Left 04/03/2018    Dr. Alma Rossi     HX CATARACT REMOVAL Bilateral , R 04/10/17    Dr. Anju Jones COLONOSCOPY  11/2005, 01/29/09, 09/16/15    with polypectomy. Dr. Rubia Robertson q5 years    HX ENDOSCOPY  09/2014    with Esophageal dilation. due to dysphagia. Dr. Hernandez Morris.  HX KNEE REPLACEMENT Right 08/27/2018    Dr. Catracho Farrell  10/18/2016    HX MALIGNANT SKIN LESION EXCISION  01/28/15    BCC. L Forehead. MOHs SURGERY. Dr. Brandi Irwin.  HX RAIMUNDO AND BSO  1976    due to fibroids    HX TONSILLECTOMY  1948    RI COMBINED ANT/POST COLPORRHAPHY  02/28/05    with enterocele RE.   Dr. Omar Doyle and Dr. Konrad Gandhi       Family History   Problem Relation Age of Onset    Hypertension Mother     Dementia Mother         alzheimers    Hypertension Father     High Cholesterol Father     Kidney Disease Father         1 kidney    Emphysema Father         O2 requiring    Hypertension Sister     Diabetes Maternal Grandmother     Dementia Maternal Grandmother     Thyroid Disease Maternal Grandmother     Thyroid Disease Daughter         goiter    Heart Attack Maternal Uncle 51        massive    Heart Attack Maternal Uncle 39        massive    Heart Attack Maternal Aunt 51        massive    Thyroid Disease Daughter         thyroid cyst    Other Brother          AT 1DAYS OLD    Anesth Problems Neg Hx      Social History     Socioeconomic History    Marital status:      Spouse name: Not on file    Number of children: Not on file    Years of education: Not on file    Highest education level: Not on file   Occupational History    Not on file   Social Needs    Financial resource strain: Not on file    Food insecurity     Worry: Not on file     Inability: Not on file    Transportation needs     Medical: Not on file     Non-medical: Not on file   Tobacco Use    Smoking status: Never Smoker    Smokeless tobacco: Never Used   Substance and Sexual Activity    Alcohol use: No    Drug use: No    Sexual activity: Not Currently     Partners: Male     Birth control/protection: Surgical   Lifestyle    Physical activity     Days per week: Not on file     Minutes per session: Not on file    Stress: Not on file   Relationships    Social connections     Talks on phone: Not on file     Gets together: Not on file     Attends Synagogue service: Not on file     Active member of club or organization: Not on file     Attends meetings of clubs or organizations: Not on file     Relationship status: Not on file    Intimate partner violence     Fear of current or ex partner: Not on file     Emotionally abused: Not on file     Physically abused: Not on file     Forced sexual activity: Not on file   Other Topics Concern    Not on file   Social History Narrative    Not on file       Allergies:    Allergies   Allergen Reactions    Other Food Itching     If eats large quantities over several days causes itching: tomatoes, peaches, strawberry, fig    Bee Sting [Sting, Bee] Swelling    Pcn [Penicillins] Hives     IN CHILDHOOD    Percocet [Oxycodone-Acetaminophen] Other (comments)      Other (comments)\"hellish nightmares\"      Shellfish Containing Products Itching    Zoster Vaccine Live Swelling     Severe Right arm swelling with redness after administered by pharmacist in elbow    Atarax [Hydroxyzine Hcl] Other (comments)     jittery    Buspar [Buspirone] Nausea Only    Crestor [Rosuvastatin] Myalgia    Hydrochlorothiazide Myalgia     Other reaction(s): sorethroat    Hydroxyzine Other (comments)     jittery    Mobic [Meloxicam] Other (comments)     Easy bruising    Norco [Hydrocodone-Acetaminophen] Nausea and Vomiting                Current Medications:  Prior to Admission Medications   Prescriptions Last Dose Informant Patient Reported? Taking? B3/azel ac/Zn/B6/copper/folate (B3-AZELAIC-ZN-B6-COPPER-FOLATE PO) 8/30/2020  Yes Yes   Sig: Take  by mouth. Blood-Glucose Meter monitoring kit Unknown at Unknown time  No No   Sig: With Diabetic Supplies   HYLAN G-F 20 IX Unknown at Unknown time  Yes No   Sig: by Intra artICUlar route. Multivitamins with Fluoride (MULTI-VITAMIN PO) 8/30/2020  Yes No   Sig: Take 1 Tab by mouth daily (after breakfast). albuterol (PROVENTIL HFA, VENTOLIN HFA, PROAIR HFA) 90 mcg/actuation inhaler Unknown at Unknown time  No No   Sig: Take 2 Puffs by inhalation every four (4) hours as needed for Wheezing or Shortness of Breath. Indications: bronchospasm prevention   ascorbic acid, vitamin C, (VITAMIN C) 500 mg tablet 8/30/2020 at Unknown time  Yes Yes   Sig: Take  by mouth.    aspirin delayed-release 81 mg tablet 8/30/2020  Yes No   Sig: Take  by mouth.   benazepriL (LOTENSIN) 20 mg tablet 9/1/2020 at Unknown time  No Yes   Sig: take 1 tablet by mouth once daily  Indications: high blood pressure   cholecalciferol, vitamin D3, (Vitamin D3) 50 mcg (2,000 unit) tab 2020  Yes Yes   Sig: Take  by mouth.   cyanocobalamin (VITAMIN B-12) 500 mcg tablet 2020  Yes No   Sig: Take 500 mcg by mouth daily. diphenhydrAMINE (BENADRYL) 25 mg capsule 2020  Yes No   Sig: Take 25 mg by mouth every six (6) hours as needed. fluticasone (FLONASE) 50 mcg/actuation nasal spray Unknown at Unknown time Self No No   Si Sprays by Both Nostrils route daily. Patient taking differently: 2 Sprays by Both Nostrils route daily. Uses Seasonal- Spring & Fall   garlic 3,192 mg cap 3206  Yes No   Sig: Take  by mouth.   melatonin 5 mg tablet 2020 at Unknown time  Yes Yes   Sig: Take 5 mg by mouth nightly. omeprazole (PRILOSEC) 40 mg capsule 2020 at Unknown time  Yes Yes   Sig: Take 20 mg by mouth daily. rOPINIRole (REQUIP) 1 mg tablet 2020 at Unknown time  No Yes   Sig: TAKE 1/2 TABLET BY MOUTH IN THE MORNING AND AT LUNCH AND TAKE 1 TABLET AT BEDTIME FOR EXTREME DISCO   turmeric root extract 500 mg cap 2020  Yes No   Sig: Take  by mouth. Facility-Administered Medications: None       No current facility-administered medications for this encounter. Physical Exam:  not currently breastfeeding. GENERAL:  No apparent distress. LUNGS:  Clear to auscultation bilaterally. HEART:  Regular rate and rhythm with no murmurs, gallops, or rubs. ABDOMEN: Soft, non-tender, and non-distended. NEUROLOGIC: Alert and oriented. No gross deficits. Alerts:    Hospital Problems  Date Reviewed: 2020    None          Laboratory:    No results for input(s): HGB, HCT, WBC, PLT, INR, BUN, CREA, K, CRCLT, HGBEXT, HCTEXT, PLTEXT, INREXT in the last 72 hours. No lab exists for component: PTT, PT    Assessment:  Another colonoscopy is now indicated for colorectal cancer screening/post-polypectomy surveillance. Plan:  The risks, benefits, and alternatives were reviewed with the patient, and she has agreed to proceed with the procedure.  The plan for this patient includes MAC.

## 2020-09-02 NOTE — DISCHARGE INSTRUCTIONS
Everette Otero, [address] [facility],[place] [address]., [address] [facility], [address]  [phone]      Post-Polypectomy Instructions    1. Do not drive, operate dangerous machinery, sign legal documents, or conduct any important business for the rest of the day. 2. Avoid strenuous exercise for the next 10 days. 3. Avoid straining when you move your bowels. 4. Do not take any aspirin, aspirin-containing products, ibuprofen (Advil, Motrin, etc.), Aleve, or garlic pills for the next two weeks. You may take Tylenol as directed on the bottle if needed. 5. You may begin with a light diet today then advance to your usual diet as tolerated. Do not drink alcohol for the rest of the day. 6. If you notice blood in your stool for more than one or two bowel movements following the procedure, if you develop abdominal pain (aside from gas cramps on the day of the procedure), or if you develop a fever with a temperature of 101.0 or higher, call my office. 7. If you do not have a bowel movement within the next two days, call my office. 8. If you have any other problems or questions, please call my office. 9. Findings and Follow-up:  There was no cancer. There were two small benign-appearing polyps that we removed. I will discuss the pathology results with you when they are available, and I will make a follow-up recommendation at that time. Please call my office if you have not heard from me by Tuesday 9/8/2020.

## 2020-09-02 NOTE — ANESTHESIA POSTPROCEDURE EVALUATION
Procedure(s):  COLONOSCOPY   :-  ENDOSCOPIC POLYPECTOMY. MAC    Anesthesia Post Evaluation        Patient location during evaluation: PACU  Patient participation: complete - patient participated  Level of consciousness: awake and alert  Pain management: adequate  Airway patency: patent  Anesthetic complications: no  Cardiovascular status: acceptable  Respiratory status: acceptable  Hydration status: acceptable  Comments: Seen, no complaints  Post anesthesia nausea and vomiting:  none      INITIAL Post-op Vital signs:   Vitals Value Taken Time   /63 9/2/2020  9:54 AM   Temp     Pulse 79 9/2/2020  9:56 AM   Resp 27 9/2/2020  9:56 AM   SpO2 99 % 9/2/2020  9:56 AM   Vitals shown include unvalidated device data.

## 2020-09-02 NOTE — ROUTINE PROCESS
[facility]  1940  [id number]    Situation:  Verbal report received from: Lindsay Dawn  Procedure: Procedure(s):  COLONOSCOPY   :-  ENDOSCOPIC POLYPECTOMY    Background:    Preoperative diagnosis: SCREENING, HX OF COLONIC POLYPS  Postoperative diagnosis: Colon Polyps    :  Dr. Laurel Wood  Assistant(s): Endoscopy RN-1: Isaac Hernandez RN  Endoscopy RN-2: Rosemarie Roca RN    Specimens:   ID Type Source Tests Collected by Time Destination   1 : Cecal Polyp Preservative   Jean Rosen MD 9/2/2020 8908 Pathology   2 : Ascending Colon Polyp Preservative   Jean Rosen MD 9/2/2020 0930 Pathology     H. Pylori      Assessment:  Intra-procedure medications   Anesthesia gave intra-procedure sedation and medications, see anesthesia flow sheet yes    Intravenous fluids: NS@ KVO     Vital signs stable yes    Abdominal assessment: round and soft yes    Recommendation:  Discharge patient per MD order .   Return to floor   Family or Friend yes  Permission to share finding with family or friend yes

## 2020-10-07 ENCOUNTER — HOSPITAL ENCOUNTER (OUTPATIENT)
Dept: MAMMOGRAPHY | Age: 80
Discharge: HOME | End: 2020-10-07
Attending: FAMILY MEDICINE
Payer: MEDICARE

## 2020-10-07 DIAGNOSIS — Z12.31 ENCOUNTER FOR SCREENING MAMMOGRAM FOR MALIGNANT NEOPLASM OF BREAST: ICD-10-CM

## 2020-10-07 PROCEDURE — 77067 SCR MAMMO BI INCL CAD: CPT

## 2020-10-21 ENCOUNTER — TELEPHONE (OUTPATIENT)
Dept: FAMILY MEDICINE CLINIC | Age: 80
End: 2020-10-21

## 2020-10-21 NOTE — TELEPHONE ENCOUNTER
----- Message from Felicia Arzate sent at 10/21/2020  1:05 PM EDT -----  Regarding: Dr. Radha Gann first and last name: n/a      Reason for call: Pt had a mammogram on 10/7/20 at [facility]. Pt has not received results and has [place] since 10[place]20. She requests the results with a phone call.        Callback required yes/no and why: yes      Best contact number(s): [phone]      Details to clarify the request: n/a       Felicia Arzate

## 2020-10-22 ENCOUNTER — PATIENT MESSAGE (OUTPATIENT)
Dept: FAMILY MEDICINE CLINIC | Age: 80
End: 2020-10-22

## 2020-10-22 NOTE — TELEPHONE ENCOUNTER
Called pt to review MAMM labs. Left a generic voice message, sent results letter through [address]

## 2021-01-25 DIAGNOSIS — I10 ESSENTIAL HYPERTENSION, BENIGN: ICD-10-CM

## 2021-01-25 NOTE — TELEPHONE ENCOUNTER
Writer sent patient a message via [facility] regarding scheduling an office visit.      Last visit 04/20/2020 Virtual visit MD Sergei Santana   Next appointment 6 months (10/2020) - Nothing scheduled   Previous refill encounter(s)   10/18/2020 Lotensin #[id number]    Requested Prescriptions     Pending Prescriptions Disp Refills    benazepriL (LOTENSIN) 20 mg tablet 30 Tab 0     Sig: Take 1 tab by mouth every day for high blood pressure

## 2021-01-29 RX ORDER — BENAZEPRIL HYDROCHLORIDE 20 MG/1
TABLET ORAL
Qty: 30 TAB | Refills: 0 | Status: SHIPPED | OUTPATIENT
Start: 2021-01-29 | End: 2021-02-23

## 2021-02-08 ENCOUNTER — OFFICE VISIT (OUTPATIENT)
Dept: PRIMARY CARE CLINIC | Age: 81
End: 2021-02-08
Payer: MEDICARE

## 2021-02-08 VITALS
TEMPERATURE: 97.5 F | DIASTOLIC BLOOD PRESSURE: 78 MMHG | WEIGHT: 150 LBS | RESPIRATION RATE: 18 BRPM | HEART RATE: 69 BPM | HEIGHT: 62 IN | OXYGEN SATURATION: 98 % | SYSTOLIC BLOOD PRESSURE: 128 MMHG | BODY MASS INDEX: 27.6 KG/M2

## 2021-02-08 DIAGNOSIS — I10 ESSENTIAL HYPERTENSION: Primary | ICD-10-CM

## 2021-02-08 DIAGNOSIS — E78.5 DYSLIPIDEMIA: ICD-10-CM

## 2021-02-08 DIAGNOSIS — K21.9 GASTROESOPHAGEAL REFLUX DISEASE WITHOUT ESOPHAGITIS: ICD-10-CM

## 2021-02-08 DIAGNOSIS — R25.1 OCCASIONAL TREMORS: ICD-10-CM

## 2021-02-08 DIAGNOSIS — E53.8 B12 DEFICIENCY: ICD-10-CM

## 2021-02-08 DIAGNOSIS — R73.02 IGT (IMPAIRED GLUCOSE TOLERANCE): ICD-10-CM

## 2021-02-08 DIAGNOSIS — G25.81 RESTLESS LEG: ICD-10-CM

## 2021-02-08 PROBLEM — E11.9 DIABETES MELLITUS TYPE 2, DIET-CONTROLLED (HCC): Status: RESOLVED | Noted: 2017-10-13 | Resolved: 2021-02-08

## 2021-02-08 PROCEDURE — G8399 PT W/DXA RESULTS DOCUMENT: HCPCS | Performed by: INTERNAL MEDICINE

## 2021-02-08 PROCEDURE — G8536 NO DOC ELDER MAL SCRN: HCPCS | Performed by: INTERNAL MEDICINE

## 2021-02-08 PROCEDURE — G8754 DIAS BP LESS 90: HCPCS | Performed by: INTERNAL MEDICINE

## 2021-02-08 PROCEDURE — 1090F PRES/ABSN URINE INCON ASSESS: CPT | Performed by: INTERNAL MEDICINE

## 2021-02-08 PROCEDURE — G8419 CALC BMI OUT NRM PARAM NOF/U: HCPCS | Performed by: INTERNAL MEDICINE

## 2021-02-08 PROCEDURE — G8427 DOCREV CUR MEDS BY ELIG CLIN: HCPCS | Performed by: INTERNAL MEDICINE

## 2021-02-08 PROCEDURE — 99204 OFFICE O/P NEW MOD 45 MIN: CPT | Performed by: INTERNAL MEDICINE

## 2021-02-08 PROCEDURE — G8752 SYS BP LESS 140: HCPCS | Performed by: INTERNAL MEDICINE

## 2021-02-08 PROCEDURE — G8510 SCR DEP NEG, NO PLAN REQD: HCPCS | Performed by: INTERNAL MEDICINE

## 2021-02-08 PROCEDURE — 1101F PT FALLS ASSESS-DOCD LE1/YR: CPT | Performed by: INTERNAL MEDICINE

## 2021-02-08 NOTE — PROGRESS NOTES
Written by Sarahi Lim, as dictated by Dr. Kiana Rae MD.    Mark Yañez (: 1940) is a [de-identified] y.o. female, new patient, here for evaluation of the following chief complaint(s):  Establish Care (labs)     SUBJECTIVE/OBJECTIVE:  HPI  Pt presents today to establish care. She used to see Dr. Lew Manuel, so when her practice closed she got a referral from a friend to come establish care here. She is taking benazepril and her BP is normal in office today at 128/78. She notes that she has weighed 210 lb in her past and was diabetic at that time. She lost weight down to about 135 lb at her lowest, but her goal in [employer] was only ever to get down to 160 lb. She has done a great job maintaining a consistent weight around 145 lb. Once she lost weight, she no longer fell in the diabetic category. She is having a tremor and is starting to have her L leg give out on her several times a day now. She is concerned what these sx are from and wether they could be a medication sfx. She has an appt coming up with Dr. Gianfranco Ribeiro on 02/15/21. She has taken vitamin B12 in the past but does not anymore. She has been having a pulsing sound in her R ear at night for a little while and has been following with an ENT for this. She did have an MRI done to evaluate for a growth, but nothing was there. She has also had some tenderness in the external ear. She would like to get registered to get the COVID vaccine wherever possible and inquires how to do this.      Patient Active Problem List   Diagnosis Code    Essential hypertension, benign I10    Fine tremor G25.2    Dysphagia R13.10    Actinic keratosis L57.0    Family history of thyroid disease Z80.51    Family history of diabetes mellitus Z83.3    Family history of heart attack Z82.49    Dyslipidemia E78.5    Degenerative lumbar spinal stenosis M48.061    Personal history of colonic polyps Z86.010    Lactose intolerance E73.9    Restless legs syndrome (RLS) G25.81    Chronic pain of both knees M25.561, M25.562, G89.29    Osteopenia M85.80    Statin intolerance Z78.9    Insomnia secondary to chronic pain G89.29, G47.01    Advance care planning Z71.89    Family history of thyroid disease in grandmother Z80.51    Lumbar stenosis with neurogenic claudication M48.062    ACE-inhibitor cough R05, T46.4X5A    Carpal tunnel syndrome of left wrist G56.02    Primary osteoarthritis of right knee M17.11    Pseudophakia of both eyes Z96.1    Bilateral pes planus M21.41, M21.42        Current Outpatient Medications on File Prior to Visit   Medication Sig Dispense Refill    benazepriL (LOTENSIN) 20 mg tablet Take 1 tab by mouth every day for high blood pressure. Office visit/labs due before next refill 30 Tab 0    melatonin 5 mg tablet Take 5 mg by mouth nightly.  rOPINIRole (REQUIP) 1 mg tablet TAKE 1/2 TABLET BY MOUTH IN THE MORNING AND AT LUNCH AND TAKE 1 TABLET AT BEDTIME FOR EXTREME DISCO 180 Tab 3    Multivitamins with Fluoride (MULTI-VITAMIN PO) Take 1 Tab by mouth daily (after breakfast).  diphenhydrAMINE (BENADRYL) 25 mg capsule Take 25 mg by mouth every six (6) hours as needed.  omeprazole (PRILOSEC) 40 mg capsule Take 20 mg by mouth daily. 0    fluticasone (FLONASE) 50 mcg/actuation nasal spray 2 Sprays by Both Nostrils route daily. (Patient taking differently: 2 Sprays by Both Nostrils route daily. Uses Seasonal- Spring & Fall) 1 Bottle 5    B3/azel ac/Zn/B6/copper/folate (B3-AZELAIC-ZN-B6-COPPER-FOLATE PO) Take  by mouth.  cholecalciferol, vitamin D3, (Vitamin D3) 50 mcg (2,000 unit) tab Take  by mouth.  HYLAN G-F 20 IX by Intra artICUlar route.  Blood-Glucose Meter monitoring kit With Diabetic Supplies 1 Kit 0    albuterol (PROVENTIL HFA, VENTOLIN HFA, PROAIR HFA) 90 mcg/actuation inhaler Take 2 Puffs by inhalation every four (4) hours as needed for Wheezing or Shortness of Breath.  Indications: bronchospasm prevention 1 Inhaler 5    cyanocobalamin (VITAMIN B-12) 500 mcg tablet Take 500 mcg by mouth daily.  ascorbic acid, vitamin C, (VITAMIN C) 500 mg tablet Take  by mouth.  turmeric root extract 500 mg cap Take  by mouth. No current facility-administered medications on file prior to visit. Allergies   Allergen Reactions    Other Food Itching     If eats large quantities over several days causes itching: tomatoes, peaches, strawberry, fig    Bee Sting [Sting, Bee] Swelling    Pcn [Penicillins] Hives     IN CHILDHOOD    Percocet [Oxycodone-Acetaminophen] Other (comments)      Other (comments)\"hellish nightmares\"      Shellfish Containing Products Itching    Zoster Vaccine Live Swelling     Severe Right arm swelling with redness after administered by pharmacist in elbow    Atarax [Hydroxyzine Hcl] Other (comments)     jittery    Buspar [Buspirone] Nausea Only    Crestor [Rosuvastatin] Myalgia    Hydrochlorothiazide Myalgia     Other reaction(s): sorethroat    Hydroxyzine Other (comments)     jittery    Mobic [Meloxicam] Other (comments)     Easy bruising    Norco [Hydrocodone-Acetaminophen] Nausea and Vomiting                Past Medical History:   Diagnosis Date    AC (acromioclavicular) joint bone spurs 01/2014    in back. Dr. Jesus Alberto Land. Txd with shots x 3    Actinic keratosis 2015    Dr. Leanne Olvera. Dr. Gaby Bokoer.  Allergic rhinitis, cause unspecified     Anxiety state, unspecified     Arthritis     R KNEE BONE-ON-BONE; R HAND-----OSTEO    Asthma     BRONCHITIS IN SPRING & FALL MOST YEARS    BCC (basal cell carcinoma), face 1980s (nose), 01/28/15 (forehead)    Dr. Zeinab Estes. Dr. Carmen Orellana.  Bilateral pes planus 03/10/2020    Dr. Dahlia Duggan 2007    Dr. Ada Lopez    Chronic insomnia     Chronic low back pain 01/2014    Dr. Brittani Kimble. DJD and spurs, Narrowing of L 3,4,5. Dr. Jesus Alberto Land.       Chronic obstructive pulmonary disease ([facility] [address]     CHRONIC (TWICE-YEARLY) BRONCHITIS    Chronic pain     Colon polyps 11/2005, 01/29/09, 9/16/2015    benign. Dr. Ferguson Ridsascha    Constipation     Degenerative lumbar spinal stenosis 01/2014    Dr. Joselito Rivera Narrowing of L 3,4,5    Diabetes mellitus type 2, diet-controlled ([facility] [address] 10/13/2017    LOST WEIGHT AND IS NOT DIABETIC    Dyslipidemia     Dysphagia 09/2014    due to Esophagitis. Dr. Wallace Benson.  Esophagitis 09/29/2014    ESOPHAGITIS    Essential hypertension, benign     Foot fracture, left 2009    stress.  Foot pain, bilateral 11/13/2013    Dr. Miki Mina.  Hearing loss     Dr. Ai Briggs.  Knee pain, bilateral 10/28/13    Dr. Willi Dixon. Right > Left. Severe OA. Txd with PT.    Lumbar stenosis with neurogenic claudication     Menopause 1976    Migraine     NONE SINCE AGE 37    Mixed stress and urge urinary incontinence     NO LEAKAGE; GETS UP SEVERAL TIMES A NIGHT, PT STATES ON 10/3/16    OA (osteoarthritis) of knee     Dr. Willi Dixon    Osteopenia 08/24/2015    Restless legs syndrome (RLS) 09/2015    Dr. Maco Montoya.  Shoulder joint dislocation 06/2011    Right. due to fall. Dr. Jackie Amor Sleep apnea     undiagnosed    Syncope 06/25/11    due to fall down 5 steps. Right occipital head trauma.  Tremor of both hands 09/2015    Dr. Alma Rosa [place] GABAPENTIN       Past Surgical History:   Procedure Laterality Date    COLONOSCOPY N/A 9/2/2020    COLONOSCOPY   :- performed by Antione Durbin [facility] at [facility] ENDOSCOPY    HX [address] Left 04/03/2018    Dr. Kerry Goyal     HX CATARACT REMOVAL Bilateral , R 04/10/17    Dr. Gen Paz  11/2005, 01/29/09, 09/16/15    with polypectomy. Dr. Ferguson Ridsascha q5 years    HX ENDOSCOPY  09/2014    with Esophageal dilation. due to dysphagia. Dr. Wallace Benson.        HX KNEE REPLACEMENT Right 2018    Dr. Jacklyn Wolf  10/18/2016    HX MALIGNANT SKIN LESION EXCISION  01/28/15    BCC. L Forehead. MOHs SURGERY. Dr. Esther Donato.  HX RAIMUNDO AND BSO      due to fibroids    HX TONSILLECTOMY      [place] COMBINED ANT/POST COLPORRHAPHY  05    with enterocele RE.   Dr. Joel Jeff and Dr. Jonna Shen       Family History   Problem Relation Age of Onset    Hypertension Mother     Dementia Mother         alzheimers    Hypertension Father     High Cholesterol Father     Kidney Disease Father         1 kidney    Emphysema Father         O2 requiring    Hypertension Sister     Diabetes Maternal Grandmother     Dementia Maternal Grandmother     Thyroid Disease Maternal Grandmother     Thyroid Disease Daughter         goiter    Heart Attack Maternal Uncle 51        massive    Heart Attack Maternal Uncle 39        massive    Heart Attack Maternal Aunt 51        massive    Thyroid Disease Daughter         thyroid cyst    Other Brother          AT 1DAYS OLD    Anesth Problems Neg Hx        Social History     Socioeconomic History    Marital status:      Spouse name: Not on file    Number of children: Not on file    Years of education: Not on file    Highest education level: Not on file   Occupational History    Not on file   Social Needs    Financial resource strain: Not on file    Food insecurity     Worry: Not on file     Inability: Not on file    Transportation needs     Medical: Not on file     Non-medical: Not on file   Tobacco Use    Smoking status: Never Smoker    Smokeless tobacco: Never Used   Substance and Sexual Activity    Alcohol use: No    Drug use: No    Sexual activity: Not Currently     Partners: Male     Birth control/protection: Surgical   Lifestyle    Physical activity     Days per week: Not on file     Minutes per session: Not on file    Stress: Not on file   Relationships    Social connections     Talks on phone: Not on file     Gets together: Not on file     Attends Islam service: Not on file     Active member of club or organization: Not on file     Attends meetings of clubs or organizations: Not on file     Relationship status: Not on file    Intimate partner violence     Fear of current or ex partner: Not on file     Emotionally abused: Not on file     Physically abused: Not on file     Forced sexual activity: Not on file   Other Topics Concern    Not on file   Social History Narrative    Not on file       No visits with results within 3 Month(s) from this visit. Latest known visit with results is:   Hospital Outpatient Visit on 08/29/2020   Component Date Value Ref Range Status    SARS-CoV-2 08/29/2020 Not Detected  Not Detected   Final    Comment: (NOTE)  This test was developed and its performance characteristics  determined by [employer] [facility]. This test has not been FDA  cleared or approved. This test has been authorized by FDA under an  Emergency Use Authorization (EUA). This test is only authorized for  the duration of time the declaration that circumstances exist  justifying the authorization of the emergency use of in vitro  diagnostic tests for detection of SARS-CoV-2 virus and/or diagnosis  of COVID-19 infection under section 564(b)(1) of the Act, 21 U. S.C.  217IIH-9(O)(1), unless the authorization is terminated or revoked  sooner. When diagnostic testing is negative, the possibility of a  false negative result should be considered in the context of a  patient's recent exposures and the presence of clinical signs and  symptoms consistent with COVID-19. An individual without symptoms of  COVID-19 and who is not shedding SARS-CoV-2 virus would expect to  have a negative (not detected) result in this assay.   Performed                            At: [address] [id number]  Chad Suresh MD UL:[id number]       Review of Systems   Constitutional: Negative for activity change, fatigue and unexpected weight change. HENT: Negative for congestion, hearing loss, rhinorrhea and sore throat. +pulsing in R ear at night   Eyes: Negative for discharge. Respiratory: Negative for cough, chest tightness and shortness of breath. Cardiovascular: Negative for leg swelling. Gastrointestinal: Negative for abdominal pain, constipation and diarrhea. Genitourinary: Negative for dysuria, flank pain, frequency and urgency. Musculoskeletal: Negative for arthralgias, back pain and myalgias. Skin: Negative for color change and rash. Neurological: Negative for dizziness, light-headedness and headaches. Psychiatric/Behavioral: Negative for dysphoric mood and sleep disturbance. The patient is not nervous/anxious. Visit Vitals  /78 (BP 1 Location: Left upper arm, BP Patient Position: Sitting)   Pulse 69   Temp 97.5 °F (36.4 °C) (Temporal)   Resp 18   Ht 5' 2\" (1.575 m)   Wt 150 lb (68 kg)   SpO2 98%   BMI 27.44 kg/m²     Physical Exam  Vitals signs and nursing note reviewed. Constitutional:       General: She is not in acute distress. Appearance: Normal appearance. She is well-developed. She is not diaphoretic. HENT:      Right Ear: External ear normal.      Left Ear: External ear normal.   Eyes:      General: No scleral icterus. Right eye: No discharge. Left eye: No discharge. Extraocular Movements: Extraocular movements intact. Conjunctiva/sclera: Conjunctivae normal.   Neck:      Musculoskeletal: Normal range of motion and neck supple. Cardiovascular:      Rate and Rhythm: Normal rate and regular rhythm. Pulmonary:      Effort: Pulmonary effort is normal.      Breath sounds: Normal breath sounds. No wheezing. Abdominal:      General: Bowel sounds are normal.      Palpations: Abdomen is soft. Tenderness: There is no abdominal tenderness. Lymphadenopathy:      Cervical: No cervical adenopathy.    Neurological: Mental Status: She is alert and oriented to person, place, and time. Psychiatric:         Mood and Affect: Mood and affect normal.         ASSESSMENT/PLAN:  1. Essential hypertension  -     METABOLIC PANEL, COMPREHENSIVE; Future  -     CBC WITH AUTOMATED DIFF; Future  -     TSH 3RD GENERATION; Future  Labs drawn in office today. 2. IGT (impaired glucose tolerance)  -     HEMOGLOBIN A1C WITH EAG; Future  -     MICROALBUMIN, UR, RAND W/ MICROALB/CREAT RATIO; Future  Labs drawn in office today. Explained that if her HbA1c comes back normal we can take her diagnosis of diabetes off of her chart. 3. Restless leg  Stable, continues on Requip. 4. Gastroesophageal reflux disease without esophagitis  Continues on omeprazole. 5. Occasional tremors  She has an appt coming up with neurology. 6. B12 deficiency  -     VITAMIN B12; Future  I ordered labs to check her vitamin B12 levels and other causes of her leg weakness. If these labs come back normal, it will be all the more important to go to neurology. 7. Dyslipidemia  -     LIPID PANEL; Future  Ordered fasting labs for pt to complete today in office. Waiting on results. This plan was reviewed with the patient and patient agrees. All questions were answered. This scribe documentation was reviewed by me and accurately reflects the examination and decisions made by me. An electronic signature was used to authenticate this note.   -- Beulah Hauser

## 2021-02-22 DIAGNOSIS — I10 ESSENTIAL HYPERTENSION, BENIGN: ICD-10-CM

## 2021-02-23 RX ORDER — BENAZEPRIL HYDROCHLORIDE 20 MG/1
TABLET ORAL
Qty: 90 TAB | Refills: 0 | Status: SHIPPED | OUTPATIENT
Start: 2021-02-23 | End: 2021-06-02 | Stop reason: SDUPTHER

## 2021-03-15 ENCOUNTER — OFFICE VISIT (OUTPATIENT)
Dept: NEUROLOGY | Age: 81
End: 2021-03-15
Payer: MEDICARE

## 2021-03-15 VITALS
SYSTOLIC BLOOD PRESSURE: 134 MMHG | HEIGHT: 62 IN | BODY MASS INDEX: 27.6 KG/M2 | DIASTOLIC BLOOD PRESSURE: 82 MMHG | HEART RATE: 92 BPM | OXYGEN SATURATION: 98 % | WEIGHT: 150 LBS

## 2021-03-15 DIAGNOSIS — R25.1 TREMOR OF BOTH HANDS: ICD-10-CM

## 2021-03-15 DIAGNOSIS — G25.81 RESTLESS LEG SYNDROME: ICD-10-CM

## 2021-03-15 DIAGNOSIS — R29.898 LEFT LEG WEAKNESS: Primary | ICD-10-CM

## 2021-03-15 DIAGNOSIS — M51.37 DDD (DEGENERATIVE DISC DISEASE), LUMBOSACRAL: ICD-10-CM

## 2021-03-15 PROCEDURE — G8752 SYS BP LESS 140: HCPCS | Performed by: PSYCHIATRY & NEUROLOGY

## 2021-03-15 PROCEDURE — G8419 CALC BMI OUT NRM PARAM NOF/U: HCPCS | Performed by: PSYCHIATRY & NEUROLOGY

## 2021-03-15 PROCEDURE — G8510 SCR DEP NEG, NO PLAN REQD: HCPCS | Performed by: PSYCHIATRY & NEUROLOGY

## 2021-03-15 PROCEDURE — 1101F PT FALLS ASSESS-DOCD LE1/YR: CPT | Performed by: PSYCHIATRY & NEUROLOGY

## 2021-03-15 PROCEDURE — 1090F PRES/ABSN URINE INCON ASSESS: CPT | Performed by: PSYCHIATRY & NEUROLOGY

## 2021-03-15 PROCEDURE — G8427 DOCREV CUR MEDS BY ELIG CLIN: HCPCS | Performed by: PSYCHIATRY & NEUROLOGY

## 2021-03-15 PROCEDURE — 99205 OFFICE O/P NEW HI 60 MIN: CPT | Performed by: PSYCHIATRY & NEUROLOGY

## 2021-03-15 PROCEDURE — G8536 NO DOC ELDER MAL SCRN: HCPCS | Performed by: PSYCHIATRY & NEUROLOGY

## 2021-03-15 PROCEDURE — G8399 PT W/DXA RESULTS DOCUMENT: HCPCS | Performed by: PSYCHIATRY & NEUROLOGY

## 2021-03-15 PROCEDURE — G8754 DIAS BP LESS 90: HCPCS | Performed by: PSYCHIATRY & NEUROLOGY

## 2021-03-15 NOTE — PROGRESS NOTES
Chief Complaint   Patient presents with    Neurologic Problem     \"My legs just randomly give out, it's usually my left leg, and I have also started having tremors. \"       Referred by:  Erica Tobias is an 45-year-old woman with a history of degenerative disc disease, asthma here for a few issues. I reviewed the medical record and spoke with her personally. I also reviewed neuroimaging results from orthopedics. Main reason why she is here is that she feels like her left leg gives out on her. This has been going on for several years. This can occur without warning and she has fallen a couple times last in February. No head trauma. She does have a history of low back radicular history requiring surgery. L-spine in 2014 results are reviewed showing multilevel canal stenosis and neuroforaminal stenosis. Superimposed on this she has chronic left knee arthritis that has been getting worse. She has not seen the knee specialist in quite a long time about this. She had the right knee replacement years ago. Other issue is that she has noticed a tremor in her hands for couple years and she is worried she might have Parkinson's. She has no hallucinations or difficulty swallowing. No problems walking aside from the left leg symptoms. She has chronic restless leg managed with Requip at night. Multiple allergies noted. Review of Systems   Gastrointestinal: Negative for constipation. Musculoskeletal: Positive for back pain, falls and joint pain. Neurological: Positive for tremors. Psychiatric/Behavioral: Negative for hallucinations. All other systems reviewed and are negative. Past Medical History:   Diagnosis Date    AC (acromioclavicular) joint bone spurs 01/2014    in back. Dr. eKvin Lion. Txd with shots x 3    Actinic keratosis 2015    Dr. Derik Cabezas. Dr. Natalie Morris.     Allergic rhinitis, cause unspecified     Anxiety state, unspecified     Arthritis     R KNEE BONE-ON-BONE; R HAND-----OSTEO    Asthma     BRONCHITIS IN SPRING & FALL MOST YEARS    BCC (basal cell carcinoma), face 1980s (nose), 01/28/15 (forehead)    Dr. Nguyễn Lee. Dr. Nancy Chacon.  Bilateral pes planus 03/10/2020    Dr. Marlon Frias 2007    Dr. Joshua Sher    Chronic insomnia     Chronic low back pain 01/2014    Dr. Willem Kwon. DJD and spurs, Narrowing of L 3,4,5. Dr. [facility].  Chronic obstructive pulmonary disease (HCC)     CHRONIC (TWICE-YEARLY) BRONCHITIS    Chronic pain     Colon polyps 11/2005, 01/29/09, 9/16/2015    benign. Dr. Herrera Ruffpollo    Constipation     Degenerative lumbar spinal stenosis 01/2014    Dr. Williamson Broad Narrowing of L 3,4,5    Diabetes mellitus type 2, diet-controlled ([facility] [address] 10/13/2017    LOST WEIGHT AND IS NOT DIABETIC    Dyslipidemia     Dysphagia 09/2014    due to Esophagitis. Dr. Carmen Herman.  Esophagitis 09/29/2014    ESOPHAGITIS    Essential hypertension, benign     Foot fracture, left 2009    stress.  Foot pain, bilateral 11/13/2013    Dr. Dominic Medrano.  Hearing loss     Dr. Elizabeth Snyder.  Knee pain, bilateral 10/28/13    Dr. Adama Ojeda. Right > Left. Severe OA. Txd with PT.    Lumbar stenosis with neurogenic claudication     Menopause 1976    Migraine     NONE SINCE AGE 37    Mixed stress and urge urinary incontinence     NO LEAKAGE; GETS UP SEVERAL TIMES A NIGHT, PT STATES ON 10/3/16    OA (osteoarthritis) of knee     Dr. Adama Ojeda    Osteopenia 08/24/2015    Restless legs syndrome (RLS) 09/2015    Dr. Hakeem Ospina.  Shoulder joint dislocation 06/2011    Right. due to fall. Dr. Katheryn Angelucci Sleep apnea     undiagnosed    Syncope 06/25/11    due to fall down 5 steps. Right occipital head trauma.     Tremor of both hands 09/2015    Dr. Krystle Polanco GABAPENTIN     Family History   Problem Relation Age of Onset    Hypertension Mother    [place] Favorite Dementia Mother         alzheimers    Hypertension Father     High Cholesterol Father     Kidney Disease Father         1 kidney    Emphysema Father         O2 requiring    Hypertension Sister     Diabetes Maternal Grandmother     Dementia Maternal Grandmother     Thyroid Disease Maternal Grandmother     Thyroid Disease Daughter         goiter    Heart Attack Maternal Uncle 51        massive    Heart Attack Maternal Uncle 39        massive    Heart Attack Maternal Aunt 51        massive    Thyroid Disease Daughter         thyroid cyst    Other Brother          AT 1DAYS OLD    Anesth Problems Neg Hx      Social History     Socioeconomic History    Marital status:      Spouse name: Not on file    Number of children: Not on file    Years of education: Not on file    Highest education level: Not on file   Occupational History    Not on file   Social Needs    Financial resource strain: Not on file    Food insecurity     Worry: Not on file     Inability: Not on file    Transportation needs     Medical: Not on file     Non-medical: Not on file   Tobacco Use    Smoking status: Never Smoker    Smokeless tobacco: Never Used   Substance and Sexual Activity    Alcohol use: No    Drug use: No    Sexual activity: Not Currently     Partners: Male     Birth control/protection: Surgical   Lifestyle    Physical activity     Days per week: Not on file     Minutes per session: Not on file    Stress: Not on file   Relationships    Social connections     Talks on phone: Not on file     Gets together: Not on file     Attends Taoism service: Not on file     Active member of club or organization: Not on file     Attends meetings of clubs or organizations: Not on file     Relationship status: Not on file    Intimate partner violence     Fear of current or ex partner: Not on file     Emotionally abused: Not on file     Physically abused: Not on file     Forced sexual activity: Not on file   Other Topics Concern    Not on file   Social History Narrative    Not on file     Current Outpatient Medications   Medication Sig    benazepriL (LOTENSIN) 20 mg tablet TAKE 1 TABLET BY MOUTH EVERY DAY FOR HIGH BLOOD PRESSURE    melatonin 5 mg tablet Take 5 mg by mouth nightly.  B3/azel ac/Zn/B6/copper/folate (B3-AZELAIC-ZN-B6-COPPER-FOLATE PO) Take  by mouth.  cholecalciferol, vitamin D3, (Vitamin D3) 50 mcg (2,000 unit) tab Take  by mouth.  rOPINIRole (REQUIP) 1 mg tablet TAKE 1/2 TABLET BY MOUTH IN THE MORNING AND AT LUNCH AND TAKE 1 TABLET AT BEDTIME FOR EXTREME DISCO    HYLAN G-F 20 IX by Intra artICUlar route.  Blood-Glucose Meter monitoring kit With Diabetic Supplies    albuterol (PROVENTIL HFA, VENTOLIN HFA, PROAIR HFA) 90 mcg/actuation inhaler Take 2 Puffs by inhalation every four (4) hours as needed for Wheezing or Shortness of Breath. Indications: bronchospasm prevention    Multivitamins with Fluoride (MULTI-VITAMIN PO) Take 1 Tab by mouth daily (after breakfast).  cyanocobalamin (VITAMIN B-12) 500 mcg tablet Take 500 mcg by mouth daily.  ascorbic acid, vitamin C, (VITAMIN C) 500 mg tablet Take  by mouth.  diphenhydrAMINE (BENADRYL) 25 mg capsule Take 25 mg by mouth every six (6) hours as needed.  omeprazole (PRILOSEC) 40 mg capsule Take 20 mg by mouth daily.  fluticasone (FLONASE) 50 mcg/actuation nasal spray 2 Sprays by Both Nostrils route daily. (Patient taking differently: 2 Sprays by Both Nostrils route daily. Uses Seasonal- Spring & Fall)    turmeric root extract 500 mg cap Take  by mouth. No current facility-administered medications for this visit.       Allergies   Allergen Reactions    Other Food Itching     If eats large quantities over several days causes itching: tomatoes, peaches, strawberry, fig    Bee Sting [Sting, Bee] Swelling    Pcn [Penicillins] Hives     IN CHILDHOOD    Percocet [Oxycodone-Acetaminophen] Other (comments)      Other (comments)\"hellish nightmares\"      Shellfish Containing Products Itching    Zoster Vaccine Live Swelling     Severe Right arm swelling with redness after administered by pharmacist in elbow    Atarax [Hydroxyzine Hcl] Other (comments)     jittery    Buspar [Buspirone] Nausea Only    Crestor [Rosuvastatin] Myalgia    Hydrochlorothiazide Myalgia     Other reaction(s): sorethroat    Hydroxyzine Other (comments)     jittery    Mobic [Meloxicam] Other (comments)     Easy bruising    Norco [Hydrocodone-Acetaminophen] Nausea and Vomiting                  Neurologic Exam     Mental Status   Oriented to person, place, and time. Pleasant well spoken with good volume of voice     Cranial Nerves   Cranial nerves II through XII intact. Motor Exam   Muscle bulk: normalLeft leg knee extension 4/5 dorsiflexion 4+  Hip flexors 5/5    No atrophy     Sensory Exam   Light touch normal.     Gait, Coordination, and Reflexes     Gait  Gait: normal    Coordination   Finger to nose coordination: normal    Tremor   Resting tremor: absentDistal hands with a very fine moderate frequency low amplitude tremor. No cogwheel or bradycardia  No rigidity         Physical Exam   Constitutional: She is oriented to person, place, and time. She appears well-developed and well-nourished. Cardiovascular: Normal rate. Pulmonary/Chest: Effort normal.   Neurological: She is oriented to person, place, and time. She has a normal Finger-Nose-Finger Test. Gait normal.   Skin: Skin is warm and dry. Psychiatric: Her behavior is normal.   Vitals reviewed.     Visit Vitals  /82 (BP 1 Location: Left upper arm, BP Patient Position: Sitting)   Pulse 92   Ht 5' 2\" (1.575 m)   Wt 150 lb (68 kg)   SpO2 98%   BMI 27.44 kg/m²       Lab Results   Component Value Date/Time    WBC 6.5 02/09/2021 11:45 AM    HGB 14.1 02/09/2021 11:45 AM    HCT 43.3 02/09/2021 11:45 AM    PLATELET 482 96/24/7232 11:45 AM    MCV 91.9 02/09/2021 11:45 AM     Lab Results   Component Value Date/Time    Hemoglobin A1c 5.6 02/09/2021 11:45 AM    Hemoglobin A1c 5.6 04/16/2019 12:38 PM    Hemoglobin A1c 5.8 08/28/2018 02:47 AM    Glucose 106 (H) 02/09/2021 11:45 AM    Glucose (POC) 96 08/29/2018 11:36 AM    Microalbumin/Creat ratio (mg/g creat) 9 02/09/2021 11:45 AM    Microalbumin,urine random 0.55 02/09/2021 11:45 AM    LDL, calculated 131.6 (H) 02/09/2021 11:45 AM    Creatinine 0.92 02/09/2021 11:45 AM      Lab Results   Component Value Date/Time    Cholesterol, total 212 (H) 02/09/2021 11:45 AM    HDL Cholesterol 61 02/09/2021 11:45 AM    LDL, calculated 131.6 (H) 02/09/2021 11:45 AM    Triglyceride 97 02/09/2021 11:45 AM    CHOL/HDL Ratio 3.5 02/09/2021 11:45 AM     Lab Results   Component Value Date/Time    ALT (SGPT) 24 02/09/2021 11:45 AM    Alk. phosphatase 78 02/09/2021 11:45 AM    Bilirubin, total 0.7 02/09/2021 11:45 AM    Albumin 4.2 02/09/2021 11:45 AM    Protein, total 7.4 02/09/2021 11:45 AM    INR 1.0 08/13/2018 12:47 PM    Prothrombin time 10.2 08/13/2018 12:47 PM    PLATELET 297 77/14/8079 11:45 AM        CT Results (maximum last 3): Results from [place] encounter on 06/25/11    [address]    Narrative **Final Report**       ICD Codes / Adm. Diagnosis: 920  831.04 / CONTUSION OF HEAD    ACROMIOCLAVICULAR JOINT SEPARA  Examination:  CT [facility]  CON  - [id number] - Jun 25 2011  7:19AM  Accession No:  [id number]  Reason:  INJURY      REPORT:  EXAM:  CT CERVICAL SPINE WITHOUT CONTRAST    INDICATION: Fall with shoulder injury. COMPARISON: None. TECHNIQUE:   Multislice helical CT of the cervical spine was performed in the axial plane   and sagittal and coronal reformations were generated. FINDINGS:  The alignment of the cervical spine is normal.     There are degenerative disc changes at C5-C6 and C6-C7 with disc space   narrowing and osteophyte formation. There is facet arthrosis at C3-C5 on the   right.  There is foraminal narrowing on the right at C3-C4, C4-C5 and on the   left at C4-C5. The vertebral body heights are preserved. There is no fracture or subluxation. The prevertebral soft tissues are normal.    The odontoid process is intact. The visualized portions of the lung apices are clear. IMPRESSION: Cervical spondylosis. No acute fracture. Signing/Reading Doctor: Annie Chase ([id number])  Transcribed:  on 06/25/2011  Approved: Annie Chase ([id number])  06/25/2011             Distribution:  Attending Doctor: Lorraine James           CT HEAD WITHOUT CONTRAST    Narrative **Final Report**       ICD Codes / Adm. Diagnosis: 379317  204557 / Fall  Shoulder Injury  Examination:  CT HEAD WO CON  - [id number] - Jun 25 2011  7:19AM  Accession No:  [id number]  Reason:  INJURY      REPORT:  EXAM:  CT HEAD WITHOUT CONTRAST    INDICATION: Fall. COMPARISON: None. CONTRAST: None. TECHNIQUE: Unenhanced CT of the head was performed using 5 mm images. Brain   and bone windows were generated. FINDINGS: There is a large right parietal scalp hematoma. The ventricles and   sulci are normal in size, shape and configuration and midline. There is no   significant white matter disease. There is no intracranial hemorrhage,   extra-axial collection, mass, mass effect or midline shift. The basilar   cisterns are open. No acute infarct is identified. The bone windows   demonstrate no abnormalities. The visualized portions of the paranasal   sinuses and mastoid air cells are clear. IMPRESSION: Right parietal scalp hematoma. No intracranial hemorrhage or   other acute intracranial process. Signing/Reading Doctor: Annie Chase ([id number])  Transcribed: n/a on 06/25/2011  Approved: Annie Chase ([id number])  06/25/2011             Distribution:  Attending Doctor: Lorraine James               MRI Results (maximum last 3):   Results from [place] encounter on 06/23/20   MRI IAC W WO CONT    Narrative EXAM: MRI IAC W WO CONT    TECHNIQUE: Sagittal and axial T1-weighted images axial FLAIR, T2-weighted,  diffusion weighted, precontrast images of the brain. Axial thin section cranial  nerve cisternal images, thin section axial and coronal T1-weighted and  fat-suppressed postcontrast T1-weighted images of the skull base, axial  postcontrast images of the head      IV CONTRAST:  13 cc Dotarem    INDICATION:  Sensorineural hearing loss, unilateral, right ear, with restricted  hearing on the contralateral side    COMPARISON:  None. FINDINGS:  BRAIN PARENCHYMA:  No acute or chronic infarct. No masses or abnormal  enhancement. Relatively mild scattered foci of FLAIR/T2 hyperintensity in the  cerebral white matter consistent with age-related changes and small vessel  disease. INTRACRANIAL HEMORRHAGE: None. CSF SPACES:  Normal in size and morphology for the patient's age. BASAL CISTERNS:  Patent. MIDLINE SHIFT: None. VASCULAR SYSTEM:  Normal flow voids. PARANASAL SINUSES AND MASTOID AIR CELLS:  No significant opacification. VISUALIZED ORBITS:  No significant abnormalities. Previous cataract surgery. VISUALIZED UPPER CERVICAL SPINE:  No significant abnormalities. SELLA:  No enlargement or  focal abnormality. SKULL BASE:  Thin section images obtained. No cranial nerve thickening or  abnormal enhancement. Normal appearance of the IACs. Cerebellar tonsils in  normal position. CALVARIUM:  Intact. Impression IMPRESSION:    1. No acute findings. No evidence of vestibular schwannoma. 2. Mild nonspecific white matter signal abnormality consistent with age-related  changes and probable mild small vessel ischemic change. Results from [facility] encounter on 05/09/14   MRI LUMB SPINE W WO CONT       PET Results (maximum last 3): No results found for this or any previous visit. Assessment and Plan   Diagnoses and all orders for this visit:    1. Left leg weakness  -     MRI LUMB SPINE WO CONT; Future    2.  Tremor of both hands    3. DDD (degenerative disc disease), lumbosacral  -     MRI LUMB SPINE WO CONT; Future    4. Restless leg syndrome      24-year-old woman who presents with various issues. First of all the tremor looks benign. I would follow clinically. It does not look like Parkinson's disease. She also does not have the other red flags of Parkinson's I will be looking for. She does have restless leg which in some cases could be a hallmark or harbinger of Parkinson's but again, I do not see any red flags to require dopamine replacement at this time. The left leg weakness is concerning. She is weak on exam.  I think a new MRI lumbosacral spine needs to be done to compare to previous. I am concerned about weakness superimposed on a bad knee causing more instability leading to falls. She is a falls risk. I recommend she revisit with orthopedics to reevaluate the knee on the left. Because of her falls risk, this poses a possible life-threatening issue. Continue Requip for restless leg which seems stable. Consider morning magnesium to help with muscle cramping. She is not on statins. Anticipate the MRI is going to show a lot of chronic disease, main issues to rule out anything serious. Questions answered. I will see her in follow-up. I reviewed and decided to continue the current medications. This clinical note was dictated with an electronic dictation software that can make unintentional errors. If there are any questions, please contact me directly for clarification. A notice of this visit/encounter being completed has been sent electronically to the patient's PCP and/or referring provider.      2 [facility], [address]  Diplomate IRA

## 2021-03-23 ENCOUNTER — HOSPITAL ENCOUNTER (OUTPATIENT)
Dept: MRI IMAGING | Age: 81
Discharge: HOME | End: 2021-03-23
Attending: PSYCHIATRY & NEUROLOGY
Payer: MEDICARE

## 2021-03-23 DIAGNOSIS — R29.898 LEFT LEG WEAKNESS: ICD-10-CM

## 2021-03-23 DIAGNOSIS — M51.37 DDD (DEGENERATIVE DISC DISEASE), LUMBOSACRAL: ICD-10-CM

## 2021-03-23 PROCEDURE — 72148 MRI LUMBAR SPINE W/O DYE: CPT

## 2021-03-24 NOTE — PROGRESS NOTES
Okay I reviewed the imaging. I clearly see the evidence of previous surgery with good results as there is no pressure on the spinal cord. She does have some evidence of small pinched nerves on the lower end but that would not cause her legs to give out. I do recommend she continue to work-up that knee particularly if the arthritis is getting worse. Overall the MRI of the low back is reassuring without any acute process.

## 2021-06-02 DIAGNOSIS — I10 ESSENTIAL HYPERTENSION, BENIGN: ICD-10-CM

## 2021-06-02 RX ORDER — BENAZEPRIL HYDROCHLORIDE 20 MG/1
TABLET ORAL
Qty: 90 TABLET | Refills: 0 | Status: SHIPPED | OUTPATIENT
Start: 2021-06-02 | End: 2021-08-23

## 2021-06-02 NOTE — TELEPHONE ENCOUNTER
Requested Prescriptions     Pending Prescriptions Disp Refills    benazepriL (LOTENSIN) 20 mg tablet 90 Tablet 0     Sig: TAKE 1 TABLET BY MOUTH EVERY DAY FOR HIGH BLOOD PRESSURE        Last Visit 2/8/21  Last Refill 2/23/21

## 2021-06-02 NOTE — TELEPHONE ENCOUNTER
----- Message from Jane Toney sent at 6/2/2021  9:50 AM EDT -----  Regarding: Dr Bria Hannah first and last name: pt      Reason for call: blood pressure medication      Callback required yes/no and why: no. Unless need be      Best contact number(s):[phone]       Details to clarify the request: pt stated that [facility] has called & faxed the office on May 20, 28, and 30 to get her blood pressure medication filled but they still have not heard anything back. Dori had to give her an emergency 5 pills since she is completely out.  the medication, Benavecril 20mg, has been called into dori Toney

## 2021-08-18 ENCOUNTER — OFFICE VISIT (OUTPATIENT)
Dept: NEUROLOGY | Age: 81
End: 2021-08-18
Payer: MEDICARE

## 2021-08-18 VITALS
HEIGHT: 62 IN | SYSTOLIC BLOOD PRESSURE: 120 MMHG | BODY MASS INDEX: 28.01 KG/M2 | WEIGHT: 152.2 LBS | DIASTOLIC BLOOD PRESSURE: 70 MMHG | HEART RATE: 76 BPM | OXYGEN SATURATION: 99 %

## 2021-08-18 DIAGNOSIS — G25.81 RESTLESS LEGS SYNDROME (RLS): ICD-10-CM

## 2021-08-18 DIAGNOSIS — M51.37 DDD (DEGENERATIVE DISC DISEASE), LUMBOSACRAL: Primary | ICD-10-CM

## 2021-08-18 PROCEDURE — 99214 OFFICE O/P EST MOD 30 MIN: CPT | Performed by: PSYCHIATRY & NEUROLOGY

## 2021-08-18 PROCEDURE — G8510 SCR DEP NEG, NO PLAN REQD: HCPCS | Performed by: PSYCHIATRY & NEUROLOGY

## 2021-08-18 PROCEDURE — G8427 DOCREV CUR MEDS BY ELIG CLIN: HCPCS | Performed by: PSYCHIATRY & NEUROLOGY

## 2021-08-18 PROCEDURE — G8399 PT W/DXA RESULTS DOCUMENT: HCPCS | Performed by: PSYCHIATRY & NEUROLOGY

## 2021-08-18 PROCEDURE — 1090F PRES/ABSN URINE INCON ASSESS: CPT | Performed by: PSYCHIATRY & NEUROLOGY

## 2021-08-18 PROCEDURE — G8536 NO DOC ELDER MAL SCRN: HCPCS | Performed by: PSYCHIATRY & NEUROLOGY

## 2021-08-18 PROCEDURE — G8752 SYS BP LESS 140: HCPCS | Performed by: PSYCHIATRY & NEUROLOGY

## 2021-08-18 PROCEDURE — G8754 DIAS BP LESS 90: HCPCS | Performed by: PSYCHIATRY & NEUROLOGY

## 2021-08-18 PROCEDURE — G8419 CALC BMI OUT NRM PARAM NOF/U: HCPCS | Performed by: PSYCHIATRY & NEUROLOGY

## 2021-08-18 PROCEDURE — 1101F PT FALLS ASSESS-DOCD LE1/YR: CPT | Performed by: PSYCHIATRY & NEUROLOGY

## 2021-08-18 RX ORDER — ROPINIROLE 1 MG/1
TABLET, FILM COATED ORAL
Qty: 180 TABLET | Refills: 3 | Status: SHIPPED | OUTPATIENT
Start: 2021-08-18 | End: 2022-01-07 | Stop reason: ALTCHOICE

## 2021-08-18 NOTE — PROGRESS NOTES
Chief Complaint   Patient presents with    Follow-up     left leg weakness, restless leg, cramping       HPI    Tunde Lund is an 80-year-old woman following up. I saw her initially for concerns of left leg weakness. She has a tremor. Restless leg at baseline. Since I saw her last we completed an MRI of the lumbar sacral spine which was unrevealing for any acute issue. Clear evidence of decompression but no acute process. I asked her to start taking magnesium for cramping which she has been with very good results. She still gets a lot of restless leg at night sometimes severely waking her up. She admits to not being very active and she is gained some weight. No falls. Background:  Tunde Lund is an 80-year-old woman with a history of degenerative disc disease, asthma here for a few issues. I reviewed the medical record and spoke with her personally. I also reviewed neuroimaging results from orthopedics. Main reason why she is here is that she feels like her left leg gives out on her. This has been going on for several years. This can occur without warning and she has fallen a couple times last in February. No head trauma. She does have a history of low back radicular history requiring surgery. L-spine in 2014 results are reviewed showing multilevel canal stenosis and neuroforaminal stenosis. Superimposed on this she has chronic left knee arthritis that has been getting worse. She has not seen the knee specialist in quite a long time about this. She had the right knee replacement years ago. Other issue is that she has noticed a tremor in her hands for couple years and she is worried she might have Parkinson's. She has no hallucinations or difficulty swallowing. No problems walking aside from the left leg symptoms. She has chronic restless leg managed with Requip at night. Multiple allergies noted. Review of Systems   Musculoskeletal: Positive for myalgias.    Neurological:        Restless leg   All other systems reviewed and are negative. Past Medical History:   Diagnosis Date    AC (acromioclavicular) joint bone spurs 01/2014    in back. Dr. Odin Hinton. Txd with shots x 3    Actinic keratosis 2015    Dr. Prabha Sanders. Dr. Kaleb Santiago.  Allergic rhinitis, cause unspecified     Anxiety state, unspecified     Arthritis     R KNEE BONE-ON-BONE; R HAND-----OSTEO    Asthma     BRONCHITIS IN SPRING & FALL MOST YEARS    BCC (basal cell carcinoma), face 1980s (nose), 01/28/15 (forehead)    Dr. Trinity Nuñez. Dr. Lina Ashraf.  Bilateral pes planus 03/10/2020    Dr. Mckayla Resendez 2007    Dr. Ashanti Rios    Chronic insomnia     Chronic low back pain 01/2014    Dr. Richy Dias. DJD and spurs, Narrowing of L 3,4,5. Dr. Odin Hinton.  Chronic obstructive pulmonary disease (HCC)     CHRONIC (TWICE-YEARLY) BRONCHITIS    Chronic pain     Colon polyps 11/2005, 01/29/09, 9/16/2015    benign. Dr. Anjel Kohler    Constipation     Degenerative lumbar spinal stenosis 01/2014    Dr. Jacquelyn Dumont Narrowing of L 3,4,5    Diabetes mellitus type 2, diet-controlled ([facility] [address] 10/13/2017    LOST WEIGHT AND IS NOT DIABETIC    Dyslipidemia     Dysphagia 09/2014    due to Esophagitis. Dr. Chi Malave.  Esophagitis 09/29/2014    ESOPHAGITIS    Essential hypertension, benign     Foot fracture, left 2009    stress.  Foot pain, bilateral 11/13/2013    Dr. Aryan Bean.  Hearing loss     Dr. Jodi Dill.  Knee pain, bilateral 10/28/13    Dr. Jeanette Guevara. Right > Left. Severe OA. Txd with PT.    Lumbar stenosis with neurogenic claudication     Menopause 1976    Migraine     NONE SINCE AGE 37    Mixed stress and urge urinary incontinence     NO LEAKAGE; GETS UP SEVERAL TIMES A NIGHT, PT STATES ON 10/3/16    OA (osteoarthritis) of knee     Dr. Jeanette Guevara    Osteopenia 08/24/2015    Restless legs syndrome (RLS) 09/2015    Dr. Gato Bunn.     Shoulder joint dislocation 2011    Right. due to fall. Dr. Belinda Lyon Sleep apnea     undiagnosed    Syncope 11    due to fall down 5 steps. Right occipital head trauma.  Tremor of both hands 2015    Dr. Va Collado                  PT [address] [facility]     Family History   Problem Relation Age of Onset    Hypertension Mother     Dementia Mother         alzheimers    Hypertension Father     High Cholesterol Father     Kidney Disease Father         1 kidney    Emphysema Father         O2 requiring    Hypertension Sister     Diabetes Maternal Grandmother     Dementia Maternal Grandmother     Thyroid Disease Maternal Grandmother     Thyroid Disease Daughter         goiter    Heart Attack Maternal Uncle 51        massive    Heart Attack Maternal Uncle 39        massive    Heart Attack Maternal Aunt 51        massive    Thyroid Disease Daughter         thyroid cyst    Other Brother          AT 1DAYS OLD    Anesth Problems Neg Hx      Social History     Socioeconomic History    Marital status:      Spouse name: Not on file    Number of children: Not on file    Years of education: Not on file    Highest education level: Not on file   Occupational History    Not on file   Tobacco Use    Smoking status: Never Smoker    Smokeless tobacco: Never Used   Vaping Use    Vaping Use: Never used   Substance and Sexual Activity    Alcohol use: No    Drug use: No    Sexual activity: Not Currently     Partners: Male     Birth control/protection: Surgical   Other Topics Concern    Not on file   Social History Narrative    Not on file     Social Determinants of Health     Financial Resource Strain:     Difficulty of Paying Living Expenses:    Food Insecurity:     Worried About Running Out of Food in the Last Year:     Ran Out of Food in the Last Year:    Transportation Needs:     Lack of Transportation (Medical):      Lack of Transportation (Non-Medical):    Physical Activity:     Days of Exercise per Week:     Minutes of Exercise per Session:    Stress:     Feeling of Stress :    Social Connections:     Frequency of Communication with Friends and Family:     Frequency of Social Gatherings with Friends and Family:     Attends Mosque Services:     Active Member of Clubs or Organizations:     Attends Club or Organization Meetings:     Marital Status:    Intimate Partner Violence:     Fear of Current or Ex-Partner:     Emotionally Abused:     Physically Abused:     Sexually Abused: Allergies   Allergen Reactions    Other Food Itching     If eats large quantities over several days causes itching: tomatoes, peaches, strawberry, fig    Bee Sting [Sting, Bee] Swelling    Pcn [Penicillins] Hives     IN CHILDHOOD    Percocet [Oxycodone-Acetaminophen] Other (comments)      Other (comments)\"hellish nightmares\"      Shellfish Containing Products Itching    Zoster Vaccine Live Swelling     Severe Right arm swelling with redness after administered by pharmacist in elbow    Atarax [Hydroxyzine Hcl] Other (comments)     jittery    Buspar [Buspirone] Nausea Only    Crestor [Rosuvastatin] Myalgia    Hydrochlorothiazide Myalgia     Other reaction(s): sorethroat    Hydroxyzine Other (comments)     jittery    Mobic [Meloxicam] Other (comments)     Easy bruising    Norco [Hydrocodone-Acetaminophen] Nausea and Vomiting                  Current Outpatient Medications   Medication Sig    rOPINIRole (REQUIP) 1 mg tablet 1-1/2 tablets at bedtime, may take an additional half tab as needed at night for severe restless leg    benazepriL (LOTENSIN) 20 mg tablet TAKE 1 TABLET BY MOUTH EVERY DAY FOR HIGH BLOOD PRESSURE    melatonin 5 mg tablet Take 5 mg by mouth nightly.  HYLAN G-F 20 IX by Intra artICUlar route.  ascorbic acid, vitamin C, (VITAMIN C) 500 mg tablet Take  by mouth.  omeprazole (PRILOSEC) 40 mg capsule Take 20 mg by mouth daily.     turmeric root extract 500 mg cap Take  by mouth. (Patient not taking: Reported on 8/18/2021)     No current facility-administered medications for this visit. Neurologic Exam     Mental Status   WD/WN adult in NAD, normal grooming  VSS  A&O x 3    PERRL, nonicteric  Face is symmetric, tongue midline  Speech is fluent and clear  No limb ataxia. No abnl movements. Moving all extemities spontaneously and symmetric  Normal gait         Visit Vitals  /70   Pulse 76   Ht 5' 2\" (1.575 m)   Wt 152 lb 3.2 oz (69 kg)   SpO2 99%   BMI 27.84 kg/m²       Assessment and Plan   Diagnoses and all orders for this visit:    1. DDD (degenerative disc disease), lumbosacral    2. Restless legs syndrome (RLS)  -     rOPINIRole (REQUIP) 1 mg tablet; 1-1/2 tablets at bedtime, may take an additional half tab as needed at night for severe restless leg      80year-old woman who has a few chronic conditions going on. Her restless leg continues to be an issue but has some modicum of control utilizing 1.5 mg at bedtime. I suggest she try an additional half tablet in the middle of the night if symptoms become severe. Stay on the magnesium to help with cramping. I need her to start becoming more physically active by going on walks and may be using the community pool for low weightbearing activity. She has become rather deconditioned. She has arthritis in the knees which do not help. MRI of the low back was fine. I think that is baseline for her. Okay to start turmeric which she was asking about. She is worried that omeprazole could be competing with Requip so I recommend she take omeprazole strictly in the morning to separate the timing of dosing. 30 minutes of time was spent reviewing the medical record today in total as well as face-to-face time discussing her symptoms and condition, a good portion of time was spent discussing her supplements and how to utilize them, completion of documentation.     I reviewed and decided to continue the current medications. This clinical note was dictated with an electronic dictation software that can make unintentional errors. If there are any questions, please contact me directly for clarification.       [address] [facility], [facility] [address]  Diplomate ABPN

## 2021-08-23 DIAGNOSIS — I10 ESSENTIAL HYPERTENSION, BENIGN: ICD-10-CM

## 2021-08-23 RX ORDER — BENAZEPRIL HYDROCHLORIDE 20 MG/1
TABLET ORAL
Qty: 90 TABLET | Refills: 0 | Status: SHIPPED | OUTPATIENT
Start: 2021-08-23 | End: 2021-11-17

## 2021-09-10 ENCOUNTER — TRANSCRIBE ORDER (OUTPATIENT)
Dept: SCHEDULING | Age: 81
End: 2021-09-10

## 2021-09-10 DIAGNOSIS — Z12.31 SCREENING MAMMOGRAM FOR HIGH-RISK PATIENT: Primary | ICD-10-CM

## 2021-10-12 ENCOUNTER — HOSPITAL ENCOUNTER (OUTPATIENT)
Dept: MAMMOGRAPHY | Age: 81
Discharge: HOME | End: 2021-10-12
Attending: FAMILY MEDICINE
Payer: MEDICARE

## 2021-10-12 DIAGNOSIS — Z12.31 SCREENING MAMMOGRAM FOR HIGH-RISK PATIENT: ICD-10-CM

## 2021-10-12 PROCEDURE — 77063 BREAST TOMOSYNTHESIS BI: CPT

## 2021-11-17 DIAGNOSIS — I10 ESSENTIAL HYPERTENSION, BENIGN: ICD-10-CM

## 2021-11-17 RX ORDER — BENAZEPRIL HYDROCHLORIDE 20 MG/1
TABLET ORAL
Qty: 90 TABLET | Refills: 0 | Status: SHIPPED | OUTPATIENT
Start: 2021-11-17 | End: 2022-02-10

## 2021-12-03 DIAGNOSIS — K21.9 GASTROESOPHAGEAL REFLUX DISEASE WITHOUT ESOPHAGITIS: Primary | ICD-10-CM

## 2021-12-03 RX ORDER — OMEPRAZOLE 20 MG/1
CAPSULE, DELAYED RELEASE ORAL
Qty: 30 CAPSULE | Refills: 0 | Status: SHIPPED | OUTPATIENT
Start: 2021-12-03 | End: 2021-12-22 | Stop reason: SDUPTHER

## 2021-12-03 NOTE — TELEPHONE ENCOUNTER
Requested Prescriptions     Pending Prescriptions Disp Refills    omeprazole (PRILOSEC) 20 mg capsule [Pharmacy Med Name: OMEPRAZOLE 20MG CAPSULES] 30 Capsule 0     Sig: TAKE 1 CAPSULE BY MOUTH DAILY 30 MINUTES BEFORE BREAKFAST        Last Visit 2/8/21  Last Refill 3/31/15

## 2021-12-22 ENCOUNTER — TELEPHONE (OUTPATIENT)
Dept: PRIMARY CARE CLINIC | Age: 81
End: 2021-12-22

## 2021-12-22 DIAGNOSIS — K21.9 GASTROESOPHAGEAL REFLUX DISEASE WITHOUT ESOPHAGITIS: ICD-10-CM

## 2021-12-22 NOTE — TELEPHONE ENCOUNTER
Called patient. Patient has had a cough for roughly a week now. Patient did state that she and her  went to [facility] where they were tested. He is positive for Covid, she is negative. Patient was advised to go back if s/s change. She states she took some Nyquil last night which helped the coughing. She stated she currently doesn't need anything.

## 2021-12-22 NOTE — TELEPHONE ENCOUNTER
----- Message from Kaleigh Berger sent at 12/22/2021  9:44 AM EST -----  Subject: Message to Provider    QUESTIONS  Information for Provider? Pt has a cough and she is asking if she can   possibly get a rx for some cough medication. She said it is like a   whooping cough in her chest. She's had it for about a week now. Can you   please follow up with her to advise?   ---------------------------------------------------------------------------  --------------  CALL BACK INFO  What is the best way for the office to contact you? OK to leave message on   voicemail  Preferred Call Back Phone Number? [phone]  ---------------------------------------------------------------------------  --------------  SCRIPT ANSWERS  Relationship to Patient?  Self

## 2021-12-22 NOTE — TELEPHONE ENCOUNTER
Requested Prescriptions     Pending Prescriptions Disp Refills    omeprazole (PRILOSEC) 20 mg capsule 30 Capsule 0     Sig: TAKE 1 CAPSULE BY MOUTH DAILY 30 MINUTES BEFORE BREAKFAST        Last Visit 2/8/21  Last Refill 12/3/21

## 2021-12-22 NOTE — TELEPHONE ENCOUNTER
----- Message from Evelyn Pickett sent at 12/22/2021  9:39 AM EST -----  Subject: Refill Request    QUESTIONS  Name of Medication? omeprazole (PRILOSEC) 20 mg capsule  Patient-reported dosage and instructions? 20 mg capsule once daily  How many days do you have left? 12  Preferred Pharmacy? [id number]  Pharmacy phone number (if available)? [phone]  Additional Information for Provider? Pt requesting 90 day supply  ---------------------------------------------------------------------------  --------------,  Name of Medication? rOPINIRole (REQUIP) 1 mg tablet  Patient-reported dosage and instructions? 1 mg tablet one and 1 half   tablet at bedtime  How many days do you have left? 0  Preferred Pharmacy? [facility] #[id number]  Pharmacy phone number (if available)? [phone]  Additional Information for Provider? Pt requesting 90 day supply  ---------------------------------------------------------------------------  --------------  CALL BACK INFO  What is the best way for the office to contact you? OK to leave message on   voicemail  Preferred Call Back Phone Number?  [phone]

## 2021-12-23 ENCOUNTER — TELEPHONE (OUTPATIENT)
Dept: PRIMARY CARE CLINIC | Age: 81
End: 2021-12-23

## 2021-12-23 ENCOUNTER — HOSPITAL ENCOUNTER (OUTPATIENT)
Age: 81
Setting detail: OBSERVATION
Discharge: HOME | End: 2021-12-24
Attending: STUDENT IN AN ORGANIZED HEALTH CARE EDUCATION/TRAINING PROGRAM | Admitting: STUDENT IN AN ORGANIZED HEALTH CARE EDUCATION/TRAINING PROGRAM
Payer: MEDICARE

## 2021-12-23 ENCOUNTER — APPOINTMENT (OUTPATIENT)
Dept: GENERAL RADIOLOGY | Age: 81
End: 2021-12-23
Attending: STUDENT IN AN ORGANIZED HEALTH CARE EDUCATION/TRAINING PROGRAM
Payer: MEDICARE

## 2021-12-23 DIAGNOSIS — E87.1 HYPONATREMIA: ICD-10-CM

## 2021-12-23 DIAGNOSIS — R07.9 CHEST PAIN, UNSPECIFIED TYPE: ICD-10-CM

## 2021-12-23 DIAGNOSIS — U07.1 COVID: Primary | ICD-10-CM

## 2021-12-23 LAB
ALBUMIN SERPL-MCNC: 3.8 G/DL (ref 3.5–5)
ALBUMIN/GLOB SERPL: 0.9 {RATIO} (ref 1.1–2.2)
ALP SERPL-CCNC: 91 U/L (ref 45–117)
ALT SERPL-CCNC: 32 U/L (ref 12–78)
ANION GAP SERPL CALC-SCNC: 7 MMOL/L (ref 5–15)
AST SERPL-CCNC: 30 U/L (ref 15–37)
BASOPHILS # BLD: 0.1 K/UL (ref 0–0.1)
BASOPHILS NFR BLD: 1 % (ref 0–1)
BILIRUB SERPL-MCNC: 0.4 MG/DL (ref 0.2–1)
BUN SERPL-MCNC: 18 MG/DL (ref 6–20)
BUN/CREAT SERPL: 18 (ref 12–20)
CALCIUM SERPL-MCNC: 9.4 MG/DL (ref 8.5–10.1)
CHLORIDE SERPL-SCNC: 97 MMOL/L (ref 97–108)
CO2 SERPL-SCNC: 25 MMOL/L (ref 21–32)
COMMENT, HOLDF: NORMAL
COVID-19 RAPID TEST, COVR: DETECTED
CREAT SERPL-MCNC: 1.01 MG/DL (ref 0.55–1.02)
DIFFERENTIAL METHOD BLD: ABNORMAL
EOSINOPHIL # BLD: 0 K/UL (ref 0–0.4)
EOSINOPHIL NFR BLD: 0 % (ref 0–7)
ERYTHROCYTE [DISTWIDTH] IN BLOOD BY AUTOMATED COUNT: 12.9 % (ref 11.5–14.5)
GLOBULIN SER CALC-MCNC: 4.3 G/DL (ref 2–4)
GLUCOSE SERPL-MCNC: 114 MG/DL (ref 65–100)
HCT VFR BLD AUTO: 44.5 % (ref 35–47)
HGB BLD-MCNC: 14.7 G/DL (ref 11.5–16)
IMM GRANULOCYTES # BLD AUTO: 0 K/UL (ref 0–0.04)
IMM GRANULOCYTES NFR BLD AUTO: 1 % (ref 0–0.5)
LACTATE SERPL-SCNC: 0.9 MMOL/L (ref 0.4–2)
LYMPHOCYTES # BLD: 1 K/UL (ref 0.8–3.5)
LYMPHOCYTES NFR BLD: 14 % (ref 12–49)
MCH RBC QN AUTO: 30.2 PG (ref 26–34)
MCHC RBC AUTO-ENTMCNC: 33 G/DL (ref 30–36.5)
MCV RBC AUTO: 91.4 FL (ref 80–99)
MONOCYTES # BLD: 0.7 K/UL (ref 0–1)
MONOCYTES NFR BLD: 10 % (ref 5–13)
NEUTS SEG # BLD: 4.9 K/UL (ref 1.8–8)
NEUTS SEG NFR BLD: 74 % (ref 32–75)
NRBC # BLD: 0 K/UL (ref 0–0.01)
NRBC BLD-RTO: 0 PER 100 WBC
PLATELET # BLD AUTO: 282 K/UL (ref 150–400)
PMV BLD AUTO: 9.6 FL (ref 8.9–12.9)
POTASSIUM SERPL-SCNC: 4 MMOL/L (ref 3.5–5.1)
PROT SERPL-MCNC: 8.1 G/DL (ref 6.4–8.2)
RBC # BLD AUTO: 4.87 M/UL (ref 3.8–5.2)
SAMPLES BEING HELD,HOLD: NORMAL
SODIUM SERPL-SCNC: 129 MMOL/L (ref 136–145)
SOURCE, COVRS: ABNORMAL
TROPONIN-HIGH SENSITIVITY: 27 NG/L (ref 0–51)
WBC # BLD AUTO: 6.6 K/UL (ref 3.6–11)

## 2021-12-23 PROCEDURE — 83605 ASSAY OF LACTIC ACID: CPT

## 2021-12-23 PROCEDURE — 82728 ASSAY OF FERRITIN: CPT

## 2021-12-23 PROCEDURE — 84145 PROCALCITONIN (PCT): CPT

## 2021-12-23 PROCEDURE — 86140 C-REACTIVE PROTEIN: CPT

## 2021-12-23 PROCEDURE — 84484 ASSAY OF TROPONIN QUANT: CPT

## 2021-12-23 PROCEDURE — 71045 X-RAY EXAM CHEST 1 VIEW: CPT

## 2021-12-23 PROCEDURE — 85652 RBC SED RATE AUTOMATED: CPT

## 2021-12-23 PROCEDURE — 80053 COMPREHEN METABOLIC PANEL: CPT

## 2021-12-23 PROCEDURE — 85025 COMPLETE CBC W/AUTO DIFF WBC: CPT

## 2021-12-23 PROCEDURE — 93005 ELECTROCARDIOGRAM TRACING: CPT

## 2021-12-23 PROCEDURE — 36415 COLL VENOUS BLD VENIPUNCTURE: CPT

## 2021-12-23 PROCEDURE — 87635 SARS-COV-2 COVID-19 AMP PRB: CPT

## 2021-12-23 PROCEDURE — 99283 EMERGENCY DEPT VISIT LOW MDM: CPT

## 2021-12-24 VITALS
TEMPERATURE: 98.9 F | RESPIRATION RATE: 14 BRPM | DIASTOLIC BLOOD PRESSURE: 82 MMHG | SYSTOLIC BLOOD PRESSURE: 156 MMHG | WEIGHT: 144 LBS | HEART RATE: 83 BPM | BODY MASS INDEX: 25.52 KG/M2 | OXYGEN SATURATION: 94 % | HEIGHT: 63 IN

## 2021-12-24 PROBLEM — E87.1 HYPONATREMIA: Status: ACTIVE | Noted: 2021-12-24

## 2021-12-24 PROBLEM — U07.1 COVID: Status: ACTIVE | Noted: 2021-12-24

## 2021-12-24 LAB
ANION GAP SERPL CALC-SCNC: 10 MMOL/L (ref 5–15)
ATRIAL RATE: 109 BPM
BUN SERPL-MCNC: 18 MG/DL (ref 6–20)
BUN/CREAT SERPL: 24 (ref 12–20)
CALCIUM SERPL-MCNC: 8.1 MG/DL (ref 8.5–10.1)
CALCULATED P AXIS, ECG09: 43 DEGREES
CALCULATED R AXIS, ECG10: -47 DEGREES
CALCULATED T AXIS, ECG11: 14 DEGREES
CHLORIDE SERPL-SCNC: 99 MMOL/L (ref 97–108)
CO2 SERPL-SCNC: 21 MMOL/L (ref 21–32)
CREAT SERPL-MCNC: 0.76 MG/DL (ref 0.55–1.02)
CRP SERPL-MCNC: 2.55 MG/DL (ref 0–0.6)
DIAGNOSIS, 93000: NORMAL
ERYTHROCYTE [SEDIMENTATION RATE] IN BLOOD: 5 MM/HR (ref 0–30)
FERRITIN SERPL-MCNC: 219 NG/ML (ref 26–388)
GLUCOSE SERPL-MCNC: 117 MG/DL (ref 65–100)
P-R INTERVAL, ECG05: 140 MS
POTASSIUM SERPL-SCNC: 3.7 MMOL/L (ref 3.5–5.1)
PROCALCITONIN SERPL-MCNC: <0.05 NG/ML
Q-T INTERVAL, ECG07: 328 MS
QRS DURATION, ECG06: 112 MS
QTC CALCULATION (BEZET), ECG08: 441 MS
SODIUM SERPL-SCNC: 130 MMOL/L (ref 136–145)
VENTRICULAR RATE, ECG03: 109 BPM

## 2021-12-24 PROCEDURE — 74011250636 HC RX REV CODE- 250/636: Performed by: INTERNAL MEDICINE

## 2021-12-24 PROCEDURE — 97530 THERAPEUTIC ACTIVITIES: CPT

## 2021-12-24 PROCEDURE — 94640 AIRWAY INHALATION TREATMENT: CPT

## 2021-12-24 PROCEDURE — 74011250637 HC RX REV CODE- 250/637: Performed by: STUDENT IN AN ORGANIZED HEALTH CARE EDUCATION/TRAINING PROGRAM

## 2021-12-24 PROCEDURE — 97161 PT EVAL LOW COMPLEX 20 MIN: CPT

## 2021-12-24 PROCEDURE — 96372 THER/PROPH/DIAG INJ SC/IM: CPT

## 2021-12-24 PROCEDURE — G0378 HOSPITAL OBSERVATION PER HR: HCPCS

## 2021-12-24 PROCEDURE — 94664 DEMO&/EVAL PT USE INHALER: CPT

## 2021-12-24 PROCEDURE — 74011250636 HC RX REV CODE- 250/636: Performed by: STUDENT IN AN ORGANIZED HEALTH CARE EDUCATION/TRAINING PROGRAM

## 2021-12-24 PROCEDURE — 96374 THER/PROPH/DIAG INJ IV PUSH: CPT

## 2021-12-24 PROCEDURE — 80048 BASIC METABOLIC PNL TOTAL CA: CPT

## 2021-12-24 RX ORDER — ENOXAPARIN SODIUM 100 MG/ML
30 INJECTION SUBCUTANEOUS EVERY 12 HOURS
Status: DISCONTINUED | OUTPATIENT
Start: 2021-12-24 | End: 2021-12-24 | Stop reason: HOSPADM

## 2021-12-24 RX ORDER — ENOXAPARIN SODIUM 100 MG/ML
40 INJECTION SUBCUTANEOUS DAILY
Status: DISCONTINUED | OUTPATIENT
Start: 2021-12-24 | End: 2021-12-24

## 2021-12-24 RX ORDER — ZINC SULFATE 50(220)MG
1 CAPSULE ORAL EVERY 12 HOURS
Qty: 12 CAPSULE | Refills: 0 | Status: SHIPPED | OUTPATIENT
Start: 2021-12-24 | End: 2022-02-23

## 2021-12-24 RX ORDER — ENOXAPARIN SODIUM 100 MG/ML
40 INJECTION SUBCUTANEOUS EVERY 12 HOURS
Status: DISCONTINUED | OUTPATIENT
Start: 2021-12-24 | End: 2021-12-24

## 2021-12-24 RX ORDER — LANOLIN ALCOHOL/MO/W.PET/CERES
9 CREAM (GRAM) TOPICAL
Status: DISCONTINUED | OUTPATIENT
Start: 2021-12-24 | End: 2021-12-24 | Stop reason: HOSPADM

## 2021-12-24 RX ORDER — PANTOPRAZOLE SODIUM 40 MG/1
40 TABLET, DELAYED RELEASE ORAL
Status: DISCONTINUED | OUTPATIENT
Start: 2021-12-24 | End: 2021-12-24 | Stop reason: HOSPADM

## 2021-12-24 RX ORDER — ALBUTEROL SULFATE 90 UG/1
2 AEROSOL, METERED RESPIRATORY (INHALATION)
Status: DISCONTINUED | OUTPATIENT
Start: 2021-12-24 | End: 2021-12-24 | Stop reason: HOSPADM

## 2021-12-24 RX ORDER — POLYETHYLENE GLYCOL 3350 17 G/17G
17 POWDER, FOR SOLUTION ORAL DAILY PRN
Status: DISCONTINUED | OUTPATIENT
Start: 2021-12-24 | End: 2021-12-24 | Stop reason: HOSPADM

## 2021-12-24 RX ORDER — ACETAMINOPHEN 325 MG/1
650 TABLET ORAL
Status: DISCONTINUED | OUTPATIENT
Start: 2021-12-24 | End: 2021-12-24 | Stop reason: HOSPADM

## 2021-12-24 RX ORDER — ZINC SULFATE 50(220)MG
1 CAPSULE ORAL EVERY 12 HOURS
Status: DISCONTINUED | OUTPATIENT
Start: 2021-12-24 | End: 2021-12-24 | Stop reason: HOSPADM

## 2021-12-24 RX ORDER — IPRATROPIUM BROMIDE AND ALBUTEROL SULFATE 2.5; .5 MG/3ML; MG/3ML
3 SOLUTION RESPIRATORY (INHALATION)
Status: DISCONTINUED | OUTPATIENT
Start: 2021-12-24 | End: 2021-12-24 | Stop reason: CLARIF

## 2021-12-24 RX ORDER — SODIUM CHLORIDE 0.9 % (FLUSH) 0.9 %
5-40 SYRINGE (ML) INJECTION AS NEEDED
Status: DISCONTINUED | OUTPATIENT
Start: 2021-12-24 | End: 2021-12-24 | Stop reason: HOSPADM

## 2021-12-24 RX ORDER — ERGOCALCIFEROL 1.25 MG/1
50000 CAPSULE ORAL ONCE
Status: COMPLETED | OUTPATIENT
Start: 2021-12-24 | End: 2021-12-24

## 2021-12-24 RX ORDER — ASCORBIC ACID 500 MG
1000 TABLET ORAL 2 TIMES DAILY
Status: DISCONTINUED | OUTPATIENT
Start: 2021-12-24 | End: 2021-12-24 | Stop reason: HOSPADM

## 2021-12-24 RX ORDER — ACETAMINOPHEN 650 MG/1
650 SUPPOSITORY RECTAL
Status: DISCONTINUED | OUTPATIENT
Start: 2021-12-24 | End: 2021-12-24 | Stop reason: HOSPADM

## 2021-12-24 RX ORDER — VITAMIN E 1000 UNIT
1000 CAPSULE ORAL 2 TIMES DAILY
Qty: 20 TABLET | Refills: 0 | Status: SHIPPED | OUTPATIENT
Start: 2021-12-24 | End: 2022-02-23

## 2021-12-24 RX ORDER — ONDANSETRON 4 MG/1
4 TABLET, ORALLY DISINTEGRATING ORAL
Status: DISCONTINUED | OUTPATIENT
Start: 2021-12-24 | End: 2021-12-24 | Stop reason: HOSPADM

## 2021-12-24 RX ORDER — LISINOPRIL 20 MG/1
20 TABLET ORAL DAILY
Status: DISCONTINUED | OUTPATIENT
Start: 2021-12-24 | End: 2021-12-24 | Stop reason: HOSPADM

## 2021-12-24 RX ORDER — ONDANSETRON 2 MG/ML
4 INJECTION INTRAMUSCULAR; INTRAVENOUS
Status: DISCONTINUED | OUTPATIENT
Start: 2021-12-24 | End: 2021-12-24 | Stop reason: HOSPADM

## 2021-12-24 RX ORDER — SODIUM CHLORIDE 0.9 % (FLUSH) 0.9 %
5-40 SYRINGE (ML) INJECTION EVERY 8 HOURS
Status: DISCONTINUED | OUTPATIENT
Start: 2021-12-24 | End: 2021-12-24 | Stop reason: HOSPADM

## 2021-12-24 RX ADMIN — ZINC SULFATE 220 MG (50 MG) CAPSULE 1 CAPSULE: CAPSULE at 00:59

## 2021-12-24 RX ADMIN — ROPINIROLE HYDROCHLORIDE 1.5 MG: 1 TABLET, FILM COATED ORAL at 03:11

## 2021-12-24 RX ADMIN — ENOXAPARIN SODIUM 40 MG: 100 INJECTION SUBCUTANEOUS at 10:10

## 2021-12-24 RX ADMIN — SODIUM CHLORIDE 1000 ML: 9 INJECTION, SOLUTION INTRAVENOUS at 01:00

## 2021-12-24 RX ADMIN — ALBUTEROL SULFATE 2 PUFF: 90 AEROSOL, METERED RESPIRATORY (INHALATION) at 04:06

## 2021-12-24 RX ADMIN — IPRATROPIUM BROMIDE 1 PUFF: 17 AEROSOL, METERED RESPIRATORY (INHALATION) at 08:01

## 2021-12-24 RX ADMIN — ONDANSETRON 4 MG: 2 INJECTION INTRAMUSCULAR; INTRAVENOUS at 10:22

## 2021-12-24 RX ADMIN — ERGOCALCIFEROL 50000 UNITS: 1.25 CAPSULE ORAL at 00:59

## 2021-12-24 RX ADMIN — OXYCODONE HYDROCHLORIDE AND ACETAMINOPHEN 1000 MG: 500 TABLET ORAL at 10:12

## 2021-12-24 RX ADMIN — Medication 10 ML: at 03:12

## 2021-12-24 RX ADMIN — LISINOPRIL 20 MG: 20 TABLET ORAL at 10:17

## 2021-12-24 RX ADMIN — Medication 10 ML: at 10:27

## 2021-12-24 RX ADMIN — PANTOPRAZOLE SODIUM 40 MG: 40 TABLET, DELAYED RELEASE ORAL at 06:04

## 2021-12-24 RX ADMIN — OXYCODONE HYDROCHLORIDE AND ACETAMINOPHEN 1000 MG: 500 TABLET ORAL at 00:59

## 2021-12-24 RX ADMIN — IPRATROPIUM BROMIDE 1 PUFF: 17 AEROSOL, METERED RESPIRATORY (INHALATION) at 04:08

## 2021-12-24 RX ADMIN — ALBUTEROL SULFATE 2 PUFF: 90 AEROSOL, METERED RESPIRATORY (INHALATION) at 08:01

## 2021-12-24 RX ADMIN — ZINC SULFATE 220 MG (50 MG) CAPSULE 1 CAPSULE: CAPSULE at 10:12

## 2021-12-24 RX ADMIN — Medication 9 MG: at 00:59

## 2021-12-24 NOTE — PROGRESS NOTES
Problem: Risk for Spread of Infection  Goal: Prevent transmission of infectious organism to others  Description: Prevent the transmission of infectious organisms to other patients, staff members, and visitors.   Outcome: Progressing Towards Goal     Problem: Airway Clearance - Ineffective  Goal: Achieve or maintain patent airway  Outcome: Progressing Towards Goal

## 2021-12-24 NOTE — PROGRESS NOTES
Medicare Outpatient Observation Notice (MOON) provided to patient/representative with verbal explanation of the notice. Time allotted for questions regarding the notice. Patient /representative provided a completed copy of the MOON notice. Copy placed on bedside chart.     Nick Cevallos RN/CRM  [phone]

## 2021-12-24 NOTE — TELEPHONE ENCOUNTER
Pt kept paging again and again. However each time I called the pt back ,  She would not  the phone and I had to leave a voice message.

## 2021-12-24 NOTE — PROGRESS NOTES
[address] [facility] Adult  Hospitalist Group                                                                                          Hospitalist Progress Note  Roxy Aranda MD  Answering service: [phone] from in house phone        Date of Service:  2021  NAME:  Fatmata Milan  :  1940  MRN:  [id number]      Admission Summary:   Copied form admit: \" Fatmata Milan is a 80 y.o. female with hx of htn, insomnia, rls who presents to hospital w/ cc of feeling sick. Multiple complaints after a positive home covid test. Has had malaise, fatigue, , nausea w/o emesis over the last 3 days. The patient denies any fever, chills, chest or abdominal pain. No recent travel or sick contacts.  COVID Immunized.     Remarkable vitals on ER Presentations: hr 101, bp 159/78  Labs Remarkable for: Na-129,    ER Images: CXR: NAP \"    Interval history / Subjective:     2021 :    Cont supportive care   Cough   On room air   No distress   + malaise   Lab for pearl Stoll as below        Assessment & Plan:        Hyponatremia  -Hypovolemic Hyponatremia  -Continue volume resuscitation  -Daily BMP     Symptomatic COVID Infection  -No hypoxia or infiltrates on CXR  -Supportive care  -Therapeutic vitamins  - AS ABOVE 2021      HTN  -Home Lisinopril  BP Readings from Last 1 Encounters:   21 (!) 156/82         GERD  -Protonix     FEN/GI -  PO hydration  Activity - As tolerted  DVT prophylaxis - Lovenox- BID   Lab Results   Component Value Date/Time    Creatinine 0.76 2021 04:58 AM      GI prophylaxis -  NI  Disposition - Home     CODE STATUS:  Full code             Hospital Problems  Date Reviewed: 2021          Codes Class Noted POA    Hyponatremia ICD-10-CM: E87.1  ICD-9-CM: 276.1  2021 Unknown        COVID ICD-10-CM: U07.1  ICD-9-CM: 079.89  2021 Unknown                  After personally interviewing pt at bedside the following is noted on 2021 :    Review of Systems   Constitutional: Positive for malaise/fatigue. Negative for chills and fever. HENT: Negative. Respiratory: Positive for cough. Negative for shortness of breath and wheezing. Cardiovascular: Negative. Genitourinary: Negative. Musculoskeletal: Negative. All other systems reviewed and are negative. I had a face to face encounter with patient on 12/24/2021 at bedside for the following physical exam:     PHYSICAL EXAM:    Visit Vitals  BP (!) 156/82   Pulse 83   Temp 98.9 °F (37.2 °C)   Resp 14   Ht 5' 3\" (1.6 m)   Wt 65.3 kg (144 lb)   SpO2 94%   BMI 25.51 kg/m²          Physical Exam  Constitutional:       General: She is not in acute distress. Appearance: She is not ill-appearing, toxic-appearing or diaphoretic. HENT:      Head: Normocephalic and atraumatic. Right Ear: External ear normal.      Left Ear: External ear normal.      Nose: Nose normal.      Mouth/Throat:      Mouth: Mucous membranes are moist.      Pharynx: Oropharynx is clear. Eyes:      Extraocular Movements: Extraocular movements intact. Conjunctiva/sclera: Conjunctivae normal.      Pupils: Pupils are equal, round, and reactive to light. Cardiovascular:      Rate and Rhythm: Normal rate and regular rhythm. Pulmonary:      Effort: No respiratory distress. Breath sounds: No wheezing. Abdominal:      General: Abdomen is flat. Bowel sounds are normal.   Musculoskeletal:         General: No swelling or deformity. Cervical back: Normal range of motion and neck supple. Skin:     General: Skin is warm and dry. Neurological:      Mental Status: She is alert and oriented to person, place, and time. Mental status is at baseline.    Psychiatric:         Mood and Affect: Mood normal.         Behavior: Behavior normal.                     Data Review:    Review and/or order of clinical lab test      Labs:     Recent Labs     12/23/21 2037   WBC 6.6   HGB 14.7   HCT 44.5    Recent Labs     12/24/21  0458 12/23/21 2037   * 129*   K 3.7 4.0   CL 99 97   CO2 21 25   BUN 18 18   CREA 0.76 1.01   * 114*   CA 8.1* 9.4     Recent Labs     12/23/21 2037   ALT 32   AP 91   TBILI 0.4   TP 8.1   ALB 3.8   GLOB 4.3*     No results for input(s): INR, PTP, APTT, INREXT in the last 72 hours. Recent Labs     12/23/21 2037   FERR 219      No results found for: FOL, RBCF   No results for input(s): PH, PCO2, PO2 in the last 72 hours. No results for input(s): CPK, CKNDX, TROIQ in the last 72 hours.     No lab exists for component: CPKMB  Lab Results   Component Value Date/Time    Cholesterol, total 212 (H) 02/09/2021 11:45 AM    HDL Cholesterol 61 02/09/2021 11:45 AM    LDL, calculated 131.6 (H) 02/09/2021 11:45 AM    Triglyceride 97 02/09/2021 11:45 AM    CHOL/HDL Ratio 3.5 02/09/2021 11:45 AM     Lab Results   Component Value Date/Time    Glucose (POC) 96 08/29/2018 11:36 AM    Glucose (POC) 117 (H) 08/29/2018 06:29 AM    Glucose (POC) 83 08/28/2018 09:10 PM    Glucose (POC) 110 (H) 08/28/2018 04:47 PM    Glucose (POC) 108 (H) 08/28/2018 11:27 AM     Lab Results   Component Value Date/Time    Color Yellow 04/16/2019 12:38 PM    Appearance Clear 04/16/2019 12:38 PM    Specific gravity 1.007 08/13/2018 12:47 PM    Specific gravity 1.017 08/21/2014 09:00 AM    pH (UA) 6.5 04/16/2019 12:38 PM    Protein NEGATIVE  08/13/2018 12:47 PM    Glucose NEGATIVE  08/13/2018 12:47 PM    Ketone Negative 04/16/2019 12:38 PM    Bilirubin Negative 04/16/2019 12:38 PM    Urobilinogen 0.2 08/13/2018 12:47 PM    Nitrites Negative 04/16/2019 12:38 PM    Leukocyte Esterase Negative 04/16/2019 12:38 PM    Epithelial cells FEW 08/13/2018 12:47 PM    Bacteria NEGATIVE  08/13/2018 12:47 PM    WBC 0-4 08/13/2018 12:47 PM    RBC 0-5 08/13/2018 12:47 PM    Casts Present (A) 08/21/2014 09:00 AM         Medications Reviewed:     Current Facility-Administered Medications   Medication Dose Route Frequency    zinc sulfate (ZINCATE) 50 mg zinc (220 mg) capsule 1 Capsule  1 Capsule Oral Q12H    ascorbic acid (vitamin C) (VITAMIN C) tablet 1,000 mg  1,000 mg Oral BID    melatonin tablet 9 mg  9 mg Oral QHS    lisinopriL (PRINIVIL, ZESTRIL) tablet 20 mg  20 mg Oral DAILY    pantoprazole (PROTONIX) tablet 40 mg  40 mg Oral ACB    rOPINIRole (REQUIP) tablet 1.5 mg  1.5 mg Oral QHS PRN    sodium chloride (NS) flush 5-40 mL  5-40 mL IntraVENous Q8H    sodium chloride (NS) flush 5-40 mL  5-40 mL IntraVENous PRN    acetaminophen (TYLENOL) tablet 650 mg  650 mg Oral Q6H PRN    Or    acetaminophen (TYLENOL) suppository 650 mg  650 mg Rectal Q6H PRN    polyethylene glycol (MIRALAX) packet 17 g  17 g Oral DAILY PRN    ondansetron (ZOFRAN ODT) tablet 4 mg  4 mg Oral Q8H PRN    Or    ondansetron (ZOFRAN) injection 4 mg  4 mg IntraVENous Q6H PRN    enoxaparin (LOVENOX) injection 40 mg  40 mg SubCUTAneous DAILY    albuterol (PROVENTIL HFA, VENTOLIN HFA, PROAIR HFA) inhaler 2 Puff  2 Puff Inhalation Q6H RT    ipratropium (ATROVENT HFA) 17 mcg inhaler  1 Puff Inhalation Q6H RT     ______________________________________________________________________  EXPECTED LENGTH OF STAY: - - -  ACTUAL LENGTH OF STAY:          Juan Luis Rosales MD

## 2021-12-24 NOTE — PROGRESS NOTES
Occupational Therapy   12.24.2021    Orders acknowledged and chart reviewed in prep for OT evaluation. Discussed patient case with PT who reports patient has been IND with ADLs and mobility, no acute OT needs. Will sign off. Niurka Logan MS, OTR/L

## 2021-12-24 NOTE — DISCHARGE INSTRUCTIONS
Discharge Instructions       PATIENT ID: Jase Lozano  MRN: [id number]   YOB: 1940    DATE OF ADMISSION: 12/23/2021 10:28 PM    DATE OF DISCHARGE: 12/24/2021    PRIMARY CARE PROVIDER: Jaimee Butts MD     ATTENDING PHYSICIAN: Denisse Paz MD  DISCHARGING PROVIDER: Carmella Anaya MD    To contact this individual call [phone] and ask the  to page. If unavailable ask to be transferred the Adult Hospitalist Department. DISCHARGE DIAGNOSES : COVID infection     CONSULTATIONS: None    PROCEDURES/SURGERIES: * No surgery found *    PENDING TEST RESULTS:   At the time of discharge the following test results are still pending: na    FOLLOW UP APPOINTMENTS:   Follow-up Information     Follow up With Specialties Details Why Contact Info    Jaimee Butts MD Internal Medicine, Bariatrics  As needed [address] [place] [address]  [phone]             ADDITIONAL CARE RECOMMENDATIONS: if have unusual symptoms and or fever and or un explained shortness of breath. DIET: Resume previous diet     ACTIVITY: Activity as tolerated    WOUND CARE: na    EQUIPMENT needed: na      Radiology      DISCHARGE MEDICATIONS:   See Medication Reconciliation Form    · It is important that you take the medication exactly as they are prescribed. · Keep your medication in the bottles provided by the pharmacist and keep a list of the medication names, dosages, and times to be taken in your wallet. · Do not take other medications without consulting your doctor. NOTIFY YOUR PHYSICIAN FOR ANY OF THE FOLLOWING:   Fever over 101 degrees for 24 hours. Chest pain, shortness of breath, fever, chills, nausea, vomiting, diarrhea, change in mentation, falling, weakness, bleeding. Severe pain or pain not relieved by medications. Or, any other signs or symptoms that you may have questions about.       DISPOSITION:    Home With:   OT  PT  [facility]  RN       SNF/Inpatient Rehab/LTAC   x Independent/assisted living    Hospice    Other:          Signed:   Taylor Sawant MD  12/24/2021  2:58 PM

## 2021-12-24 NOTE — DISCHARGE SUMMARY
Discharge Summary       PATIENT ID: Nakul Gonzalez  MRN: [id number]   YOB: 1940    DATE OF ADMISSION: 12/23/2021 10:28 PM    DATE OF DISCHARGE: 12/24/2021    PRIMARY CARE PROVIDER: Pamella Perez MD     ATTENDING PHYSICIAN: Sebastien Rodriguez MD   DISCHARGING PROVIDER: Sebastien Rodriguez MD    To contact this individual call [phone] and ask the  to page. If unavailable ask to be transferred the Adult Hospitalist Department. CONSULTATIONS: None    PROCEDURES/SURGERIES: * No surgery found *    ADMITTING DIAGNOSES & HOSPITAL COURSE:      Admission Summary:   Copied form admit: \" Monica Naqvi is a 80 y.o. female with hx of htn, insomnia, rls who presents to hospital w/ cc of feeling sick. Multiple complaints after a positive home covid test. Has had malaise, fatigue, , nausea w/o emesis over the last 3 days. The patient denies any fever, chills, chest or abdominal pain. No recent travel or sick contacts.  COVID Immunized.     Remarkable vitals on ER Presentations: hr 101, bp 159/78  Labs Remarkable for: Na-129,    ER Images: CXR: NAP \"         Interval history / Subjective:      12/24/2021 :   · Cont supportive care  · Cough  · On room air  · No distress  · + malaise  · Lab for am   · Mahendra Free as below          Assessment & Plan:         Hyponatremia  -Hypovolemic Hyponatremia  -Continue volume resuscitation  -Daily BMP  -is now eating, so S Na will self correct ( wasn't eating for a few days PTA, now is)      Symptomatic COVID Infection  -No hypoxia or infiltrates on CXR  -Supportive care  -Therapeutic vitamins  - AS ABOVE 12/24/2021   - desires home as minimal symptoms resolve, improve      HTN  -Home Lisinopril      BP Readings from Last 1 Encounters:   12/24/21 (!) 156/82         GERD  -Protonix     FEN/GI -  PO hydration  Activity - As tolerted  DVT prophylaxis - Lovenox- BID         Lab Results   Component Value Date/Time     Creatinine 0.76 12/24/2021 04:58 AM      GI prophylaxis -  NI  Disposition - Home     CODE STATUS:  Full code           Pt remains asymptomatic and improved, is now eating;     DISCHARGE DIAGNOSES / PLAN:      1.  as above      ADDITIONAL CARE RECOMMENDATIONS:   na     PENDING TEST RESULTS:   At the time of discharge the following test results are still pending: na    FOLLOW UP APPOINTMENTS:    Follow-up Information     Follow up With Specialties Details Why Contact Info    Christine Shields MD Internal Medicine, Bariatrics  As needed [address]  [phone]               DIET: Resume previous diet       ACTIVITY: Activity as tolerated    WOUND CARE: na    EQUIPMENT needed: na      DISCHARGE MEDICATIONS:  Current Discharge Medication List      START taking these medications    Details   !! ascorbic acid, vitamin C, (VITAMIN C) 1,000 mg tablet Take 1 Tablet by mouth two (2) times a day. Qty: 20 Tablet, Refills: 0  Start date: 12/24/2021      zinc sulfate (ZINCATE) 50 mg zinc (220 mg) capsule Take 1 Capsule by mouth every twelve (12) hours. Qty: 12 Capsule, Refills: 0  Start date: 12/24/2021       !! - Potential duplicate medications found. Please discuss with provider. CONTINUE these medications which have NOT CHANGED    Details   omeprazole (PRILOSEC) 20 mg capsule TAKE 1 CAPSULE BY MOUTH DAILY 30 MINUTES BEFORE BREAKFAST  Qty: 30 Capsule, Refills: 0    Associated Diagnoses: Gastroesophageal reflux disease without esophagitis      benazepriL (LOTENSIN) 20 mg tablet TAKE 1 TABLET BY MOUTH EVERY DAY FOR HIGH BLOOD PRESSURE  Qty: 90 Tablet, Refills: 0    Associated Diagnoses: Essential hypertension, benign      rOPINIRole (REQUIP) 1 mg tablet 1-1/2 tablets at bedtime, may take an additional half tab as needed at night for severe restless leg  Qty: 180 Tablet, Refills: 3    Associated Diagnoses: Restless legs syndrome (RLS)      melatonin 5 mg tablet Take 5 mg by mouth nightly. HYLAN G-F 20 IX by Intra artICUlar route.       !! ascorbic acid, vitamin C, (VITAMIN C) 500 mg tablet Take  by mouth. turmeric root extract 500 mg cap Take  by mouth. !! - Potential duplicate medications found. Please discuss with provider. NOTIFY YOUR PHYSICIAN FOR ANY OF THE FOLLOWING:   Fever over 101 degrees for 24 hours. Chest pain, shortness of breath, fever, chills, nausea, vomiting, diarrhea, change in mentation, falling, weakness, bleeding. Severe pain or pain not relieved by medications. Or, any other signs or symptoms that you may have questions about.     DISPOSITION:    Home With:   OT  PT  HH  RN       Long term SNF/Inpatient Rehab   x Independent/assisted living    Hospice    Other:       PATIENT CONDITION AT DISCHARGE:     Functional status    Poor     Deconditioned    x Independent      Cognition    x Lucid     Forgetful     Dementia      Catheters/lines (plus indication)    Mixon     PICC     PEG     None      Code status    x Full code     DNR      PHYSICAL EXAMINATION AT DISCHARGE:  General:          Alert, cooperative, no distress, appears stated age  Visit Vitals  BP (!) 156/82   Pulse 83   Temp 98.9 °F (37.2 °C)   Resp 14   Ht 5' 3\" (1.6 m)   Wt 65.3 kg (144 lb)   SpO2 94%   BMI 25.51 kg/m²          CHRONIC MEDICAL DIAGNOSES:  Problem List as of 12/24/2021 Date Reviewed: 2/8/2021          Codes Class Noted - Resolved    Hyponatremia ICD-10-CM: E87.1  ICD-9-CM: 276.1  12/24/2021 - Present        COVID ICD-10-CM: U07.1  ICD-9-CM: 079.89  12/24/2021 - Present        Bilateral pes planus ICD-10-CM: M21.41, M21.42  ICD-9-CM: 293  3/10/2020 - Present    Overview Signed 4/20/2020  9:46 AM by DO Dr. Binu Carlisle             Primary osteoarthritis of right knee ICD-10-CM: M17.11  ICD-9-CM: 715.16  8/27/2018 - Present        Carpal tunnel syndrome of left wrist ICD-10-CM: G56.02  ICD-9-CM: 354.0  4/3/2018 - Present        Pseudophakia of both eyes ICD-10-CM: Z96.1  ICD-9-CM: V43.1  12/4/2017 - Present ACE-inhibitor cough ICD-10-CM: R05.8, T46.4X5A  ICD-9-CM: 786.2, E942.6  2/27/2017 - Present        Lumbar stenosis with neurogenic claudication ICD-10-CM: M48.062  ICD-9-CM: 724.03  Unknown - Present        Chronic pain of both knees ICD-10-CM: M25.561, M25.562, G89.29  ICD-9-CM: 719.46, 338.29  8/26/2016 - Present        Statin intolerance ICD-10-CM: Z78.9  ICD-9-CM: 995.27  8/26/2016 - Present    Overview Signed 8/26/2016 10:07 AM by Emily Ziegler DO     Crestor             Insomnia secondary to chronic pain ICD-10-CM: G89.29, G47.01  ICD-9-CM: 338.29, 327.01  8/26/2016 - Present        Advance care planning ICD-10-CM: Z71.89  ICD-9-CM: V65.49  8/26/2016 - Present        Family history of thyroid disease in grandmother ICD-10-CM: Z83.49  ICD-9-CM: V18.19  8/26/2016 - Present    Overview Signed 8/26/2016 10:25 AM by Emily Ziegler DO     and daughter             Restless legs syndrome (RLS) ICD-10-CM: G25.81  ICD-9-CM: 333.94  9/1/2015 - Present    Overview Signed 2/26/2016 11:19 AM by DO Dr. Davi Hernandez. Osteopenia ICD-10-CM: M85.80  ICD-9-CM: 733.90  8/24/2015 - Present        Personal history of colonic polyps ICD-10-CM: Z86.010  ICD-9-CM: V12.72  6/17/2015 - Present        Lactose intolerance ICD-10-CM: E73.9  ICD-9-CM: 271.3  6/17/2015 - Present        Actinic keratosis ICD-10-CM: L57.0  ICD-9-CM: 702.0  Unknown - Present    Overview Signed 5/5/2015  9:13 AM by Kimberley Franklin. Dr. Giorgio Muñiz.              Family history of thyroid disease ICD-10-CM: Z83.49  ICD-9-CM: V18.19  5/5/2015 - Present        Family history of diabetes mellitus ICD-10-CM: Z83.3  ICD-9-CM: V18.0  5/5/2015 - Present        Family history of heart attack ICD-10-CM: Z82.49  ICD-9-CM: V17.3  5/5/2015 - Present        Dyslipidemia ICD-10-CM: E78.5  ICD-9-CM: 272.4  5/5/2015 - Present        Dysphagia ICD-10-CM: R13.10  ICD-9-CM: 787.20  9/1/2014 - Present Overview Signed 5/5/2015  9:09 AM by Lawrence GREENE DO     due to Esophagitis. Dr. Ventura Lux. Degenerative lumbar spinal stenosis ICD-10-CM: M48.061  ICD-9-CM: 724.02  1/1/2014 - Present    Overview Signed 6/17/2015  9:34 AM by Fayrene Pilar, DO Dr. Elvina Gaucher Narrowing of L 3,4,5             Fine tremor ICD-10-CM: G25.2  ICD-9-CM: 781.0  6/26/2013 - Present        Essential hypertension, benign ICD-10-CM: I10  ICD-9-CM: 401.1  Unknown - Present        RESOLVED: Diabetes mellitus type 2, diet-controlled ([facility] [address] ICD-10-CM: E11.9  ICD-9-CM: 250.00  10/13/2017 - 2/8/2021        RESOLVED: Jerky body movements ICD-10-CM: R25.8  ICD-9-CM: 781.0  8/26/2016 - 2/8/2021    Overview Signed 8/26/2016 10:27 AM by Golden Thomas DO     BL hands, due to neurontin vs other             RESOLVED: Tremor of both hands ICD-10-CM: R25.1  ICD-9-CM: 781.0  9/1/2015 - 2/27/2017    Overview Signed 2/26/2016 11:19 AM by DO Dr. Neal Harrison: Muscle cramps ICD-10-CM: R25.2  ICD-9-CM: 729.82  6/17/2015 - 2/8/2021    Overview Signed 6/17/2015  9:25 AM by Golden Thomas DO     legs and body             RESOLVED: Vitamin D deficiency ICD-10-CM: E55.9  ICD-9-CM: 268.9  5/5/2015 - 2/8/2021        RESOLVED: Hyperglycemia ICD-10-CM: R73.9  ICD-9-CM: 790.29  5/5/2015 - 4/17/2018        RESOLVED: Asthma ICD-10-CM: J45.909  ICD-9-CM: 493.90  5/5/2015 - 2/8/2021        RESOLVED: Hyperinsulinemia ICD-10-CM: E16.1  ICD-9-CM: 251.1  5/5/2015 - 2/8/2021        RESOLVED: BCC (basal cell carcinoma), face ICD-10-CM: C44.310  ICD-9-CM: 173.31  1/28/2015 - 2/27/2017    Overview Signed 5/5/2015  9:12 AM by Arnel Truong. Dr. John Rbolero.              RESOLVED: Chronic insomnia ICD-10-CM: F51.04  ICD-9-CM: 780.52  5/2/2014 - 8/26/2016        RESOLVED: Foot pain, bilateral ICD-10-CM: M79.671, S29.170  ICD-9-CM: 729.5  Unknown - 5/5/2015    Overview Signed 10/29/2013 11:25 AM by DO Dr. Luigi Olmstead. RESOLVED: Knee pain, bilateral ICD-10-CM: M25.561, M25.562  ICD-9-CM: 719.46  10/28/2013 - 8/26/2016    Overview Signed 10/29/2013 11:25 AM by Emilee Owusu. Right > Left. Severe OA. Txd with PT.             RESOLVED: Shin splints ICD-10-CM: B47.772R  ICD-9-CM: 844.9  6/26/2013 - 5/5/2015        RESOLVED: Syncope ICD-10-CM: R55  ICD-9-CM: 780.2  9/28/2011 - 5/5/2015    Overview Signed 9/28/2011 12:02 PM by Trini Johnson DO     due to Right occipital head trauma from fall down 5 stairs, resolved             RESOLVED: Shoulder joint dislocation ICD-10-CM: S43.006A  ICD-9-CM: 831.00  6/1/2011 - 5/5/2015    Overview Signed 9/28/2011 12:00 PM by Trini Johnson DO     Right. due to fall.   Dr. Neymar Cabrera: Allergic rhinitis, cause unspecified ICD-10-CM: J30.9  ICD-9-CM: 477.9  Unknown - 10/13/2017        RESOLVED: Other and unspecified hyperlipidemia ICD-10-CM: E78.5  ICD-9-CM: 272.4  Unknown - 5/5/2015        RESOLVED: Menopause ICD-10-CM: Z78.0  ICD-9-CM: 627.2  Unknown - 2/8/2021              Greater than 15  minutes were spent with the patient on counseling and coordination of care    Signed:   Toñito Rios MD  12/24/2021  3:01 PM

## 2021-12-24 NOTE — ED TRIAGE NOTES
Patient arrives from home with CC of feeling ill for 1 day. She stated that she took a home covid test and it was positive.      She stated that she feels like a \"truck hit me\"   Complains of SOB (spo2 99%), fever, and \"I cant get a deep breath\"    Denies N/V/D 0 = independent

## 2021-12-24 NOTE — H&P
History & Physical    Primary Care Provider: Vanessa Meade MD  Source of Information: Patient and chart review    History of Presenting Illness:   Armaan Marinelli is a 80 y.o. female with hx of htn, insomnia, rls who presents to hospital w/ cc of feeling sick. Multiple complaints after a positive home covid test. Has had malaise, fatigue, , nausea w/o emesis over the last 3 days. The patient denies any fever, chills, chest or abdominal pain. No recent travel or sick contacts. COVID Immunized. Remarkable vitals on ER Presentations: hr 101, bp 159/78  Labs Remarkable for: Na-129,    ER Images: CXR: NAP     Review of Systems:  A comprehensive review of systems was negative except for that written in the History of Present Illness. Past Medical History:   Diagnosis Date    AC (acromioclavicular) joint bone spurs 01/2014    in back. Dr. David Cronin. Txd with shots x 3    Actinic keratosis 2015    Dr. Felipe Ramsey. Dr. Siomara Mcfarland.  Allergic rhinitis, cause unspecified     Anxiety state, unspecified     Arthritis     R KNEE BONE-ON-BONE; R HAND-----OSTEO    Asthma     BRONCHITIS IN [place] MOST YEARS    BCC (basal cell carcinoma), face 1980s (nose), 01/28/15 (forehead)    Dr. Luis Tomas. Dr. Tiburcio Iraheta.  Bilateral pes planus 03/10/2020    Dr. Karrie Wall 2007    Dr. Gayatri Berger    Chronic insomnia     Chronic low back pain 01/2014    Dr. Patricio Hampton. DJD and spurs, Narrowing of L 3,4,5. Dr. David Cronin.  Chronic obstructive pulmonary disease (HCC)     CHRONIC (TWICE-YEARLY) BRONCHITIS    Chronic pain     Colon polyps 11/2005, 01/29/09, 9/16/2015    benign.   Dr. Long Pizarro    Constipation     Degenerative lumbar spinal stenosis 01/2014    Dr. Suhail Basilio Narrowing of L 3,4,5    Diabetes mellitus type 2, diet-controlled ([facility] [address] 10/13/2017    LOST WEIGHT AND IS NOT DIABETIC    Dyslipidemia     Dysphagia 09/2014    due to Esophagitis. Dr. Jennifer Camacho.  Esophagitis 09/29/2014    ESOPHAGITIS    Essential hypertension, benign     Foot fracture, left 2009    stress.  Foot pain, bilateral 11/13/2013    Dr. Vasyl Tabares.  Hearing loss     Dr. Bernie Ventura.  Knee pain, bilateral 10/28/13    Dr. Heraclio Fine. Right > Left. Severe OA. Txd with PT.    Lumbar stenosis with neurogenic claudication     Menopause 1976    Migraine     NONE SINCE AGE 37    Mixed stress and urge urinary incontinence     NO LEAKAGE; GETS UP SEVERAL TIMES A NIGHT, PT STATES ON 10/3/16    OA (osteoarthritis) of knee     Dr. Heraclio Fine    Osteopenia 08/24/2015    Restless legs syndrome (RLS) 09/2015    Dr. Poly Ward.  Shoulder joint dislocation 06/2011    Right. due to fall. Dr. Mar Jurado Sleep apnea     undiagnosed    Syncope 06/25/11    due to fall down 5 steps. Right occipital head trauma.  Tremor of both hands 09/2015    Dr. Kary Flynn GABAPENTIN      Past Surgical History:   Procedure Laterality Date    COLONOSCOPY N/A 9/2/2020    COLONOSCOPY   :- performed by Liz[facility] at [facility] ENDOSCOPY    HX [address] Left 04/03/2018    Dr. Madelin Flor     HX CATARACT REMOVAL Bilateral , R 04/10/17    Dr. Scott Gloria  11/2005, 01/29/09, 09/16/15    with polypectomy. Dr. Bird Alston q5 years    HX ENDOSCOPY  09/2014    with Esophageal dilation. due to dysphagia. Dr. Jennifer Camacho.  HX KNEE REPLACEMENT Right 08/27/2018    Dr. Delores Dias  10/18/2016    HX MALIGNANT SKIN LESION EXCISION  01/28/15    BCC. L Forehead. MOHs SURGERY. Dr. Joseph John.  HX RAIMUNDO AND BSO  1976    due to fibroids    HX TONSILLECTOMY  1948    CT COMBINED ANT/POST COLPORRHAPHY  02/28/05    with enterocele RE.   Dr. Justo Contreras and Dr. James Rendon     Prior to Admission medications    Medication Sig Start Date End Date Taking? Authorizing Provider   omeprazole (PRILOSEC) 20 mg capsule TAKE 1 CAPSULE BY MOUTH DAILY 30 MINUTES BEFORE BREAKFAST 12/3/21   Arnav Gomes MD   benazepriL (LOTENSIN) 20 mg tablet TAKE 1 TABLET BY MOUTH EVERY DAY FOR HIGH BLOOD PRESSURE 11/17/21   Arnav Gomes MD   rOPINIRole (REQUIP) 1 mg tablet 1-1/2 tablets at bedtime, may take an additional half tab as needed at night for severe restless leg 8/18/21   Kristen Kumar DO   melatonin 5 mg tablet Take 5 mg by mouth nightly. Provider, Historical   HYLAN G-F 20 IX by Intra artICUlar route. Provider, Historical   ascorbic acid, vitamin C, (VITAMIN C) 500 mg tablet Take  by mouth. Provider, Historical   turmeric root extract 500 mg cap Take  by mouth.   Patient not taking: Reported on 8/18/2021    Provider, Historical     Allergies   Allergen Reactions    Other Food Itching     If eats large quantities over several days causes itching: tomatoes, peaches, strawberry, fig    Bee Sting [Sting, Bee] Swelling    Pcn [Penicillins] Hives     IN CHILDHOOD    Percocet [Oxycodone-Acetaminophen] Other (comments)      Other (comments)\"hellish nightmares\"      Shellfish Containing Products Itching    Zoster Vaccine Live Swelling     Severe Right arm swelling with redness after administered by pharmacist in elbow    Atarax [Hydroxyzine Hcl] Other (comments)     jittery    Buspar [Buspirone] Nausea Only    Crestor [Rosuvastatin] Myalgia    Hydrochlorothiazide Myalgia     Other reaction(s): sorethroat    Hydroxyzine Other (comments)     jittery    Mobic [Meloxicam] Other (comments)     Easy bruising    Norco [Hydrocodone-Acetaminophen] Nausea and Vomiting               Family History   Problem Relation Age of Onset    Hypertension Mother     Dementia Mother         alzheimers    Hypertension Father     High Cholesterol Father     Kidney Disease Father         1 kidney    Emphysema Father         O2 requiring    Hypertension Sister  Diabetes Maternal Grandmother     Dementia Maternal Grandmother     Thyroid Disease Maternal Grandmother     Thyroid Disease Daughter         goiter    Heart Attack Maternal Uncle 46        massive    Heart Attack Maternal Uncle 39        massive    Heart Attack Maternal Aunt 51        massive    Thyroid Disease Daughter         thyroid cyst    Other Brother          AT 1DAYS OLD    Anesth Problems Neg Hx         SOCIAL HISTORY:  Patient resides:  Independently x   Assisted Living    SNF    With family care       Smoking history:   None x   Former    Chronic      Alcohol history:   None x   Social    Chronic      Ambulates:   Independently x   w/cane    w/walker    w/wc    CODE STATUS:  DNR    Full x   Other      Objective:     Physical Exam:     Visit Vitals  BP (!) 159/78 (BP 1 Location: Right upper arm)   Pulse (!) 101   Temp 99.7 °F (37.6 °C)   Resp 16   Ht 5' 3\" (1.6 m)   Wt 65.3 kg (144 lb)   SpO2 98%   BMI 25.51 kg/m²      O2 Device: None (Room air)    General:  Alert, cooperative, no distress, appears stated age. Head:  Normocephalic, without obvious abnormality, atraumatic. Eyes:  Conjunctivae/corneas clear. PERRL, EOMs intact. Nose: Nares normal. Septum midline. Mucosa normal.        Neck: Supple, symmetrical, trachea midline, no carotid bruit and no JVD. Lungs:   Clear to auscultation bilaterally. Chest wall:  No tenderness or deformity. Heart:  Regular rate and rhythm, S1, S2 normal, no murmur, click, rub or gallop. Abdomen:   Soft, non-tender. Bowel sounds normal. No masses,  No organomegaly. Extremities: Extremities normal, atraumatic, no cyanosis or edema. Pulses: 2+ and symmetric all extremities. Skin: Skin color, texture, turgor normal. No rashes or lesions   Neurologic: CNII-XII intact.           EKG:  Sinus Tach with RBBB  Data Review:     Recent Days:  Recent Labs     21   WBC 6.6   HGB 14.7   HCT 44.5        Recent Labs 12/23/21 2037   *   K 4.0   CL 97   CO2 25   *   BUN 18   CREA 1.01   CA 9.4   ALB 3.8   ALT 32     No results for input(s): PH, PCO2, PO2, HCO3, FIO2 in the last 72 hours. 24 Hour Results:  Recent Results (from the past 24 hour(s))   EKG, 12 LEAD, INITIAL    Collection Time: 12/23/21  8:30 PM   Result Value Ref Range    Ventricular Rate 109 BPM    Atrial Rate 109 BPM    P-R Interval 140 ms    QRS Duration 112 ms    Q-T Interval 328 ms    QTC Calculation (Bezet) 441 ms    Calculated P Axis 43 degrees    Calculated R Axis -47 degrees    Calculated T Axis 14 degrees    Diagnosis       Sinus tachycardia  Right bundle branch block  Left anterior fascicular block  ** Bifascicular block **  Abnormal ECG  When compared with ECG of 13-AUG-2018 11:59,  Vent. rate has increased BY  49 BPM  (RBBB and left anterior fascicular block) has replaced Incomplete right   bundle branch block     CBC WITH AUTOMATED DIFF    Collection Time: 12/23/21  8:37 PM   Result Value Ref Range    WBC 6.6 3.6 - 11.0 K/uL    RBC 4.87 3.80 - 5.20 M/uL    HGB 14.7 11.5 - 16.0 g/dL    HCT 44.5 35.0 - 47.0 %    MCV 91.4 80.0 - 99.0 FL    MCH 30.2 26.0 - 34.0 PG    MCHC 33.0 30.0 - 36.5 g/dL    RDW 12.9 11.5 - 14.5 %    PLATELET 678 591 - 071 K/uL    MPV 9.6 8.9 - 12.9 FL    NRBC 0.0 0  WBC    ABSOLUTE NRBC 0.00 0.00 - 0.01 K/uL    NEUTROPHILS 74 32 - 75 %    LYMPHOCYTES 14 12 - 49 %    MONOCYTES 10 5 - 13 %    EOSINOPHILS 0 0 - 7 %    BASOPHILS 1 0 - 1 %    IMMATURE GRANULOCYTES 1 (H) 0.0 - 0.5 %    ABS. NEUTROPHILS 4.9 1.8 - 8.0 K/UL    ABS. LYMPHOCYTES 1.0 0.8 - 3.5 K/UL    ABS. MONOCYTES 0.7 0.0 - 1.0 K/UL    ABS. EOSINOPHILS 0.0 0.0 - 0.4 K/UL    ABS. BASOPHILS 0.1 0.0 - 0.1 K/UL    ABS. IMM.  GRANS. 0.0 0.00 - 0.04 K/UL    DF AUTOMATED     METABOLIC PANEL, COMPREHENSIVE    Collection Time: 12/23/21  8:37 PM   Result Value Ref Range    Sodium 129 (L) 136 - 145 mmol/L    Potassium 4.0 3.5 - 5.1 mmol/L    Chloride 97 97 - 108 mmol/L    CO2 25 21 - 32 mmol/L    Anion gap 7 5 - 15 mmol/L    Glucose 114 (H) 65 - 100 mg/dL    BUN 18 6 - 20 MG/DL    Creatinine 1.01 0.55 - 1.02 MG/DL    BUN/Creatinine ratio 18 12 - 20      GFR est AA >60 >60 ml/min/1.73m2    GFR est non-AA 53 (L) >60 ml/min/1.73m2    Calcium 9.4 8.5 - 10.1 MG/DL    Bilirubin, total 0.4 0.2 - 1.0 MG/DL    ALT (SGPT) 32 12 - 78 U/L    AST (SGOT) 30 15 - 37 U/L    Alk. phosphatase 91 45 - 117 U/L    Protein, total 8.1 6.4 - 8.2 g/dL    Albumin 3.8 3.5 - 5.0 g/dL    Globulin 4.3 (H) 2.0 - 4.0 g/dL    A-G Ratio 0.9 (L) 1.1 - 2.2     SAMPLES BEING HELD    Collection Time: 12/23/21  8:37 PM   Result Value Ref Range    SAMPLES BEING HELD 1RED     COMMENT        Add-on orders for these samples will be processed based on acceptable specimen integrity and analyte stability, which may vary by analyte. TROPONIN-HIGH SENSITIVITY    Collection Time: 12/23/21  8:37 PM   Result Value Ref Range    Troponin-High Sensitivity 27 0 - 51 ng/L   COVID-19 RAPID TEST    Collection Time: 12/23/21 11:23 PM   Result Value Ref Range    Specimen source Nasopharyngeal      COVID-19 rapid test Detected (AA) NOTD     LACTIC ACID    Collection Time: 12/23/21 11:23 PM   Result Value Ref Range    Lactic acid 0.9 0.4 - 2.0 MMOL/L         Imaging:     Assessment:     Facundo Peterson is a 80 y.o. female with hx of htn, insomnia, rls who is admitted for hyponatremia and covid infection.        Plan:       Hyponatremia  -Hypovolemic Hyponatremia  -Continue volume resuscitation  -Daily BMP    Symptomatic COVID Infection  -No hypoxia or infiltrates on CXR  -Supportive care  -Therapeutic vitamins    HTN  -Home Lisinopril    GERD  -Protonix    FEN/GI -  PO hydration  Activity - As tolerted  DVT prophylaxis - Lovenox  GI prophylaxis -  NI  Disposition - Home    CODE STATUS:  Full code       Signed By: Raeann Duarte MD     December 24, 2021

## 2021-12-24 NOTE — ROUTINE PROCESS
TRANSFER - OUT REPORT:    Verbal report given to Ana Mmariely Red (name) on [employer]  being transferred to [phone] (unit) for routine progression of care       Report consisted of patients Situation, Background, Assessment and   Recommendations(SBAR). Information from the following report(s) SBAR, ED Summary and Recent Results was reviewed with the receiving nurse. Lines:   Peripheral IV 12/23/21 Left Antecubital (Active)   Site Assessment Clean, dry, & intact 12/23/21 2041   Phlebitis Assessment 0 12/23/21 2041   Infiltration Assessment 0 12/23/21 2041   Dressing Status Clean, dry, & intact 12/23/21 2041   Dressing Type 4 X 4;Transparent 12/23/21 2041   Hub Color/Line Status Pink;Flushed;Patent 12/23/21 2041   Action Taken Blood drawn 12/23/21 2041   Alcohol Cap Used No 12/23/21 2041        Opportunity for questions and clarification was provided.       Patient transported with:   [employer]

## 2021-12-24 NOTE — ED PROVIDER NOTES
Patient is an 59-year-old female history of asthma and chronic pain presenting today secondary to COVID-19. She took multiple at home test this week which were negative and then another test today which was positive. She has been sick for the past 3 to 4 days and complains of myalgias, shortness of breath, cough with no sputum production, subjective fever with chills and generalized fatigue. Poor appetite. No vomiting or diarrhea. No chest pain. States her oxygen's been okay. She has received 2 out of 3 Covid vaccinations. Past Medical History:   Diagnosis Date    AC (acromioclavicular) joint bone spurs 01/2014    in back. Dr. Akosua Rothman. Txd with shots x 3    Actinic keratosis 2015    Dr. Mckay Gardner. Dr. Brigette Fatima.  Allergic rhinitis, cause unspecified     Anxiety state, unspecified     Arthritis     R KNEE BONE-ON-BONE; R HAND-----OSTEO    Asthma     BRONCHITIS IN SPRING & FALL MOST YEARS    BCC (basal cell carcinoma), face 1980s (nose), 01/28/15 (forehead)    Dr. Jean Glover. Dr. Tyronne Burkitt.  Bilateral pes planus 03/10/2020    Dr. Blake Spears 2007    Dr. Bhanu Rubi    Chronic insomnia     Chronic low back pain 01/2014    Dr. Kingston Daniels. DJD and spurs, Narrowing of L 3,4,5. Dr. Akosua Rothman.  Chronic obstructive pulmonary disease (HCC)     CHRONIC (TWICE-YEARLY) BRONCHITIS    Chronic pain     Colon polyps 11/2005, 01/29/09, 9/16/2015    benign. Dr. Thomas Osborne    Constipation     Degenerative lumbar spinal stenosis 01/2014    Dr. Estevan Terrell Narrowing of L 3,4,5    Diabetes mellitus type 2, diet-controlled ([facility] [address] 10/13/2017    LOST WEIGHT AND IS NOT DIABETIC    Dyslipidemia     Dysphagia 09/2014    due to Esophagitis. Dr. Asim Doss.  Esophagitis 09/29/2014    ESOPHAGITIS    Essential hypertension, benign     Foot fracture, left 2009    stress.  Foot pain, bilateral 11/13/2013    Dr. Issa Coyle.     Hearing loss      Michelle Filbert.  Knee pain, bilateral 10/28/13    Dr. Tal Manzanares. Right > Left. Severe OA. Txd with PT.    Lumbar stenosis with neurogenic claudication     Menopause 1976    Migraine     NONE SINCE AGE 37    Mixed stress and urge urinary incontinence     NO LEAKAGE; GETS UP SEVERAL TIMES A NIGHT, PT STATES ON 10/3/16    OA (osteoarthritis) of knee     Dr. Tal Manzanares    Osteopenia 08/24/2015    Restless legs syndrome (RLS) 09/2015    Dr. Demond Thapa.  Shoulder joint dislocation 06/2011    Right. due to fall. Dr. La Singh Sleep apnea     undiagnosed    Syncope 06/25/11    due to fall down 5 steps. Right occipital head trauma.  Tremor of both hands 09/2015    Dr. Paulie Brunner GABAPENTIN       Past Surgical History:   Procedure Laterality Date    COLONOSCOPY N/A 9/2/2020    COLONOSCOPY   :- performed by Fuentes [facility]acarias [facility] at [facility]OSCOPY    HX [address] Left 04/03/2018    Dr. Kathy Danielle     HX CATARACT REMOVAL Bilateral , R 04/10/17    Dr. Cate Baxter  11/2005, 01/29/09, 09/16/15    with polypectomy. Dr. Marques Dave q5 years    HX ENDOSCOPY  09/2014    with Esophageal dilation. due to dysphagia. Dr. Timothy Jacob.  HX KNEE REPLACEMENT Right 08/27/2018    Dr. Thom Murphy  10/18/2016    HX MALIGNANT SKIN LESION EXCISION  01/28/15    BCC. L Forehead. MOHs SURGERY. Dr. Amelia Gaston.  HX RAIMUNDO AND BSO  1976    due to fibroids    HX TONSILLECTOMY  1948    [place] COMBINED ANT/POST COLPORRHAPHY  02/28/05    with enterocele RE.   Dr. Too Iglesias and Dr. Andrae Quiroz         Family History:   Problem Relation Age of Onset    Hypertension Mother     Dementia Mother         alzheimers    Hypertension Father     High Cholesterol Father     Kidney Disease Father         1 kidney    Emphysema Father         O2 requiring    Hypertension Sister     Diabetes Maternal Grandmother     Dementia Maternal Grandmother     Thyroid Disease Maternal Grandmother     Thyroid Disease Daughter         goiter    Heart Attack Maternal Uncle 51        massive    Heart Attack Maternal Uncle 39        massive    Heart Attack Maternal Aunt 51        massive    Thyroid Disease Daughter         thyroid cyst    Other Brother          AT 1DAYS OLD    Anesth Problems Neg Hx        Social History     Socioeconomic History    Marital status:      Spouse name: Not on file    Number of children: Not on file    Years of education: Not on file    Highest education level: Not on file   Occupational History    Not on file   Tobacco Use    Smoking status: Never Smoker    Smokeless tobacco: Never Used   Vaping Use    Vaping Use: Never used   Substance and Sexual Activity    Alcohol use: No    Drug use: No    Sexual activity: Not Currently     Partners: Male     Birth control/protection: Surgical   Other Topics Concern    Not on file   Social History Narrative    Not on file     Social Determinants of Health     Financial Resource Strain:     Difficulty of Paying Living Expenses: Not on file   Food Insecurity:     Worried About Running Out of Food in the Last Year: Not on file    Timothy of Food in the Last Year: Not on file   Transportation Needs:     Lack of Transportation (Medical): Not on file    Lack of Transportation (Non-Medical):  Not on file   Physical Activity:     Days of Exercise per Week: Not on file    Minutes of Exercise per Session: Not on file   Stress:     Feeling of Stress : Not on file   Social Connections:     Frequency of Communication with Friends and Family: Not on file    Frequency of Social Gatherings with Friends and Family: Not on file    Attends Holiness Services: Not on file    Active Member of Clubs or Organizations: Not on file    Attends Club or Organization Meetings: Not on file    Marital Status: Not on file   Intimate Partner Violence:     Fear of Current or Ex-Partner: Not on file    Emotionally Abused: Not on file    Physically Abused: Not on file    Sexually Abused: Not on file   Housing Stability:     Unable to Pay for Housing in the Last Year: Not on file    Number of [place] in the Last Year: Not on file    Unstable Housing in the Last Year: Not on file         ALLERGIES: Other food; Bee sting [sting, bee]; Pcn [penicillins]; Percocet [oxycodone-acetaminophen]; Shellfish containing products; Zoster vaccine live; Atarax [hydroxyzine hcl]; Buspar [buspirone]; Crestor [rosuvastatin]; Hydrochlorothiazide; Hydroxyzine; Mobic [meloxicam]; and Norco [hydrocodone-acetaminophen]    Review of Systems   Constitutional: Positive for chills and fatigue. Negative for fever. HENT: Negative for congestion and rhinorrhea. Eyes: Negative for redness and visual disturbance. Respiratory: Positive for cough and shortness of breath. Cardiovascular: Negative for chest pain and leg swelling. Gastrointestinal: Negative for abdominal pain, diarrhea, nausea and vomiting. Genitourinary: Negative for dysuria, flank pain, frequency, hematuria and urgency. Musculoskeletal: Positive for myalgias. Negative for arthralgias, back pain and neck pain. Skin: Negative for rash and wound. Allergic/Immunologic: Negative for immunocompromised state. Neurological: Negative for dizziness and headaches. Vitals:    12/23/21 2011   BP: (!) 159/78   Pulse: (!) 101   Resp: 16   Temp: 99.7 °F (37.6 °C)   SpO2: 98%   Weight: 65.3 kg (144 lb)   Height: 5' 3\" (1.6 m)            Physical Exam  Vitals and nursing note reviewed. Constitutional:       General: She is not in acute distress. Appearance: She is well-developed. She is ill-appearing. She is not diaphoretic. HENT:      Head: Normocephalic. Mouth/Throat:      Pharynx: No oropharyngeal exudate. Eyes:      General:         Right eye: No discharge.          Left eye: No discharge. Pupils: Pupils are equal, round, and reactive to light. Cardiovascular:      Rate and Rhythm: Regular rhythm. Tachycardia present. Heart sounds: Normal heart sounds. No murmur heard. No friction rub. No gallop. Pulmonary:      Effort: Respiratory distress (exertional/conversational dyspnea) present. Breath sounds: Normal breath sounds. No stridor. No wheezing or rales. Abdominal:      General: Bowel sounds are normal. There is no distension. Palpations: Abdomen is soft. Tenderness: There is no abdominal tenderness. There is no guarding or rebound. Musculoskeletal:         General: No deformity. Normal range of motion. Cervical back: Normal range of motion and neck supple. Skin:     General: Skin is warm and dry. Capillary Refill: Capillary refill takes less than 2 seconds. Findings: No rash. Neurological:      Mental Status: She is alert and oriented to person, place, and time. Psychiatric:         Behavior: Behavior normal.          MDM       Procedures    Labs:  Hyponatremic at 129  Renal function acceptable  No leukocytosis or anemia    Chest x-ray shows no acute process                 Perfect Serve Consult for Admission  11:07 PM    ED Room Number: ER23/23  Patient Name and age:  Yeni Llamas 80 y.o.  female  Working Diagnosis:   1. COVID    2. Chest pain, unspecified type    3. Hyponatremia        COVID-19 Suspicion:  yes  Sepsis present:  no  Reassessment needed: no  Code Status:  Full Code  Readmission: no  Isolation Requirements: yes  Recommended Level of Care:  telemetry  Department:[facility] Adult ED - 21   Other:  80 y.o. female covid, hyponatremia, sob      51-year-old female presenting today with COVID-19. She is hyponatremic. She feels very poorly with poor appetite, significant fatigue and shortness of breath. She did not get hypoxic with ambulation but was clearly dyspneic.   Given age and progression of symptoms with shortness of breath and hyponatremia she will be admitted for further management. ICD-10-CM ICD-9-CM    1. COVID  U07.1 079.89    2. Chest pain, unspecified type  R07.9 786.50    3.  Hyponatremia  E87.1 276.1      Admit  Markos Torres DO

## 2021-12-24 NOTE — PROGRESS NOTES
DORIAN:    RUR N/A    Disposition: Home with spouse    Follow up: PCP/Specialist    Transportation: Spouse    Primary contact: Eric Naqvi(Spouse) [phone]    Care Management Interventions  PCP Verified by CM: Yes  Mode of Transport at Discharge: Other (see comment) ()  [place] Signup: Yes  Discharge Durable Medical Equipment: No  Physical Therapy Consult: Yes  Occupational Therapy Consult: Yes  Speech Therapy Consult: No  Support Systems: Spouse/Significant Other  The Patient and/or Patient Representative was Provided with a Choice of Provider and Agrees with the Discharge Plan?: Yes  Discharge Location  Discharge Placement: Home with family assistance    Reason for Admission:  COVID                     RUR Score: N/A                    Plan for utilizing home health: No orders         PCP: First and Last name:  Lakeshia Crowell MD     Name of Practice: [facility]   Are you a current patient: Yes/No: Yes   Approximate date of last visit:    Can you participate in a virtual visit with your PCP:                     Current Advanced Directive/Advance Care Plan: Full Code      Healthcare Decision Maker:   Click here to complete [address] including selection of the Healthcare Decision Maker Relationship (ie \"Primary\")                             Transition of Care Plan:      CM spoke with patient's  to introduce CM role, verify demographics and begin discharge planning. Patient lives in a 2 story house with her . Her bedroom is on the first level. Patient uses [address] at [employer] and [address] [employer]. Patient is independent at home and drives. She has had HH in the past but cannot recall agency name. Insurance verified: [employer] Medicare A&B primary and [facility] secondary.  to transport at discharge.     Harjeet Hancock RN/CRM  [phone]

## 2021-12-24 NOTE — PROGRESS NOTES
PHYSICAL THERAPY EVALUATION/DISCHARGE  Patient: Eugene Milligan (37 y.o. female)  Date: 12/24/2021  Primary Diagnosis: Hyponatremia [E87.1]  COVID [U07.1]       Precautions:          ASSESSMENT  Based on the objective data described below, the patient presents with decreased endurance compared to baseline after being admitted with COVID. Prior to admission, she was independent with all activity and self care and lives with her . At this time, she is limited only by coughing spells that result in dry heaving. She is on room air and SpO2 96% with short distance ambulation. Reviewed activity recommendations and safety considerations for hospital stay, and she has no other acute therapy needs at this time. We will complete orders at this time; please reconsult if her needs change. Functional Outcome Measure: The patient scored 80 on the Barthel Index outcome measure which is indicative of minimally impaired functional independence. Other factors to consider for discharge: COVID positive     Further skilled acute physical therapy is not indicated at this time. PLAN :  Recommendation for discharge: (in order for the patient to meet his/her long term goals)  No skilled physical therapy/ follow up rehabilitation needs identified at this time. This discharge recommendation:  Has not yet been discussed the attending provider and/or case management    IF patient discharges home will need the following DME: none       SUBJECTIVE:   Patient stated I have been going to the bathroom, but when I start coughing, I start to heave.     OBJECTIVE DATA SUMMARY:   HISTORY:    Past Medical History:   Diagnosis Date    AC (acromioclavicular) joint bone spurs 01/2014    in back. Dr. Mario Alberto Lopez. Txd with shots x 3    Actinic keratosis 2015    Dr. Curry Muro. Dr. Con Brar.     Allergic rhinitis, cause unspecified     Anxiety state, unspecified     Arthritis     R KNEE BONE-ON-BONE; R HAND-----OSTEO    Asthma     BRONCHITIS IN SPR[place] & FALL MOST YEARS    BCC (basal cell carcinoma), face 1980s (nose), 01/28/15 (forehead)    Dr. Logan Wang. Dr. Root Estimable.  Bilateral pes planus 03/10/2020    Dr. Silvia Schwartz 2007    Dr. Ramsey Dobbins    Chronic insomnia     Chronic low back pain 01/2014    Dr. Neal Keating. DJD and spurs, Narrowing of L 3,4,5. Dr. Lillian Kong.  Chronic obstructive pulmonary disease (HCC)     CHRONIC (TWICE-YEARLY) BRONCHITIS    Chronic pain     Colon polyps 11/2005, 01/29/09, 9/16/2015    benign. Dr. Rene Saunders    Constipation     Degenerative lumbar spinal stenosis 01/2014    Dr. Rico Peterson Narrowing of L 3,4,5    Diabetes mellitus type 2, diet-controlled ([facility] [address] 10/13/2017    LOST WEIGHT AND IS NOT DIABETIC    Dyslipidemia     Dysphagia 09/2014    due to Esophagitis. Dr. Zach Barahona.  Esophagitis 09/29/2014    ESOPHAGITIS    Essential hypertension, benign     Foot fracture, left 2009    stress.  Foot pain, bilateral 11/13/2013    Dr. Arce Notice.  Hearing loss     Dr. Pugh Gains.  Knee pain, bilateral 10/28/13    Dr. Lucretia Hein. Right > Left. Severe OA. Txd with PT.    Lumbar stenosis with neurogenic claudication     Menopause 1976    Migraine     NONE SINCE AGE 37    Mixed stress and urge urinary incontinence     NO LEAKAGE; GETS UP SEVERAL TIMES A NIGHT, PT STATES ON 10/3/16    OA (osteoarthritis) of knee     Dr. Lucretia Hein    Osteopenia 08/24/2015    Restless legs syndrome (RLS) 09/2015    Dr. Hayley Limon.  Shoulder joint dislocation 06/2011    Right. due to fall. Dr. Carla Castro Sleep apnea     undiagnosed    Syncope 06/25/11    due to fall down 5 steps. Right occipital head trauma.     Tremor of both hands 09/2015    Dr. Lowell De La Fuente GABAPENTIN     Past Surgical History:   Procedure Laterality Date    COLONOSCOPY N/A 9/2/2020    COLONOSCOPY   :- performed by Scott [facility] at [facility] ENDOSCOPY    HX [address] Left 04/03/2018    Dr. Libertad Dumont     HX CATARACT REMOVAL Bilateral , R 04/10/17    Dr. Edi Pena HX COLONOSCOPY  11/2005, 01/29/09, 09/16/15    with polypectomy. Dr. Shira Olszewski q5 years    HX ENDOSCOPY  09/2014    with Esophageal dilation. due to dysphagia. Dr. Dustin Luna.  HX KNEE REPLACEMENT Right 08/27/2018    Dr. Charu Robertson  10/18/2016    HX MALIGNANT SKIN LESION EXCISION  01/28/15    BCC. L Forehead. MOHs SURGERY. Dr. Xavier Right.  HX RAIMUNDO AND BSO  1976    due to fibroids    HX TONSILLECTOMY  1948    [place] COMBINED ANT/POST COLPORRHAPHY  02/28/05    with enterocele RE. Dr. Zaire Hermosillo and Dr. Kathryn Luu       Prior level of function: independent  Personal factors and/or comorbidities impacting plan of care: as noted above    Home Situation  # Steps to Enter: 0  One/Two Story Residence: Two story, live on 1st floor  Living Alone: No  Support Systems: Spouse/Significant Other  Patient Expects to be Discharged to[de-identified] House  Current DME Used/Available at Home: Cane, straight,Grab bars  Tub or Shower Type: Shower    EXAMINATION/PRESENTATION/DECISION MAKING:   Critical Behavior:              Hearing:   Auditory  Auditory Impairment: None  Skin:  intact  Edema: none  Range Of Motion:  AROM: Within functional limits                       Strength:    Strength: Generally decreased, functional                    Tone & Sensation:   Tone: Normal              Sensation: Intact               Coordination:  Coordination: Within functional limits  Vision:      Functional Mobility:  Bed Mobility:  Rolling: Modified independent  Supine to Sit: Modified independent  Sit to Supine: Modified independent  Scooting: Modified independent  Transfers:  Sit to Stand: Modified independent  Stand to Sit: Modified independent        Bed to Chair: Modified independent              Balance: Sitting: Intact; Without support  Standing: Intact; Without support  Ambulation/Gait Training:  Distance (ft): 35 Feet (ft)     Ambulation - Level of Assistance: Modified independent; Additional time        Gait Abnormalities: Decreased step clearance; Path deviations (very mild)              Speed/Aparna: Pace decreased (<100 feet/min)           Interventions: Safety awareness training;Verbal cues            Stairs: Therapeutic Exercises:       Functional Measure:  Barthel Index:    Bathin  Bladder: 10  Bowels: 10  Groomin  Dressing: 10  Feeding: 10  Mobility: 0 (limitde by endurance at this time)  Stairs: 5 (inferred based on mobility in room)  Toilet Use: 10  Transfer (Bed to Chair and Back): 15  Total: 80/100       The Barthel ADL Index: Guidelines  1. The index should be used as a record of what a patient does, not as a record of what a patient could do. 2. The main aim is to establish degree of independence from any help, physical or verbal, however minor and for whatever reason. 3. The need for supervision renders the patient not independent. 4. A patient's performance should be established using the best available evidence. Asking the patient, friends/relatives and nurses are the usual sources, but direct observation and common sense are also important. However direct testing is not needed. 5. Usually the patient's performance over the preceding 24-48 hours is important, but occasionally longer periods will be relevant. 6. Middle categories imply that the patient supplies over 50 per cent of the effort. 7. Use of aids to be independent is allowed. Score Interpretation (from [address] [facility] [address])    Independent   60-79 Minimally independent   40-59 Partially dependent   20-39 Very dependent   <20 Totally dependent     -Mert Treadwell., Barthel, D.W. (1965). Functional evaluation: the Barthel Index. [facility] ([address] ().  The Barthel activities of daily living index: self-reporting versus actual performance in the old (> or = 75 years). Journal of [address] [place] [id number]), 14 [employer], J.J.LUIS.F, Melanie Kaufman.Daya. (1999). Measuring the change in disability after inpatient rehabilitation; comparison of the responsiveness of the Barthel Index and Functional Ward Measure. Journal of Neurology, Neurosurgery, and Psychiatry, 66(4), 306-828. JOE Dougherty, JEN Motley, & Michell Birzuela M.A. (2004) Assessment of post-stroke quality of life in cost-effectiveness studies: The usefulness of the Barthel Index and the EuroQoL-5D. Quality of Life Research, 15, 288-44          Physical Therapy Evaluation Charge Determination   History Examination Presentation Decision-Making   HIGH Complexity :3+ comorbidities / personal factors will impact the outcome/ POC  LOW Complexity : 1-2 Standardized tests and measures addressing body structure, function, activity limitation and / or participation in recreation  LOW Complexity : Stable, uncomplicated  LOW Complexity : FOTO score of       Based on the above components, the patient evaluation is determined to be of the following complexity level: LOW     Pain Rating:  none    Activity Tolerance:   Fair and requires rest breaks      After treatment patient left in no apparent distress:   Supine in bed, Call bell within reach and Side rails x 3    COMMUNICATION/EDUCATION:   The patients plan of care was discussed with: Occupational therapist and Registered nurse. Fall prevention education was provided and the patient/caregiver indicated understanding., Patient/family have participated as able in goal setting and plan of care. and Patient/family agree to work toward stated goals and plan of care.     Thank you for this referral.  Janelle Zee, PT   Time Calculation: 17 mins

## 2021-12-26 RX ORDER — OMEPRAZOLE 20 MG/1
CAPSULE, DELAYED RELEASE ORAL
Qty: 30 CAPSULE | Refills: 0 | Status: SHIPPED | OUTPATIENT
Start: 2021-12-26 | End: 2022-02-01

## 2021-12-27 ENCOUNTER — PATIENT OUTREACH (OUTPATIENT)
Dept: CASE MANAGEMENT | Age: 81
End: 2021-12-27

## 2021-12-27 RX ORDER — FLUTICASONE PROPIONATE 50 MCG
2 SPRAY, SUSPENSION (ML) NASAL DAILY
COMMUNITY

## 2021-12-27 RX ORDER — ALBUTEROL SULFATE 2.5 MG/.5ML
SOLUTION RESPIRATORY (INHALATION) ONCE
COMMUNITY
End: 2022-02-23

## 2021-12-27 NOTE — ACP (ADVANCE CARE PLANNING)
Patient states her [facility] that is dated 4-, and that is currently scanned into her chart, is a current ACP document.

## 2022-01-07 ENCOUNTER — OFFICE VISIT (OUTPATIENT)
Dept: PRIMARY CARE CLINIC | Age: 82
End: 2022-01-07
Payer: MEDICARE

## 2022-01-07 VITALS
TEMPERATURE: 97.8 F | HEIGHT: 63 IN | RESPIRATION RATE: 15 BRPM | WEIGHT: 149.4 LBS | OXYGEN SATURATION: 99 % | SYSTOLIC BLOOD PRESSURE: 136 MMHG | BODY MASS INDEX: 26.47 KG/M2 | HEART RATE: 75 BPM | DIASTOLIC BLOOD PRESSURE: 79 MMHG

## 2022-01-07 DIAGNOSIS — Z00.00 MEDICARE ANNUAL WELLNESS VISIT, SUBSEQUENT: Primary | ICD-10-CM

## 2022-01-07 DIAGNOSIS — Z23 NEED FOR DIPHTHERIA-TETANUS-PERTUSSIS (TDAP) VACCINE: ICD-10-CM

## 2022-01-07 DIAGNOSIS — E87.1 HYPONATREMIA: ICD-10-CM

## 2022-01-07 DIAGNOSIS — N39.0 URINARY TRACT INFECTION WITHOUT HEMATURIA, SITE UNSPECIFIED: ICD-10-CM

## 2022-01-07 DIAGNOSIS — U07.1 COVID-19: ICD-10-CM

## 2022-01-07 DIAGNOSIS — J45.909 ASTHMA DUE TO ENVIRONMENTAL ALLERGIES: ICD-10-CM

## 2022-01-07 DIAGNOSIS — G25.81 RESTLESS LEGS SYNDROME (RLS): ICD-10-CM

## 2022-01-07 DIAGNOSIS — R73.02 IGT (IMPAIRED GLUCOSE TOLERANCE): ICD-10-CM

## 2022-01-07 DIAGNOSIS — K21.9 GASTROESOPHAGEAL REFLUX DISEASE WITHOUT ESOPHAGITIS: ICD-10-CM

## 2022-01-07 DIAGNOSIS — N39.498 OTHER URINARY INCONTINENCE: ICD-10-CM

## 2022-01-07 DIAGNOSIS — I10 PRIMARY HYPERTENSION: ICD-10-CM

## 2022-01-07 LAB
BILIRUB UR QL STRIP: NEGATIVE
GLUCOSE UR-MCNC: NEGATIVE MG/DL
KETONES P FAST UR STRIP-MCNC: NEGATIVE MG/DL
PH UR STRIP: 6.5 [PH] (ref 4.6–8)
PROT UR QL STRIP: NEGATIVE
SP GR UR STRIP: 1.03 (ref 1–1.03)
UA UROBILINOGEN AMB POC: NORMAL (ref 0.2–1)
URINALYSIS CLARITY POC: CLEAR
URINALYSIS COLOR POC: YELLOW
URINE BLOOD POC: NEGATIVE
URINE LEUKOCYTES POC: NORMAL
URINE NITRITES POC: NEGATIVE

## 2022-01-07 PROCEDURE — G8419 CALC BMI OUT NRM PARAM NOF/U: HCPCS | Performed by: INTERNAL MEDICINE

## 2022-01-07 PROCEDURE — G8399 PT W/DXA RESULTS DOCUMENT: HCPCS | Performed by: INTERNAL MEDICINE

## 2022-01-07 PROCEDURE — 99215 OFFICE O/P EST HI 40 MIN: CPT | Performed by: INTERNAL MEDICINE

## 2022-01-07 PROCEDURE — G8427 DOCREV CUR MEDS BY ELIG CLIN: HCPCS | Performed by: INTERNAL MEDICINE

## 2022-01-07 PROCEDURE — G8754 DIAS BP LESS 90: HCPCS | Performed by: INTERNAL MEDICINE

## 2022-01-07 PROCEDURE — G8536 NO DOC ELDER MAL SCRN: HCPCS | Performed by: INTERNAL MEDICINE

## 2022-01-07 PROCEDURE — 81001 URINALYSIS AUTO W/SCOPE: CPT | Performed by: INTERNAL MEDICINE

## 2022-01-07 PROCEDURE — 1090F PRES/ABSN URINE INCON ASSESS: CPT | Performed by: INTERNAL MEDICINE

## 2022-01-07 PROCEDURE — G8510 SCR DEP NEG, NO PLAN REQD: HCPCS | Performed by: INTERNAL MEDICINE

## 2022-01-07 PROCEDURE — G0439 PPPS, SUBSEQ VISIT: HCPCS | Performed by: INTERNAL MEDICINE

## 2022-01-07 PROCEDURE — 1101F PT FALLS ASSESS-DOCD LE1/YR: CPT | Performed by: INTERNAL MEDICINE

## 2022-01-07 PROCEDURE — G8752 SYS BP LESS 140: HCPCS | Performed by: INTERNAL MEDICINE

## 2022-01-07 RX ORDER — PRAMIPEXOLE DIHYDROCHLORIDE 1 MG/1
1 TABLET ORAL 3 TIMES DAILY
Qty: 90 TABLET | Refills: 0 | Status: SHIPPED | OUTPATIENT
Start: 2022-01-07 | End: 2022-01-08

## 2022-01-07 RX ORDER — IPRATROPIUM BROMIDE AND ALBUTEROL SULFATE 2.5; .5 MG/3ML; MG/3ML
3 SOLUTION RESPIRATORY (INHALATION)
Qty: 30 NEBULE | Refills: 0 | Status: SHIPPED | OUTPATIENT
Start: 2022-01-07 | End: 2022-02-06

## 2022-01-07 NOTE — PROGRESS NOTES
Chief Complaint   Patient presents with    Annual Wellness Visit    UTI       Visit Vitals  /79 (BP 1 Location: Left arm)   Pulse 75   Temp 97.8 °F (36.6 °C)   Resp 15   Ht 5' 3\" (1.6 m)   Wt 149 lb 6.4 oz (67.8 kg)   SpO2 99%   BMI 26.47 kg/m²       1. Have you been to the ER, urgent care clinic since your last visit? Hospitalized since your last visit? Yes [facility]    2. Have you seen or consulted any other health care providers outside of the [address] [place] [address] since your last visit? Include any pap smears or colon screening. 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 female and presents for Annual Medicare Wellness Visit. Assessment of cognitive impairment: Alert and oriented x 4. Depression Screen:   3 most recent PHQ Screens 1/7/2022   Little interest or pleasure in doing things Not at all   Feeling down, depressed, irritable, or hopeless Not at all   Total Score PHQ 2 0       Fall Risk Assessment:    Fall Risk Assessment, last 12 mths 1/7/2022   Able to walk? Yes   Fall in past 12 months? 0   Do you feel unsteady? 0   Are you worried about falling 0   Is the gait abnormal? -   Number of falls in past 12 months -   Fall with injury? -       Abuse Screen:   Abuse Screening Questionnaire 1/7/2022   Do you ever feel afraid of your partner? N   Are you in a relationship with someone who physically or mentally threatens you? N   Is it safe for you to go home? Y       Activities of Daily Living:  Self-care.    Requires assistance with: no ADLs  Patient handle his/her own medications  yes Use of pill box  yes  Activities of Daily Living:   ADL Assessment 1/7/2022   Feeding yourself No Help Needed   Getting from bed to chair No Help Needed   Getting dressed No Help Needed   Bathing or showering No Help Needed   Walk across the room (includes cane/walker) No Help Needed   Using the telphone No Help Needed   Taking your medications No Help Needed   Preparing meals No Help Needed   Managing money (expenses/bills) No Help Needed   Moderately strenuous housework (laundry) No Help Needed   Shopping for personal items (toiletries/medicines) No Help Needed   Shopping for groceries No Help Needed   Driving No Help Needed   Climbing a flight of stairs No Help Needed   Getting to places beyond walking distances No Help Needed       Health Maintenance:  Daily Aspirin: no  Bone Density: 7/16/2020  Glaucoma Screening: no, will make soon  Immunizations:    Tetanus: unknown. Script sent to the pharmacy   Influenza: up to date. Shingles: up to date. PPSV-23: up to date. Prevnar-13: up to date. COVID vaccine up to date     Cancer screening:    Cervical: N/a due to age. Breast: up to date. Colon: up to date. Alcohol Risk Screen:   On any occasion during the past 3 months, have you had more than 3 drinks(female) or 4 drinks (male) containing alcohol in one? No  Do you average more than 7 drinks (female) or 14 drinks (male) per week? No  Type and amount:none    Hearing Loss:  denies any hearing loss    Vision Loss:   Wears glasses, contact lenses, or have any other visual impairment  Readers    Adult Nutrition Screen:  No risk factors noted. Advance Care Planning:   End of Life Planning: has an advanced directive - a copy has been provided  [facility] ACP-Facilitator appointment no      Medications/Allergies: Reviewed with patient  Prior to Admission medications    Medication Sig Start Date End Date Taking? Authorizing Provider   fluticasone propionate (Flonase Allergy Relief) 50 mcg/actuation nasal spray 2 Sprays by Both Nostrils route daily. Yes Provider, Historical   omeprazole (PRILOSEC) 20 mg capsule TAKE 1 CAPSULE BY MOUTH DAILY 30 MINUTES BEFORE BREAKFAST 12/26/21  Yes Davide Ho MD   ascorbic acid, vitamin C, (VITAMIN C) 1,000 mg tablet Take 1 Tablet by mouth two (2) times a day.  12/24/21  Yes Tonie Carranza MD   zinc sulfate (ZINCATE) 50 mg zinc (220 mg) capsule Take 1 Capsule by mouth every twelve (12) hours. 12/24/21  Yes Kaylee Montalvo MD   benazepriL (LOTENSIN) 20 mg tablet TAKE 1 TABLET BY MOUTH EVERY DAY FOR HIGH BLOOD PRESSURE 11/17/21  Yes Mamie Reis MD   rOPINIRole (REQUIP) 1 mg tablet 1-1/2 tablets at bedtime, may take an additional half tab as needed at night for severe restless leg 8/18/21  Yes Kristen Kumar DO   melatonin 5 mg tablet Take 5 mg by mouth nightly. Yes Provider, Historical   HYLAN G-F 20 IX by Intra artICUlar route. Yes Provider, Historical   ascorbic acid, vitamin C, (VITAMIN C) 500 mg tablet Take  by mouth. Yes Provider, Historical   turmeric root extract 500 mg cap Take  by mouth. Yes Provider, Historical   albuterol sulfate (PROVENTIL;VENTOLIN) 2.5 mg/0.5 mL nebu nebulizer solution by Nebulization route once. Provider, Historical       Allergies   Allergen Reactions    Other Food Itching     If eats large quantities over several days causes itching: tomatoes, peaches, strawberry, fig    Bee Sting [Sting, Bee] Swelling    Pcn [Penicillins] Hives     IN CHILDHOOD    Percocet [Oxycodone-Acetaminophen] Other (comments)      Other (comments)\"hellish nightmares\"      Shellfish Containing Products Itching    Zoster Vaccine Live Swelling     Severe Right arm swelling with redness after administered by pharmacist in elbow    Atarax [Hydroxyzine Hcl] Other (comments)     jittery    Buspar [Buspirone] Nausea Only    Crestor [Rosuvastatin] Myalgia    Hydrochlorothiazide Myalgia     Other reaction(s): sorethroat    Hydroxyzine Other (comments)     jittery    Mobic [Meloxicam] Other (comments)     Easy bruising    Norco [Hydrocodone-Acetaminophen] Nausea and Vomiting                Objective:  Visit Vitals  /79 (BP 1 Location: Left arm)   Pulse 75   Temp 97.8 °F (36.6 °C)   Resp 15   Ht 5' 3\" (1.6 m)   Wt 149 lb 6.4 oz (67.8 kg)   SpO2 99%   BMI 26.47 kg/m²    Body mass index is 26.47 kg/m².     Problem List: Reviewed with patient and discussed risk factors. Patient Active Problem List   Diagnosis Code    Essential hypertension, benign I10    Fine tremor G25.2    Dysphagia R13.10    Actinic keratosis L57.0    Family history of thyroid disease Z80.51    Family history of diabetes mellitus Z83.3    Family history of heart attack Z82.49    Dyslipidemia E78.5    Degenerative lumbar spinal stenosis M48.061    Personal history of colonic polyps Z86.010    Lactose intolerance E73.9    Restless legs syndrome (RLS) G25.81    Chronic pain of both knees M25.561, M25.562, G89.29    Osteopenia M85.80    Statin intolerance Z78.9    Insomnia secondary to chronic pain G89.29, G47.01    Advance care planning Z71.89    Family history of thyroid disease in grandmother Z80.51    Lumbar stenosis with neurogenic claudication M48.062    ACE-inhibitor cough R05.8, T46.4X5A    Carpal tunnel syndrome of left wrist G56.02    Primary osteoarthritis of right knee M17.11    Pseudophakia of both eyes Z96.1    Bilateral pes planus M21.41, M21.42    Hyponatremia E87.1    COVID U07.1       PSH: Reviewed with patient  Past Surgical History:   Procedure Laterality Date    COLONOSCOPY N/A 9/2/2020    COLONOSCOPY   :- [facility] at [facility]COPY    HX CARPAL TUNNEL RELEASE Left 04/03/2018    Dr. Lamine Alexander     HX CATARACT REMOVAL Bilateral , R 04/10/17    Dr. Yahaira Jaramillo  11/2005, 01/29/09, 09/16/15    with polypectomy. Dr. Alayna Arce q5 years    HX ENDOSCOPY  09/2014    with Esophageal dilation. due to dysphagia. Dr. Aron Spencer.  HX KNEE REPLACEMENT Right 08/27/2018    Dr. Carmen Zarate  10/18/2016    HX MALIGNANT SKIN LESION EXCISION  01/28/15    BCC. L Forehead. MOHs SURGERY. Dr. Ibrahima Bermudez.     HX RAIMUNDO AND BSO  1976    due to fibroids    HX TONSILLECTOMY  1948    [place] COMBINED ANT/POST COLPORRHAPHY 05    with enterocele RE. Dr. Avery Donaldson and Dr. Slim Reece        SH: Reviewed with patient  Social History     Tobacco Use    Smoking status: Never Smoker    Smokeless tobacco: Never Used   Vaping Use    Vaping Use: Never used   Substance Use Topics    Alcohol use: No    Drug use: No       FH: Reviewed with patient  Family History   Problem Relation Age of Onset    Hypertension Mother     Dementia Mother         alzheimers    Hypertension Father     High Cholesterol Father     Kidney Disease Father         1 kidney    Emphysema Father         O2 requiring    Hypertension Sister     Diabetes Maternal Grandmother     Dementia Maternal Grandmother     Thyroid Disease Maternal Grandmother     Thyroid Disease Daughter         goiter    Heart Attack Maternal Uncle 51        massive    Heart Attack Maternal Uncle 39        massive    Heart Attack Maternal Aunt 51        massive    Thyroid Disease Daughter         thyroid cyst    Other Brother          AT 1DAYS OLD    Anesth Problems Neg Hx        Current medical providers:    Patient Care Team:  Dalton Moyer MD as PCP - General (Internal Medicine)  Dalton Moyer MD as PCP - [facility] Empaneled Provider  Tip Lucas MD as Physician (Radiology)  Essie Wheeler MD (Pain Management)  Savannah Escoto MD (Dermatology)  Kamar Barnes MD (Neurology)  Navin Leyva MD (Colon and Rectal Surgery)  Eliseo Jacques MD (Otolaryngology)  Sanjuanita Gomez MD (Orthopedic Surgery)  Velasquez Akins MD (Ophthalmology)  Josetta Dubin, MD (Gastroenterology)  Lexy Ratliff MD (Orthopedic Surgery)  Magalys Guaman DPM (Podiatry)  Araceli Dillard MD (Ophthalmology)  Danya Hernandez RN as Care Transitions Nurse    Plan:    Diagnoses and all orders for this visit:    Medicare annual wellness visit, subsequent  . Age appropriate Health screening and immunization discussed with patient.      Need for diphtheria-tetanus-pertussis (Tdap) vaccine  -     diphth,pertus,acell,,tetanus (BOOSTRIX TDAP) 2.5-8-5 Lf-mcg-Lf/0.5mL susp suspension; 0.5 mL by IntraMUSCular route once for 1 dose., Normal, Disp-0.5 mL, R-0            Health Maintenance   Topic Date Due    COVID-19 Vaccine (1) Never done    Eye Exam Retinal or Dilated  2020    Medicare Yearly Exam  2021    A1C test (Diabetic or Prediabetic)  2021    Flu Vaccine (1) 2021    Lipid Screen  2022    DTaP/Tdap/Td series (2 - Td or Tdap) 2026    Bone Densitometry (Dexa) Screening  Completed    Shingrix Vaccine Age 50>  Completed    Pneumococcal 65+ years  Completed          Urinary/ Fecal Incontinence: Nocturia, inconvenience during day    Regular physical exercise: Not lately since she had COVID    Patient verbalized understanding of information presented. AVS and Medicare Part B Preventive Services Table printed and given to pt and reviewed. See table for findings under Recommendation and Scheduled. All of the patient's questions were answered. Austin Krueger (: 1940) is a 80 y.o. female, established patient, here for evaluation of the following chief complaint(s): Annual Wellness Visit and UTI     Written by Macario Alcaraz, as dictated by Dr. Ramona Dietz MD.      ASSESSMENT/PLAN:  Below is the assessment and plan developed based on review of pertinent history, physical exam, labs, studies, and medications. 1. Urinary tract infection without hematuria, site unspecified  UA in office today showed 1+ leukocytes. Will send urine for culture. -     AMB POC URINALYSIS DIP STICK AUTO W/ MICRO  -     URINALYSIS W/ REFLEX CULTURE; Future    2. COVID-19  She was recently hospitalized at [facility] for COVID-19. She has been recovering, but continues to have some chest congestion. We will increase her albuterol 2.5mg/0.5mL nebulizer to 3 mL. She can`t tolerate oral inhaler.   -     albuterol-ipratropium (DUO-NEB) 2.5 mg-0.5 mg/3 ml nebu; 3 mL by Nebulization route every six (6) hours as needed for Wheezing for up to 30 days. , Normal, Disp-30 Nebule, R-0 sent to pharmacy. 3. Hyponatremia  Recheck CMP. 4. Asthma due to environmental allergies  Does not want oral inhaler as can`t tolerate. .  -     albuterol-ipratropium (DUO-NEB) 2.5 mg-0.5 mg/3 ml nebu; 3 mL by Nebulization route every six (6) hours as needed for Wheezing for up to 30 days. , Normal, Disp-30 Nebule, R-0 sent to pharmacy. 5. IGT (impaired glucose tolerance)  Recheck hgb A1c.  -     HEMOGLOBIN A1C WITH EAG; Future    6. Gastroesophageal reflux disease without esophagitis  Well controlled on omeprazole 20 mg daily. I advised her to take this in the AM before breakfast.    7. Primary hypertension  BP is well controlled on benazepril 20 mg daily. Continue current medication. Labs ordered for medication management.  -     METABOLIC PANEL, COMPREHENSIVE; Future    8. Restless legs syndrome (RLS)  She continues to have RLS despite treatment with Requip. We will switch her from Requip to Mirapex 1 mg TID. I recommended she start with 1 tab QHS and increase if needed. Potential side effects were discussed. I also recommended a teaspoon of mustard. -     pramipexole (MIRAPEX) 1 mg tablet; Take 1 Tablet by mouth three (3) times daily for 30 days. , Normal, Disp-90 Tablet, R-0 sent to pharmacy. 9. Other urinary incontinence  I provided her with a referral to Dr. Ashwini Castellon (urology) for further evaluation and treatment.   -     REFERRAL TO UROLOGY      SUBJECTIVE/OBJECTIVE:  HPI   Ms. Ольга Pena is a 80y.o. year old female, she is seen today for Transition of Care services following a hospital discharge for COVID-19 on 12/24/21. Our office Nurse Navigator performed an outreach to Ms. Naqvi on 12/27/21 (within 2 business days of discharge) to complete medication reconciliation and a telephonic assessment of her condition.  At the hospital she was given neb treatments. She also had hypovolemic hyponatremia and was started on volume resuscitation. She is now taking albuterol 2.5mg/0.5mL nebulizer solution and Flonase nasal spray. She continues to have some chest congestion and is requesting a stronger medication. She states she is unable to use inhalers as these don't help. . She believes she got COVID from her daughter's friend who had been living with them. She has received both doses of the [place] Torre Peter series. She is on omeprazole 20 mg daily for reflux which works well. Her BP today is good at 136/79. She is on benazepril 20 mg daily for HTN. She is on Requip 1 mg 1.5 tabs at bedtime. She states that she continues to have leg spasms at night. She also takes a magnesium tab. She is also concerned about a possible UTI.      Patient Active Problem List   Diagnosis Code    Essential hypertension, benign I10    Fine tremor G25.2    Dysphagia R13.10    Actinic keratosis L57.0    Family history of thyroid disease Z80.51    Family history of diabetes mellitus Z83.3    Family history of heart attack Z82.49    Dyslipidemia E78.5    Degenerative lumbar spinal stenosis M48.061    Personal history of colonic polyps Z86.010    Restless legs syndrome (RLS) G25.81    Chronic pain of both knees M25.561, M25.562, G89.29    Osteopenia M85.80    Statin intolerance Z78.9    Insomnia secondary to chronic pain G89.29, G47.01    Family history of thyroid disease in grandmother Z80.51    Lumbar stenosis with neurogenic claudication M48.062    ACE-inhibitor cough R05.8, T46.4X5A    Carpal tunnel syndrome of left wrist G56.02    Primary osteoarthritis of right knee M17.11    Pseudophakia of both eyes Z96.1    Bilateral pes planus M21.41, M21.42    Hyponatremia E87.1    COVID U07.1        Current Outpatient Medications on File Prior to Visit   Medication Sig Dispense Refill    fluticasone propionate (Flonase Allergy Relief) 50 mcg/actuation nasal spray 2 Sprays by Both Nostrils route daily.  omeprazole (PRILOSEC) 20 mg capsule TAKE 1 CAPSULE BY MOUTH DAILY 30 MINUTES BEFORE BREAKFAST 30 Capsule 0    ascorbic acid, vitamin C, (VITAMIN C) 1,000 mg tablet Take 1 Tablet by mouth two (2) times a day. 20 Tablet 0    zinc sulfate (ZINCATE) 50 mg zinc (220 mg) capsule Take 1 Capsule by mouth every twelve (12) hours. 12 Capsule 0    benazepriL (LOTENSIN) 20 mg tablet TAKE 1 TABLET BY MOUTH EVERY DAY FOR HIGH BLOOD PRESSURE 90 Tablet 0    melatonin 5 mg tablet Take 5 mg by mouth nightly.  HYLAN G-F 20 IX by Intra artICUlar route.  ascorbic acid, vitamin C, (VITAMIN C) 500 mg tablet Take  by mouth.  turmeric root extract 500 mg cap Take  by mouth.  albuterol sulfate (PROVENTIL;VENTOLIN) 2.5 mg/0.5 mL nebu nebulizer solution by Nebulization route once.  [DISCONTINUED] rOPINIRole (REQUIP) 1 mg tablet 1-1/2 tablets at bedtime, may take an additional half tab as needed at night for severe restless leg 180 Tablet 3     No current facility-administered medications on file prior to visit.        Allergies   Allergen Reactions    Other Food Itching     If eats large quantities over several days causes itching: tomatoes, peaches, strawberry, fig    Bee Sting [Sting, Bee] Swelling    Pcn [Penicillins] Hives     IN CHILDHOOD    Percocet [Oxycodone-Acetaminophen] Other (comments)      Other (comments)\"hellish nightmares\"      Shellfish Containing Products Itching    Zoster Vaccine Live Swelling     Severe Right arm swelling with redness after administered by pharmacist in elbow    Atarax [Hydroxyzine Hcl] Other (comments)     jittery    Buspar [Buspirone] Nausea Only    Crestor [Rosuvastatin] Myalgia    Hydrochlorothiazide Myalgia     Other reaction(s): sorethroat    Hydroxyzine Other (comments)     jittery    Mobic [Meloxicam] Other (comments)     Easy bruising    Norco [Hydrocodone-Acetaminophen] Nausea and Vomiting                Past Medical History:   Diagnosis Date    AC (acromioclavicular) joint bone spurs 01/2014    in back. Dr. Sonia Sheppard. Txd with shots x 3    Actinic keratosis 2015    Dr. Ashly Rincon. Dr. Delmy Appiah.  Allergic rhinitis, cause unspecified     Anxiety state, unspecified     Arthritis     R KNEE BONE-ON-BONE; R HAND-----OSTEO    Asthma     BRONCHITIS IN SPR[place] MOST YEARS    BCC (basal cell carcinoma), face 1980s (nose), 01/28/15 (forehead)    Dr. Edy Virk. Dr. Aretha Yañez.  Bilateral pes planus 03/10/2020    Dr. Dany Garcia 2007    Dr. Елена Nava    Chronic insomnia     Chronic low back pain 01/2014    Dr. Ryne Broussard. DJD and spurs, Narrowing of L 3,4,5. Dr. Sonia Sheppard.  Chronic obstructive pulmonary disease (HCC)     CHRONIC (TWICE-YEARLY) BRONCHITIS    Chronic pain     Colon polyps 11/2005, 01/29/09, 9/16/2015    benign. Dr. Echo Mesa    Constipation     Degenerative lumbar spinal stenosis 01/2014    Dr. Zeina Engel Narrowing of L 3,4,5    Diabetes mellitus type 2, diet-controlled ([facility] [address] 10/13/2017    LOST WEIGHT AND IS NOT DIABETIC    Dyslipidemia     Dysphagia 09/2014    due to Esophagitis. Dr. Gisela Garcia.  Esophagitis 09/29/2014    ESOPHAGITIS    Essential hypertension, benign     Foot fracture, left 2009    stress.  Foot pain, bilateral 11/13/2013    Dr. Clarisa Price.  Hearing loss     Dr. Joe Levy.  Knee pain, bilateral 10/28/13    Dr. Janice Funk. Right > Left. Severe OA. Txd with PT.    Lumbar stenosis with neurogenic claudication     Menopause 1976    Migraine     NONE SINCE AGE 37    Mixed stress and urge urinary incontinence     NO LEAKAGE; GETS UP SEVERAL TIMES A NIGHT, PT STATES ON 10/3/16    OA (osteoarthritis) of knee     Dr. Janice Funk    Osteopenia 08/24/2015    Restless legs syndrome (RLS) 09/2015    Dr. Griselda Galeas.  Shoulder joint dislocation 06/2011    Right. due to fall.   Dr. Holland Ahumada apnea     undiagnosed    Syncope 11    due to fall down 5 steps. Right occipital head trauma.  Tremor of both hands 2015    Dr. Bebeto WILKERSON       Past Surgical History:   Procedure Laterality Date    COLONOSCOPY N/A 2020    COLONOSCOPY   :- [facility] ENDOSCOPY    HX [address] Left 2018    Dr. Silvina Elizalde     HX CATARACT REMOVAL Bilateral , R 04/10/17    Dr. Hema Sabillon  2005, 09, 09/16/15    with polypectomy. Dr. Fausto Meier q5 years    HX ENDOSCOPY  2014    with Esophageal dilation. due to dysphagia. Dr. Sukhjinder Alvarado.  HX KNEE REPLACEMENT Right 2018    Dr. Gennaro Bokyin  10/18/2016    HX MALIGNANT SKIN LESION EXCISION  01/28/15    BCC. L Forehead. MOHs SURGERY. Dr. Ousmane Hickey.  HX RAIMUNDO AND BSO      due to fibroids    HX TONSILLECTOMY      [place] COMBINED ANT/POST COLPORRHAPHY  05    with enterocele RE.   Dr. Parris Maher and Dr. Lucia Hunt       Family History   Problem Relation Age of Onset    Hypertension Mother     Dementia Mother         alzheimers    Hypertension Father     High Cholesterol Father     Kidney Disease Father         1 kidney    Emphysema Father         O2 requiring    Hypertension Sister     Diabetes Maternal Grandmother     Dementia Maternal Grandmother     Thyroid Disease Maternal Grandmother     Thyroid Disease Daughter         goiter    Heart Attack Maternal Uncle 51        massive    Heart Attack Maternal Uncle 39        massive    Heart Attack Maternal Aunt 51        massive    Thyroid Disease Daughter         thyroid cyst    Other Brother          AT 1DAYS OLD    Anesth Problems Neg Hx        Social History     Socioeconomic History    Marital status:      Spouse name: Not on file    Number of children: Not on file    Years of education: Not on file    Highest education level: Not on file   Occupational History    Not on file   Tobacco Use    Smoking status: Never Smoker    Smokeless tobacco: Never Used   Vaping Use    Vaping Use: Never used   Substance and Sexual Activity    Alcohol use: No    Drug use: No    Sexual activity: Not Currently     Partners: Male     Birth control/protection: Surgical   Other Topics Concern    Not on file   Social History Narrative    Not on file     Social Determinants of Health     Financial Resource Strain:     Difficulty of Paying Living Expenses: Not on file   Food Insecurity:     Worried About Running Out of Food in the Last Year: Not on file    Timothy of Food in the Last Year: Not on file   Transportation Needs:     Lack of Transportation (Medical): Not on file    Lack of Transportation (Non-Medical):  Not on file   Physical Activity:     Days of Exercise per Week: Not on file    Minutes of Exercise per Session: Not on file   Stress:     Feeling of Stress : Not on file   Social Connections:     Frequency of Communication with Friends and Family: Not on file    Frequency of Social Gatherings with Friends and Family: Not on file    Attends [employer] Services: Not on file    Active Member of [address] [place] [address] or Organizations: Not on file    Attends Club or Organization Meetings: Not on file    Marital Status: Not on file   Intimate Partner Violence:     Fear of Current or Ex-Partner: Not on file    Emotionally Abused: Not on file    Physically Abused: Not on file    Sexually Abused: Not on file   Housing Stability:     Unable to Pay for Housing in the Last Year: Not on file    Number of [place] in the Last Year: Not on file    Unstable Housing in the Last Year: Not on file       Office Visit on 01/07/2022   Component Date Value Ref Range Status    Color (UA POC) 01/07/2022 Yellow   Final    Clarity (UA POC) 01/07/2022 Clear   Final    Glucose (UA POC) 01/07/2022 Negative  Negative Final    Bilirubin (UA POC) 01/07/2022 Negative  Negative Final    Ketones (UA POC) 01/07/2022 Negative  Negative Final    Specific gravity (UA POC) 01/07/2022 1.030  1.001 - 1.035 Final    Blood (UA POC) 01/07/2022 Negative  Negative Final    pH (UA POC) 01/07/2022 6.5  4.6 - 8.0 Final    Protein (UA POC) 01/07/2022 Negative  Negative Final    Urobilinogen (UA POC) 01/07/2022 0.2 mg/dL  0.2 - 1 Final    Nitrites (UA POC) 01/07/2022 Negative  Negative Final    Leukocyte esterase (UA POC) 01/07/2022 1+  Negative Final   Admission on 12/23/2021, Discharged on 12/24/2021   Component Date Value Ref Range Status    Ventricular Rate 12/23/2021 109  BPM Final    Atrial Rate 12/23/2021 109  BPM Final    P-R Interval 12/23/2021 140  ms Final    QRS Duration 12/23/2021 112  ms Final    Q-T Interval 12/23/2021 328  ms Final    QTC Calculation (Bezet) 12/23/2021 441  ms Final    Calculated P Axis 12/23/2021 43  degrees Final    Calculated R Axis 12/23/2021 -47  degrees Final    Calculated T Axis 12/23/2021 14  degrees Final    Diagnosis 12/23/2021    Final                    Value:Sinus tachycardia  Right bundle branch block  Left anterior fascicular block  ** Bifascicular block **  Abnormal ECG  When compared with ECG of 13-AUG-2018 11:59,  Vent.  rate has increased BY  49 BPM  (RBBB and left anterior fascicular block) has replaced Incomplete right   bundle branch block  Confirmed by Merle Pickard MD. ([id number]) on 12/24/2021 11:09:21 PM      WBC 12/23/2021 6.6  3.6 - 11.0 K/uL Final    RBC 12/23/2021 4.87  3.80 - 5.20 M/uL Final    HGB 12/23/2021 14.7  11.5 - 16.0 g/dL Final    HCT 12/23/2021 44.5  35.0 - 47.0 % Final    MCV 12/23/2021 91.4  80.0 - 99.0 FL Final    MCH 12/23/2021 30.2  26.0 - 34.0 PG Final    MCHC 12/23/2021 33.0  30.0 - 36.5 g/dL Final    RDW 12/23/2021 12.9  11.5 - 14.5 % Final    PLATELET 55/88/9817 709  150 - 400 K/uL Final    MPV 12/23/2021 9.6  8.9 - 12.9 FL Final    NRBC 12/23/2021 0.0  0  WBC Final    ABSOLUTE NRBC 12/23/2021 0.00  0.00 - 0.01 K/uL Final    NEUTROPHILS 12/23/2021 74  32 - 75 % Final    LYMPHOCYTES 12/23/2021 14  12 - 49 % Final    MONOCYTES 12/23/2021 10  5 - 13 % Final    EOSINOPHILS 12/23/2021 0  0 - 7 % Final    BASOPHILS 12/23/2021 1  0 - 1 % Final    IMMATURE GRANULOCYTES 12/23/2021 1* 0.0 - 0.5 % Final    ABS. NEUTROPHILS 12/23/2021 4.9  1.8 - 8.0 K/UL Final    ABS. LYMPHOCYTES 12/23/2021 1.0  0.8 - 3.5 K/UL Final    ABS. MONOCYTES 12/23/2021 0.7  0.0 - 1.0 K/UL Final    ABS. EOSINOPHILS 12/23/2021 0.0  0.0 - 0.4 K/UL Final    ABS. BASOPHILS 12/23/2021 0.1  0.0 - 0.1 K/UL Final    ABS. IMM. GRANS. 12/23/2021 0.0  0.00 - 0.04 K/UL Final    DF 12/23/2021 AUTOMATED    Final    Sodium 12/23/2021 129* 136 - 145 mmol/L Final    Potassium 12/23/2021 4.0  3.5 - 5.1 mmol/L Final    Chloride 12/23/2021 97  97 - 108 mmol/L Final    CO2 12/23/2021 25  21 - 32 mmol/L Final    Anion gap 12/23/2021 7  5 - 15 mmol/L Final    Glucose 12/23/2021 114* 65 - 100 mg/dL Final    BUN 12/23/2021 18  6 - 20 MG/DL Final    Creatinine 12/23/2021 1.01  0.55 - 1.02 MG/DL Final    BUN/Creatinine ratio 12/23/2021 18  12 - 20   Final    GFR est AA 12/23/2021 >60  >60 ml/min/1.73m2 Final    GFR est non-AA 12/23/2021 53* >60 ml/min/1.73m2 Final    Estimated GFR is calculated using the IDMS-traceable Modification of Diet in Renal Disease (MDRD) Study equation, reported for both  Americans (GFRAA) and non- Americans (GFRNA), and normalized to 1.73m2 body surface area. The physician must decide which value applies to the patient.  Calcium 12/23/2021 9.4  8.5 - 10.1 MG/DL Final    Bilirubin, total 12/23/2021 0.4  0.2 - 1.0 MG/DL Final    ALT (SGPT) 12/23/2021 32  12 - 78 U/L Final    AST (SGOT) 12/23/2021 30  15 - 37 U/L Final    Alk.  phosphatase 12/23/2021 91  45 - 117 U/L Final    Protein, total 12/23/2021 8.1  6.4 - 8.2 g/dL Final    Albumin 12/23/2021 3.8  3.5 - 5.0 g/dL Final    Globulin 12/23/2021 4.3* 2.0 - 4.0 g/dL Final    A-G Ratio 12/23/2021 0.9* 1.1 - 2.2   Final    SAMPLES BEING HELD 12/23/2021 1RED   Final    COMMENT 12/23/2021 Add-on orders for these samples will be processed based on acceptable specimen integrity and analyte stability, which may vary by analyte. Final    Specimen source 12/23/2021 Nasopharyngeal    Final    COVID-19 rapid test 12/23/2021 Detected* NOTD   Final    Comment: CALLED TO AND READ BACK BY   NIDHI MOSES @[facility]  Rapid Abbott ID Now       The specimen is POSITIVE for SARS-CoV-2, the novel coronavirus associated with COVID-19. This test has been authorized by the FDA under an Emergency Use Authorization (EUA) for use by authorized laboratories. Fact sheet for Healthcare Providers: HEMINGWAYdate.co.nz  Fact sheet for Patients: HEMINGWAYdate.co.nz       Methodology: Isothermal Nucleic Acid Amplification      Lactic acid 12/23/2021 0.9  0.4 - 2.0 MMOL/L Final    Procalcitonin 12/23/2021 <0.05  ng/mL Final    Comment:      Suspected Sepsis:  <0.50 ng/mL     Low likelihood of sepsis. 0.50-2.00 ng/mL    Increased likelihood of sepsis. Antibiotics encouraged. >2.00 ng/mL  High risk of sepsis/shock. Antibiotics strongly encouraged. Suspected Lower Resp Tract Infections:  <0.24 ng/mL    Low likelihood of bacterial infection. >0.24 ng/mL    Increased likelihood of bacterial infection. Antibiotics encouraged. With successful antibiotic therapy, PCT levels should decrease rapidly. (Half-life of 24 to 36 hours)       Procalcitonin values from samples collected within the first 6 hours of systemic infection may still be low. Retesting may be indicated. Values from day 1 and day 4 can be entered into the Change in Procalcitonin Calculator (www.[employer]-pct-calculator. com) to determine the patient's Mortality Risk Prognosis. In healthy neonates, plasma Procalcitonin (PCT) concentrations increase gradually after birth, reaching peak values at about 24 hours of age then decrease to normal valu                           es below 0.5 ng/mL by 48-72 hours of age.  C-Reactive protein 12/23/2021 2.55* 0.00 - 0.60 mg/dL Final    Comment: CRP is a nonspecific acute phase reactant that shows rapid, marked increases with inflammation, infection, trauma, tissue necrosis, malignancies and autoimmune diseases. Sequential CRP levels are useful in monitoring response to antibacterial therapy. This assay is not equivalent to the hsCRP test since the presence of one or more of the foregoing disease processes obviates the risk stratification information available from hsCRP testing.  Troponin-High Sensitivity 12/23/2021 27  0 - 51 ng/L Final    Up to 50% of normal patients may have low levels of detectable troponin (within reference range) circulating in the blood. Mild elevations in troponin that are above the reference range, may be present with other cardiac and non-cardiac disease states. Two or three serial tests at two hour intervals, are recommended to evaluate changes in circulating troponin over time.     Ferritin 12/23/2021 219  26 - 388 NG/ML Final    Sed rate, automated 12/23/2021 5  0 - 30 mm/hr Final    Sodium 12/24/2021 130* 136 - 145 mmol/L Final    Potassium 12/24/2021 3.7  3.5 - 5.1 mmol/L Final    Chloride 12/24/2021 99  97 - 108 mmol/L Final    CO2 12/24/2021 21  21 - 32 mmol/L Final    Anion gap 12/24/2021 10  5 - 15 mmol/L Final    Glucose 12/24/2021 117* 65 - 100 mg/dL Final    BUN 12/24/2021 18  6 - 20 MG/DL Final    Creatinine 12/24/2021 0.76  0.55 - 1.02 MG/DL Final    BUN/Creatinine ratio 12/24/2021 24* 12 - 20   Final    GFR est AA 12/24/2021 >60  >60 ml/min/1.73m2 Final    GFR est non-AA 12/24/2021 >60  >60 ml/min/1.73m2 Final    Calcium 12/24/2021 8.1* 8.5 - 10.1 MG/DL Final      Review of Systems Constitutional: Negative for activity change, fatigue and unexpected weight change. HENT: Negative for congestion, hearing loss, rhinorrhea and sore throat. Eyes: Negative for discharge. Respiratory: Positive for chest tightness (congestion). Negative for cough and shortness of breath. Cardiovascular: Negative for leg swelling. Gastrointestinal: Negative for abdominal pain, constipation and diarrhea. Genitourinary: Positive for dysuria, frequency and urgency. Negative for flank pain. Musculoskeletal: Negative for arthralgias, back pain and myalgias. Skin: Negative for color change and rash. Neurological: Negative for dizziness, light-headedness and headaches. Psychiatric/Behavioral: Negative for dysphoric mood and sleep disturbance. The patient is not nervous/anxious. Visit Vitals  /79 (BP 1 Location: Left arm)   Pulse 75   Temp 97.8 °F (36.6 °C)   Resp 15   Ht 5' 3\" (1.6 m)   Wt 149 lb 6.4 oz (67.8 kg)   SpO2 99%   BMI 26.47 kg/m²      Physical Exam  Vitals and nursing note reviewed. Constitutional:       General: She is not in acute distress. Appearance: Normal appearance. She is well-developed. She is not diaphoretic. HENT:      Right Ear: External ear normal.      Left Ear: External ear normal.   Eyes:      General: No scleral icterus. Right eye: No discharge. Left eye: No discharge. Extraocular Movements: Extraocular movements intact. Conjunctiva/sclera: Conjunctivae normal.   Cardiovascular:      Rate and Rhythm: Normal rate and regular rhythm. Pulmonary:      Effort: Pulmonary effort is normal.      Breath sounds: Normal breath sounds. No wheezing. Abdominal:      General: Bowel sounds are normal.      Palpations: Abdomen is soft. Tenderness: There is no abdominal tenderness. Musculoskeletal:      Cervical back: Normal range of motion and neck supple. Lymphadenopathy:      Cervical: No cervical adenopathy.    Neurological: Mental Status: She is alert and oriented to person, place, and time. Psychiatric:         Mood and Affect: Mood and affect normal.     Total Time ( face to face)  spent discussing with patient her  Hospital course , post discharge recovery ,  reviewing the [facility] and hospital documentation  , ordering new labs and  prescribing new medications,  discussing potential  Side effects 45 minutes. An electronic signature was used to authenticate this note.   -- Dahlia Coyle

## 2022-01-08 DIAGNOSIS — G25.81 RESTLESS LEGS SYNDROME (RLS): ICD-10-CM

## 2022-01-08 RX ORDER — PRAMIPEXOLE DIHYDROCHLORIDE 1 MG/1
TABLET ORAL
Qty: 270 TABLET | Refills: 0 | Status: SHIPPED | OUTPATIENT
Start: 2022-01-08 | End: 2022-06-07 | Stop reason: SDUPTHER

## 2022-01-10 DIAGNOSIS — R35.0 INCREASED URINARY FREQUENCY: Primary | ICD-10-CM

## 2022-01-11 DIAGNOSIS — N30.90 CYSTITIS: Primary | ICD-10-CM

## 2022-01-11 RX ORDER — CIPROFLOXACIN 500 MG/1
500 TABLET ORAL 2 TIMES DAILY
Qty: 20 TABLET | Refills: 0 | Status: SHIPPED | OUTPATIENT
Start: 2022-01-11 | End: 2022-01-21

## 2022-01-14 ENCOUNTER — TELEPHONE (OUTPATIENT)
Dept: PRIMARY CARE CLINIC | Age: 82
End: 2022-01-14

## 2022-01-14 ENCOUNTER — HOSPITAL ENCOUNTER (OUTPATIENT)
Dept: ULTRASOUND IMAGING | Age: 82
Discharge: HOME | End: 2022-01-14
Attending: INTERNAL MEDICINE
Payer: MEDICARE

## 2022-01-14 DIAGNOSIS — R10.30 LOWER ABDOMINAL PAIN: ICD-10-CM

## 2022-01-14 DIAGNOSIS — R31.29 OTHER MICROSCOPIC HEMATURIA: ICD-10-CM

## 2022-01-14 DIAGNOSIS — R31.29 OTHER MICROSCOPIC HEMATURIA: Primary | ICD-10-CM

## 2022-01-14 PROCEDURE — 76770 US EXAM ABDO BACK WALL COMP: CPT

## 2022-01-14 NOTE — TELEPHONE ENCOUNTER
Spoke with patient, told her she is on right antibiotic, to finish it.  She did imaging today- patient wanting to know if she can take AZO to help with burning

## 2022-01-14 NOTE — TELEPHONE ENCOUNTER
Patient called in, stated that she has been taking Cipro for 3 days and has not had any relief. She is drinking plenty of water however she is unable to urinate or has to strain to go.  And is also having lower abdominal pain like spasms

## 2022-01-14 NOTE — TELEPHONE ENCOUNTER
Patient called stating she had the sonogram done today. Wants to know if she can get medication for the burning and pain when she urinates and refill of the Cipro. Please call patient if this can be done or not.

## 2022-01-17 NOTE — PROGRESS NOTES
Result discussed with patient. Her urinary symptoms are improving. Feeling much better now. If symptoms return will do CT scan.

## 2022-02-01 DIAGNOSIS — K21.9 GASTROESOPHAGEAL REFLUX DISEASE WITHOUT ESOPHAGITIS: ICD-10-CM

## 2022-02-01 RX ORDER — OMEPRAZOLE 20 MG/1
CAPSULE, DELAYED RELEASE ORAL
Qty: 30 CAPSULE | Refills: 0 | OUTPATIENT
Start: 2022-02-01

## 2022-02-01 RX ORDER — OMEPRAZOLE 20 MG/1
CAPSULE, DELAYED RELEASE ORAL
Qty: 30 CAPSULE | Refills: 0 | Status: SHIPPED | OUTPATIENT
Start: 2022-02-01 | End: 2022-03-05

## 2022-02-01 NOTE — TELEPHONE ENCOUNTER
Requested Prescriptions     Pending Prescriptions Disp Refills    omeprazole (PRILOSEC) 20 mg capsule 30 Capsule 0     Sig: TAKE 1 CAPSULE BY MOUTH DAILY 30 MINUTES BEFORE BREAKFAST        Last Visit 1/7/22  Last Refill 12/26/21

## 2022-02-03 ENCOUNTER — TELEPHONE (OUTPATIENT)
Dept: PRIMARY CARE CLINIC | Age: 82
End: 2022-02-03

## 2022-02-03 DIAGNOSIS — R30.0 DYSURIA: Primary | ICD-10-CM

## 2022-02-03 NOTE — TELEPHONE ENCOUNTER
Returned call to patient. She stated that she was not requesting a refill of abt. She wanted provider to know that she is still having some burning once she completed the abt. Said the burning is not as intense as before but it is still there. And she wanted to know if she should provide another sample. Let her know that I will put in the order and she can go to the lab to have done.

## 2022-02-03 NOTE — TELEPHONE ENCOUNTER
Per pt, still having some burning after taking below medication for UTI. ciprofloxacin HCl (CIPRO) 500 mg tablet 20 Tablet 0 1/11/2022 1/21/2022    Sig - Route:  Take 1 Tablet by mouth two (2) times a day for 10 days. - Oral    Sent to pharmacy as: ciprofloxacin 500 mg tablet (CIPRO)    E-Prescribing Status: Receipt confirmed by pharmacy (1/11/2022  7:44 AM EST)

## 2022-02-04 ENCOUNTER — TRANSCRIBE ORDER (OUTPATIENT)
Dept: SCHEDULING | Age: 82
End: 2022-02-04

## 2022-02-04 DIAGNOSIS — M75.42 IMPINGEMENT SYNDROME OF LEFT SHOULDER: Primary | ICD-10-CM

## 2022-02-07 ENCOUNTER — TELEPHONE (OUTPATIENT)
Dept: PRIMARY CARE CLINIC | Age: 82
End: 2022-02-07

## 2022-02-07 DIAGNOSIS — N76.0 ACUTE VAGINITIS: Primary | ICD-10-CM

## 2022-02-07 RX ORDER — FLUCONAZOLE 150 MG/1
150 TABLET ORAL DAILY
Qty: 1 TABLET | Refills: 0 | Status: SHIPPED | OUTPATIENT
Start: 2022-02-07 | End: 2022-02-08

## 2022-02-07 NOTE — TELEPHONE ENCOUNTER
Satinder Lee with [facility]iology [facility] called to inquire if results received on below. I did verify results were received.         Result Information    Status: Final result (Exam End: 1/14/2022 14:00) Provider Status: Reviewed       US RETROPERITONEUM COMP: Result Notes     Eun Keita MD   1/17/2022  9:56 AM EST

## 2022-02-10 DIAGNOSIS — I10 ESSENTIAL HYPERTENSION, BENIGN: ICD-10-CM

## 2022-02-10 RX ORDER — BENAZEPRIL HYDROCHLORIDE 20 MG/1
TABLET ORAL
Qty: 90 TABLET | Refills: 0 | Status: SHIPPED | OUTPATIENT
Start: 2022-02-10 | End: 2022-05-11

## 2022-02-15 ENCOUNTER — HOSPITAL ENCOUNTER (OUTPATIENT)
Dept: MRI IMAGING | Age: 82
Discharge: HOME | End: 2022-02-15
Attending: ORTHOPAEDIC SURGERY
Payer: MEDICARE

## 2022-02-15 DIAGNOSIS — M75.42 IMPINGEMENT SYNDROME OF LEFT SHOULDER: ICD-10-CM

## 2022-02-15 PROCEDURE — 73221 MRI JOINT UPR EXTREM W/O DYE: CPT

## 2022-02-18 ENCOUNTER — OFFICE VISIT (OUTPATIENT)
Dept: PRIMARY CARE CLINIC | Age: 82
End: 2022-02-18
Payer: MEDICARE

## 2022-02-18 ENCOUNTER — TELEPHONE (OUTPATIENT)
Dept: PRIMARY CARE CLINIC | Age: 82
End: 2022-02-18

## 2022-02-18 VITALS
HEART RATE: 89 BPM | HEIGHT: 63 IN | DIASTOLIC BLOOD PRESSURE: 83 MMHG | WEIGHT: 149 LBS | BODY MASS INDEX: 26.4 KG/M2 | OXYGEN SATURATION: 100 % | RESPIRATION RATE: 18 BRPM | TEMPERATURE: 97.6 F | SYSTOLIC BLOOD PRESSURE: 127 MMHG

## 2022-02-18 DIAGNOSIS — N28.9 RENAL LESION: ICD-10-CM

## 2022-02-18 DIAGNOSIS — M75.102 ROTATOR CUFF TEAR ARTHROPATHY OF LEFT SHOULDER: Primary | ICD-10-CM

## 2022-02-18 DIAGNOSIS — R14.0 BLOATED ABDOMEN: ICD-10-CM

## 2022-02-18 DIAGNOSIS — R30.0 DYSURIA: ICD-10-CM

## 2022-02-18 DIAGNOSIS — E78.2 MIXED HYPERLIPIDEMIA: ICD-10-CM

## 2022-02-18 DIAGNOSIS — N39.0 RECURRENT UTI: ICD-10-CM

## 2022-02-18 DIAGNOSIS — M12.812 ROTATOR CUFF TEAR ARTHROPATHY OF LEFT SHOULDER: Primary | ICD-10-CM

## 2022-02-18 DIAGNOSIS — R30.1 PAINFUL BLADDER SPASM: ICD-10-CM

## 2022-02-18 PROCEDURE — G8427 DOCREV CUR MEDS BY ELIG CLIN: HCPCS | Performed by: INTERNAL MEDICINE

## 2022-02-18 PROCEDURE — G8510 SCR DEP NEG, NO PLAN REQD: HCPCS | Performed by: INTERNAL MEDICINE

## 2022-02-18 PROCEDURE — G8399 PT W/DXA RESULTS DOCUMENT: HCPCS | Performed by: INTERNAL MEDICINE

## 2022-02-18 PROCEDURE — G8419 CALC BMI OUT NRM PARAM NOF/U: HCPCS | Performed by: INTERNAL MEDICINE

## 2022-02-18 PROCEDURE — G8752 SYS BP LESS 140: HCPCS | Performed by: INTERNAL MEDICINE

## 2022-02-18 PROCEDURE — 99214 OFFICE O/P EST MOD 30 MIN: CPT | Performed by: INTERNAL MEDICINE

## 2022-02-18 PROCEDURE — G8754 DIAS BP LESS 90: HCPCS | Performed by: INTERNAL MEDICINE

## 2022-02-18 PROCEDURE — 1101F PT FALLS ASSESS-DOCD LE1/YR: CPT | Performed by: INTERNAL MEDICINE

## 2022-02-18 PROCEDURE — G8536 NO DOC ELDER MAL SCRN: HCPCS | Performed by: INTERNAL MEDICINE

## 2022-02-18 PROCEDURE — 1090F PRES/ABSN URINE INCON ASSESS: CPT | Performed by: INTERNAL MEDICINE

## 2022-02-18 NOTE — PROGRESS NOTES
Jordyn Naqvi (: 1940) is a 80 y.o. female, established patient, here for evaluation of the following chief complaint(s):  Bladder Infection (ongoing symptoms)     Written by Jane Patrick, as dictated by Dr. Nola Blackwell MD.      ASSESSMENT/PLAN:  Below is the assessment and plan developed based on review of pertinent history, physical exam, labs, studies, and medications. 1. Rotator cuff tear arthropathy of left shoulder  Followed by Dr. Jordyn Narvaez (orthopedics). Reviewed MRI of her left shoulder done on 02/15/22 which showed intrascapular rupture of long head biceps tendon and partial thickness tearing in the anterior supraspinatus. She would like to continue with conservative management. 2. Dysuria  Will check UA. Also ordered an abdominal CT since retroperitoneum US on 22 showed mild left renal pelvocaliectasis without cause identified.    -     URINALYSIS W/ REFLEX CULTURE  -     CT ABDOMEN W WO CONT AND PELVIS W CONT; Future    3. Painful bladder spasm  Ordered an abdominal CT since retroperitoneum US on 22 showed mild left renal pelvocaliectasis without cause identified.    -     CT ABDOMEN W WO CONT AND PELVIS W CONT; Future    4. Bloated abdomen  Will check UA and ordered abdominal CT. 5. Mixed hyperlipidemia  Recheck CMP, lipid panel, and CBC.   -     METABOLIC PANEL, COMPREHENSIVE; Future  -     LIPID PANEL; Future  -     CBC W/O DIFF; Future    6. Renal lesion  Ordered an abdominal CT since retroperitoneum US on 22 showed mild left renal pelvocaliectasis without cause identified.    -     CT ABDOMEN W WO CONT AND PELVIS W CONT; Future    7. Recurrent UTI  Ordered an abdominal CT since retroperitoneum US on 22 showed mild left renal pelvocaliectasis without cause identified.    -     CT ABDOMEN W WO CONT AND PELVIS W CONT; Future      SUBJECTIVE/OBJECTIVE:  HPI   The patient presents today c/o continuing UTI symptoms.  She was previously treated with a 10 day course of Cipro, but she later sent a message on 02/07/22 noting the dysuria returned. She was rx'd Diflucan, but this this not provide significant relief. She had a retroperitoneum US on 01/14/22 after her 1st UTI which showed mild left renal pelvocaliectasis without cause identified. She has been trying to drink a lot of water. She denies a hx of kidney stones. She denies any nausea. She is followed by Dr. Arleth Valero (orthopedics) for left shoulder pain. She had an MRI of her left shoulder done on 02/15/22 which showed intrascapular rupture of long head biceps tendon and partial thickness tearing in the anterior supraspinatus. She had a follow-up with Dr. Arleth Valero yesterday and she wanted to continue with conservative management.      Patient Active Problem List   Diagnosis Code    Essential hypertension, benign I10    Fine tremor G25.2    Dysphagia R13.10    Actinic keratosis L57.0    Family history of thyroid disease Z80.51    Family history of diabetes mellitus Z83.3    Family history of heart attack Z82.49    Dyslipidemia E78.5    Degenerative lumbar spinal stenosis M48.061    Personal history of colonic polyps Z86.010    Restless legs syndrome (RLS) G25.81    Chronic pain of both knees M25.561, M25.562, G89.29    Osteopenia M85.80    Statin intolerance Z78.9    Insomnia secondary to chronic pain G89.29, G47.01    Family history of thyroid disease in grandmother Z80.51    Lumbar stenosis with neurogenic claudication M48.062    ACE-inhibitor cough R05.8, T46.4X5A    Carpal tunnel syndrome of left wrist G56.02    Primary osteoarthritis of right knee M17.11    Pseudophakia of both eyes Z96.1    Bilateral pes planus M21.41, M21.42    Hyponatremia E87.1    COVID U07.1        Current Outpatient Medications on File Prior to Visit   Medication Sig Dispense Refill    benazepriL (LOTENSIN) 20 mg tablet TAKE 1 TABLET BY MOUTH EVERY DAY FOR HIGH BLOOD PRESSURE 90 Tablet 0    omeprazole (PRILOSEC) 20 mg capsule TAKE 1 CAPSULE BY MOUTH DAILY 30 MINUTES BEFORE BREAKFAST 30 Capsule 0    pramipexole (MIRAPEX) 1 mg tablet TAKE 1 TABLET BY MOUTH THREE TIMES DAILY 270 Tablet 0    fluticasone propionate (Flonase Allergy Relief) 50 mcg/actuation nasal spray 2 Sprays by Both Nostrils route daily.  albuterol sulfate (PROVENTIL;VENTOLIN) 2.5 mg/0.5 mL nebu nebulizer solution by Nebulization route once.  ascorbic acid, vitamin C, (VITAMIN C) 1,000 mg tablet Take 1 Tablet by mouth two (2) times a day. 20 Tablet 0    zinc sulfate (ZINCATE) 50 mg zinc (220 mg) capsule Take 1 Capsule by mouth every twelve (12) hours. 12 Capsule 0    melatonin 5 mg tablet Take 5 mg by mouth nightly.  HYLAN G-F 20 IX by Intra artICUlar route.  turmeric root extract 500 mg cap Take  by mouth. (Patient not taking: Reported on 2/18/2022)      [DISCONTINUED] ascorbic acid, vitamin C, (VITAMIN C) 500 mg tablet Take  by mouth. No current facility-administered medications on file prior to visit.        Allergies   Allergen Reactions    Other Food Itching     If eats large quantities over several days causes itching: tomatoes, peaches, strawberry, fig    Bee Sting [Sting, Bee] Swelling    Pcn [Penicillins] Hives     IN CHILDHOOD    Percocet [Oxycodone-Acetaminophen] Other (comments)      Other (comments)\"hellish nightmares\"      Shellfish Containing Products Itching    Zoster Vaccine Live Swelling     Severe Right arm swelling with redness after administered by pharmacist in elbow    Atarax [Hydroxyzine Hcl] Other (comments)     jittery    Buspar [Buspirone] Nausea Only    Crestor [Rosuvastatin] Myalgia    Hydrochlorothiazide Myalgia     Other reaction(s): sorethroat    Hydroxyzine Other (comments)     jittery    Mobic [Meloxicam] Other (comments)     Easy bruising    Norco [Hydrocodone-Acetaminophen] Nausea and Vomiting                Past Medical History:   Diagnosis Date    [facility] (acromioclavicular) joint bone spurs 01/2014    in back. Dr. Bernice Nunez. Txd with shots x 3    Actinic keratosis 2015    Dr. Sara Gonzales. Dr. Marivel Velez.  Allergic rhinitis, cause unspecified     Anxiety state, unspecified     Arthritis     R KNEE BONE-ON-BONE; R HAND-----OSTEO    Asthma     BRONCHITIS IN [place] MOST YEARS    BCC (basal cell carcinoma), face 1980s (nose), 01/28/15 (forehead)    Dr. Paris Miramontes. Dr. Shireen Villar.  Bilateral pes planus 03/10/2020    Dr. Earnestine Lara 2007    Dr. Bogdan Lee    Chronic insomnia     Chronic low back pain 01/2014    Dr. Nguyen Aranda. DJD and spurs, Narrowing of L 3,4,5. Dr. Bernice Nunez.  Chronic obstructive pulmonary disease (HCC)     CHRONIC (TWICE-YEARLY) BRONCHITIS    Chronic pain     Colon polyps 11/2005, 01/29/09, 9/16/2015    benign. Dr. Joo Alston    Constipation     Degenerative lumbar spinal stenosis 01/2014    Dr. Maulik Han Narrowing of L 3,4,5    Diabetes mellitus type 2, diet-controlled ([facility] [address] 10/13/2017    LOST WEIGHT AND IS NOT DIABETIC    Dyslipidemia     Dysphagia 09/2014    due to Esophagitis. Dr. Dennie Decamp.  Esophagitis 09/29/2014    ESOPHAGITIS    Essential hypertension, benign     Foot fracture, left 2009    stress.  Foot pain, bilateral 11/13/2013    Dr. Medina Weaver.  Hearing loss     Dr. Corina Collado.  Knee pain, bilateral 10/28/13    Dr. Kristofer Castillo. Right > Left. Severe OA. Txd with PT.    Lumbar stenosis with neurogenic claudication     Menopause 1976    Migraine     NONE SINCE AGE 37    Mixed stress and urge urinary incontinence     NO LEAKAGE; GETS UP SEVERAL TIMES A NIGHT, PT STATES ON 10/3/16    OA (osteoarthritis) of knee     Dr. Kristofer Castillo    Osteopenia 08/24/2015    Restless legs syndrome (RLS) 09/2015    Dr. Abhinav Peguero.  Shoulder joint dislocation 06/2011    Right. due to fall.   Dr. Fide Cortes Sleep apnea     undiagnosed    Syncope 06/25/11 due to fall down 5 steps. Right occipital head trauma.  Tremor of both hands 2015    Dr. mAador Krishnan GABAPENTIN       Past Surgical History:   Procedure Laterality Date    COLONOSCOPY N/A 2020    COLONOSCOPY   :- performed by Pooja Chester [facility] at [facility] ENDOSCOPY    HX [address] Left 2018    Dr. Jose Granados     HX CATARACT REMOVAL Bilateral , R 04/10/17    Dr. Lui Prude  2005, 09, 09/16/15    with polypectomy. Dr. Sarah Whitehead q5 years    HX ENDOSCOPY  2014    with Esophageal dilation. due to dysphagia. Dr. Piper Olivarez.  HX KNEE REPLACEMENT Right 2018    Dr. Jose Burk  10/18/2016    HX MALIGNANT SKIN LESION EXCISION  01/28/15    BCC. L Forehead. MOHs SURGERY. Dr. Stephen Nunes.  HX RAIMUNDO AND BSO      due to fibroids    HX TONSILLECTOMY      [facility] COMBINED ANT/POST COLPORRHAPHY  05    with enterocele RE.   Dr. Sam Bull and Dr. Vernette Opitz       Family History   Problem Relation Age of Onset    Hypertension Mother     Dementia Mother         alzheimers    Hypertension Father     High Cholesterol Father     Kidney Disease Father         1 kidney    Emphysema Father         O2 requiring    Hypertension Sister     Diabetes Maternal Grandmother     Dementia Maternal Grandmother     Thyroid Disease Maternal Grandmother     Thyroid Disease Daughter         goiter    Heart Attack Maternal Uncle 51        massive    Heart Attack Maternal Uncle [address]        massive    Heart Attack Maternal Aunt 51        massive    Thyroid Disease Daughter         thyroid cyst    Other Brother          AT 1DAYS OLD    Anesth Problems Neg Hx        Social History     Socioeconomic History    Marital status:      Spouse name: Not on file    Number of children: Not on file    Years of education: Not on file   [place] Highest education level: Not on file   Occupational History    Not on file   Tobacco Use    Smoking status: Never Smoker    Smokeless tobacco: Never Used   Vaping Use    Vaping Use: Never used   Substance and Sexual Activity    Alcohol use: No    Drug use: No    Sexual activity: Not Currently     Partners: Male     Birth control/protection: Surgical   Other Topics Concern    Not on file   Social History Narrative    Not on file       Orders Only on 02/04/2022   Component Date Value Ref Range Status    Color 02/04/2022 YELLOW/STRAW    Final    Color Reference Range: Straw, Yellow or Dark Yellow    Appearance 02/04/2022 CLEAR  CLEAR   Final    Specific gravity 02/04/2022 1.007  1.003 - 1.030   Final    pH (UA) 02/04/2022 6.5  5.0 - 8.0   Final    Protein 02/04/2022 Negative  Negative mg/dL Final    Glucose 02/04/2022 Negative  Negative mg/dL Final    Ketone 02/04/2022 Negative  Negative mg/dL Final    Bilirubin 02/04/2022 Negative  Negative   Final    Blood 02/04/2022 Negative  Negative   Final    Urobilinogen 02/04/2022 0.2  0.2 - 1.0 EU/dL Final    Nitrites 02/04/2022 Negative  Negative   Final    Leukocyte Esterase 02/04/2022 Negative  Negative   Final    UA:UC IF INDICATED 02/04/2022 CULTURE NOT INDICATED BY UA RESULT  CULTURE NOT INDICATED BY UA RESULT   Final    WBC 02/04/2022 0-4  0 - 4 /hpf Final    RBC 02/04/2022 0-5  0 - 5 /hpf Final    Epithelial cells 02/04/2022 FEW  FEW /lpf Final    Comment: Epithelial cell category consists of squamous cells and /or transitional  urothelial cells. Renal tubular cells, if present, are separately identified as  such.       Bacteria 02/04/2022 Negative  Negative /hpf Final    Hyaline cast 02/04/2022 0-2  0 - 5 /lpf Final   Orders Only on 01/10/2022   Component Date Value Ref Range Status    Color 01/10/2022 YELLOW/STRAW    Final    Color Reference Range: Straw, Yellow or Dark Yellow    Appearance 01/10/2022 CLEAR  CLEAR   Final    Specific gravity 01/10/2022 1.018  1.003 - 1.030   Final    pH (UA) 01/10/2022 6.0  5.0 - 8.0   Final    Protein 01/10/2022 Negative  Negative mg/dL Final    Glucose 01/10/2022 Negative  Negative mg/dL Final    Ketone 01/10/2022 Negative  Negative mg/dL Final    Bilirubin 01/10/2022 Negative  Negative   Final    Blood 01/10/2022 Negative  Negative   Final    Urobilinogen 01/10/2022 0.2  0.2 - 1.0 EU/dL Final    Nitrites 01/10/2022 Positive* Negative   Final    Leukocyte Esterase 01/10/2022 MODERATE* Negative   Final    WBC 01/10/2022   0 - 4 /hpf Final    RBC 01/10/2022 0-5  0 - 5 /hpf Final    Epithelial cells 01/10/2022 FEW  FEW /lpf Final    Comment: Epithelial cell category consists of squamous cells and /or transitional  urothelial cells. Renal tubular cells, if present, are separately identified as  such.       Bacteria 01/10/2022 3+* Negative /hpf Final    UA:UC IF INDICATED 01/10/2022 URINE CULTURE ORDERED* CULTURE NOT INDICATED BY UA RESULT   Final    Special Requests: 01/10/2022     Final                    Value:NO SPECIAL REQUESTS  Reflexed from [id number]      [place] Count 01/10/2022     Final                    Value:>100,000  COLONIES/mL      Culture result: 01/10/2022 ESCHERICHIA COLI*   Final   Orders Only on 01/07/2022   Component Date Value Ref Range Status    Hemoglobin A1c 01/07/2022 5.9* 4.0 - 5.6 % Final    Comment: NEW METHOD PLEASE NOTE NEW REFERENCE RANGE  (NOTE)  HbA1C Interpretive Ranges  <5.7              Normal  5.7 - 6.4         Consider Prediabetes  >6.5              Consider Diabetes      Est. average glucose 01/07/2022 123  mg/dL Final    Sodium 01/07/2022 135* 136 - 145 mmol/L Final    Potassium 01/07/2022 5.3* 3.5 - 5.1 mmol/L Final    Chloride 01/07/2022 104  97 - 108 mmol/L Final    CO2 01/07/2022 26  21 - 32 mmol/L Final    Anion gap 01/07/2022 5  5 - 15 mmol/L Final    Glucose 01/07/2022 113* 65 - 100 mg/dL Final    BUN 01/07/2022 28* 6 - 20 MG/DL Final    Creatinine 01/07/2022 0.90  0.55 - 1.02 MG/DL Final    BUN/Creatinine ratio 01/07/2022 31* 12 - 20   Final    GFR est AA 01/07/2022 >60  >60 ml/min/1.73m2 Final    GFR est non-AA 01/07/2022 >60  >60 ml/min/1.73m2 Final    Comment: Estimated GFR is calculated using the IDMS-traceable Modification of Diet in  Renal Disease (MDRD) Study equation, reported for both  Americans  (GFRAA) and non- Americans (GFRNA), and normalized to 1.73m2 body  surface area. The physician must decide which value applies to the patient.  Calcium 01/07/2022 9.6  8.5 - 10.1 MG/DL Final    Bilirubin, total 01/07/2022 0.4  0.2 - 1.0 MG/DL Final    ALT (SGPT) 01/07/2022 26  12 - 78 U/L Final    AST (SGOT) 01/07/2022 14* 15 - 37 U/L Final    Alk.  phosphatase 01/07/2022 91  45 - 117 U/L Final    Protein, total 01/07/2022 7.5  6.4 - 8.2 g/dL Final    Albumin 01/07/2022 3.9  3.5 - 5.0 g/dL Final    Globulin 01/07/2022 3.6  2.0 - 4.0 g/dL Final    A-G Ratio 01/07/2022 1.1  1.1 - 2.2   Final   Office Visit on 01/07/2022   Component Date Value Ref Range Status    Color (UA POC) 01/07/2022 Yellow   Final    Clarity (UA POC) 01/07/2022 Clear   Final    Glucose (UA POC) 01/07/2022 Negative  Negative Final    Bilirubin (UA POC) 01/07/2022 Negative  Negative Final    Ketones (UA POC) 01/07/2022 Negative  Negative Final    Specific gravity (UA POC) 01/07/2022 1.030  1.001 - 1.035 Final    Blood (UA POC) 01/07/2022 Negative  Negative Final    pH (UA POC) 01/07/2022 6.5  4.6 - 8.0 Final    Protein (UA POC) 01/07/2022 Negative  Negative Final    Urobilinogen (UA POC) 01/07/2022 0.2 mg/dL  0.2 - 1 Final    Nitrites (UA POC) 01/07/2022 Negative  Negative Final    Leukocyte esterase (UA POC) 01/07/2022 1+  Negative Final   Admission on 12/23/2021, Discharged on 12/24/2021   Component Date Value Ref Range Status    Ventricular Rate 12/23/2021 109  BPM Final    Atrial Rate 12/23/2021 109  BPM Final    P-R Interval 12/23/2021 140  ms Final    QRS Duration 12/23/2021 112  ms Final    Q-T Interval 12/23/2021 328  ms Final    QTC Calculation (Bezet) 12/23/2021 441  ms Final    Calculated P Axis 12/23/2021 43  degrees Final    Calculated R Axis 12/23/2021 -47  degrees Final    Calculated T Axis 12/23/2021 14  degrees Final    Diagnosis 12/23/2021    Final                    Value:Sinus tachycardia  Right bundle branch block  Left anterior fascicular block  ** Bifascicular block **  Abnormal ECG  When compared with ECG of 13-AUG-2018 11:59,  Vent. rate has increased BY  49 BPM  (RBBB and left anterior fascicular block) has replaced Incomplete right   bundle branch block  Confirmed by Laly Vogt MD. ([id number]) on 12/24/2021 11:09:21 PM      WBC 12/23/2021 6.6  3.6 - 11.0 K/uL Final    RBC 12/23/2021 4.87  3.80 - 5.20 M/uL Final    HGB 12/23/2021 14.7  11.5 - 16.0 g/dL Final    HCT 12/23/2021 44.5  35.0 - 47.0 % Final    MCV 12/23/2021 91.4  80.0 - 99.0 FL Final    MCH 12/23/2021 30.2  26.0 - 34.0 PG Final    MCHC 12/23/2021 33.0  30.0 - 36.5 g/dL Final    RDW 12/23/2021 12.9  11.5 - 14.5 % Final    PLATELET 37/11/4987 175  150 - 400 K/uL Final    MPV 12/23/2021 9.6  8.9 - 12.9 FL Final    NRBC 12/23/2021 0.0  0  WBC Final    ABSOLUTE NRBC 12/23/2021 0.00  0.00 - 0.01 K/uL Final    NEUTROPHILS 12/23/2021 74  32 - 75 % Final    LYMPHOCYTES 12/23/2021 14  12 - 49 % Final    MONOCYTES 12/23/2021 10  5 - 13 % Final    EOSINOPHILS 12/23/2021 0  0 - 7 % Final    BASOPHILS 12/23/2021 1  0 - 1 % Final    IMMATURE GRANULOCYTES 12/23/2021 1* 0.0 - 0.5 % Final    ABS. NEUTROPHILS 12/23/2021 4.9  1.8 - 8.0 K/UL Final    ABS. LYMPHOCYTES 12/23/2021 1.0  0.8 - 3.5 K/UL Final    ABS. MONOCYTES 12/23/2021 0.7  0.0 - 1.0 K/UL Final    ABS. EOSINOPHILS 12/23/2021 0.0  0.0 - 0.4 K/UL Final    ABS. BASOPHILS 12/23/2021 0.1  0.0 - 0.1 K/UL Final    ABS. IMM. GRANS.  12/23/2021 0.0  0.00 - 0.04 K/UL Final    DF 12/23/2021 AUTOMATED    Final    Sodium 12/23/2021 129* 136 - 145 mmol/L Final    Potassium 12/23/2021 4.0  3.5 - 5.1 mmol/L Final    Chloride 12/23/2021 97  97 - 108 mmol/L Final    CO2 12/23/2021 25  21 - 32 mmol/L Final    Anion gap 12/23/2021 7  5 - 15 mmol/L Final    Glucose 12/23/2021 114* 65 - 100 mg/dL Final    BUN 12/23/2021 18  6 - 20 MG/DL Final    Creatinine 12/23/2021 1.01  0.55 - 1.02 MG/DL Final    BUN/Creatinine ratio 12/23/2021 18  12 - 20   Final    GFR est AA 12/23/2021 >60  >60 ml/min/1.73m2 Final    GFR est non-AA 12/23/2021 53* >60 ml/min/1.73m2 Final    Estimated GFR is calculated using the IDMS-traceable Modification of Diet in Renal Disease (MDRD) Study equation, reported for both  Americans (GFRAA) and non- Americans (GFRNA), and normalized to 1.73m2 body surface area. The physician must decide which value applies to the patient.  Calcium 12/23/2021 9.4  8.5 - 10.1 MG/DL Final    Bilirubin, total 12/23/2021 0.4  0.2 - 1.0 MG/DL Final    ALT (SGPT) 12/23/2021 32  12 - 78 U/L Final    AST (SGOT) 12/23/2021 30  15 - 37 U/L Final    Alk. phosphatase 12/23/2021 91  45 - 117 U/L Final    Protein, total 12/23/2021 8.1  6.4 - 8.2 g/dL Final    Albumin 12/23/2021 3.8  3.5 - 5.0 g/dL Final    Globulin 12/23/2021 4.3* 2.0 - 4.0 g/dL Final    A-G Ratio 12/23/2021 0.9* 1.1 - 2.2   Final    SAMPLES BEING HELD 12/23/2021 1RED   Final    COMMENT 12/23/2021 Add-on orders for these samples will be processed based on acceptable specimen integrity and analyte stability, which may vary by analyte. Final    Specimen source 12/23/2021 Nasopharyngeal    Final    COVID-19 rapid test 12/23/2021 Detected* NOTD   Final    Comment: CALLED TO AND READ BACK BY   NIDHI MOSES @[facility]  Rapid Abbott ID Now       The specimen is POSITIVE for SARS-CoV-2, the novel coronavirus associated with COVID-19.        This test has been authorized by the FDA under an Emergency Use Authorization (EUA) for use by authorized laboratories. Fact sheet for Healthcare Providers: ConventionUpdate.co.nz  Fact sheet for Patients: ConventionUpdate.co.nz       Methodology: Isothermal Nucleic Acid Amplification      Lactic acid 12/23/2021 0.9  0.4 - 2.0 MMOL/L Final    Procalcitonin 12/23/2021 <0.05  ng/mL Final    Comment:      Suspected Sepsis:  <0.50 ng/mL     Low likelihood of sepsis. 0.50-2.00 ng/mL    Increased likelihood of sepsis. Antibiotics encouraged. >2.00 ng/mL  High risk of sepsis/shock. Antibiotics strongly encouraged. Suspected Lower Resp Tract Infections:  <0.24 ng/mL    Low likelihood of bacterial infection. >0.24 ng/mL    Increased likelihood of bacterial infection. Antibiotics encouraged. With successful antibiotic therapy, PCT levels should decrease rapidly. (Half-life of 24 to 36 hours)       Procalcitonin values from samples collected within the first 6 hours of systemic infection may still be low. Retesting may be indicated. Values from day 1 and day 4 can be entered into the Change in Procalcitonin Calculator (www.[employer]-pct-calculator. [employer] [place]) to determine the patient's Mortality Risk Prognosis. In healthy neonates, plasma Procalcitonin (PCT) concentrations increase gradually after birth, reaching peak values at about 24 hours of age then decrease to normal valu                           es below 0.5 ng/mL by 48-72 hours of age.  C-Reactive protein 12/23/2021 2.55* 0.00 - 0.60 mg/dL Final    Comment: CRP is a nonspecific acute phase reactant that shows rapid, marked increases with inflammation, infection, trauma, tissue necrosis, malignancies and autoimmune diseases. Sequential CRP levels are useful in monitoring response to antibacterial therapy.   This assay is not equivalent to the hsCRP test since the presence of one or more of the foregoing disease processes obviates the risk stratification information available from hsCRP testing.  Troponin-High Sensitivity 12/23/2021 27  0 - 51 ng/L Final    Up to 50% of normal patients may have low levels of detectable troponin (within reference range) circulating in the blood. Mild elevations in troponin that are above the reference range, may be present with other cardiac and non-cardiac disease states. Two or three serial tests at two hour intervals, are recommended to evaluate changes in circulating troponin over time.  Ferritin 12/23/2021 219  26 - 388 NG/ML Final    Sed rate, automated 12/23/2021 5  0 - 30 mm/hr Final    Sodium 12/24/2021 130* 136 - 145 mmol/L Final    Potassium 12/24/2021 3.7  3.5 - 5.1 mmol/L Final    Chloride 12/24/2021 99  97 - 108 mmol/L Final    CO2 12/24/2021 21  21 - 32 mmol/L Final    Anion gap 12/24/2021 10  5 - 15 mmol/L Final    Glucose 12/24/2021 117* 65 - 100 mg/dL Final    BUN 12/24/2021 18  6 - 20 MG/DL Final    Creatinine 12/24/2021 0.76  0.55 - 1.02 MG/DL Final    BUN/Creatinine ratio 12/24/2021 24* 12 - 20   Final    GFR est AA 12/24/2021 >60  >60 ml/min/1.73m2 Final    GFR est non-AA 12/24/2021 >60  >60 ml/min/1.73m2 Final    Calcium 12/24/2021 8.1* 8.5 - 10.1 MG/DL Final      Review of Systems   Constitutional: Negative for activity change, fatigue and unexpected weight change. HENT: Negative for congestion, hearing loss, rhinorrhea and sore throat. Eyes: Negative for discharge. Respiratory: Negative for cough, chest tightness and shortness of breath. Cardiovascular: Negative for leg swelling. Gastrointestinal: Positive for abdominal distention. Negative for abdominal pain, constipation and diarrhea. Genitourinary: Positive for dysuria. Negative for flank pain, frequency and urgency. Musculoskeletal: Negative for arthralgias, back pain and myalgias. Skin: Negative for color change and rash. Neurological: Negative for dizziness, light-headedness and headaches.    Psychiatric/Behavioral: Negative for dysphoric mood and sleep disturbance. The patient is not nervous/anxious. Visit Vitals  /83 (BP 1 Location: Right arm, BP Patient Position: Sitting)   Pulse 89   Temp 97.6 °F (36.4 °C) (Temporal)   Resp 18   Ht 5' 3\" (1.6 m)   Wt 149 lb (67.6 kg)   SpO2 100%   BMI 26.39 kg/m²      Physical Exam  Vitals and nursing note reviewed. Constitutional:       General: She is not in acute distress. Appearance: Normal appearance. She is well-developed. She is not diaphoretic. HENT:      Right Ear: External ear normal.      Left Ear: External ear normal.   Eyes:      General: No scleral icterus. Right eye: No discharge. Left eye: No discharge. Extraocular Movements: Extraocular movements intact. Conjunctiva/sclera: Conjunctivae normal.   Cardiovascular:      Rate and Rhythm: Normal rate and regular rhythm. Pulmonary:      Effort: Pulmonary effort is normal.      Breath sounds: Normal breath sounds. No wheezing. Abdominal:      General: Bowel sounds are normal. There is distension. Palpations: Abdomen is soft. Tenderness: There is no abdominal tenderness. Musculoskeletal:      Cervical back: Normal range of motion and neck supple. Lymphadenopathy:      Cervical: No cervical adenopathy. Neurological:      Mental Status: She is alert and oriented to person, place, and time. Psychiatric:         Mood and Affect: Mood and affect normal.         An electronic signature was used to authenticate this note.   -- Nestor Tyson

## 2022-02-18 NOTE — PROGRESS NOTES
Results reviewed. Release via Jen Lyon you are doing well. Your MRI report came to me and I was wondering if Dr. Bethel Lucia has recommended surgery?

## 2022-02-19 LAB
APPEARANCE UR: CLEAR
BACTERIA URNS QL MICRO: NEGATIVE /HPF
BILIRUB UR QL: NEGATIVE
COLOR UR: NORMAL
EPITH CASTS URNS QL MICRO: NORMAL /LPF
GLUCOSE UR STRIP.AUTO-MCNC: NEGATIVE MG/DL
HGB UR QL STRIP: NEGATIVE
HYALINE CASTS URNS QL MICRO: NORMAL /LPF (ref 0–5)
KETONES UR QL STRIP.AUTO: NEGATIVE MG/DL
LEUKOCYTE ESTERASE UR QL STRIP.AUTO: NEGATIVE
NITRITE UR QL STRIP.AUTO: NEGATIVE
PH UR STRIP: 7 [PH] (ref 5–8)
PROT UR STRIP-MCNC: NEGATIVE MG/DL
RBC #/AREA URNS HPF: NORMAL /HPF (ref 0–5)
SP GR UR REFRACTOMETRY: 1.01 (ref 1–1.03)
UA: UC IF INDICATED,UAUC: NORMAL
UROBILINOGEN UR QL STRIP.AUTO: 0.2 EU/DL (ref 0.2–1)
WBC URNS QL MICRO: NORMAL /HPF (ref 0–4)

## 2022-02-23 ENCOUNTER — OFFICE VISIT (OUTPATIENT)
Dept: NEUROLOGY | Age: 82
End: 2022-02-23
Payer: MEDICARE

## 2022-02-23 VITALS — BODY MASS INDEX: 25.69 KG/M2 | OXYGEN SATURATION: 98 % | HEART RATE: 88 BPM | WEIGHT: 145 LBS | HEIGHT: 63 IN

## 2022-02-23 DIAGNOSIS — G25.81 RESTLESS LEGS SYNDROME (RLS): Primary | ICD-10-CM

## 2022-02-23 PROCEDURE — 1090F PRES/ABSN URINE INCON ASSESS: CPT | Performed by: PSYCHIATRY & NEUROLOGY

## 2022-02-23 PROCEDURE — G8536 NO DOC ELDER MAL SCRN: HCPCS | Performed by: PSYCHIATRY & NEUROLOGY

## 2022-02-23 PROCEDURE — G8756 NO BP MEASURE DOC: HCPCS | Performed by: PSYCHIATRY & NEUROLOGY

## 2022-02-23 PROCEDURE — G8432 DEP SCR NOT DOC, RNG: HCPCS | Performed by: PSYCHIATRY & NEUROLOGY

## 2022-02-23 PROCEDURE — G8399 PT W/DXA RESULTS DOCUMENT: HCPCS | Performed by: PSYCHIATRY & NEUROLOGY

## 2022-02-23 PROCEDURE — G8427 DOCREV CUR MEDS BY ELIG CLIN: HCPCS | Performed by: PSYCHIATRY & NEUROLOGY

## 2022-02-23 PROCEDURE — 1101F PT FALLS ASSESS-DOCD LE1/YR: CPT | Performed by: PSYCHIATRY & NEUROLOGY

## 2022-02-23 PROCEDURE — G8419 CALC BMI OUT NRM PARAM NOF/U: HCPCS | Performed by: PSYCHIATRY & NEUROLOGY

## 2022-02-23 PROCEDURE — 99213 OFFICE O/P EST LOW 20 MIN: CPT | Performed by: PSYCHIATRY & NEUROLOGY

## 2022-02-23 RX ORDER — ASCORBIC ACID 1000 MG
1 TABLET ORAL DAILY
COMMUNITY

## 2022-02-23 RX ORDER — LANOLIN ALCOHOL/MO/W.PET/CERES
1000 CREAM (GRAM) TOPICAL DAILY
COMMUNITY

## 2022-02-23 RX ORDER — ALBUTEROL SULFATE 90 UG/1
AEROSOL, METERED RESPIRATORY (INHALATION)
COMMUNITY

## 2022-02-23 NOTE — PROGRESS NOTES
Chief Complaint   Patient presents with    Follow-up    Neurologic Problem     RLS: doing much better on Mirapex       HPI        Hill Bass is an 49-year-old woman following up. She has restless leg and spine degeneration. Since I saw her last she now has optimize pramipexole taking 1 mg at bedtime with excellent results. Additionally she does get occasional cramping in the legs for which she uses yellow mustard with good results. Sleep is good. The weakness she was having in her legs has resolved and she is walking now. She is undergoing work-up for some type of abdominal mass lesion renal issue. She is getting recurrent UTIs. Background:  Hill Bass is an 49-year-old woman with a history of degenerative disc disease, asthma here for a few issues. I reviewed the medical record and spoke with her personally. I also reviewed neuroimaging results from orthopedics. Main reason why she is here is that she feels like her left leg gives out on her. This has been going on for several years. This can occur without warning and she has fallen a couple times last in February. No head trauma. She does have a history of low back radicular history requiring surgery. L-spine in 2014 results are reviewed showing multilevel canal stenosis and neuroforaminal stenosis. Superimposed on this she has chronic left knee arthritis that has been getting worse. She has not seen the knee specialist in quite a long time about this. She had the right knee replacement years ago. Other issue is that she has noticed a tremor in her hands for couple years and she is worried she might have Parkinson's. She has no hallucinations or difficulty swallowing. No problems walking aside from the left leg symptoms. She has chronic restless leg managed with Requip at night. Multiple allergies noted. Review of Systems   Musculoskeletal: Positive for myalgias.    Neurological:        Restless leg   All other systems reviewed and are negative. Past Medical History:   Diagnosis Date    AC (acromioclavicular) joint bone spurs 01/2014    in back. Dr. Pamela Ball. Txd with shots x 3    Actinic keratosis 2015    Dr. Fernanda Bello. Dr. Ayla Castro.  Allergic rhinitis, cause unspecified     Anxiety state, unspecified     Arthritis     R KNEE BONE-ON-BONE; R HAND-----OSTEO    Asthma     BRONCHITIS IN SPRING & FALL MOST YEARS    BCC (basal cell carcinoma), face 1980s (nose), 01/28/15 (forehead)    Dr. Lane Moise. Dr. Trina Naranjo.  Bilateral pes planus 03/10/2020    Dr. Macie Turner 2007    Dr. Lilian Hunter    Chronic insomnia     Chronic low back pain 01/2014    Dr. Sisi Moreau. DJD and spurs, Narrowing of L 3,4,5. Dr. Pamela Ball.  Chronic obstructive pulmonary disease (HCC)     CHRONIC (TWICE-YEARLY) BRONCHITIS    Chronic pain     Colon polyps 11/2005, 01/29/09, 9/16/2015    benign. Dr. Yonatan Hamilton    Constipation     Degenerative lumbar spinal stenosis 01/2014    Dr. Danay Andrade Narrowing of L 3,4,5    Diabetes mellitus type 2, diet-controlled ([facility] [address] 10/13/2017    LOST WEIGHT AND IS NOT DIABETIC    Dyslipidemia     Dysphagia 09/2014    due to Esophagitis. Dr. Susy Guerrier.  Esophagitis 09/29/2014    ESOPHAGITIS    Essential hypertension, benign     Foot fracture, left 2009    stress.  Foot pain, bilateral 11/13/2013    Dr. Juan Luna.  Hearing loss     Dr. Mayte Peterson.  Knee pain, bilateral 10/28/13    Dr. Lars Anderson. Right > Left. Severe OA. Txd with PT.    Lumbar stenosis with neurogenic claudication     Menopause 1976    Migraine     NONE SINCE AGE 37    Mixed stress and urge urinary incontinence     NO LEAKAGE; GETS UP SEVERAL TIMES A NIGHT, PT STATES ON 10/3/16    OA (osteoarthritis) of knee     Dr. Lars Anderson    Osteopenia 08/24/2015    Restless legs syndrome (RLS) 09/2015    Dr. Mary Bello.  Shoulder joint dislocation 06/2011    Right.   due to fall.  Dr. Stacy Valentin Sleep apnea     undiagnosed    Syncope 11    due to fall down 5 steps. Right occipital head trauma.  Tremor of both hands 2015    Dr. Alexa Paredes                  [facility] [address] [facility]     Family History   Problem Relation Age of Onset    Hypertension Mother     Dementia Mother         alzheimers    Hypertension Father     High Cholesterol Father     Kidney Disease Father         1 kidney    Emphysema Father         O2 requiring    Hypertension Sister     Diabetes Maternal Grandmother     Dementia Maternal Grandmother     Thyroid Disease Maternal Grandmother     Thyroid Disease Daughter         goiter    Heart Attack Maternal Uncle 51        massive    Heart Attack Maternal Uncle 39        massive    Heart Attack Maternal Aunt 51        massive    Thyroid Disease Daughter         thyroid cyst    Other Brother          AT 1DAYS OLD    Anesth Problems Neg Hx      Social History     Socioeconomic History    Marital status:      Spouse name: Not on file    Number of children: Not on file    Years of education: Not on file    Highest education level: Not on file   Occupational History    Not on file   Tobacco Use    Smoking status: Never Smoker    Smokeless tobacco: Never Used   Vaping Use    Vaping Use: Never used   Substance and Sexual Activity    Alcohol use: No    Drug use: No    Sexual activity: Not Currently     Partners: Male     Birth control/protection: Surgical   Other Topics Concern    Not on file   Social History Narrative    Not on file     Social Determinants of Health     Financial Resource Strain:     Difficulty of Paying Living Expenses: Not on file   Food Insecurity:     Worried About Running Out of Food in the Last Year: Not on file    Timothy of Food in the Last Year: Not on file   Transportation Needs:     Lack of Transportation (Medical): Not on file    Lack of Transportation (Non-Medical):  Not on file   Physical Activity:     Days of Exercise per Week: Not on file    Minutes of Exercise per Session: Not on file   Stress:     Feeling of Stress : Not on file   Social Connections:     Frequency of Communication with Friends and Family: Not on file    Frequency of Social Gatherings with Friends and Family: Not on file    Attends Taoist Services: Not on file    Active Member of [address] [place] [address] or Organizations: Not on file    Attends Club or Organization Meetings: Not on file    Marital Status: Not on file   Intimate Partner Violence:     Fear of Current or Ex-Partner: Not on file    Emotionally Abused: Not on file    Physically Abused: Not on file    Sexually Abused: Not on file   Housing Stability:     Unable to Pay for Housing in the Last Year: Not on file    Number of [place] in the Last Year: Not on file    Unstable Housing in the Last Year: Not on file     Allergies   Allergen Reactions    Other Food Itching     If eats large quantities over several days causes itching: tomatoes, peaches, strawberry, fig    Bee Sting [Sting, Bee] Swelling    Pcn [Penicillins] Hives     IN CHILDHOOD    Percocet [Oxycodone-Acetaminophen] Other (comments)      Other (comments)\"hellish nightmares\"      Shellfish Containing Products Itching    Zoster Vaccine Live Swelling     Severe Right arm swelling with redness after administered by pharmacist in elbow    Atarax [Hydroxyzine Hcl] Other (comments)     jittery    Buspar [Buspirone] Nausea Only    Crestor [Rosuvastatin] Myalgia    Hydrochlorothiazide Myalgia     Other reaction(s): sorethroat    Hydroxyzine Other (comments)     jittery    Mobic [Meloxicam] Other (comments)     Easy bruising    Norco [Hydrocodone-Acetaminophen] Nausea and Vomiting                  Current Outpatient Medications   Medication Sig    albuterol (Ventolin HFA) 90 mcg/actuation inhaler Take  by inhalation.     mv-min-folic-calcium carb-K1 (Women's 50 Plus Multivitamin) 400 mcg-500 mg calcium-20 mcg tab Take 1 Tablet by mouth daily.  cyanocobalamin (Vitamin B-12) 1,000 mcg tablet Take 1,000 mcg by mouth daily.  benazepriL (LOTENSIN) 20 mg tablet TAKE 1 TABLET BY MOUTH EVERY DAY FOR HIGH BLOOD PRESSURE    omeprazole (PRILOSEC) 20 mg capsule TAKE 1 CAPSULE BY MOUTH DAILY 30 MINUTES BEFORE BREAKFAST    pramipexole (MIRAPEX) 1 mg tablet TAKE 1 TABLET BY MOUTH THREE TIMES DAILY    fluticasone propionate (Flonase Allergy Relief) 50 mcg/actuation nasal spray 2 Sprays by Both Nostrils route daily.  albuterol sulfate (PROVENTIL;VENTOLIN) 2.5 mg/0.5 mL nebu nebulizer solution by Nebulization route once. (Patient not taking: Reported on 2/23/2022)    ascorbic acid, vitamin C, (VITAMIN C) 1,000 mg tablet Take 1 Tablet by mouth two (2) times a day. (Patient not taking: Reported on 2/23/2022)    zinc sulfate (ZINCATE) 50 mg zinc (220 mg) capsule Take 1 Capsule by mouth every twelve (12) hours. (Patient not taking: Reported on 2/23/2022)    melatonin 5 mg tablet Take 5 mg by mouth nightly. (Patient not taking: Reported on 2/23/2022)    HYLAN G-F 20 IX by Intra artICUlar route. (Patient not taking: Reported on 2/23/2022)    turmeric root extract 500 mg cap Take  by mouth. (Patient not taking: Reported on 2/18/2022)     No current facility-administered medications for this visit. Neurologic Exam     Mental Status   WD/WN adult in NAD, normal grooming  VSS  A&O x 3    PERRL, nonicteric  Face is symmetric, tongue midline  Speech is fluent and clear  No limb ataxia. No abnl movements. Moving all extemities spontaneously and symmetric  Normal gait         Visit Vitals  Pulse 88   Ht 5' 3\" (1.6 m)   Wt 145 lb (65.8 kg)   SpO2 98%   BMI 25.69 kg/m²       Assessment and Plan   Diagnoses and all orders for this visit:    1. Restless legs syndrome (RLS)       80year-old woman with restless leg but overall is stable on low-dose pramipexole. No changes.   It sounds like her strength is better as well. It sounds like a lot of electrolyte issues ongoing and home remedies like mustard are working. She has GI/urological work-up ongoing. Keep being physically active walking daily if possible. I would like to see her annually. 20 minutes of time was spent reviewing the medical record today face-to-face time, and time completing of documentation today. I reviewed and decided to continue the current medications. This clinical note was dictated with an electronic dictation software that can make unintentional errors. If there are any questions, please contact me directly for clarification.       [facility] [address]  Diplomate ABPN

## 2022-03-05 DIAGNOSIS — K21.9 GASTROESOPHAGEAL REFLUX DISEASE WITHOUT ESOPHAGITIS: ICD-10-CM

## 2022-03-05 RX ORDER — OMEPRAZOLE 20 MG/1
CAPSULE, DELAYED RELEASE ORAL
Qty: 30 CAPSULE | Refills: 0 | Status: SHIPPED | OUTPATIENT
Start: 2022-03-05 | End: 2022-03-07

## 2022-03-07 ENCOUNTER — TELEPHONE (OUTPATIENT)
Dept: PRIMARY CARE CLINIC | Age: 82
End: 2022-03-07

## 2022-03-07 DIAGNOSIS — K21.9 GASTROESOPHAGEAL REFLUX DISEASE WITHOUT ESOPHAGITIS: ICD-10-CM

## 2022-03-07 RX ORDER — OMEPRAZOLE 20 MG/1
CAPSULE, DELAYED RELEASE ORAL
Qty: 90 CAPSULE | Refills: 0 | Status: SHIPPED | OUTPATIENT
Start: 2022-03-07 | End: 2022-06-01 | Stop reason: SDUPTHER

## 2022-03-07 NOTE — TELEPHONE ENCOUNTER
----- Message from [facility] sent at 3/7/2022  7:33 AM EST -----  Subject: Message to Provider    QUESTIONS  Information for Provider? pt would like a call back from office in regards   to where to go to  prep for CT scan on [email or web address]  ---------------------------------------------------------------------------  --------------  CALL BACK INFO  What is the best way for the office to contact you? OK to leave message on   [employer]  Preferred Call Back Phone Number? [phone]  ---------------------------------------------------------------------------  --------------  SCRIPT ANSWERS  Relationship to Patient?  Self

## 2022-03-07 NOTE — TELEPHONE ENCOUNTER
Returned call to patient.  Provided the number to scheduling for information about what she needs to do prior to CT scan

## 2022-03-10 ENCOUNTER — HOSPITAL ENCOUNTER (OUTPATIENT)
Dept: CT IMAGING | Age: 82
Discharge: HOME | End: 2022-03-10
Attending: INTERNAL MEDICINE
Payer: MEDICARE

## 2022-03-10 DIAGNOSIS — R30.0 DYSURIA: ICD-10-CM

## 2022-03-10 DIAGNOSIS — R30.1 PAINFUL BLADDER SPASM: ICD-10-CM

## 2022-03-10 DIAGNOSIS — N39.0 RECURRENT UTI: ICD-10-CM

## 2022-03-10 DIAGNOSIS — N28.9 RENAL LESION: ICD-10-CM

## 2022-03-10 PROCEDURE — 74011000636 HC RX REV CODE- 636: Performed by: INTERNAL MEDICINE

## 2022-03-10 PROCEDURE — 74178 CT ABD&PLV WO CNTR FLWD CNTR: CPT

## 2022-03-10 RX ADMIN — IOPAMIDOL 100 ML: 755 INJECTION, SOLUTION INTRAVENOUS at 16:54

## 2022-03-13 NOTE — PROGRESS NOTES
Results reviewed. Release via Sonal[facility] [employer] [facility] [place] you are doing well. Your CT scan showed gall stones and a small umbilical hernia. It should not cause any urinary symptoms. How are your symptoms?

## 2022-03-18 PROBLEM — U07.1 COVID: Status: ACTIVE | Noted: 2021-12-24

## 2022-03-19 PROBLEM — M21.42 BILATERAL PES PLANUS: Status: ACTIVE | Noted: 2020-03-10

## 2022-03-19 PROBLEM — T46.4X5A ACE-INHIBITOR COUGH: Status: ACTIVE | Noted: 2017-02-27

## 2022-03-19 PROBLEM — G56.02 CARPAL TUNNEL SYNDROME OF LEFT WRIST: Status: ACTIVE | Noted: 2018-04-03

## 2022-03-19 PROBLEM — R05.8 ACE-INHIBITOR COUGH: Status: ACTIVE | Noted: 2017-02-27

## 2022-03-19 PROBLEM — M21.41 BILATERAL PES PLANUS: Status: ACTIVE | Noted: 2020-03-10

## 2022-03-20 PROBLEM — M17.11 PRIMARY OSTEOARTHRITIS OF RIGHT KNEE: Status: ACTIVE | Noted: 2018-08-27

## 2022-03-20 PROBLEM — Z96.1 PSEUDOPHAKIA OF BOTH EYES: Status: ACTIVE | Noted: 2017-12-04

## 2022-03-20 PROBLEM — E87.1 HYPONATREMIA: Status: ACTIVE | Noted: 2021-12-24

## 2022-05-11 DIAGNOSIS — I10 ESSENTIAL HYPERTENSION, BENIGN: ICD-10-CM

## 2022-05-11 RX ORDER — BENAZEPRIL HYDROCHLORIDE 20 MG/1
TABLET ORAL
Qty: 90 TABLET | Refills: 0 | Status: SHIPPED | OUTPATIENT
Start: 2022-05-11 | End: 2022-08-09

## 2022-06-01 DIAGNOSIS — K21.9 GASTROESOPHAGEAL REFLUX DISEASE WITHOUT ESOPHAGITIS: ICD-10-CM

## 2022-06-01 RX ORDER — OMEPRAZOLE 20 MG/1
20 CAPSULE, DELAYED RELEASE ORAL DAILY
Qty: 30 CAPSULE | Refills: 2 | Status: SHIPPED | OUTPATIENT
Start: 2022-06-01 | End: 2022-08-30

## 2022-06-01 NOTE — TELEPHONE ENCOUNTER
Requested Prescriptions     Pending Prescriptions Disp Refills    omeprazole (PRILOSEC) 20 mg capsule 90 Capsule 0        Last Visit 02/18/22  Last Refill 03/07/22

## 2022-06-07 ENCOUNTER — PATIENT MESSAGE (OUTPATIENT)
Dept: PRIMARY CARE CLINIC | Age: 82
End: 2022-06-07

## 2022-06-07 DIAGNOSIS — G25.81 RESTLESS LEGS SYNDROME (RLS): ICD-10-CM

## 2022-06-07 RX ORDER — PRAMIPEXOLE DIHYDROCHLORIDE 1 MG/1
1 TABLET ORAL 3 TIMES DAILY
Qty: 270 TABLET | Refills: 0 | Status: SHIPPED | OUTPATIENT
Start: 2022-06-07 | End: 2022-10-04

## 2022-06-07 NOTE — TELEPHONE ENCOUNTER
From: Soraida Naqvi  To: Edward Carlin MD  Sent: 6/7/2022 10:22 AM EDT  Subject: Need a refill on PRAMIPEXOLE 1MG [phone]    Dr. Zhao Tang, I have 3 left and take 2 nightly. [facility] on [address]  Thank you so much. This prescription works great.    Alpesh Camacho

## 2022-06-07 NOTE — TELEPHONE ENCOUNTER
Requested Prescriptions     Pending Prescriptions Disp Refills    pramipexole (MIRAPEX) 1 mg tablet 270 Tablet 0     Sig: Take 1 Tablet by mouth three (3) times daily.         Last Visit 2/18/22  Last Refill 1/8/22

## 2022-08-09 DIAGNOSIS — I10 ESSENTIAL HYPERTENSION, BENIGN: ICD-10-CM

## 2022-08-09 RX ORDER — BENAZEPRIL HYDROCHLORIDE 20 MG/1
TABLET ORAL
Qty: 90 TABLET | Refills: 0 | Status: SHIPPED | OUTPATIENT
Start: 2022-08-09 | End: 2022-10-18 | Stop reason: SDUPTHER

## 2022-08-30 DIAGNOSIS — K21.9 GASTROESOPHAGEAL REFLUX DISEASE WITHOUT ESOPHAGITIS: ICD-10-CM

## 2022-08-30 RX ORDER — OMEPRAZOLE 20 MG/1
CAPSULE, DELAYED RELEASE ORAL
Qty: 30 CAPSULE | Refills: 2 | Status: SHIPPED | OUTPATIENT
Start: 2022-08-30

## 2022-09-13 ENCOUNTER — TELEPHONE (OUTPATIENT)
Dept: PRIMARY CARE CLINIC | Age: 82
End: 2022-09-13

## 2022-09-13 ENCOUNTER — TRANSCRIBE ORDER (OUTPATIENT)
Dept: SCHEDULING | Age: 82
End: 2022-09-13

## 2022-09-13 DIAGNOSIS — Z12.31 SCREENING MAMMOGRAM FOR HIGH-RISK PATIENT: Primary | ICD-10-CM

## 2022-09-13 NOTE — TELEPHONE ENCOUNTER
Returned call to patient. She is going to contact her eye doctor and have the visit notes sent over so that her health maint can be updated.

## 2022-09-13 NOTE — TELEPHONE ENCOUNTER
----- Message from James Ramirez sent at 9/13/2022 11:42 AM EDT -----  Subject: Message to Provider    QUESTIONS  Information for Provider? Pt is calling in for some information regarding   her eye exam history. My Chart info is showing she was last seen for eye   exam in 2018 and she has actually been [facility] [place]   [facility]. She is wanting to get the My chart info updated. Please reach out to pt to discuss. ---------------------------------------------------------------------------  --------------  Caitlin Neri [facility]  [phone]; OK to leave message on voicemail  ---------------------------------------------------------------------------  --------------  SCRIPT ANSWERS  Relationship to Patient?  Self

## 2022-10-04 DIAGNOSIS — G25.81 RESTLESS LEGS SYNDROME (RLS): ICD-10-CM

## 2022-10-04 RX ORDER — PRAMIPEXOLE DIHYDROCHLORIDE 1 MG/1
TABLET ORAL
Qty: 270 TABLET | Refills: 0 | Status: SHIPPED | OUTPATIENT
Start: 2022-10-04

## 2022-10-15 ENCOUNTER — PATIENT MESSAGE (OUTPATIENT)
Dept: PRIMARY CARE CLINIC | Age: 82
End: 2022-10-15

## 2022-10-15 DIAGNOSIS — I10 ESSENTIAL HYPERTENSION, BENIGN: ICD-10-CM

## 2022-10-18 RX ORDER — BENAZEPRIL HYDROCHLORIDE 20 MG/1
20 TABLET ORAL DAILY
Qty: 90 TABLET | Refills: 0 | Status: SHIPPED | OUTPATIENT
Start: 2022-10-18 | End: 2023-01-16

## 2022-11-16 ENCOUNTER — HOSPITAL ENCOUNTER (OUTPATIENT)
Dept: MAMMOGRAPHY | Age: 82
Discharge: HOME | End: 2022-11-16
Attending: INTERNAL MEDICINE
Payer: MEDICARE

## 2022-11-16 DIAGNOSIS — Z12.31 SCREENING MAMMOGRAM FOR HIGH-RISK PATIENT: ICD-10-CM

## 2022-11-16 DIAGNOSIS — K21.9 GASTROESOPHAGEAL REFLUX DISEASE WITHOUT ESOPHAGITIS: ICD-10-CM

## 2022-11-16 PROCEDURE — 77063 BREAST TOMOSYNTHESIS BI: CPT

## 2022-11-16 RX ORDER — OMEPRAZOLE 20 MG/1
20 CAPSULE, DELAYED RELEASE ORAL DAILY
Qty: 30 CAPSULE | Refills: 2 | Status: SHIPPED | OUTPATIENT
Start: 2022-11-16 | End: 2022-11-18 | Stop reason: SDUPTHER

## 2022-11-16 NOTE — TELEPHONE ENCOUNTER
Requested Prescriptions     Pending Prescriptions Disp Refills    omeprazole (PRILOSEC) 20 mg capsule 30 Capsule 2     Sig: Take 1 Capsule by mouth daily.         Last Visit 2/18/22  Last Refill 8/30/22

## 2022-11-18 ENCOUNTER — OFFICE VISIT (OUTPATIENT)
Dept: PRIMARY CARE CLINIC | Age: 82
End: 2022-11-18
Payer: MEDICARE

## 2022-11-18 VITALS
RESPIRATION RATE: 16 BRPM | HEIGHT: 63 IN | SYSTOLIC BLOOD PRESSURE: 128 MMHG | BODY MASS INDEX: 25.23 KG/M2 | WEIGHT: 142.4 LBS | TEMPERATURE: 97.3 F | DIASTOLIC BLOOD PRESSURE: 80 MMHG | HEART RATE: 73 BPM | OXYGEN SATURATION: 100 %

## 2022-11-18 DIAGNOSIS — K21.9 GASTROESOPHAGEAL REFLUX DISEASE WITHOUT ESOPHAGITIS: ICD-10-CM

## 2022-11-18 DIAGNOSIS — E11.9 DIABETES MELLITUS TYPE 2, DIET-CONTROLLED (HCC): ICD-10-CM

## 2022-11-18 DIAGNOSIS — J45.909 ASTHMA DUE TO ENVIRONMENTAL ALLERGIES: ICD-10-CM

## 2022-11-18 DIAGNOSIS — R09.81 NASAL CONGESTION: ICD-10-CM

## 2022-11-18 DIAGNOSIS — G25.81 RESTLESS LEG SYNDROME: ICD-10-CM

## 2022-11-18 DIAGNOSIS — I10 PRIMARY HYPERTENSION: Primary | ICD-10-CM

## 2022-11-18 DIAGNOSIS — G62.9 NEUROPATHY: ICD-10-CM

## 2022-11-18 LAB — HBA1C MFR BLD HPLC: 5.7 %

## 2022-11-18 PROCEDURE — 3044F HG A1C LEVEL LT 7.0%: CPT | Performed by: INTERNAL MEDICINE

## 2022-11-18 PROCEDURE — G8754 DIAS BP LESS 90: HCPCS | Performed by: INTERNAL MEDICINE

## 2022-11-18 PROCEDURE — G8432 DEP SCR NOT DOC, RNG: HCPCS | Performed by: INTERNAL MEDICINE

## 2022-11-18 PROCEDURE — 99214 OFFICE O/P EST MOD 30 MIN: CPT | Performed by: INTERNAL MEDICINE

## 2022-11-18 PROCEDURE — 1101F PT FALLS ASSESS-DOCD LE1/YR: CPT | Performed by: INTERNAL MEDICINE

## 2022-11-18 PROCEDURE — 83036 HEMOGLOBIN GLYCOSYLATED A1C: CPT | Performed by: INTERNAL MEDICINE

## 2022-11-18 PROCEDURE — 1090F PRES/ABSN URINE INCON ASSESS: CPT | Performed by: INTERNAL MEDICINE

## 2022-11-18 PROCEDURE — G8399 PT W/DXA RESULTS DOCUMENT: HCPCS | Performed by: INTERNAL MEDICINE

## 2022-11-18 PROCEDURE — 3078F DIAST BP <80 MM HG: CPT | Performed by: INTERNAL MEDICINE

## 2022-11-18 PROCEDURE — G8419 CALC BMI OUT NRM PARAM NOF/U: HCPCS | Performed by: INTERNAL MEDICINE

## 2022-11-18 PROCEDURE — 3074F SYST BP LT 130 MM HG: CPT | Performed by: INTERNAL MEDICINE

## 2022-11-18 PROCEDURE — 1123F ACP DISCUSS/DSCN MKR DOCD: CPT | Performed by: INTERNAL MEDICINE

## 2022-11-18 PROCEDURE — G8752 SYS BP LESS 140: HCPCS | Performed by: INTERNAL MEDICINE

## 2022-11-18 PROCEDURE — G8536 NO DOC ELDER MAL SCRN: HCPCS | Performed by: INTERNAL MEDICINE

## 2022-11-18 PROCEDURE — G8427 DOCREV CUR MEDS BY ELIG CLIN: HCPCS | Performed by: INTERNAL MEDICINE

## 2022-11-18 RX ORDER — ALBUTEROL SULFATE 90 UG/1
AEROSOL, METERED RESPIRATORY (INHALATION)
Qty: 54 G | Refills: 0 | Status: SHIPPED | OUTPATIENT
Start: 2022-11-18

## 2022-11-18 RX ORDER — OMEPRAZOLE 20 MG/1
20 CAPSULE, DELAYED RELEASE ORAL DAILY
Qty: 30 CAPSULE | Refills: 2 | Status: SHIPPED | OUTPATIENT
Start: 2022-11-18 | End: 2022-12-18

## 2022-11-18 RX ORDER — ALBUTEROL SULFATE 90 UG/1
1 AEROSOL, METERED RESPIRATORY (INHALATION)
Qty: 18 G | Refills: 1 | Status: SHIPPED | OUTPATIENT
Start: 2022-11-18 | End: 2022-11-18

## 2022-11-18 RX ORDER — AZELASTINE 1 MG/ML
1 SPRAY, METERED NASAL 2 TIMES DAILY
Qty: 1 EACH | Refills: 1 | Status: SHIPPED | OUTPATIENT
Start: 2022-11-18 | End: 2022-12-18

## 2022-11-18 NOTE — PROGRESS NOTES
Sean Frey (: 1940) is a 80 y.o. female, established patient, here for evaluation of the following chief complaint(s):  Follow-up       ASSESSMENT/PLAN:  Below is the assessment and plan developed based on review of pertinent history, physical exam, labs, studies, and medications. 1. Primary hypertension  Continue on benazepril 20 mg daily    2. Restless leg syndrome  -     coenzyme q10-thioctic-vit e 30- mg-mg-unit cap; 1 tablet at night, Normal, Disp-30 Capsule, R-1 sent to pharmacy. Recommended ALA. Potential side effects were discussed. I recommended also drinking more water daily. Finish current Mirapex then increase increase 1.5 mg 2 tablets. 3. Gastroesophageal reflux disease without esophagitis  -     omeprazole (PRILOSEC) 20 mg capsule; Take 1 Capsule by mouth daily for 30 days. , Normal, Disp-30 Capsule, R-2 sent to pharmacy. I recommended continuing using omeprazole 20 mg daily. 4. Diabetes mellitus type 2, diet-controlled (HCC)  -     AMB POC HEMOGLOBIN A1C  Will check her Hgb A1c in the office. 5. Neuropathy  -     coenzyme q10-thioctic-vit e 30- mg-mg-unit cap; 1 tablet at night, Normal, Disp-30 Capsule, R-1  Recommended ALA. Potential side effects were discussed. 6. Asthma due to environmental allergies  -     albuterol (PROVENTIL HFA, VENTOLIN HFA, PROAIR HFA) 90 mcg/actuation inhaler; Take 1 Puff by inhalation every six (6) hours as needed for Wheezing for up to 30 days. , Normal, Disp-18 g, R-1 sent to pharmacy. Recommended using albuterol inhaler PRN. 7. Nasal congestion  -     azelastine (ASTELIN) 137 mcg (0.1 %) nasal spray; 1 [place] by Both Nostrils route two (2) times a day for 30 days. Use in each nostril as directed, Normal, Disp-1 Each, R-1 sent to pharmacy. She can use OTC Mucinex for congestion, and I recommended Astelin to be used first but Flonase can be added as needed.   Potential side effects were discussed. SUBJECTIVE/OBJECTIVE:  HPI  Patient presents today for a follow up on chronic conditions. Patient not fasting today. Her BP is elevated today is 143/81 upon manual recheck 128/80. She has not being checking at home. She is taking benazepril but her RLS is still keeping her awake. She also reports having leg cramps. She is flatfooted and followed by podiatry. She says that her feet burn and are sore which limits her walking. She states that her legs sometimes give out on her. She was previously sick with COVID, where she increased water intake which improved leg cramps. Since then, she has only been drinking 16 oz a day. She denies headaches    She is taking vitamin b12 and Mg supplements    She is no longer checking her BG. She had congestion and thought it was seasonal asthma, she says she can hear her pulse in her ear. She has taken mustard for leg cramps which had been helping but did not help last night.     Patient Active Problem List   Diagnosis Code    Essential hypertension, benign I10    Fine tremor G25.2    Dysphagia R13.10    Actinic keratosis L57.0    Family history of thyroid disease Z83.49    Family history of diabetes mellitus Z83.3    Family history of heart attack Z82.49    Dyslipidemia E78.5    Degenerative lumbar spinal stenosis M48.061    Personal history of colonic polyps Z86.010    Restless legs syndrome (RLS) G25.81    Chronic pain of both knees M25.561, M25.562, G89.29    Osteopenia M85.80    Statin intolerance Z78.9    Insomnia secondary to chronic pain G89.29, G47.01    Family history of thyroid disease in grandmother Z80.51    Lumbar stenosis with neurogenic claudication M48.062    ACE-inhibitor cough R05.8, T46.4X5A    Carpal tunnel syndrome of left wrist G56.02    Primary osteoarthritis of right knee M17.11    Pseudophakia of both eyes Z96.1    Bilateral pes planus M21.41, M21.42    COVID U07.1    Asthma due to environmental allergies J45.909        Current Outpatient Medications on File Prior to Visit   Medication Sig Dispense Refill    benazepriL (LOTENSIN) 20 mg tablet Take 1 Tablet by mouth daily for 90 days. 90 Tablet 0    pramipexole (MIRAPEX) 1 mg tablet TAKE 1 TABLET BY MOUTH THREE TIMES DAILY 270 Tablet 0    cyanocobalamin 1,000 mcg tablet Take 1,000 mcg by mouth daily. fluticasone propionate (FLONASE) 50 mcg/actuation nasal spray 2 Sprays by Both Nostrils route daily. [DISCONTINUED] omeprazole (PRILOSEC) 20 mg capsule Take 1 Capsule by mouth daily. 30 Capsule 2    mv-min-folic-calcium carb-K1 (Women's 50 Plus Multivitamin) 400 mcg-500 mg calcium-20 mcg tab Take 1 Tablet by mouth daily. (Patient not taking: Reported on 11/18/2022)      [DISCONTINUED] albuterol (PROVENTIL HFA, VENTOLIN HFA, PROAIR HFA) 90 mcg/actuation inhaler Take  by inhalation. No current facility-administered medications on file prior to visit. Allergies   Allergen Reactions    Other Food Itching     If eats large quantities over several days causes itching: tomatoes, peaches, strawberry, fig    Bee Sting [Sting, Bee] Swelling    Pcn [Penicillins] Hives     IN CHILDHOOD    Percocet [Oxycodone-Acetaminophen] Other (comments)      Other (comments)\"hellish nightmares\"      Shellfish Containing Products Itching    Zoster Vaccine Live Swelling     Severe Right arm swelling with redness after administered by pharmacist in elbow    Atarax [Hydroxyzine Hcl] Other (comments)     jittery    Buspar [Buspirone] Nausea Only    Crestor [Rosuvastatin] Myalgia    Hydrochlorothiazide Myalgia     Other reaction(s): sorethroat    Hydroxyzine Other (comments)     jittery    Mobic [Meloxicam] Other (comments)     Easy bruising    Norco [Hydrocodone-Acetaminophen] Nausea and Vomiting                Past Medical History:   Diagnosis Date    AC (acromioclavicular) joint bone spurs 01/2014    in back. Dr. Kobe Ibarra. Txd with shots x 3    Actinic keratosis 2015    Dr. Sania Norwood.    Efrain. Allergic rhinitis, cause unspecified     Anxiety state, unspecified     Arthritis     R KNEE BONE-ON-BONE; R HAND-----OSTEO    Asthma     BRONCHITIS IN [place] MOST YEARS    BCC (basal cell carcinoma), face 1980s (nose), 01/28/15 (forehead)    Dr. Markos Page. Dr. Letha Reed. Bilateral pes planus 03/10/2020    Dr. Nicholas Collins 2007    Dr. Bety Rodas    Chronic insomnia     Chronic low back pain 01/2014    Dr. Jackelyn Salceod. DJD and spurs, Narrowing of L 3,4,5. Dr. Nikki Goss. Chronic obstructive pulmonary disease ([facility] [address]     CHRONIC (TWICE-YEARLY) BRONCHITIS    Colon polyps 11/2005, 01/29/09, 9/16/2015    benign. Dr. Cuellar Orn    Constipation     Degenerative lumbar spinal stenosis 01/2014    Dr. Sue Parker Narrowing of L 3,4,5    Diabetes mellitus type 2, diet-controlled ([facility] [address] 10/13/2017    LOST WEIGHT AND IS NOT DIABETIC    Dyslipidemia     Dysphagia 09/2014    due to Esophagitis. Dr. Gustavo Knutson. Esophagitis 09/29/2014    ESOPHAGITIS    Essential hypertension, benign     Foot fracture, left 2009    stress. Foot pain, bilateral 11/13/2013    Dr. Eulice Blizzard. Hearing loss     Dr. Pablo Sofia. Knee pain, bilateral 10/28/13    Dr. Kel Garcia. Right > Left. Severe OA. Txd with PT.    Lumbar stenosis with neurogenic claudication     Menopause 1976    Migraine     NONE SINCE AGE 37    Mixed stress and urge urinary incontinence     NO LEAKAGE; GETS UP SEVERAL TIMES A NIGHT, PT STATES ON 10/3/16    OA (osteoarthritis) of knee     Dr. Kel Garcia    Osteopenia 08/24/2015    Restless legs syndrome (RLS) 09/2015    Dr. Kita Maldonado. Shoulder joint dislocation 06/2011    Right. due to fall.   Dr. Ernesto Levi    Sleep apnea     undiagnosed    Tremor of both hands 09/2015    Dr. Ishmael WILKERSON       Past Surgical History:   Procedure Laterality Date    COLONOSCOPY N/A 9/2/2020    COLONOSCOPY :- performed by Consuelo Tai MD at [facility] ENDOSCOPY    R [address] Left 2018    Dr. Javier Alan     HX CATARACT REMOVAL Bilateral , R 04/10/17    Dr. Rose Quintana    HX COLONOSCOPY  2005, 09, 09/16/15    with polypectomy. Dr. Venkat De Jesus q5 years    HX ENDOSCOPY  2014    with Esophageal dilation. due to dysphagia. Dr. Rory Langford. HX KNEE REPLACEMENT Right 2018    Dr. Breezy Zelaya    HX LUMBAR LAMINECTOMY  10/18/2016    HX MALIGNANT SKIN LESION EXCISION  01/28/15    BCC. L Forehead. MOHs SURGERY. Dr. Abdi [facility]. HX RAIMUNDO AND BSO      due to fibroids    HX TONSILLECTOMY      UT COMBINED ANT/POST COLPORRHAPHY  05    with enterocele RE.   Dr. Fabiola Tobin and Dr. Tru Pryor       Family History   Problem Relation Age of Onset    Hypertension Mother     Dementia Mother         alzheimers    Hypertension Father     High Cholesterol Father     Kidney Disease Father         1 kidney    Emphysema Father         O2 requiring    Hypertension Sister     Diabetes Maternal Grandmother     Dementia Maternal Grandmother     Thyroid Disease Maternal Grandmother     Thyroid Disease Daughter         goiter    Heart Attack Maternal Uncle 46        massive    Heart Attack Maternal Uncle 39        massive    Heart Attack Maternal Aunt 51        massive    Thyroid Disease Daughter         thyroid cyst    Other Brother          AT 1DAYS OLD    Anesth Problems Neg Hx        Social History     Socioeconomic History    Marital status:      Spouse name: Not on file    Number of children: Not on file    Years of education: Not on file    Highest education level: Not on file   Occupational History    Not on file   Tobacco Use    Smoking status: Never    Smokeless tobacco: Never   Vaping Use    Vaping Use: Never used   Substance and Sexual Activity    Alcohol use: No    Drug use: No    Sexual activity: Not Currently     Partners: Male     Birth control/protection: Surgical   Other Topics Concern    Not on file   Social History Narrative    Not on file     Social Determinants of Health     Financial Resource Strain: Low Risk     Difficulty of Paying Living Expenses: Not very hard   Food Insecurity: No Food Insecurity    Worried About Running Out of Food in the Last Year: Never true    Ran Out of Food in the Last Year: Never true   Transportation Needs: Not on file   Physical Activity: Not on file   Stress: Not on file   Social Connections: Not on file   Intimate Partner Violence: Not on file   Housing Stability: Not on file       Office Visit on 11/18/2022   Component Date Value Ref Range Status    Hemoglobin A1c (POC) 11/18/2022 5.7  % Final       Review of Systems   Constitutional:  Negative for activity change, fatigue and unexpected weight change. HENT:  Negative for congestion, hearing loss, rhinorrhea and sore throat. Eyes:  Negative for discharge. Respiratory:  Negative for cough, chest tightness and shortness of breath. Cardiovascular:  Negative for leg swelling. Gastrointestinal:  Negative for abdominal pain, constipation and diarrhea. Genitourinary:  Negative for dysuria, flank pain, frequency and urgency. Musculoskeletal:  Negative for arthralgias, back pain and myalgias. Foot pain, Restless legs   Skin:  Negative for color change and rash. Neurological:  Negative for dizziness, light-headedness and headaches. Psychiatric/Behavioral:  Negative for dysphoric mood and sleep disturbance. The patient is not nervous/anxious. Visit Vitals  /80 (BP 1 Location: Left upper arm, BP Patient Position: Sitting)   Pulse 73   Temp 97.3 °F (36.3 °C) (Temporal)   Resp 16   Ht 5' 3\" (1.6 m)   Wt 142 lb 6.4 oz (64.6 kg)   SpO2 100%   BMI 25.23 kg/m²       Physical Exam  Vitals and nursing note reviewed. Constitutional:       General: She is not in acute distress. Appearance: Normal appearance. She is well-developed.  She is not diaphoretic. HENT:      Right Ear: External ear normal.      Left Ear: External ear normal.   Eyes:      General: No scleral icterus. Right eye: No discharge. Left eye: No discharge. Extraocular Movements: Extraocular movements intact. Conjunctiva/sclera: Conjunctivae normal.   Cardiovascular:      Rate and Rhythm: Normal rate and regular rhythm. Pulmonary:      Effort: Pulmonary effort is normal.      Breath sounds: Normal breath sounds. No wheezing. Abdominal:      General: Bowel sounds are normal.      Palpations: Abdomen is soft. Tenderness: There is no abdominal tenderness. Musculoskeletal:      Cervical back: Normal range of motion and neck supple. Lymphadenopathy:      Cervical: No cervical adenopathy. Neurological:      Mental Status: She is alert and oriented to person, place, and time. Psychiatric:         Mood and Affect: Mood and affect normal.         I, Layfina Garnica MD, personally performed the services described in this documentation as scribed by Abilio Reyes in my presence, and it is both accurate and complete. An electronic signature was used to authenticate this note.   -- Abilio Reyes

## 2022-11-18 NOTE — PROGRESS NOTES
1. \"Have you been to the ER, urgent care clinic since your last visit? Hospitalized since your last visit? \" No    2. \"Have you seen or consulted any other health care providers outside of the [address] [place] [address] since your last visit? \" No     3. For patients aged 39-70: Has the patient had a colonoscopy / FIT/ Cologuard? NA - based on age      If the patient is female:    4. For patients aged 41-77: Has the patient had a mammogram within the past 2 years? Yes - no [place] present  This week, at [facility]    5. For patients aged 21-65: Has the patient had a pap smear?  NA - based on age or sex     Chief Complaint   Patient presents with    Follow-up

## 2022-12-02 ENCOUNTER — TELEPHONE (OUTPATIENT)
Dept: PRIMARY CARE CLINIC | Age: 82
End: 2022-12-02

## 2022-12-02 NOTE — TELEPHONE ENCOUNTER
Pharmacy received an order for coenzyme Q10, but the combination drug is not available through their . They are requesting a verbal order to split the script into its components for quicker distribution.     Phone: [phone]

## 2022-12-06 NOTE — TELEPHONE ENCOUNTER
Called and spoke with the pharmacy- regular co q 10 is okay to give. Verbal has been given for ordering spilt medication for what she is able to order.

## 2023-01-11 DIAGNOSIS — G25.81 RESTLESS LEGS SYNDROME (RLS): ICD-10-CM

## 2023-01-11 RX ORDER — PRAMIPEXOLE DIHYDROCHLORIDE 1 MG/1
TABLET ORAL
Qty: 270 TABLET | Refills: 0 | Status: SHIPPED | OUTPATIENT
Start: 2023-01-11

## 2023-01-12 DIAGNOSIS — I10 ESSENTIAL HYPERTENSION, BENIGN: ICD-10-CM

## 2023-01-12 RX ORDER — BENAZEPRIL HYDROCHLORIDE 20 MG/1
TABLET ORAL
Qty: 90 TABLET | Refills: 0 | Status: SHIPPED | OUTPATIENT
Start: 2023-01-12

## 2023-01-17 ENCOUNTER — OFFICE VISIT (OUTPATIENT)
Dept: ORTHOPEDIC SURGERY | Age: 83
End: 2023-01-17
Payer: MEDICARE

## 2023-01-17 VITALS — HEIGHT: 63 IN | WEIGHT: 144 LBS | BODY MASS INDEX: 25.52 KG/M2

## 2023-01-17 DIAGNOSIS — M17.12 PRIMARY OSTEOARTHRITIS OF LEFT KNEE: ICD-10-CM

## 2023-01-17 DIAGNOSIS — M25.562 ACUTE PAIN OF LEFT KNEE: Primary | ICD-10-CM

## 2023-01-17 NOTE — LETTER
1/17/2023    Patient: Zechariah Sanchez   YOB: 1940   Date of Visit: 1/17/2023     Kelly Adamson MD  [address] [place] [address] In [place]    Dear Kelly Adamson MD,      Thank you for referring Ms. Mary Breaux to [facility] for evaluation. My notes for this consultation are attached. If you have questions, please do not hesitate to call me. I look forward to following your patient along with you.       Sincerely,    Jennifer Engel MD

## 2023-01-17 NOTE — PROGRESS NOTES
Osorio Tello (: 1940) is a 80 y.o. female, patient, here for evaluation of the following chief complaint(s):  Knee Pain (Left knee pain )       HPI:    Patient presents the office today with a chief complaint of left knee pain. This is the patient has had on and off left knee pain for quite some time. She has been seen by another orthopedic surgeon before in the past she has had cortisone injection. She states her cortisone injection is no longer helpful. She notices discomfort that is mostly located along the lateral aspect the knee.   She notices swelling catching popping locking and giving way of the knee on a routine basis    Allergies   Allergen Reactions    Other Food Itching     If eats large quantities over several days causes itching: tomatoes, peaches, strawberry, fig    Bee Sting [Sting, Bee] Swelling    Pcn [Penicillins] Hives     IN CHILDHOOD    Percocet [Oxycodone-Acetaminophen] Other (comments)      Other (comments)\"hellish nightmares\"      Shellfish Containing Products Itching    Zoster Vaccine Live Swelling     Severe Right arm swelling with redness after administered by pharmacist in elbow    Atarax [Hydroxyzine Hcl] Other (comments)     jittery    Buspar [Buspirone] Nausea Only    Crestor [Rosuvastatin] Myalgia    Hydrochlorothiazide Myalgia     Other reaction(s): sorethroat    Hydroxyzine Other (comments)     jittery    Mobic [Meloxicam] Other (comments)     Easy bruising    Norco [Hydrocodone-Acetaminophen] Nausea and Vomiting                Current Outpatient Medications   Medication Sig    benazepriL (LOTENSIN) 20 mg tablet TAKE 1 TABLET BY MOUTH DAILY    pramipexole (MIRAPEX) 1 mg tablet TAKE 1 TABLET BY MOUTH THREE TIMES DAILY    coenzyme q10-thioctic-vit e 30- mg-mg-unit cap 1 tablet at night    albuterol (PROVENTIL HFA, VENTOLIN HFA, PROAIR HFA) 90 mcg/actuation inhaler INHALE 1 PUFF BY MOUTH EVERY 6 HOURS AS NEEDED FOR WHEEZING    mv-min-folic-calcium carb-K1 (Women's 50 Plus Multivitamin) 400 mcg-500 mg calcium-20 mcg tab Take 1 Tablet by mouth daily. (Patient not taking: Reported on 11/18/2022)    cyanocobalamin 1,000 mcg tablet Take 1,000 mcg by mouth daily. fluticasone propionate (FLONASE) 50 mcg/actuation nasal spray 2 Sprays by Both Nostrils route daily. No current facility-administered medications for this visit. Past Medical History:   Diagnosis Date    AC (acromioclavicular) joint bone spurs 01/2014    in back. Dr. Kolby Merrill. Txd with shots x 3    Actinic keratosis 2015    Dr. Nabor Briggs. Dr. Tarik Choe. Allergic rhinitis, cause unspecified     Anxiety state, unspecified     Arthritis     R KNEE BONE-ON-BONE; R HAND-----OSTEO    Asthma     BRONCHITIS IN [place] MOST YEARS    BCC (basal cell carcinoma), face 1980s (nose), 01/28/15 (forehead)    Dr. Kolleen Claude. Dr. Chacho Ward. Bilateral pes planus 03/10/2020    Dr. Chidi Lehman 2007    Dr. Josué So    Chronic insomnia     Chronic low back pain 01/2014    Dr. Rambo Chairez. DJD and spurs, Narrowing of L 3,4,5. Dr. Kolby Merrill. Chronic obstructive pulmonary disease ([facility] [address]     CHRONIC (TWICE-YEARLY) BRONCHITIS    Colon polyps 11/2005, 01/29/09, 9/16/2015    benign. Dr. Leonel Leslie    Constipation     Degenerative lumbar spinal stenosis 01/2014    Dr. Philomena Foster Narrowing of L 3,4,5    Diabetes mellitus type 2, diet-controlled ([facility] [address] 10/13/2017    LOST WEIGHT AND IS NOT DIABETIC    Dyslipidemia     Dysphagia 09/2014    due to Esophagitis. Dr. Bernard Hernandez. Esophagitis 09/29/2014    ESOPHAGITIS    Essential hypertension, benign     Foot fracture, left 2009    stress. Foot pain, bilateral 11/13/2013    Dr. Bret Brantley. Hearing loss     Dr. Miryam Gutierrez. Knee pain, bilateral 10/28/13    Dr. Billie Hein. Right > Left. Severe OA. Txd with PT.    Lumbar stenosis with neurogenic claudication     Menopause 1976    Migraine     NONE SINCE AGE 40 Mixed stress and urge urinary incontinence     NO LEAKAGE; GETS UP SEVERAL TIMES A NIGHT, PT STATES ON 10/3/16    OA (osteoarthritis) of knee     Dr. Annemarie Kwok    Osteopenia 08/24/2015    Restless legs syndrome (RLS) 09/2015    Dr. Nini Smith. Shoulder joint dislocation 06/2011    Right. due to fall. Dr. Jag Gore    Sleep apnea     undiagnosed    Tremor of both hands 09/2015    Dr. Jovanny WILKERSON        Past Surgical History:   Procedure Laterality Date    COLONOSCOPY N/A 9/2/2020    COLONOSCOPY   :- performed by Jett Mulligan MD at [facility] ENDOSCOPY    R [address] Left 04/03/2018    Dr. Gus Rowan     HX CATARACT REMOVAL Bilateral , R 04/10/17    Dr. Thelma Ross    HX COLONOSCOPY  11/2005, 01/29/09, 09/16/15    with polypectomy. Dr. Leola Meng q5 years    HX ENDOSCOPY  09/2014    with Esophageal dilation. due to dysphagia. Dr. Gina Sánchez. HX KNEE REPLACEMENT Right 08/27/2018    Dr. Radha Madrid    HX LUMBAR LAMINECTOMY  10/18/2016    HX MALIGNANT SKIN LESION EXCISION  01/28/15    BCC. L Forehead. MOHs SURGERY. Dr. Tonya Arreaga. HX RAIMUNDO AND BSO  1976    due to fibroids    HX TONSILLECTOMY  1948    [place] COMBINED ANT/POST COLPORRHAPHY  02/28/05    with enterocele RE.   Dr. Khadijah Hammond and Dr. Tu Duvall       Family History   Problem Relation Age of Onset    Hypertension Mother     Dementia Mother         alzheimers    Hypertension Father     High Cholesterol Father     Kidney Disease Father         1 kidney    Emphysema Father         O2 requiring    Hypertension Sister     Diabetes Maternal Grandmother     Dementia Maternal Grandmother     Thyroid Disease Maternal Grandmother     Thyroid Disease Daughter         goiter    Heart Attack Maternal Uncle 51        massive    Heart Attack Maternal Uncle 45        massive    Heart Attack Maternal Aunt 51        massive    Thyroid Disease Daughter thyroid cyst    Other Brother          AT 1DAYS OLD    Anesth Problems Neg Hx         Social History     Socioeconomic History    Marital status:      Spouse name: Not on file    Number of children: Not on file    Years of education: Not on file    Highest education level: Not on file   Occupational History    Not on file   Tobacco Use    Smoking status: Never    Smokeless tobacco: Never   Vaping Use    Vaping Use: Never used   Substance and Sexual Activity    Alcohol use: No    Drug use: No    Sexual activity: Not Currently     Partners: Male     Birth control/protection: Surgical   Other Topics Concern    Not on file   Social History Narrative    Not on file     Social Determinants of Health     Financial Resource Strain: Low Risk     Difficulty of Paying Living Expenses: Not very hard   Food Insecurity: No Food Insecurity    Worried About Running Out of Food in the Last Year: Never true    Ran Out of Food in the Last Year: Never true   Transportation Needs: Not on file   Physical Activity: Not on file   Stress: Not on file   Social Connections: Not on file   Intimate Partner Violence: Not on file   Housing Stability: Not on file       Review of Systems   Musculoskeletal:         Left knee pain      Vitals:  Ht 5' 3\" (1.6 m)   Wt 144 lb (65.3 kg)   BMI 25.51 kg/m²    Body mass index is 25.51 kg/m². Ortho Exam     Patient is alert and oriented x3. Patient is in no acute distress. Patient ambulates with a mild antalgic gait      Left knee: 2+ knee effusion. Range of motion 0-130 degrees. Positive crepitation throughout range of motion. Positive valgus deformity. There is no instability with manipulation of the knee. Calf is soft and supple. Neurovascular examination is intact. Right knee: Incision from prior surgery is healed well. There is no effusion. She has no pain. Range of motion 0-122 degrees. No instability is identified. Neurovascular examination intact.     XR Results (most recent):  Results from Appointment encounter on 01/17/23    XR KNEE LT MIN 4 V    Narrative  4 view x-ray of the left knee reveals bone-on-bone arthritis lateral compartment the left knee. She also has significant osteoarthritis of the patellofemoral joint       ASSESSMENT/PLAN:    Patient presents to the office today with end-stage osteoarthritis of the left knee. We discussed treatment options moving forward. She is considering total knee replacement. I think it is reasonable and appropriate. She has radiographic evidence of significant advanced osteoarthritis of her knee. She also has recurrent knee pain and catching locking of the knee joint. I talked her about surgery she is obviously familiar with this but I refreshed her memory. She is 80years of age and this does create some concerns and we talked about this as well today. She is going to pray and think about this and call let me know how she would like to proceed. I have gone over the surgery in great detail. I have gone over the risks. The risks and benefits were described to the patient. The patient understands there is a risk of infection, postoperative pain, numbness, tingling, stiffness DVT, PE, MI, CVA and any other unforeseen events. The patient also understands there is a long rehabilitative process that typically follows the surgical procedure. We talked about the possibility of not being able to alleviate all of the discomfort. Also, I explained  there is no guarantee all function and strength will return. The patient understands the possiblity that  implants may be utilized during this surgery. The patient also understands the generalized, associated risk of anesthetic and wishes to proceed in an elective fashion.         Roque Higuera MD

## 2023-01-20 ENCOUNTER — OFFICE VISIT (OUTPATIENT)
Dept: NEUROLOGY | Age: 83
End: 2023-01-20
Payer: MEDICARE

## 2023-01-20 VITALS
OXYGEN SATURATION: 99 % | WEIGHT: 151 LBS | RESPIRATION RATE: 17 BRPM | BODY MASS INDEX: 26.75 KG/M2 | HEIGHT: 63 IN | DIASTOLIC BLOOD PRESSURE: 76 MMHG | SYSTOLIC BLOOD PRESSURE: 127 MMHG | HEART RATE: 88 BPM

## 2023-01-20 DIAGNOSIS — R29.898 LEFT LEG WEAKNESS: ICD-10-CM

## 2023-01-20 DIAGNOSIS — G25.81 RESTLESS LEGS SYNDROME (RLS): Primary | ICD-10-CM

## 2023-01-20 DIAGNOSIS — M17.12 PRIMARY OSTEOARTHRITIS OF LEFT KNEE: Primary | ICD-10-CM

## 2023-01-20 DIAGNOSIS — G62.9 NEUROPATHY: ICD-10-CM

## 2023-01-20 RX ORDER — OMEPRAZOLE 20 MG/1
20 CAPSULE, DELAYED RELEASE ORAL DAILY
COMMUNITY

## 2023-01-20 RX ORDER — LANOLIN ALCOHOL/MO/W.PET/CERES
3 CREAM (GRAM) TOPICAL
COMMUNITY

## 2023-01-20 RX ORDER — IBUPROFEN 100 MG/5ML
1000 SUSPENSION, ORAL (FINAL DOSE FORM) ORAL DAILY
COMMUNITY

## 2023-01-20 RX ORDER — MAGNESIUM 250 MG
500 TABLET ORAL DAILY
COMMUNITY

## 2023-01-20 RX ORDER — CALCIUM CARBONATE 600 MG
600 TABLET ORAL DAILY
COMMUNITY

## 2023-01-20 NOTE — PROGRESS NOTES
Chief Complaint   Patient presents with    Follow-up     Restless legs. Patient reports no balance and burning in legs. Reports left hand tremors       HPI    Mrs Flo Crenshaw is a 80 year old female here for a follow up. She is a new patient for me. She was last seen in the office on 2/23/22 by Dr Jami Pang for restless leg syndrome and spine degeneration. She has optimized pramipexole 1 mg at night and having great results. Since last visit she has gone up to 2 mg at night which has made her sleep better. She is still taking her magnesium daily. She is reporting that her legs are giving out at times when shes walking. She usually catches herself before she falls. She does have a bad left knee that she is scheduled for a knee replacement in May. She is having some burning in the tops of her feet at night as well. Background:  Felice Heredia is an 70-year-old woman with a history of degenerative disc disease, asthma here for a few issues. I reviewed the medical record and spoke with her personally. I also reviewed neuroimaging results from orthopedics. Main reason why she is here is that she feels like her left leg gives out on her. This has been going on for several years. This can occur without warning and she has fallen a couple times last in February. No head trauma. She does have a history of low back radicular history requiring surgery. L-spine in 2014 results are reviewed showing multilevel canal stenosis and neuroforaminal stenosis. Superimposed on this she has chronic left knee arthritis that has been getting worse. She has not seen the knee specialist in quite a long time about this. She had the right knee replacement years ago. Other issue is that she has noticed a tremor in her hands for couple years and she is worried she might have Parkinson's. She has no hallucinations or difficulty swallowing. No problems walking aside from the left leg symptoms.   She has chronic restless leg managed with Requip at night. Multiple allergies noted. Review of Systems   Musculoskeletal:  Positive for myalgias. Negative for falls. Neurological:  Positive for tingling. Restless leg   All other systems reviewed and are negative. Past Medical History:   Diagnosis Date    AC (acromioclavicular) joint bone spurs 01/2014    in back. Dr. Rob Dixon. Txd with shots x 3    Actinic keratosis 2015    Dr. Jack Dawn. Dr. Oksana Araiza. Allergic rhinitis, cause unspecified     Anxiety state, unspecified     Arthritis     R KNEE BONE-ON-BONE; R HAND-----OSTEO    Asthma     BRONCHITIS IN [place] MOST YEARS    BCC (basal cell carcinoma), face 1980s (nose), 01/28/15 (forehead)    Dr. Lamar Butler. Dr. Alexandra Dewitt. Bilateral pes planus 03/10/2020    Dr. Alayna Hay 2007    Dr. Mando Strauss    Chronic insomnia     Chronic low back pain 01/2014    Dr. Melly Beard. DJD and spurs, Narrowing of L 3,4,5. Dr. Rob Dixon. Chronic obstructive pulmonary disease ([facility] [address]     CHRONIC (TWICE-YEARLY) BRONCHITIS    Colon polyps 11/2005, 01/29/09, 9/16/2015    benign. Dr. Lyla Heath    Constipation     Degenerative lumbar spinal stenosis 01/2014    Dr. Justus Singer Narrowing of L 3,4,5    Diabetes mellitus type 2, diet-controlled ([facility] [address] 10/13/2017    LOST WEIGHT AND IS NOT DIABETIC    Dyslipidemia     Dysphagia 09/2014    due to Esophagitis. Dr. Roseanne Cabrera. Esophagitis 09/29/2014    ESOPHAGITIS    Essential hypertension, benign     Foot fracture, left 2009    stress. Foot pain, bilateral 11/13/2013    Dr. Wesley Ortiz. Hearing loss     Dr. Velvet Sauer. Knee pain, bilateral 10/28/13    Dr. Marta Ruffin. Right > Left. Severe OA. Txd with PT.    Lumbar stenosis with neurogenic claudication     Menopause 1976    Migraine     NONE SINCE AGE 37    Mixed stress and urge urinary incontinence     NO LEAKAGE; GETS UP SEVERAL TIMES A NIGHT, PT STATES ON 10/3/16    OA (osteoarthritis) of knee     Dr. Chris Clayton    Osteopenia 2015    Restless legs syndrome (RLS) 2015    Dr. Roberto Gustafson. Shoulder joint dislocation 2011    Right. due to fall.   Dr. Landy Blank    Sleep apnea     undiagnosed    Tremor of both hands 2015    Dr. Jose Stafford                  PT Versabihae Ilene GABAPENTIN     Family History   Problem Relation Age of Onset    Hypertension Mother     Dementia Mother         alzheimers    Hypertension Father     High Cholesterol Father     Kidney Disease Father         1 kidney    Emphysema Father         O2 requiring    Hypertension Sister     Diabetes Maternal Grandmother     Dementia Maternal Grandmother     Thyroid Disease Maternal Grandmother     Thyroid Disease Daughter         goiter    Heart Attack Maternal Uncle 46        massive    Heart Attack Maternal Uncle 39        massive    Heart Attack Maternal Aunt 51        massive    Thyroid Disease Daughter         thyroid cyst    Other Brother          AT 1DAYS OLD    Anesth Problems Neg Hx      Social History     Socioeconomic History    Marital status:      Spouse name: Not on file    Number of children: Not on file    Years of education: Not on file    Highest education level: Not on file   Occupational History    Not on file   Tobacco Use    Smoking status: Never    Smokeless tobacco: Never   Vaping Use    Vaping Use: Never used   Substance and Sexual Activity    Alcohol use: No    Drug use: No    Sexual activity: Not Currently     Partners: Male     Birth control/protection: Surgical   Other Topics Concern    Not on file   Social History Narrative    Not on file     Social Determinants of Health     Financial Resource Strain: Low Risk     Difficulty of Paying Living Expenses: Not very hard   Food Insecurity: No Food Insecurity    Worried About Running Out of Food in the Last Year: Never true    Ran Out of Food in the Last Year: Never true   Transportation Needs: Not on file   Physical Activity: Not on file   Stress: Not on file   Social Connections: Not on file   Intimate Partner Violence: Not on file   Housing Stability: Not on file     Allergies   Allergen Reactions    Other Food Itching     If eats large quantities over several days causes itching: tomatoes, peaches, strawberry, fig    Bee Sting [Sting, Bee] Swelling    Pcn [Penicillins] Hives     IN CHILDHOOD    Percocet [Oxycodone-Acetaminophen] Other (comments)      Other (comments)\"hellish nightmares\"      Shellfish Containing Products Itching    Zoster Vaccine Live Swelling     Severe Right arm swelling with redness after administered by pharmacist in elbow    Atarax [Hydroxyzine Hcl] Other (comments)     jittery    Buspar [Buspirone] Nausea Only    Crestor [Rosuvastatin] Myalgia    Hydrochlorothiazide Myalgia     Other reaction(s): sorethroat    Hydroxyzine Other (comments)     jittery    Mobic [Meloxicam] Other (comments)     Easy bruising    Norco [Hydrocodone-Acetaminophen] Nausea and Vomiting                  Current Outpatient Medications   Medication Sig    magnesium 250 mg tab Take 500 mg by mouth daily. ascorbic acid, vitamin C, (VITAMIN C) 1,000 mg tablet Take 1,000 mg by mouth daily. calcium carbonate (CALTREX) 600 mg calcium (1,500 mg) tablet Take 600 mg by mouth daily. melatonin 3 mg tablet Take 3 mg by mouth nightly. omeprazole (PRILOSEC) 20 mg capsule Take 20 mg by mouth daily. benazepriL (LOTENSIN) 20 mg tablet TAKE 1 TABLET BY MOUTH DAILY    pramipexole (MIRAPEX) 1 mg tablet TAKE 1 TABLET BY MOUTH THREE TIMES DAILY    albuterol (PROVENTIL HFA, VENTOLIN HFA, PROAIR HFA) 90 mcg/actuation inhaler INHALE 1 PUFF BY MOUTH EVERY 6 HOURS AS NEEDED FOR WHEEZING    cyanocobalamin 1,000 mcg tablet Take 1,000 mcg by mouth daily.     coenzyme q10-thioctic-vit e 30- mg-mg-unit cap 1 tablet at night (Patient not taking: Reported on 8/73/6335)    mv-min-folic-calcium carb-K1 (Women's 50 Plus Multivitamin) 400 mcg-500 mg calcium-20 mcg tab Take 1 Tablet by mouth daily. (Patient not taking: No sig reported)    fluticasone propionate (FLONASE) 50 mcg/actuation nasal spray 2 Sprays by Both Nostrils route daily. (Patient not taking: Reported on 1/20/2023)     No current facility-administered medications for this visit. Neurologic Exam     Mental Status   WD/WN adult in NAD, normal grooming  VSS  A&O x 3    PERRL, nonicteric  Face is symmetric, tongue midline  Speech is fluent and clear  No limb ataxia. No abnl movements. Moving all extemities spontaneously and symmetric  Normal gait       Visit Vitals  /76 (BP 1 Location: Left arm, BP Patient Position: Sitting, BP Cuff Size: Adult)   Pulse 88   Resp 17   Ht 5' 3\" (1.6 m)   Wt 151 lb (68.5 kg)   SpO2 99%   BMI 26.75 kg/m²     Follow-up and Dispositions    Return in about 1 year (around 1/20/2024). Follow-up and Dispositions    Return in about 1 year (around 1/20/2024). Assessment and Plan   Diagnoses and all orders for this visit:    1. Restless legs syndrome (RLS)    2. Left leg weakness    3. Neuropathy      80year old female with restless leg that is doing well on her current regime. Continue to use the medication every night. You can increase to 3 mg nightly if you feel like you need it. I believe the leg weakness is from the knee pain, and surgery is going to help this. Physical therapy post-op is going to help with balance and getting her strength back. I believe the burning in her feet is from the restless leg and some slight neuropathy. I recommended she try some Vicks at night on her feet to try and calm some of the burning down. I will see her back yearly. I spent 45 minutes of time today reviewing the medical record and notes, imaging, examining the patient, and face-to-face time, patient/family teaching and medication side effects, and time spent completing documentation.     YASMINE Sanchez

## 2023-01-20 NOTE — PROGRESS NOTES
Chief Complaint   Patient presents with    Follow-up     Restless legs. Patient reports no balance and burning in legs.   Reports left hand tremors    Visit Vitals  /76 (BP 1 Location: Left arm, BP Patient Position: Sitting, BP Cuff Size: Adult)   Pulse 88   Resp 17   Ht 5' 3\" (1.6 m)   Wt 151 lb (68.5 kg)   SpO2 99%   BMI 26.75 kg/m²

## 2023-01-31 ENCOUNTER — TELEPHONE (OUTPATIENT)
Dept: ORTHOPEDIC SURGERY | Age: 83
End: 2023-01-31

## 2023-04-03 PROBLEM — E11.9 DIABETES MELLITUS TYPE 2, DIET-CONTROLLED (HCC): Status: RESOLVED | Noted: 2017-10-13 | Resolved: 2021-02-08

## 2023-04-11 PROBLEM — U07.1 COVID: Status: RESOLVED | Noted: 2021-12-24 | Resolved: 2023-04-11

## 2023-05-04 ENCOUNTER — HOSPITAL ENCOUNTER (OUTPATIENT)
Dept: PREADMISSION TESTING | Age: 83
Discharge: HOME | End: 2023-05-04
Payer: MEDICARE

## 2023-05-04 LAB
ABO + RH BLD: NORMAL
APPEARANCE UR: CLEAR
BACTERIA URNS QL MICRO: ABNORMAL /HPF
BILIRUB UR QL: NEGATIVE
BLOOD GROUP ANTIBODIES SERPL: NORMAL
COLOR UR: ABNORMAL
EPITH CASTS URNS QL MICRO: ABNORMAL /LPF
GLUCOSE UR STRIP.AUTO-MCNC: NEGATIVE MG/DL
HGB UR QL STRIP: NEGATIVE
HYALINE CASTS URNS QL MICRO: ABNORMAL /LPF (ref 0–5)
KETONES UR QL STRIP.AUTO: NEGATIVE MG/DL
LEUKOCYTE ESTERASE UR QL STRIP.AUTO: ABNORMAL
NITRITE UR QL STRIP.AUTO: NEGATIVE
PH UR STRIP: 6.5 (ref 5–8)
PROT UR STRIP-MCNC: NEGATIVE MG/DL
RBC #/AREA URNS HPF: ABNORMAL /HPF (ref 0–5)
SP GR UR REFRACTOMETRY: 1.01 (ref 1–1.03)
SPECIMEN EXP DATE BLD: NORMAL
UA: UC IF INDICATED,UAUC: ABNORMAL
UROBILINOGEN UR QL STRIP.AUTO: 0.2 EU/DL (ref 0.2–1)
WBC URNS QL MICRO: ABNORMAL /HPF (ref 0–4)

## 2023-05-04 PROCEDURE — 36415 COLL VENOUS BLD VENIPUNCTURE: CPT

## 2023-05-04 PROCEDURE — 86900 BLOOD TYPING SEROLOGIC ABO: CPT

## 2023-05-04 PROCEDURE — 81001 URINALYSIS AUTO W/SCOPE: CPT

## 2023-05-04 PROCEDURE — 87086 URINE CULTURE/COLONY COUNT: CPT

## 2023-05-04 RX ORDER — CALCIUM CARBONATE 300MG(750)
TABLET,CHEWABLE ORAL
COMMUNITY

## 2023-05-04 RX ORDER — AZELASTINE 1 MG/ML
1 SPRAY, METERED NASAL 2 TIMES DAILY
COMMUNITY

## 2023-05-05 LAB
BACTERIA SPEC CULT: NORMAL
BACTERIA SPEC CULT: NORMAL
SERVICE CMNT-IMP: NORMAL

## 2023-05-05 RX ORDER — NITROFURANTOIN 25; 75 MG/1; MG/1
100 CAPSULE ORAL 2 TIMES DAILY
Qty: 10 CAPSULE | Refills: 0 | Status: SHIPPED | OUTPATIENT
Start: 2023-05-05 | End: 2023-05-10

## 2023-05-06 LAB
BACTERIA SPEC CULT: ABNORMAL
CC UR VC: ABNORMAL
SERVICE CMNT-IMP: ABNORMAL

## 2023-05-08 ENCOUNTER — TELEPHONE (OUTPATIENT)
Dept: PRIMARY CARE CLINIC | Facility: CLINIC | Age: 83
End: 2023-05-08

## 2023-05-08 NOTE — TELEPHONE ENCOUNTER
Called Maddy and left a message stating I am calling in regards to a pt and I need a fax number to fax last office notes and to call back.

## 2023-05-08 NOTE — TELEPHONE ENCOUNTER
----- Message from Melbournedoretha Alcantar sent at 5/8/2023 12:48 PM EDT -----  Subject: Message to Provider    QUESTIONS  Information for Provider? Tamanna with [facility] needs the most   recent APRIL visit to be sent [facility]. [phone]  ---------------------------------------------------------------------------  --------------  Alisha Naylor INFO  [phone]; OK to leave message on voicemail  ---------------------------------------------------------------------------  --------------  SCRIPT ANSWERS  Relationship to Patient? Covered Entity  Covered Entity Type? [facility] Care? Representative Name?  Hubert Smith

## 2023-05-09 NOTE — PERIOP NOTE
RECEIVED VM FROM PT REGARDING MEDICATION QUESTIONS. PT STATES SHE IS UNABLE TO SLEEP WITHOUT HER MELATONIN. PT ALSO INQUIRING WHICH MEDICATIONS SHE CAN TAKE DOS. SPOKE [phone] [facility] [phone], NP AND PER HERMINIA, PT MAY TAKE HER MELATONIN AS NEEDED FOR SLEEP. LVM FOR PT THAT SHE CAN TAKE HER MELATONIN AND ALL OTHER SCHEDULED BEDTIME MEDICATIONS INCLUDING BENAZEPRIL, OMEPRAZOLE, AND MIRAPEX THE NIGHT BEFORE SURGERY. PT INSTRUCTED NOT TO TAKE ANY MEDICATIONS MORNING OF SURGERY. LEFT CALL BACK NUMBER ON VM AND INSTRUCTED PT TO CALL BACK WITH ANY OTHER QUESTIONS.

## 2023-05-10 ENCOUNTER — ANESTHESIA EVENT (OUTPATIENT)
Facility: HOSPITAL | Age: 83
End: 2023-05-10
Payer: MEDICARE

## 2023-05-11 ENCOUNTER — HOSPITAL ENCOUNTER (OUTPATIENT)
Facility: HOSPITAL | Age: 83
Setting detail: OBSERVATION
LOS: 1 days | Discharge: HOME | End: 2023-05-12
Attending: ORTHOPAEDIC SURGERY | Admitting: ORTHOPAEDIC SURGERY
Payer: MEDICARE

## 2023-05-11 ENCOUNTER — ANESTHESIA (OUTPATIENT)
Facility: HOSPITAL | Age: 83
End: 2023-05-11
Payer: MEDICARE

## 2023-05-11 DIAGNOSIS — M17.12 OSTEOARTHRITIS OF LEFT KNEE, UNSPECIFIED OSTEOARTHRITIS TYPE: Primary | ICD-10-CM

## 2023-05-11 LAB
GLUCOSE BLD STRIP.AUTO-MCNC: 103 MG/DL (ref 65–117)
GLUCOSE BLD STRIP.AUTO-MCNC: 95 MG/DL (ref 65–117)
SERVICE CMNT-IMP: NORMAL
SERVICE CMNT-IMP: NORMAL

## 2023-05-11 PROCEDURE — C1776 JOINT DEVICE (IMPLANTABLE): HCPCS | Performed by: ORTHOPAEDIC SURGERY

## 2023-05-11 PROCEDURE — 27447 TOTAL KNEE ARTHROPLASTY: CPT | Performed by: PHYSICIAN ASSISTANT

## 2023-05-11 PROCEDURE — 2580000003 HC RX 258: Performed by: ORTHOPAEDIC SURGERY

## 2023-05-11 PROCEDURE — C1713 ANCHOR/SCREW BN/BN,TIS/BN: HCPCS | Performed by: ORTHOPAEDIC SURGERY

## 2023-05-11 PROCEDURE — 2500000003 HC RX 250 WO HCPCS: Performed by: ORTHOPAEDIC SURGERY

## 2023-05-11 PROCEDURE — 97530 THERAPEUTIC ACTIVITIES: CPT

## 2023-05-11 PROCEDURE — 3600000005 HC SURGERY LEVEL 5 BASE: Performed by: ORTHOPAEDIC SURGERY

## 2023-05-11 PROCEDURE — 82962 GLUCOSE BLOOD TEST: CPT

## 2023-05-11 PROCEDURE — 3700000000 HC ANESTHESIA ATTENDED CARE: Performed by: ORTHOPAEDIC SURGERY

## 2023-05-11 PROCEDURE — 97162 PT EVAL MOD COMPLEX 30 MIN: CPT

## 2023-05-11 PROCEDURE — 2580000003 HC RX 258: Performed by: ANESTHESIOLOGY

## 2023-05-11 PROCEDURE — 2709999900 HC NON-CHARGEABLE SUPPLY: Performed by: ORTHOPAEDIC SURGERY

## 2023-05-11 PROCEDURE — 6360000002 HC RX W HCPCS: Performed by: ORTHOPAEDIC SURGERY

## 2023-05-11 PROCEDURE — 7100000000 HC PACU RECOVERY - FIRST 15 MIN: Performed by: ORTHOPAEDIC SURGERY

## 2023-05-11 PROCEDURE — 2500000003 HC RX 250 WO HCPCS: Performed by: NURSE ANESTHETIST, CERTIFIED REGISTERED

## 2023-05-11 PROCEDURE — G0378 HOSPITAL OBSERVATION PER HR: HCPCS

## 2023-05-11 PROCEDURE — 2500000003 HC RX 250 WO HCPCS: Performed by: ANESTHESIOLOGY

## 2023-05-11 PROCEDURE — C9290 INJ, BUPIVACAINE LIPOSOME: HCPCS | Performed by: ORTHOPAEDIC SURGERY

## 2023-05-11 PROCEDURE — 2580000003 HC RX 258

## 2023-05-11 PROCEDURE — 6370000000 HC RX 637 (ALT 250 FOR IP): Performed by: ORTHOPAEDIC SURGERY

## 2023-05-11 PROCEDURE — 97116 GAIT TRAINING THERAPY: CPT

## 2023-05-11 PROCEDURE — 6360000002 HC RX W HCPCS: Performed by: ANESTHESIOLOGY

## 2023-05-11 PROCEDURE — 27447 TOTAL KNEE ARTHROPLASTY: CPT | Performed by: ORTHOPAEDIC SURGERY

## 2023-05-11 PROCEDURE — 3700000001 HC ADD 15 MINUTES (ANESTHESIA): Performed by: ORTHOPAEDIC SURGERY

## 2023-05-11 PROCEDURE — 3600000015 HC SURGERY LEVEL 5 ADDTL 15MIN: Performed by: ORTHOPAEDIC SURGERY

## 2023-05-11 PROCEDURE — 7100000001 HC PACU RECOVERY - ADDTL 15 MIN: Performed by: ORTHOPAEDIC SURGERY

## 2023-05-11 PROCEDURE — 6370000000 HC RX 637 (ALT 250 FOR IP): Performed by: ANESTHESIOLOGY

## 2023-05-11 PROCEDURE — 6360000002 HC RX W HCPCS

## 2023-05-11 PROCEDURE — 64447 NJX AA&/STRD FEMORAL NRV IMG: CPT | Performed by: ANESTHESIOLOGY

## 2023-05-11 PROCEDURE — 6360000002 HC RX W HCPCS: Performed by: NURSE ANESTHETIST, CERTIFIED REGISTERED

## 2023-05-11 DEVICE — IMPLANTABLE DEVICE
Type: IMPLANTABLE DEVICE | Site: KNEE | Status: FUNCTIONAL
Brand: OPTETRAK LOGIC

## 2023-05-11 DEVICE — IMPLANTABLE DEVICE
Type: IMPLANTABLE DEVICE | Site: KNEE | Status: FUNCTIONAL
Brand: OPTETRAK

## 2023-05-11 DEVICE — IMPLANTABLE DEVICE
Type: IMPLANTABLE DEVICE | Site: KNEE | Status: FUNCTIONAL
Brand: TRULIANT

## 2023-05-11 DEVICE — SCREW BNE L85MM DIA4.5MM HD DIA8MM CORT S STL ST FULL THRD: Type: IMPLANTABLE DEVICE | Site: KNEE | Status: FUNCTIONAL

## 2023-05-11 DEVICE — IMPLANTABLE DEVICE: Type: IMPLANTABLE DEVICE | Site: KNEE | Status: FUNCTIONAL

## 2023-05-11 DEVICE — SCREW BNE L76MM DIA4.5MM HD DIA8MM CORT S STL ST FULL THRD: Type: IMPLANTABLE DEVICE | Site: KNEE | Status: FUNCTIONAL

## 2023-05-11 DEVICE — CEMENT BONE 80GM DBL DOSE CA PHOS W/ GENTMYCN HI VISC N: Type: IMPLANTABLE DEVICE | Site: KNEE | Status: FUNCTIONAL

## 2023-05-11 DEVICE — COMPONENT TOT KNEE CAPPED K1 STD HEMI CEM: Type: IMPLANTABLE DEVICE | Status: FUNCTIONAL

## 2023-05-11 RX ORDER — LANOLIN ALCOHOL/MO/W.PET/CERES
400 CREAM (GRAM) TOPICAL DAILY
Status: DISCONTINUED | OUTPATIENT
Start: 2023-05-11 | End: 2023-05-12 | Stop reason: HOSPADM

## 2023-05-11 RX ORDER — SODIUM CHLORIDE, SODIUM LACTATE, POTASSIUM CHLORIDE, CALCIUM CHLORIDE 600; 310; 30; 20 MG/100ML; MG/100ML; MG/100ML; MG/100ML
INJECTION, SOLUTION INTRAVENOUS CONTINUOUS
Status: DISCONTINUED | OUTPATIENT
Start: 2023-05-11 | End: 2023-05-11 | Stop reason: HOSPADM

## 2023-05-11 RX ORDER — CEFAZOLIN SODIUM 1 G/3ML
INJECTION, POWDER, FOR SOLUTION INTRAMUSCULAR; INTRAVENOUS PRN
Status: DISCONTINUED | OUTPATIENT
Start: 2023-05-11 | End: 2023-05-11 | Stop reason: SDUPTHER

## 2023-05-11 RX ORDER — OXYCODONE HYDROCHLORIDE 5 MG/1
5-10 TABLET ORAL EVERY 6 HOURS PRN
Qty: 40 TABLET | Refills: 0 | Status: SHIPPED | OUTPATIENT
Start: 2023-05-11 | End: 2023-05-18

## 2023-05-11 RX ORDER — SODIUM CHLORIDE 0.9 % (FLUSH) 0.9 %
5-40 SYRINGE (ML) INJECTION EVERY 12 HOURS SCHEDULED
Status: DISCONTINUED | OUTPATIENT
Start: 2023-05-11 | End: 2023-05-11 | Stop reason: HOSPADM

## 2023-05-11 RX ORDER — BISACODYL 5 MG/1
5 TABLET, DELAYED RELEASE ORAL DAILY PRN
Status: DISCONTINUED | OUTPATIENT
Start: 2023-05-11 | End: 2023-05-12 | Stop reason: HOSPADM

## 2023-05-11 RX ORDER — ACETAMINOPHEN 500 MG
1000 TABLET ORAL ONCE
Status: DISCONTINUED | OUTPATIENT
Start: 2023-05-11 | End: 2023-05-11 | Stop reason: HOSPADM

## 2023-05-11 RX ORDER — OXYCODONE HYDROCHLORIDE 5 MG/1
5 TABLET ORAL EVERY 4 HOURS PRN
Status: DISCONTINUED | OUTPATIENT
Start: 2023-05-11 | End: 2023-05-12

## 2023-05-11 RX ORDER — SODIUM CHLORIDE 0.9 % (FLUSH) 0.9 %
5-40 SYRINGE (ML) INJECTION PRN
Status: DISCONTINUED | OUTPATIENT
Start: 2023-05-11 | End: 2023-05-11 | Stop reason: HOSPADM

## 2023-05-11 RX ORDER — FENTANYL CITRATE 50 UG/ML
25 INJECTION, SOLUTION INTRAMUSCULAR; INTRAVENOUS EVERY 5 MIN PRN
Status: DISCONTINUED | OUTPATIENT
Start: 2023-05-11 | End: 2023-05-11 | Stop reason: HOSPADM

## 2023-05-11 RX ORDER — BUPIVACAINE HYDROCHLORIDE 5 MG/ML
INJECTION, SOLUTION EPIDURAL; INTRACAUDAL PRN
Status: DISCONTINUED | OUTPATIENT
Start: 2023-05-11 | End: 2023-05-11

## 2023-05-11 RX ORDER — SODIUM CHLORIDE 9 MG/ML
INJECTION, SOLUTION INTRAVENOUS PRN
Status: DISCONTINUED | OUTPATIENT
Start: 2023-05-11 | End: 2023-05-11 | Stop reason: HOSPADM

## 2023-05-11 RX ORDER — IBUPROFEN 200 MG
1250 CAPSULE ORAL DAILY
Status: DISCONTINUED | OUTPATIENT
Start: 2023-05-11 | End: 2023-05-11 | Stop reason: CLARIF

## 2023-05-11 RX ORDER — HYDRALAZINE HYDROCHLORIDE 20 MG/ML
10 INJECTION INTRAMUSCULAR; INTRAVENOUS
Status: DISCONTINUED | OUTPATIENT
Start: 2023-05-11 | End: 2023-05-11 | Stop reason: HOSPADM

## 2023-05-11 RX ORDER — HYDROMORPHONE HYDROCHLORIDE 1 MG/ML
0.5 INJECTION, SOLUTION INTRAMUSCULAR; INTRAVENOUS; SUBCUTANEOUS
Status: DISCONTINUED | OUTPATIENT
Start: 2023-05-11 | End: 2023-05-12 | Stop reason: HOSPADM

## 2023-05-11 RX ORDER — BUPIVACAINE HYDROCHLORIDE 5 MG/ML
INJECTION, SOLUTION EPIDURAL; INTRACAUDAL
Status: COMPLETED | OUTPATIENT
Start: 2023-05-11 | End: 2023-05-11

## 2023-05-11 RX ORDER — ONDANSETRON 2 MG/ML
4 INJECTION INTRAMUSCULAR; INTRAVENOUS EVERY 6 HOURS PRN
Status: DISCONTINUED | OUTPATIENT
Start: 2023-05-11 | End: 2023-05-12 | Stop reason: HOSPADM

## 2023-05-11 RX ORDER — WARFARIN SODIUM 2.5 MG/1
2.5 TABLET ORAL DAILY
Qty: 30 TABLET | Refills: 3 | Status: SHIPPED | OUTPATIENT
Start: 2023-05-11

## 2023-05-11 RX ORDER — DEXAMETHASONE SODIUM PHOSPHATE 4 MG/ML
8 INJECTION, SOLUTION INTRA-ARTICULAR; INTRALESIONAL; INTRAMUSCULAR; INTRAVENOUS; SOFT TISSUE ONCE
Status: DISCONTINUED | OUTPATIENT
Start: 2023-05-11 | End: 2023-05-11 | Stop reason: HOSPADM

## 2023-05-11 RX ORDER — TRANEXAMIC ACID 100 MG/ML
INJECTION, SOLUTION INTRAVENOUS PRN
Status: DISCONTINUED | OUTPATIENT
Start: 2023-05-11 | End: 2023-05-11 | Stop reason: HOSPADM

## 2023-05-11 RX ORDER — LISINOPRIL 20 MG/1
20 TABLET ORAL DAILY
Status: DISCONTINUED | OUTPATIENT
Start: 2023-05-11 | End: 2023-05-12 | Stop reason: HOSPADM

## 2023-05-11 RX ORDER — MIDAZOLAM HYDROCHLORIDE 2 MG/2ML
2 INJECTION, SOLUTION INTRAMUSCULAR; INTRAVENOUS
Status: COMPLETED | OUTPATIENT
Start: 2023-05-11 | End: 2023-05-11

## 2023-05-11 RX ORDER — ROPIVACAINE HYDROCHLORIDE 5 MG/ML
30 INJECTION, SOLUTION EPIDURAL; INFILTRATION; PERINEURAL ONCE
Status: DISCONTINUED | OUTPATIENT
Start: 2023-05-11 | End: 2023-05-11

## 2023-05-11 RX ORDER — WARFARIN SODIUM 4 MG/1
4 TABLET ORAL
Status: COMPLETED | OUTPATIENT
Start: 2023-05-11 | End: 2023-05-11

## 2023-05-11 RX ORDER — ONDANSETRON 2 MG/ML
4 INJECTION INTRAMUSCULAR; INTRAVENOUS AS NEEDED
Status: DISCONTINUED | OUTPATIENT
Start: 2023-05-11 | End: 2023-05-11 | Stop reason: HOSPADM

## 2023-05-11 RX ORDER — SODIUM CHLORIDE 9 MG/ML
INJECTION, SOLUTION INTRAVENOUS PRN
Status: DISCONTINUED | OUTPATIENT
Start: 2023-05-11 | End: 2023-05-12 | Stop reason: HOSPADM

## 2023-05-11 RX ORDER — SODIUM CHLORIDE 0.9 % (FLUSH) 0.9 %
5-40 SYRINGE (ML) INJECTION PRN
Status: DISCONTINUED | OUTPATIENT
Start: 2023-05-11 | End: 2023-05-12 | Stop reason: HOSPADM

## 2023-05-11 RX ORDER — LIDOCAINE HYDROCHLORIDE 10 MG/ML
1 INJECTION, SOLUTION EPIDURAL; INFILTRATION; INTRACAUDAL; PERINEURAL
Status: DISCONTINUED | OUTPATIENT
Start: 2023-05-11 | End: 2023-05-11 | Stop reason: HOSPADM

## 2023-05-11 RX ORDER — ONDANSETRON 2 MG/ML
4 INJECTION INTRAMUSCULAR; INTRAVENOUS
Status: DISCONTINUED | OUTPATIENT
Start: 2023-05-11 | End: 2023-05-11 | Stop reason: HOSPADM

## 2023-05-11 RX ORDER — OXYCODONE HYDROCHLORIDE 5 MG/1
10 TABLET ORAL EVERY 4 HOURS PRN
Status: DISCONTINUED | OUTPATIENT
Start: 2023-05-11 | End: 2023-05-12

## 2023-05-11 RX ORDER — OXYCODONE HYDROCHLORIDE 5 MG/1
5 TABLET ORAL
Status: DISCONTINUED | OUTPATIENT
Start: 2023-05-11 | End: 2023-05-11 | Stop reason: HOSPADM

## 2023-05-11 RX ORDER — ACETAMINOPHEN 325 MG/1
650 TABLET ORAL EVERY 6 HOURS
Status: DISCONTINUED | OUTPATIENT
Start: 2023-05-11 | End: 2023-05-12 | Stop reason: HOSPADM

## 2023-05-11 RX ORDER — SODIUM CHLORIDE 9 MG/ML
INJECTION, SOLUTION INTRAVENOUS CONTINUOUS
Status: DISCONTINUED | OUTPATIENT
Start: 2023-05-11 | End: 2023-05-12 | Stop reason: HOSPADM

## 2023-05-11 RX ORDER — EPHEDRINE SULFATE 50 MG/ML
INJECTION INTRAVENOUS PRN
Status: DISCONTINUED | OUTPATIENT
Start: 2023-05-11 | End: 2023-05-11 | Stop reason: SDUPTHER

## 2023-05-11 RX ORDER — PROCHLORPERAZINE EDISYLATE 5 MG/ML
5 INJECTION INTRAMUSCULAR; INTRAVENOUS
Status: DISCONTINUED | OUTPATIENT
Start: 2023-05-11 | End: 2023-05-11 | Stop reason: HOSPADM

## 2023-05-11 RX ORDER — SODIUM CHLORIDE 0.9 % (FLUSH) 0.9 %
5-40 SYRINGE (ML) INJECTION EVERY 12 HOURS SCHEDULED
Status: DISCONTINUED | OUTPATIENT
Start: 2023-05-11 | End: 2023-05-12 | Stop reason: HOSPADM

## 2023-05-11 RX ORDER — ALBUTEROL SULFATE 2.5 MG/3ML
2.5 SOLUTION RESPIRATORY (INHALATION) EVERY 6 HOURS PRN
Status: DISCONTINUED | OUTPATIENT
Start: 2023-05-11 | End: 2023-05-12 | Stop reason: HOSPADM

## 2023-05-11 RX ORDER — FENTANYL CITRATE 50 UG/ML
100 INJECTION, SOLUTION INTRAMUSCULAR; INTRAVENOUS
Status: COMPLETED | OUTPATIENT
Start: 2023-05-11 | End: 2023-05-11

## 2023-05-11 RX ORDER — ONDANSETRON 4 MG/1
4 TABLET, ORALLY DISINTEGRATING ORAL EVERY 8 HOURS PRN
Status: DISCONTINUED | OUTPATIENT
Start: 2023-05-11 | End: 2023-05-12 | Stop reason: HOSPADM

## 2023-05-11 RX ORDER — ACETAMINOPHEN 500 MG
1000 TABLET ORAL ONCE
Status: COMPLETED | OUTPATIENT
Start: 2023-05-11 | End: 2023-05-11

## 2023-05-11 RX ORDER — FAMOTIDINE 20 MG/1
20 TABLET, FILM COATED ORAL 2 TIMES DAILY
Status: DISCONTINUED | OUTPATIENT
Start: 2023-05-11 | End: 2023-05-12

## 2023-05-11 RX ORDER — GINSENG 100 MG
CAPSULE ORAL 3 TIMES DAILY
Status: DISCONTINUED | OUTPATIENT
Start: 2023-05-11 | End: 2023-05-12 | Stop reason: HOSPADM

## 2023-05-11 RX ORDER — HYDROMORPHONE HYDROCHLORIDE 1 MG/ML
0.5 INJECTION, SOLUTION INTRAMUSCULAR; INTRAVENOUS; SUBCUTANEOUS EVERY 5 MIN PRN
Status: DISCONTINUED | OUTPATIENT
Start: 2023-05-11 | End: 2023-05-11 | Stop reason: HOSPADM

## 2023-05-11 RX ORDER — ONDANSETRON 2 MG/ML
INJECTION INTRAMUSCULAR; INTRAVENOUS PRN
Status: DISCONTINUED | OUTPATIENT
Start: 2023-05-11 | End: 2023-05-11 | Stop reason: SDUPTHER

## 2023-05-11 RX ORDER — MAGNESIUM OXIDE 400 MG/1
400 TABLET ORAL DAILY
Status: DISCONTINUED | OUTPATIENT
Start: 2023-05-11 | End: 2023-05-11 | Stop reason: CLARIF

## 2023-05-11 RX ORDER — SENNA PLUS 8.6 MG/1
1 TABLET ORAL DAILY PRN
Status: DISCONTINUED | OUTPATIENT
Start: 2023-05-11 | End: 2023-05-12

## 2023-05-11 RX ADMIN — FENTANYL CITRATE 50 MCG: 50 INJECTION, SOLUTION INTRAMUSCULAR; INTRAVENOUS at 07:18

## 2023-05-11 RX ADMIN — OXYCODONE HYDROCHLORIDE 5 MG: 5 TABLET ORAL at 19:13

## 2023-05-11 RX ADMIN — Medication 400 MG: at 14:17

## 2023-05-11 RX ADMIN — SODIUM CHLORIDE: 9 INJECTION, SOLUTION INTRAVENOUS at 11:53

## 2023-05-11 RX ADMIN — CEFAZOLIN 2 G: 330 INJECTION, POWDER, FOR SOLUTION INTRAMUSCULAR; INTRAVENOUS at 07:50

## 2023-05-11 RX ADMIN — BUPIVACAINE HYDROCHLORIDE 10 MG: 5 INJECTION, SOLUTION EPIDURAL; INTRACAUDAL; PERINEURAL at 07:42

## 2023-05-11 RX ADMIN — FAMOTIDINE 20 MG: 20 TABLET, FILM COATED ORAL at 21:58

## 2023-05-11 RX ADMIN — PROPOFOL 50 MCG/KG/MIN: 10 INJECTION, EMULSION INTRAVENOUS at 07:45

## 2023-05-11 RX ADMIN — ACETAMINOPHEN 650 MG: 325 TABLET ORAL at 14:17

## 2023-05-11 RX ADMIN — EPHEDRINE SULFATE 5 MG: 50 INJECTION INTRAVENOUS at 08:22

## 2023-05-11 RX ADMIN — FAMOTIDINE 20 MG: 20 TABLET, FILM COATED ORAL at 14:19

## 2023-05-11 RX ADMIN — EPHEDRINE SULFATE 10 MG: 50 INJECTION INTRAVENOUS at 10:31

## 2023-05-11 RX ADMIN — ONDANSETRON HYDROCHLORIDE 4 MG: 2 INJECTION, SOLUTION INTRAMUSCULAR; INTRAVENOUS at 07:46

## 2023-05-11 RX ADMIN — WARFARIN SODIUM 4 MG: 4 TABLET ORAL at 16:07

## 2023-05-11 RX ADMIN — ACETAMINOPHEN 1000 MG: 500 TABLET ORAL at 06:54

## 2023-05-11 RX ADMIN — SODIUM CHLORIDE, POTASSIUM CHLORIDE, SODIUM LACTATE AND CALCIUM CHLORIDE: 600; 310; 30; 20 INJECTION, SOLUTION INTRAVENOUS at 06:48

## 2023-05-11 RX ADMIN — ACETAMINOPHEN 650 MG: 325 TABLET ORAL at 18:18

## 2023-05-11 RX ADMIN — Medication: at 21:58

## 2023-05-11 RX ADMIN — CEFAZOLIN 2000 MG: 1 INJECTION, POWDER, FOR SOLUTION INTRAMUSCULAR; INTRAVENOUS at 16:08

## 2023-05-11 RX ADMIN — EPHEDRINE SULFATE 5 MG: 50 INJECTION INTRAVENOUS at 08:04

## 2023-05-11 RX ADMIN — EPHEDRINE SULFATE 5 MG: 50 INJECTION INTRAVENOUS at 09:45

## 2023-05-11 RX ADMIN — MIDAZOLAM 2 MG: 1 INJECTION INTRAMUSCULAR; INTRAVENOUS at 07:20

## 2023-05-11 RX ADMIN — EPHEDRINE SULFATE 5 MG: 50 INJECTION INTRAVENOUS at 10:19

## 2023-05-11 RX ADMIN — SODIUM CHLORIDE, PRESERVATIVE FREE 10 ML: 5 INJECTION INTRAVENOUS at 22:00

## 2023-05-11 ASSESSMENT — PAIN SCALES - GENERAL
PAINLEVEL_OUTOF10: 0
PAINLEVEL_OUTOF10: 0
PAINLEVEL_OUTOF10: 6

## 2023-05-11 ASSESSMENT — PAIN DESCRIPTION - ORIENTATION: ORIENTATION: LEFT

## 2023-05-11 ASSESSMENT — PAIN - FUNCTIONAL ASSESSMENT
PAIN_FUNCTIONAL_ASSESSMENT: 0-10
PAIN_FUNCTIONAL_ASSESSMENT: ACTIVITIES ARE NOT PREVENTED

## 2023-05-11 ASSESSMENT — PAIN DESCRIPTION - LOCATION: LOCATION: KNEE

## 2023-05-11 ASSESSMENT — PAIN DESCRIPTION - DESCRIPTORS: DESCRIPTORS: ACHING

## 2023-05-11 NOTE — ANESTHESIA PROCEDURE NOTES
Peripheral Block    Patient location during procedure: pre-op  Reason for block: post-op pain management and at surgeon's request  Start time: 5/11/2023 7:16 AM  End time: 5/11/2023 7:25 AM  Staffing  Performed: anesthesiologist   Anesthesiologist: Carito Velasquez MD  Preanesthetic Checklist  Completed: patient identified, IV checked, site marked, risks and benefits discussed, surgical/procedural consents, equipment checked, pre-op evaluation, timeout performed, anesthesia consent given, oxygen available, monitors applied/VS acknowledged and fire risk safety assessment completed and verbalized  Peripheral Block   Patient position: supine  Prep: ChloraPrep  Provider prep: mask and sterile gloves  Patient monitoring: cardiac monitor, continuous pulse ox, frequent blood pressure checks, IV access and oxygen  Block type: Saphenous  Laterality: left  Injection technique: single-shot  Guidance: ultrasound guided  Local infiltration: ropivacaine  Infiltration strength: 0.5 %  Local infiltration: ropivacaineDose: 30 mL    Needle   Needle type: insulated echogenic nerve stimulator needle   Needle gauge: 21 G  Needle localization: anatomical landmarks and ultrasound guidance  Needle length: 10 cm  Assessment   Injection assessment: negative aspiration for heme, no paresthesia on injection, local visualized surrounding nerve on ultrasound and no intravascular symptoms  Paresthesia pain: none  Slow fractionated injection: yes  Hemodynamics: stable  Real-time US image taken/store: yes  Outcomes: uncomplicated and patient tolerated procedure well

## 2023-05-11 NOTE — PERIOP NOTE
1210: TRANSFER - OUT REPORT:    Verbal report given to [address] RN(name) on [employer]  being transferred to (unit) for routine post-op       Report consisted of patients Situation, Background, Assessment and   Recommendations(SBAR). Time Pre op antibiotic given:0750  Anesthesia Stop time: 4325    Information from the following report(s) SBAR, Kardex, OR Summary, Intake/Output, and MAR was reviewed with the receiving nurse. Opportunity for questions and clarification was provided. Is the patient on 02? No      Is the patient on a monitor? No    Is the nurse transporting with the patient? No    Surgical Waiting Area notified of patient's transfer from PACU?  Yes      The following personal items collected during your admission accompanied patient upon transfer:   Dental Appliance:    Vision:    Hearing Aid:    Jewelry:    Clothing:    Other Valuables:    Valuables sent to safe: Dose (mL/hr) Propofol : 0 mL/hr

## 2023-05-11 NOTE — PROGRESS NOTES
Pharmacist Note - Warfarin Dosing  Consult provided for this 80 y.o. female to manage warfarin for VTE Ppx s/p L TKA    INR Goal: 1.7- 2.2  Therapy Day: 1  Preop Dose: Dawn Mcdonnell- none    Drugs that may increase INR: None  Drugs that may decrease INR: None  Other current anticoagulants/ drugs that may increase bleeding risk: None  Risk factors: Age > 65  Daily INR ordered: YES    No results for input(s): HGB, INR in the last 72 hours. Date               INR                 Dose  4/11  1.0  ---  5/11  ---  4 mg    Assessment/ Plan: Will order warfarin 4 mg PO x 1 dose. Pharmacy will continue to monitor daily and adjust therapy as indicated.      Vaibhav Gutierrez, PharmD  Clinical Pharmacist, Orthopedics and Med/Surg  [address] [facility] ()

## 2023-05-11 NOTE — OP NOTE
benefit because the screws may not get purchase, I did feel it was worth an attempt. However, I did not want to do this until actually after the implantation of the joint. At that time, our trial components  were removed. We hyperflexed the knee. The posterior compartment was debrided of any bony debris or meniscal tissue. Exparel was injected around the soft tissue. We then sized the tibia to size 3. Appropriate rotation was identified and the tibia was drilled and broached in standard fashion. We then pulsatile irrigated and irrigated the bone stock with Irrisept and pulse irrigation while antibiotic cement was mixed in the back table. We then cemented the tibia followed by the femur. We made sure there was no cement that propagated into the wafer of bone along the medial epicondyle. With the knee in extension, the wafer of bone medially reduced itself perfectly. In the usual fashion, we left the knee in extension after cementing. We placed a 9 mm trial in place and left it there until it finally cured. We also implanted the patella at the same time. We then ranged the knee and once again the size 9 was appropriate. The trial component was removed. We dropped our tourniquet and placed our final 9 poly in place. The knee was pulsatile irrigated. At that time, while the knee was in extension, a 2 mm pin was placed through the medial condyle and advanced up to the lateral condyle. This held the fracture tightly in place so that we could flex the knee out. I chose a 4.5 mm screw in this scenario. A 3.2 drill was placed right at the medial epicondyle which gave us the best purchase of bone. This was drilled all the way through. Unfortunately, it was an 84 mm screw, so we chose an 85 mm screw which had to be opened on our pelvic side. This was also placed over washer. Fortunately, we were able to gain compression on this and I was very pleased with the compression.   A second screw was then

## 2023-05-11 NOTE — H&P
BERRY PRE-OP HISTORY AND PHYSICAL    Subjective:     Patient is a 80 y.o. female presented with a history of left knee pain. Onset of symptoms was gradual with gradually worsening course since that time. She is being admitted for surgical management of this condition. Patient Active Problem List    Diagnosis Date Noted    Asthma due to environmental allergies 11/18/2022    Diabetes mellitus type 2, diet-controlled ([facility] [address]     Bilateral pes planus 03/10/2020    Primary osteoarthritis of right knee 08/27/2018    Carpal tunnel syndrome of left wrist 04/03/2018    Pseudophakia of both eyes 12/04/2017    ACE-inhibitor cough 02/27/2017    Lumbar stenosis with neurogenic claudication     Family history of thyroid disease in grandmother 08/26/2016    Chronic pain of both knees 08/26/2016    Statin intolerance 08/26/2016    Insomnia secondary to chronic pain 08/26/2016    Restless legs syndrome (RLS) 09/01/2015    Osteopenia 08/24/2015    Personal history of colonic polyps 06/17/2015    Family history of diabetes mellitus 05/05/2015    Family history of heart attack 05/05/2015    Dyslipidemia 05/05/2015    Family history of thyroid disease 05/05/2015    Actinic keratosis     Dysphagia 09/01/2014    Degenerative lumbar spinal stenosis 01/01/2014    Fine tremor 06/26/2013    Essential hypertension, benign      Past Medical History:   Diagnosis Date    Arthritis     Asthma     during childhood/seasonal    Cancer ([facility] [address]     skin    GERD (gastroesophageal reflux disease)     Hypertension       Past Surgical History:   Procedure Laterality Date    BACK SURGERY  2016    CARPAL TUNNEL RELEASE Left 2018    CATARACT REMOVAL Bilateral     COLPORRHAPHY  2005    HYSTERECTOMY (CERVIX STATUS UNKNOWN)  1976    JOINT REPLACEMENT Right 2018    TONSILLECTOMY  1948      Prior to Admission medications    Medication Sig Start Date End Date Taking?  Authorizing Provider   azelastine (ASTELIN) 0.1 % nasal spray 1 spray by Nasal route 2 times daily

## 2023-05-11 NOTE — ANESTHESIA PRE PROCEDURE
Department of Anesthesiology  Preprocedure Note       Name:  Kaylee Medeiros   Age:  80 y.o.  :  1940                                          MRN:  [id number]         Date:  2023      Surgeon: Waqar Ross):  Mima Dominguez MD    Procedure: Procedure(s):  LEFT TOTAL KNEE ARTHROPLASTY    Medications prior to admission:   Prior to Admission medications    Medication Sig Start Date End Date Taking?  Authorizing Provider   azelastine (ASTELIN) 0.1 % nasal spray 1 spray by Nasal route 2 times daily Use in each nostril as directed   Yes Historical Provider, MD   Magnesium 400 MG TABS Take by mouth   Yes Historical Provider, MD   UNABLE TO FIND 3 EUSEBIA PM   Yes Historical Provider, MD   albuterol sulfate HFA (PROVENTIL;VENTOLIN;PROAIR) 108 (90 Base) MCG/ACT inhaler INHALE 1 PUFF BY MOUTH EVERY 6 HOURS AS NEEDED FOR WHEEZING  Patient not taking: Reported on 22   Ar Automatic Reconciliation   ascorbic acid (VITAMIN C) 1000 MG tablet Take by mouth daily  Patient not taking: Reported on 2023    Ar Automatic Reconciliation   benazepril (LOTENSIN) 20 MG tablet 2 tablets at bedtime 23   Ar Automatic Reconciliation   calcium carbonate 1500 (600 Ca) MG TABS tablet Take by mouth daily  Patient not taking: Reported on 2023    Ar Automatic Reconciliation   cyanocobalamin 1000 MCG tablet Take by mouth daily  Patient not taking: Reported on 2023    Ar Automatic Reconciliation   melatonin 3 MG TABS tablet Take by mouth    Ar Automatic Reconciliation   omeprazole (PRILOSEC) 20 MG delayed release capsule Take by mouth nightly    Ar Automatic Reconciliation   pramipexole (MIRAPEX) 1 MG tablet 1 tablet nightly 23   Ar Automatic Reconciliation       Current medications:    Current Facility-Administered Medications   Medication Dose Route Frequency Provider Last Rate Last Admin    lidocaine PF 1 % injection 1 mL  1 mL IntraDERmal Once PRN Dori Bruno MD        fentaNYL (SUBLIMAZE)

## 2023-05-11 NOTE — DISCHARGE INSTRUCTIONS
[facility]. Total Knee Replacement   Post-Op Discharge Instructions Knee   Dr. Yessica Coon    Patient Name  Richa Laird  Date of procedure  [unfilled]    Procedure  Procedure(s):  LEFT TOTAL KNEE ARTHROPLASTY  Surgeon  Surgeon(s) and Role:     * Yessica Coon MD - Primary  PCP: [unfilled]    Follow up care  Follow up visit with Dr. Berwyn Klinefelter in 2-3 weeks. Call [phone], extension [phone] to make an appointment    Activity at home  Take a short walk every hour; except at night when sleeping  Do your Home Exercise Program 3 times every day   After exercising lie down and elevate your leg on pillows for 15-30 minutes to decrease swelling  Refer to your patient notebook for more information   Preventing blood clots  Take Warfarin (Coumadin) every day as prescribed. Do not take aspirin or anti-inflammatory medicine while taking Warfarin  Wear elastic stockings (TEDS) for 4 weeks. You may remove them daily for 1 hour when shoering/sponge-bathing. Call Dr. Berwyn Klinefelter if you have side effects of blood thinning medication: bleeding, bruising, upset stomach, or diarrhea. Call Dr. Berwyn Klinefelter for signs of a blood clot in your leg: calf pain, tenderness, redness, swelling of lower leg   Preventing lung congestion  Use your incentive spirometer 4 times a day; do 10 repetitions each time  Remember to keep the small blue ball between the two arrows when taking a slow, deep breath   Pain Management  Get up and walk a short distance to relieve pain and stiffness. Place ice wrap on your knee except when you are walking. The gel ice packs should be changed about every 4 hours  Elevate your leg on pillows for 15-30 minutes   If needed, take a narcotic pain pill every 4-6 hours as prescribed. Take all medications with a small amount of food.    As your pain decreases, take the narcotics less often or take ½ of a pill  Call Dr. Berwyn Klinefelter if you have side effects from your narcotic pain medication: itching, drowsiness,

## 2023-05-11 NOTE — ANESTHESIA PROCEDURE NOTES
Spinal Block    End time: 5/11/2023 7:42 AM  Reason for block: primary anesthetic  Staffing  Performed: anesthesiologist and resident/CRNA   Anesthesiologist: Renetta Lee MD  Resident/CRNA: CJ Nava - CRNA  Spinal Block  Patient position: sitting  Prep: Betadine  Patient monitoring: continuous pulse ox, continuous capnometry, frequent blood pressure checks and oxygen  Approach: midline  Location: other  Provider prep: mask and sterile gloves  Local infiltration: lidocaine  Needle  Needle type: pencil-tip   Needle gauge: 24 G  Needle length: 4 in  Assessment  Sensory level: T10  Swirl obtained: Yes  CSF: clear  Attempts: 3+  Hemodynamics: stable  Additional Notes  Placed at L1/L2 level due to spinal fusion below L3, Initial attempt by [facility] CRNA unable to locate intrathecal space, 2 attempts by MD Caron Ngo with success on 2nd attempt  Preanesthetic Checklist  Completed: patient identified, IV checked, site marked, risks and benefits discussed, surgical/procedural consents, equipment checked, pre-op evaluation, timeout performed, anesthesia consent given, oxygen available, monitors applied/VS acknowledged, fire risk safety assessment completed and verbalized and blood product R/B/A discussed and consented

## 2023-05-11 NOTE — BRIEF OP NOTE
Brief Postoperative Note      Patient: Kelsie Light  YOB: 1940  MRN: [id number]    Date of Procedure: 5/11/2023    Pre-Op Diagnosis Codes:     * Primary osteoarthritis of left knee [M17.12]    Post-Op Diagnosis: Same       Procedure(s):  LEFT TOTAL KNEE ARTHROPLASTY    Surgeon(s):  DO Myra Mccall MD    First Assistant:  Physician Assistant: Harjeet Warren PA-C    Anesthesia: Spinal with mac    Estimated Blood Loss (mL): less than 090     Complications: please see note    Specimens:   Bone discarded    Implants:  Implant Name Type Inv. Item Serial No.  Lot No. LRB No. Used Action   CEMENT BONE 80GM DBL DOSE CA PHOS W/ GENTMYCN HI VISC N - SN/A  CEMENT BONE 80GM DBL DOSE CA PHOS W/ GENTMYCN HI VISC N N/A [employer]WD [id number] Left 1 Implanted   COMPONENT FEM SZ 3 LT POST STBL ONUR TRULIANT - [id number]  COMPONENT FEM SZ 3 LT POST STBL ONUR TRULIANT [id number] [employer] N/A Left 1 Implanted   COMPONENT TIB ONUR FIT 3F/3T LOGIC - [id number]  COMPONENT TIB ONUR FIT 3F/3T LOGIC [id number] [employer] N/A Left 1 Implanted   COMPONENT PAT 35MM CHAUNCEY 3MM THCK 3 PEG 3 RND PATNERA STD ONUR NP - [id number]  COMPONENT PAT 35MM CHAUNCEY 3MM THCK 3 PEG 3 RND PANTERA STD ONUR NP [id number] [employer] N/A Left 1 Implanted   INSERT TIB SZ 3 THK9MM POST STBL TRULIANT - [id number]  INSERT TIB SZ 3 THK9MM POST STBL TRULIANT [id number] [employer] N/A Left 1 Implanted   SCREW BNE L85MM DIA4. 5MM HD DIA8MM ARPIT S STL ST FULL THRD - SN/A  SCREW BNE L85MM DIA4. 5MM HD DIA8MM ARPIT S STL ST FULL THRD N/A [employer]WD N/A Left 1 Implanted   SCREW BNE L76MM DIA4. 5MM HD DIA8MM ARPIT S STL ST FULL THRD - SN/A  SCREW BNE L76MM DIA4. 5MM HD DIA8MM ARPIT S STL ST FULL THRD N/A [employer]WD N/A Left 1 Implanted   WASHER   N/A  N/A Left 2 Implanted             Findings: see note      Electronically signed by Myra Salazar MD on 5/11/2023 at 11:19 AM

## 2023-05-12 VITALS
TEMPERATURE: 98 F | OXYGEN SATURATION: 97 % | WEIGHT: 144.84 LBS | HEIGHT: 63 IN | SYSTOLIC BLOOD PRESSURE: 145 MMHG | RESPIRATION RATE: 18 BRPM | HEART RATE: 106 BPM | BODY MASS INDEX: 25.66 KG/M2 | DIASTOLIC BLOOD PRESSURE: 77 MMHG

## 2023-05-12 LAB
HCT VFR BLD AUTO: 36 % (ref 35–47)
HGB BLD-MCNC: 11.6 G/DL (ref 11.5–16)
INR PPP: 1 (ref 0.9–1.1)
PROTHROMBIN TIME: 10.5 SEC (ref 9–11.1)

## 2023-05-12 PROCEDURE — G0378 HOSPITAL OBSERVATION PER HR: HCPCS

## 2023-05-12 PROCEDURE — 99024 POSTOP FOLLOW-UP VISIT: CPT

## 2023-05-12 PROCEDURE — 96374 THER/PROPH/DIAG INJ IV PUSH: CPT

## 2023-05-12 PROCEDURE — 96375 TX/PRO/DX INJ NEW DRUG ADDON: CPT

## 2023-05-12 PROCEDURE — 6370000000 HC RX 637 (ALT 250 FOR IP): Performed by: ORTHOPAEDIC SURGERY

## 2023-05-12 PROCEDURE — 6360000002 HC RX W HCPCS: Performed by: ORTHOPAEDIC SURGERY

## 2023-05-12 PROCEDURE — 97116 GAIT TRAINING THERAPY: CPT

## 2023-05-12 PROCEDURE — 6370000000 HC RX 637 (ALT 250 FOR IP)

## 2023-05-12 PROCEDURE — 6360000002 HC RX W HCPCS

## 2023-05-12 PROCEDURE — 2580000003 HC RX 258

## 2023-05-12 PROCEDURE — 85018 HEMOGLOBIN: CPT

## 2023-05-12 PROCEDURE — 36415 COLL VENOUS BLD VENIPUNCTURE: CPT

## 2023-05-12 PROCEDURE — 85610 PROTHROMBIN TIME: CPT

## 2023-05-12 PROCEDURE — 2580000003 HC RX 258: Performed by: ORTHOPAEDIC SURGERY

## 2023-05-12 PROCEDURE — 97530 THERAPEUTIC ACTIVITIES: CPT

## 2023-05-12 PROCEDURE — 85014 HEMATOCRIT: CPT

## 2023-05-12 RX ORDER — FAMOTIDINE 20 MG/1
20 TABLET, FILM COATED ORAL DAILY
Status: DISCONTINUED | OUTPATIENT
Start: 2023-05-13 | End: 2023-05-12 | Stop reason: HOSPADM

## 2023-05-12 RX ORDER — OXYCODONE HYDROCHLORIDE 5 MG/1
10 TABLET ORAL
Status: DISCONTINUED | OUTPATIENT
Start: 2023-05-12 | End: 2023-05-12 | Stop reason: HOSPADM

## 2023-05-12 RX ORDER — DIPHENHYDRAMINE HYDROCHLORIDE 50 MG/ML
25 INJECTION INTRAMUSCULAR; INTRAVENOUS EVERY 6 HOURS PRN
Status: DISCONTINUED | OUTPATIENT
Start: 2023-05-12 | End: 2023-05-12 | Stop reason: HOSPADM

## 2023-05-12 RX ORDER — PRAMIPEXOLE DIHYDROCHLORIDE 1 MG/1
2 TABLET ORAL NIGHTLY
Status: DISCONTINUED | OUTPATIENT
Start: 2023-05-12 | End: 2023-05-12 | Stop reason: HOSPADM

## 2023-05-12 RX ORDER — POLYETHYLENE GLYCOL 3350 17 G/17G
17 POWDER, FOR SOLUTION ORAL DAILY
Status: DISCONTINUED | OUTPATIENT
Start: 2023-05-12 | End: 2023-05-12 | Stop reason: HOSPADM

## 2023-05-12 RX ORDER — FLUTICASONE PROPIONATE 50 MCG
1 SPRAY, SUSPENSION (ML) NASAL DAILY
Status: DISCONTINUED | OUTPATIENT
Start: 2023-05-12 | End: 2023-05-12 | Stop reason: HOSPADM

## 2023-05-12 RX ORDER — OXYCODONE HYDROCHLORIDE 5 MG/1
5 TABLET ORAL
Status: DISCONTINUED | OUTPATIENT
Start: 2023-05-12 | End: 2023-05-12 | Stop reason: HOSPADM

## 2023-05-12 RX ORDER — PRAMIPEXOLE DIHYDROCHLORIDE 1 MG/1
1 TABLET ORAL 3 TIMES DAILY
Status: DISCONTINUED | OUTPATIENT
Start: 2023-05-12 | End: 2023-05-12

## 2023-05-12 RX ORDER — WARFARIN SODIUM 4 MG/1
4 TABLET ORAL
Status: COMPLETED | OUTPATIENT
Start: 2023-05-12 | End: 2023-05-12

## 2023-05-12 RX ORDER — SENNA PLUS 8.6 MG/1
1 TABLET ORAL 2 TIMES DAILY
Status: DISCONTINUED | OUTPATIENT
Start: 2023-05-12 | End: 2023-05-12 | Stop reason: HOSPADM

## 2023-05-12 RX ADMIN — CEFAZOLIN 2000 MG: 1 INJECTION, POWDER, FOR SOLUTION INTRAMUSCULAR; INTRAVENOUS at 00:16

## 2023-05-12 RX ADMIN — DIPHENHYDRAMINE HYDROCHLORIDE 25 MG: 50 INJECTION, SOLUTION INTRAMUSCULAR; INTRAVENOUS at 05:29

## 2023-05-12 RX ADMIN — Medication 400 MG: at 09:44

## 2023-05-12 RX ADMIN — FAMOTIDINE 20 MG: 20 TABLET, FILM COATED ORAL at 09:44

## 2023-05-12 RX ADMIN — SENNOSIDES 8.6 MG: 8.6 TABLET, FILM COATED ORAL at 13:17

## 2023-05-12 RX ADMIN — OXYCODONE HYDROCHLORIDE 10 MG: 5 TABLET ORAL at 00:16

## 2023-05-12 RX ADMIN — OXYCODONE HYDROCHLORIDE 5 MG: 5 TABLET ORAL at 09:51

## 2023-05-12 RX ADMIN — Medication: at 09:00

## 2023-05-12 RX ADMIN — WARFARIN SODIUM 4 MG: 4 TABLET ORAL at 15:32

## 2023-05-12 RX ADMIN — ACETAMINOPHEN 650 MG: 325 TABLET ORAL at 13:17

## 2023-05-12 RX ADMIN — FLUTICASONE PROPIONATE 1 SPRAY: 50 SPRAY, METERED NASAL at 15:33

## 2023-05-12 RX ADMIN — Medication 1 TABLET: at 09:44

## 2023-05-12 RX ADMIN — Medication: at 15:34

## 2023-05-12 RX ADMIN — OXYCODONE HYDROCHLORIDE 10 MG: 5 TABLET ORAL at 13:16

## 2023-05-12 RX ADMIN — LISINOPRIL 20 MG: 20 TABLET ORAL at 09:43

## 2023-05-12 RX ADMIN — SODIUM CHLORIDE, PRESERVATIVE FREE 10 ML: 5 INJECTION INTRAVENOUS at 09:53

## 2023-05-12 RX ADMIN — POLYETHYLENE GLYCOL 3350 17 G: 17 POWDER, FOR SOLUTION ORAL at 13:16

## 2023-05-12 RX ADMIN — ONDANSETRON 4 MG: 4 TABLET, ORALLY DISINTEGRATING ORAL at 15:32

## 2023-05-12 RX ADMIN — ACETAMINOPHEN 650 MG: 325 TABLET ORAL at 00:16

## 2023-05-12 ASSESSMENT — PAIN SCALES - GENERAL: PAINLEVEL_OUTOF10: 5

## 2023-05-12 NOTE — PROGRESS NOTES
POD 1 Day Post-Op    Procedure:  Procedure(s):  LEFT TOTAL KNEE ARTHROPLASTY    Subjective:     Patient has no complaints today. Patient has complaints of pain mostly in her L thigh. Objective:     Blood pressure (!) 156/81, pulse 83, temperature 97.8 °F (36.6 °C), temperature source Oral, resp. rate 20, height 5' 2.99\" (1.6 m), weight 144 lb 13.5 oz (65.7 kg), SpO2 99 %. Temp (24hrs), Av.3 °F (36.3 °C), Min:96.3 °F (35.7 °C), Max:98.1 °F (36.7 °C)      Physical Exam:  Examination of the left knee reveals that the dressing is clean and intact. Sensation is intact to light touch. Brisk capillary refill. Labs:   Lab Results   Component Value Date/Time    HGB 13.4 2023 10:07 AM         Assessment:     Principal Problem:    Osteoarthritis of left knee, unspecified osteoarthritis type  Resolved Problems:    * No resolved hospital problems.  *      Plan/Recommendations/Medical Decision Making:     Continue physical therapy  Ice and elevate  Begin discharge planning, home today vs tomorrow

## 2023-05-12 NOTE — PROGRESS NOTES
Pharmacist Note - Warfarin Dosing  Consult provided for this 80 y.o. female to manage warfarin for VTE Ppx s/p L TKA    INR Goal: 1.7- 2.2  Therapy Day: 2  Preop Dose: Norma German- none    Drugs that may increase INR: None  Drugs that may decrease INR: None  Other current anticoagulants/ drugs that may increase bleeding risk: None  Risk factors: Age > 65  Daily INR ordered: YES    Recent Labs     05/12/23  0504   HGB 11.6   INR 1.0         Date               INR                 Dose  4/11  1.0  ---  5/11  ---  4 mg  5/12  1.0  4 mg    Assessment/ Plan: Will order warfarin 4 mg PO x 1 dose. Pharmacy will continue to monitor daily and adjust therapy as indicated.      Sushant Valdez, PharmD  Clinical Pharmacist, Orthopedics and Med/Surg  [address] [facility] ()

## 2023-05-12 NOTE — PROGRESS NOTES
Pt c/o itchiness all over her body. VS taken and stable. On call surgeon to notified. Surgeon states it's okay to give benadryl.

## 2023-05-12 NOTE — PROGRESS NOTES
Ortho NP Note    POD# 1  s/p LEFT TOTAL KNEE ARTHROPLASTY   Pt seen with RN, family present. Pt resting in bed. Patient upset regarding leaking Purewick, discomfort to left thigh, recent high blood pressure reading and care overnight. Patient states she has been wet this morning and is requesting to get cleaned up, to [facility]. Mobility thus far in post op period has been limited d/t persistent pain. Patient using oxycodone overnight which she states she has been tolerating well but feels its been \"a while\" since previous dose, RN administering now. Patient states she is very anxious and upset and feels this is contributing to pain, BP at present. Patient hopeful for discharge to home. Denies CP, SOB. No HA. Rash overnight, resolved this morning. BP noted, RN rechecking following AM BP dosing. Afebrile. BP (!) 195/95   Pulse (!) 101   Temp 98.2 °F (36.8 °C) (Oral)   Resp 20   Ht 1.6 m (5' 2.99\")   Wt 65.7 kg (144 lb 13.5 oz)   SpO2 98%   BMI 25.66 kg/m²     Voiding status: spontaneous void          LABS :  Recent Results (from the past 24 hour(s))   POCT Glucose    Collection Time: 05/11/23 12:02 PM   Result Value Ref Range    POC Glucose 95 65 - 117 mg/dL    Performed by: Britt Romo    Hemoglobin and Hematocrit    Collection Time: 05/12/23  5:04 AM   Result Value Ref Range    Hemoglobin 11.6 11.5 - 16.0 g/dL    Hematocrit 36.0 35.0 - 47.0 %   Protime-INR    Collection Time: 05/12/23  5:04 AM   Result Value Ref Range    INR 1.0 0.9 - 1.1      Protime 10.5 9.0 - 11.1 sec           Body mass index is 25.66 kg/m². : A BMI > 30 is classified as obesity and > 40 is classified as morbid obesity. Gauze/abd pad dressing changed, incision c.d.i  Cryotherapy in place over incision  Calves soft and supple; No pain with passive stretch  Bilateral LEs warm, dry. 2+ DP pulses.     Sensation and motor intact - PF/DF/EHL intact 5/5  Foot Pumps for mechanical DVT proph while in bed     PLAN: I have

## 2023-05-12 NOTE — PROGRESS NOTES
Famotidine - Renal dosing by Pharmacy  Current regimen:  20 mg PO Q 12 hr  SCr Trend:  No results for input(s): CREATININE, BUN in the last 72 hours. Estimated CrCl:  44 ml/min    Plan:   Change to 20 mg q24 Q 24 hr with respect to estimated creatinine clearance (CrCl <50 mL/min) per [facility]/[employer]-approved Renal Dosing Adjustment protocol. Pharmacy will continue to monitor this patient daily for changes in clinical status and renal function. Please contact pharmacy with any questions/clarifications at .      Charline Marino, PharmD  Clinical Pharmacist, Orthopedics and Med/Surg  [address] [employer] [facility] ()

## 2023-05-12 NOTE — PLAN OF CARE
Problem: Pain  Goal: Verbalizes/displays adequate comfort level or baseline comfort level  Outcome: Progressing     Problem: Safety - Adult  Goal: Free from fall injury  Outcome: Progressing  Note: Bed alarm engaged. Patient instructed to call before getting out of bed.
Problem: Physical Therapy - Adult  Goal: By Discharge: Performs mobility at highest level of function for planned discharge setting. See evaluation for individualized goals. Description: FUNCTIONAL STATUS PRIOR TO ADMISSION: Patient was independent without use of DME. Pt s/p right TKR 2018. HOME SUPPORT PRIOR TO ADMISSION: The patient lived with  but did not require assistance. No stairs to enter - lives on first floor. Physical Therapy Goals  Initiated 5/11/2023  1. Patient will move from supine to sit and sit to supine and scoot up and down in bed with modified independence within 4 day(s). 2.  Patient will perform sit to stand with modified independence within 4 day(s). 3.  Patient will transfer from bed to chair and chair to bed with modified independence using the least restrictive device within 4 day(s). 4.  Patient will ambulate with modified independence for > 150 feet with the least restrictive device within 4 day(s). 5.  Patient will ascend/descend 4 stairs with one handrail(s) with supervision/set-up within 4 day(s). 6. Patient will perform TKR home exercise program per protocol with supervision/set-up within 4 days. 7. Patient will demonstrate AROM 0-90 degrees in operative joint within 4 days. 5/12/2023 1520 by Comfort Au PT  Outcome: Adequate for Discharge   PHYSICAL THERAPY TREATMENT/DISCHARGE    Patient: Iván Leung (80 y.o. female)  Date: 5/12/2023  Diagnosis: Primary osteoarthritis of left knee [M17.12]  Osteoarthritis of left knee, unspecified osteoarthritis type [M17.12] Osteoarthritis of left knee, unspecified osteoarthritis type  Procedure(s) (LRB):  LEFT TOTAL KNEE ARTHROPLASTY (Left) 1 Day Post-Op  Precautions: Weight Bearing   Left Lower Extremity Weight Bearing: Weight Bearing As Tolerated                ASSESSMENT:  Patient has been followed by skilled PT services and has progressed towards goals.  Patient performed bed mobility, transfers, gait
Problem: Physical Therapy - Adult  Goal: By Discharge: Performs mobility at highest level of function for planned discharge setting. See evaluation for individualized goals. Description: FUNCTIONAL STATUS PRIOR TO ADMISSION: Patient was independent without use of DME. Pt s/p right TKR 2018. HOME SUPPORT PRIOR TO ADMISSION: The patient lived with  but did not require assistance. No stairs to enter - lives on first floor. Physical Therapy Goals  Initiated 5/11/2023  1. Patient will move from supine to sit and sit to supine and scoot up and down in bed with modified independence within 4 day(s). 2.  Patient will perform sit to stand with modified independence within 4 day(s). 3.  Patient will transfer from bed to chair and chair to bed with modified independence using the least restrictive device within 4 day(s). 4.  Patient will ambulate with modified independence for > 150 feet with the least restrictive device within 4 day(s). 5.  Patient will ascend/descend 4 stairs with one handrail(s) with supervision/set-up within 4 day(s). 6. Patient will perform TKR home exercise program per protocol with supervision/set-up within 4 days. 7. Patient will demonstrate AROM 0-90 degrees in operative joint within 4 days. Outcome: Progressing   PHYSICAL THERAPY TREATMENT    Patient: Liam Maurer (89 y.o. female)  Date: 5/12/2023  Diagnosis: Primary osteoarthritis of left knee [M17.12]  Osteoarthritis of left knee, unspecified osteoarthritis type [M17.12] Osteoarthritis of left knee, unspecified osteoarthritis type  Procedure(s) (LRB):  LEFT TOTAL KNEE ARTHROPLASTY (Left) 1 Day Post-Op  Precautions: Weight Bearing   Left Lower Extremity Weight Bearing: Weight Bearing As Tolerated                ASSESSMENT:  Patient continues to benefit from skilled PT services and is progressing towards goals. Patient is anxious as her blood pressure has been elevated.  She is also experiencing much pain in left
Reviewed and instructed in proper positioning to avoid external rotation and knee flexion in supine or recliner position - using blanket roll to support leg in neutral position. Reviewed importance of not placing pillow under knee to position knee in prolonged flexion and  continued use of ice - have staff replace frequently. Pt able to mobilize with RW without c/o's of dizziness or nausea. Anticipate pt will be able to increase amb distance, advance exercise and perform stairs during am session on 5/12 and be cleared for early discharge from PT standpoint. Pt educated on fall prevention and safety in hospital - Instructed to utilize call button and wait for assistance prior to attempting OOB. Patient will benefit from skilled intervention to address the above impairments. Functional Outcome Measure: The patient scored 55/100 on the Barthel Index outcome measure which is indicative of partially dependent. PLAN :  Recommendations and Planned Interventions:   bed mobility training, transfer training, gait training, therapeutic exercises, patient and family training/education, and therapeutic activities    Frequency/Duration: Patient will be followed by physical therapy to address goals, PT Plan of Care: BID to address goals. Recommendation for discharge: (in order for the patient to meet his/her long term goals): Therapy 2 days/week in the home    Other factors to consider for discharge:  will have support at home    IF patient discharges home will need the following DME: patient owns DME required for discharge       SUBJECTIVE:   Patient stated This feels better all ready.     OBJECTIVE DATA SUMMARY:       Past Medical History:   Diagnosis Date    Arthritis     Asthma     during childhood/seasonal    Cancer ([facility] [address]     skin    GERD (gastroesophageal reflux disease)     Hypertension      Past Surgical History:   Procedure Laterality Date    BACK SURGERY  2016    CARPAL TUNNEL RELEASE Left

## 2023-05-15 NOTE — ANESTHESIA POSTPROCEDURE EVALUATION
Department of Anesthesiology  Postprocedure Note    Patient: Marlene Zambrano  MRN: [id number]  YOB: 1940  Date of evaluation: 5/15/2023      Procedure Summary     Date: 05/11/23 Room / Location: [facility] MAIN OR 23 / [facility] MAIN OR    Anesthesia Start: 0726 Anesthesia Stop: 1134    Procedure: LEFT TOTAL KNEE ARTHROPLASTY (Left: Knee) Diagnosis:       Primary osteoarthritis of left knee      (Primary osteoarthritis of left knee [M17.12])    Providers: Meagan Mckeon MD Responsible Provider: Nereida Bhatt MD    Anesthesia Type: Regional, MAC, Spinal ASA Status: 2          Anesthesia Type: Regional, MAC, Spinal    Negro Phase I: Negro Score: 10    Negro Phase II:        Anesthesia Post Evaluation    Patient location during evaluation: PACU  Patient participation: complete - patient participated  Level of consciousness: responsive to verbal stimuli and sleepy but conscious  Airway patency: patent  Complications: no  Cardiovascular status: blood pressure returned to baseline  Respiratory status: acceptable  Hydration status: stable  Comments: +Post-Anesthesia Evaluation and Assessment    Patient: Marlene Zambrano MRN: [id number]  SSN: [id number]   YOB: 1940  Age: 80 y.o. Sex: female          Cardiovascular Function/Vital Signs    BP (!) 145/77   Pulse (!) 106   Temp 98 °F (36.7 °C) (Oral)   Resp 18   Ht 1.6 m (5' 2.99\")   Wt 65.7 kg (144 lb 13.5 oz)   SpO2 97%   BMI 25.66 kg/m²     Patient is status post Procedure(s):  LEFT TOTAL KNEE ARTHROPLASTY. Nausea/Vomiting: Controlled. Postoperative hydration reviewed and adequate. Pain:      Managed. Neurological Status: At baseline. Mental Status and Level of Consciousness: Arousable. Pulmonary Status:       Adequate oxygenation and airway patent. Complications related to anesthesia: None    Post-anesthesia assessment completed. No concerns.     I have evaluated the patient and the patient is stable and ready to be

## 2023-05-15 NOTE — PROGRESS NOTES
[facility]  [facility] Handoff     FROM:                                TO: At [address]                                                      ([address] [place] [address] or Facility name)  Cristino Aldana [address]  [address] [place] [address]  Dept: [address]: [phone]                                      Room#:  551/01                                                       Nurse Navigator: Rubina Car RN         SITUATION      ASAScore: 2    Admitted:  5/11/2023  Hospital Day: 2      Attending Provider:  No att. providers found     Consultations:  PHARMACY TO DOSE WARFARIN  IP CONSULT TO CASE MANAGEMENT    PCP:  Jose Davila MD   [phone]     Admitting Dx:  Primary osteoarthritis of left knee [M17.12]  Osteoarthritis of left knee, unspecified osteoarthritis type [M17.12]       Principal Problem:    Osteoarthritis of left knee, unspecified osteoarthritis type  Resolved Problems:    * No resolved hospital problems. *    4 Days Post-Op of   Procedure(s):  LEFT TOTAL KNEE ARTHROPLASTY   BY: Chato Marcano MD             ON: [unfilled]                  Code Status: Prior             Advance Directive? <no information> (Send w/patient)     Isolation:  No active isolations       MDRO: [unfilled]    BACKGROUND     Allergies:   Allergies   Allergen Reactions    Bee Venom Swelling    Oxycodone-Acetaminophen Other (See Comments)      Other (comments)\"hellish nightmares\"    Penicillins Hives     IN CHILDHOOD    Shellfish Allergy Itching    Zoster Vaccine Live Swelling     Severe Right arm swelling with redness after administered by pharmacist in elbow    Buspirone Nausea Only    Hydrochlorothiazide Myalgia     Other reaction(s): sorethroat    Hydroxyzine Other (See Comments)     jittery    Hydroxyzine Hcl Other (See Comments)     jittery    Meloxicam Other (See Comments)     Easy bruising    Rosuvastatin Myalgia    Hydrocodone-Acetaminophen Nausea And Vomiting

## 2023-05-16 NOTE — NURSE NAVIGATOR
Post Discharge Follow-up contact after Joint Replacement    Patient discharged on 5/12/23  By      following  Left total knee Arthroplasty. Spoke with patient today, who reports that \" She is doing well. \"  Denies Fever, Shortness of Breath or Chest Pain. Home Health has visited. Patient also reports:  Surgical dressing is clean dry and intact  Calf is non-tender, operative extremity has moderate swelling. Pain is well managed. Discussed use of ice & elevation. Patient is progressing with therapy and is exercising independently. Taking Warfarin for anticoagulation, Tylenol for pain. Patient is not experiencing symptoms of constipation & urinating without difficulty. Discussed side effects of anticoagulants & pain medications (bleeding/bruising, constipation, lightheaded/dizziness)  Follow up appointment is not  scheduled; but patient states plan to schedule. Had called office but had to leave a message. Encouraged to call office back to arrange follow up. Discussed calling surgeon Dr. Yara Forrest  for drainage, bleeding, swelling in operative extremity, fever or pain. Discussed calling PCP Becka with concern of low B/P. Pt stated that B/P was very low on her medication and that she stopped taken it.

## 2023-05-17 ENCOUNTER — TELEPHONE (OUTPATIENT)
Dept: PRIMARY CARE CLINIC | Facility: CLINIC | Age: 83
End: 2023-05-17

## 2023-05-17 DIAGNOSIS — E11.9 DIABETES MELLITUS TYPE 2, DIET-CONTROLLED (HCC): Primary | ICD-10-CM

## 2023-05-17 RX ORDER — GLUCOSAMINE HCL/CHONDROITIN SU 500-400 MG
CAPSULE ORAL
Qty: 100 STRIP | Refills: 0 | Status: SHIPPED | OUTPATIENT
Start: 2023-05-17

## 2023-05-17 RX ORDER — LANCETS 30 GAUGE
1 EACH MISCELLANEOUS 2 TIMES DAILY
Qty: 100 EACH | Refills: 5 | Status: SHIPPED | OUTPATIENT
Start: 2023-05-17

## 2023-05-17 RX ORDER — BLOOD-GLUCOSE METER
1 KIT MISCELLANEOUS DAILY
Qty: 1 KIT | Refills: 0 | Status: SHIPPED | OUTPATIENT
Start: 2023-05-17

## 2023-05-17 NOTE — TELEPHONE ENCOUNTER
Called and went straight to . Needing to know how she is currently taking meds, and how low BP is.  Refill will be sent under  refill tab

## 2023-05-17 NOTE — TELEPHONE ENCOUNTER
Requested Prescriptions     Pending Prescriptions Disp Refills    Lancets MISC 100 each 5     Si each by Does not apply route 2 times daily    glucose monitoring (FREESTYLE FREEDOM) kit 1 kit 0     Si kit by Does not apply route daily    blood glucose monitor strips 100 strip 0     Sig: Test once times a day        Last Visit 23  Last Refill

## 2023-05-17 NOTE — TELEPHONE ENCOUNTER
Patient just had knee surgery and her blood pressure has been low. Her hh nurse told her to adjust her blood pressure pills and she wants to know if she should continue that or what Dr Mandy White thinks. Also, she needs a new blood sugar machine and all the parts that go with it. Her current one is not working and she is not able to use it. It's an Accu-check. She goes to the [facility] on [address] and [place].

## 2023-05-22 SDOH — ECONOMIC STABILITY: FOOD INSECURITY: WITHIN THE PAST 12 MONTHS, YOU WORRIED THAT YOUR FOOD WOULD RUN OUT BEFORE YOU GOT MONEY TO BUY MORE.: NEVER TRUE

## 2023-05-22 SDOH — ECONOMIC STABILITY: FOOD INSECURITY: WITHIN THE PAST 12 MONTHS, THE FOOD YOU BOUGHT JUST DIDN'T LAST AND YOU DIDN'T HAVE MONEY TO GET MORE.: NEVER TRUE

## 2023-05-22 SDOH — ECONOMIC STABILITY: HOUSING INSECURITY
IN THE LAST 12 MONTHS, WAS THERE A TIME WHEN YOU DID NOT HAVE A STEADY PLACE TO SLEEP OR SLEPT IN A SHELTER (INCLUDING NOW)?: NO

## 2023-05-22 SDOH — ECONOMIC STABILITY: TRANSPORTATION INSECURITY
IN THE PAST 12 MONTHS, HAS LACK OF TRANSPORTATION KEPT YOU FROM MEETINGS, WORK, OR FROM GETTING THINGS NEEDED FOR DAILY LIVING?: NO

## 2023-05-22 SDOH — ECONOMIC STABILITY: INCOME INSECURITY: HOW HARD IS IT FOR YOU TO PAY FOR THE VERY BASICS LIKE FOOD, HOUSING, MEDICAL CARE, AND HEATING?: NOT VERY HARD

## 2023-05-24 ENCOUNTER — OFFICE VISIT (OUTPATIENT)
Dept: PRIMARY CARE CLINIC | Facility: CLINIC | Age: 83
End: 2023-05-24

## 2023-05-24 VITALS
SYSTOLIC BLOOD PRESSURE: 123 MMHG | OXYGEN SATURATION: 100 % | WEIGHT: 153.4 LBS | RESPIRATION RATE: 17 BRPM | HEART RATE: 80 BPM | HEIGHT: 63 IN | TEMPERATURE: 97.3 F | DIASTOLIC BLOOD PRESSURE: 76 MMHG | BODY MASS INDEX: 27.18 KG/M2

## 2023-05-24 DIAGNOSIS — Z96.652 STATUS POST LEFT KNEE REPLACEMENT: ICD-10-CM

## 2023-05-24 DIAGNOSIS — G25.81 RESTLESS LEGS SYNDROME (RLS): ICD-10-CM

## 2023-05-24 DIAGNOSIS — E11.9 DIABETES MELLITUS TYPE 2, DIET-CONTROLLED (HCC): ICD-10-CM

## 2023-05-24 DIAGNOSIS — I10 PRIMARY HYPERTENSION: Primary | ICD-10-CM

## 2023-05-24 DIAGNOSIS — Z09 HOSPITAL DISCHARGE FOLLOW-UP: ICD-10-CM

## 2023-05-24 DIAGNOSIS — F51.01 PRIMARY INSOMNIA: ICD-10-CM

## 2023-05-24 RX ORDER — TRAZODONE HYDROCHLORIDE 50 MG/1
50 TABLET ORAL NIGHTLY
Qty: 30 TABLET | Refills: 1 | Status: SHIPPED | OUTPATIENT
Start: 2023-05-24 | End: 2023-06-23

## 2023-05-24 ASSESSMENT — PATIENT HEALTH QUESTIONNAIRE - PHQ9
SUM OF ALL RESPONSES TO PHQ QUESTIONS 1-9: 0
SUM OF ALL RESPONSES TO PHQ9 QUESTIONS 1 & 2: 0
1. LITTLE INTEREST OR PLEASURE IN DOING THINGS: 0
SUM OF ALL RESPONSES TO PHQ QUESTIONS 1-9: 0
2. FEELING DOWN, DEPRESSED OR HOPELESS: 0

## 2023-05-24 ASSESSMENT — ENCOUNTER SYMPTOMS
EYE DISCHARGE: 0
DIARRHEA: 0
RHINORRHEA: 0
SHORTNESS OF BREATH: 0
CHEST TIGHTNESS: 0
COLOR CHANGE: 0
COUGH: 0
CONSTIPATION: 0
BACK PAIN: 0
ABDOMINAL PAIN: 0
SORE THROAT: 0

## 2023-05-24 ASSESSMENT — LIFESTYLE VARIABLES
HOW MANY STANDARD DRINKS CONTAINING ALCOHOL DO YOU HAVE ON A TYPICAL DAY: PATIENT DOES NOT DRINK
HOW OFTEN DO YOU HAVE A DRINK CONTAINING ALCOHOL: NEVER

## 2023-05-24 NOTE — PROGRESS NOTES
Health Decision Maker has been checked with the patient      Patient has stated that the scribe can come in room    Chief Complaint   Patient presents with    Follow-up     Depression: Not at risk    PHQ-2 Score: 0      /76 (Site: Left Upper Arm)   Pulse 80   Temp 97.3 °F (36.3 °C)   Resp 17   Ht 5' 2.9\" (1.598 m)   Wt 153 lb 6.4 oz (69.6 kg)   SpO2 100%   BMI 27.26 kg/m²     1. \"Have you been to the ER, urgent care clinic since your last visit? Hospitalized since your last visit? \" no    2. \"Have you seen or consulted any other health care providers outside of the [address] [place] [address] since your last visit? \" No     3. For patients aged 39-70: Has the patient had a colonoscopy / FIT/ Cologuard? NA - based on age      If the patient is female:    4. For patients aged 41-77: Has the patient had a mammogram within the past 2 years? NA - based on age or sex      11. For patients aged 21-65: Has the patient had a pap smear?  NA - based on age or sex
nostril as directed      Magnesium 400 MG TABS Take by mouth      ascorbic acid (VITAMIN C) 1000 MG tablet Take by mouth daily      benazepril (LOTENSIN) 20 MG tablet 2 tablets at bedtime Indications: 5-15-23: Patient states she is taking 20mg (1 tab) of benazepril every morning. Only taking once daily in the AM      calcium carbonate 1500 (600 Ca) MG TABS tablet Take by mouth daily      cyanocobalamin 1000 MCG tablet Take by mouth daily      melatonin 3 MG TABS tablet Take by mouth      omeprazole (PRILOSEC) 20 MG delayed release capsule Take by mouth nightly      albuterol sulfate HFA (PROVENTIL;VENTOLIN;PROAIR) 108 (90 Base) MCG/ACT inhaler  (Patient not taking: Reported on 5/24/2023)       No current facility-administered medications on file prior to visit.        Allergies   Allergen Reactions    Bee Venom Swelling    Oxycodone-Acetaminophen Other (See Comments)      Other (comments)\"hellish nightmares\"    Penicillins Hives     IN CHILDHOOD    Shellfish Allergy Itching    Zoster Vaccine Live Swelling     Severe Right arm swelling with redness after administered by pharmacist in elbow    Buspirone Nausea Only    Hydrochlorothiazide Myalgia     Other reaction(s): sorethroat    Hydroxyzine Other (See Comments)     jittery    Hydroxyzine Hcl Other (See Comments)     jittery    Meloxicam Other (See Comments)     Easy bruising    Rosuvastatin Myalgia    Hydrocodone-Acetaminophen Nausea And Vomiting    Other Itching     If eats large quantities over several days causes itching: tomatoes, peaches, strawberry, fig       Past Medical History:   Diagnosis Date    Arthritis     Asthma     during childhood/seasonal    Cancer ([facility] [address]     skin    GERD (gastroesophageal reflux disease)     Hypertension        Past Surgical History:   Procedure Laterality Date    BACK SURGERY  2016    CARPAL TUNNEL RELEASE Left 2018    CATARACT REMOVAL Bilateral     COLPORRHAPHY  2005    HYSTERECTOMY (CERVIX STATUS UNKNOWN)  1976    JOINT

## 2023-05-31 ENCOUNTER — TELEPHONE (OUTPATIENT)
Dept: PRIMARY CARE CLINIC | Facility: CLINIC | Age: 83
End: 2023-05-31

## 2023-05-31 RX ORDER — OMEPRAZOLE 20 MG/1
CAPSULE, DELAYED RELEASE ORAL
Qty: 90 CAPSULE | Refills: 0 | Status: SHIPPED | OUTPATIENT
Start: 2023-05-31

## 2023-05-31 RX ORDER — OMEPRAZOLE 20 MG/1
CAPSULE, DELAYED RELEASE ORAL
Qty: 30 CAPSULE | Refills: 0 | Status: SHIPPED | OUTPATIENT
Start: 2023-05-31 | End: 2023-05-31

## 2023-05-31 NOTE — TELEPHONE ENCOUNTER
Patient said the Trazodone is not working. Patient said her sister is taking Temazepam and it works for her and wondered if Dr. Cory Bright can prescribe it to her. Patient also wants to talk to Dr. Cory Bright or her nurse regarding her Warfarin medication. Patient asked someone to call her back.

## 2023-06-01 DIAGNOSIS — Z79.899 ON DEEP VEIN THROMBOSIS (DVT) PROPHYLAXIS: Primary | ICD-10-CM

## 2023-06-01 RX ORDER — WARFARIN SODIUM 5 MG/1
5 TABLET ORAL DAILY
Qty: 30 TABLET | Refills: 0 | Status: SHIPPED | OUTPATIENT
Start: 2023-06-01

## 2023-06-02 DIAGNOSIS — F51.01 PRIMARY INSOMNIA: ICD-10-CM

## 2023-06-02 RX ORDER — TRAZODONE HYDROCHLORIDE 50 MG/1
100 TABLET ORAL NIGHTLY
Qty: 60 TABLET | Refills: 2 | Status: SHIPPED | OUTPATIENT
Start: 2023-06-02 | End: 2023-08-31

## 2023-06-02 NOTE — TELEPHONE ENCOUNTER
Patient called stating that she does not have enough Trazodone medication left in order to take 2 pill a day on certain days. She said she only has 5 pills left. Also patient said that her medication was going to the wrong [facility]. The correct [facility] is on [place] not [place] and [place]. Patient wanted a call back at ZD#[phone].

## 2023-06-09 ENCOUNTER — OFFICE VISIT (OUTPATIENT)
Dept: PRIMARY CARE CLINIC | Facility: CLINIC | Age: 83
End: 2023-06-09

## 2023-06-09 VITALS
TEMPERATURE: 97.3 F | BODY MASS INDEX: 27.29 KG/M2 | OXYGEN SATURATION: 99 % | DIASTOLIC BLOOD PRESSURE: 70 MMHG | WEIGHT: 154 LBS | RESPIRATION RATE: 18 BRPM | SYSTOLIC BLOOD PRESSURE: 106 MMHG | HEART RATE: 78 BPM | HEIGHT: 63 IN

## 2023-06-09 DIAGNOSIS — R09.81 NASAL CONGESTION: ICD-10-CM

## 2023-06-09 DIAGNOSIS — Z96.652 STATUS POST LEFT KNEE REPLACEMENT: ICD-10-CM

## 2023-06-09 DIAGNOSIS — Z00.00 MEDICARE ANNUAL WELLNESS VISIT, SUBSEQUENT: Primary | ICD-10-CM

## 2023-06-09 DIAGNOSIS — I10 PRIMARY HYPERTENSION: ICD-10-CM

## 2023-06-09 DIAGNOSIS — G25.81 RESTLESS LEG SYNDROME: ICD-10-CM

## 2023-06-09 DIAGNOSIS — F51.01 PRIMARY INSOMNIA: ICD-10-CM

## 2023-06-09 RX ORDER — PRAMIPEXOLE DIHYDROCHLORIDE 1 MG/1
1 TABLET ORAL NIGHTLY
Qty: 90 TABLET | Refills: 0 | COMMUNITY
Start: 2023-06-09

## 2023-06-09 RX ORDER — TEMAZEPAM 15 MG/1
15 CAPSULE ORAL NIGHTLY PRN
Qty: 14 CAPSULE | Refills: 0 | Status: SHIPPED | OUTPATIENT
Start: 2023-06-09 | End: 2023-06-23

## 2023-06-09 ASSESSMENT — ENCOUNTER SYMPTOMS
DIARRHEA: 0
COUGH: 0
CONSTIPATION: 0
SHORTNESS OF BREATH: 0
BACK PAIN: 0
EYE DISCHARGE: 0
CHEST TIGHTNESS: 0
SORE THROAT: 0
ABDOMINAL PAIN: 0
COLOR CHANGE: 0
RHINORRHEA: 0

## 2023-06-09 ASSESSMENT — LIFESTYLE VARIABLES
HOW OFTEN DO YOU HAVE A DRINK CONTAINING ALCOHOL: NEVER
HOW MANY STANDARD DRINKS CONTAINING ALCOHOL DO YOU HAVE ON A TYPICAL DAY: PATIENT DOES NOT DRINK

## 2023-06-09 ASSESSMENT — PATIENT HEALTH QUESTIONNAIRE - PHQ9
SUM OF ALL RESPONSES TO PHQ9 QUESTIONS 1 & 2: 0
SUM OF ALL RESPONSES TO PHQ QUESTIONS 1-9: 0
2. FEELING DOWN, DEPRESSED OR HOPELESS: 0
SUM OF ALL RESPONSES TO PHQ QUESTIONS 1-9: 0
1. LITTLE INTEREST OR PLEASURE IN DOING THINGS: 0

## 2023-06-09 NOTE — PATIENT INSTRUCTIONS
Fatigue: Care Instructions  Your Care Instructions     Fatigue is a feeling of tiredness, exhaustion, or lack of energy. You may feel fatigue because of too much or not enough activity. It can also come from stress, lack of sleep, boredom, and poor diet. Many medical problems, such as viral infections, can cause fatigue. Emotional problems, especially depression, are often the cause of fatigue. Fatigue is most often a symptom of another problem. Treatment for fatigue depends on the cause. For example, if you have fatigue because you have a certain health problem, treating this problem also treats your fatigue. If depression or anxiety is the cause, treatment may help. Follow-up care is a key part of your treatment and safety. Be sure to make and go to all appointments, and call your doctor if you are having problems. It's also a good idea to know your test results and keep a list of the medicines you take. How can you care for yourself at home? Get regular exercise. But don't overdo it. Go back and forth between rest and exercise. Get plenty of rest.  Eat a healthy diet. Do not skip meals, especially breakfast.  Reduce your use of caffeine, tobacco, and alcohol. Caffeine is most often found in coffee, tea, cola drinks, and chocolate. Limit medicines that can cause fatigue. This includes tranquilizers and cold and allergy medicines. When should you call for help? Watch closely for changes in your health, and be sure to contact your doctor if:    You have new symptoms such as fever or a rash.     Your fatigue gets worse.     You have been feeling down, depressed, or hopeless. Or you may have lost interest in things that you usually enjoy.     You are not getting better as expected. Where can you learn more? Go to http://www.woods.com/ and enter V713 to learn more about \"Fatigue: Care Instructions. \"  Current as of: October 20, 2022               Content Version: 13.6  © 9915-7506

## 2023-06-09 NOTE — PROGRESS NOTES
1. \"Have you been to the ER, urgent care clinic since your last visit? Hospitalized since your last visit? \" No    2. \"Have you seen or consulted any other health care providers outside of the [address] [place] [address] since your last visit? \" Yes When: Ortho, PT      3. For patients aged 39-70: Has the patient had a colonoscopy / FIT/ Cologuard? NA - based on age      If the patient is female:    4. For patients aged 41-77: Has the patient had a mammogram within the past 2 years? Yes    5. For patients aged 21-65: Has the patient had a pap smear? NA - based on age or sex       Chief Complaint   Patient presents with    Sleep Problem    Medicare AWV    Sinus Problem     Cant breath out of nose       Pt is ok with scribe.
Medicare Annual Wellness Visit    Delfina Blankenship is here for Sleep Problem, Medicare AWV, and Sinus Problem (Cant breath out of nose)         Subjective       Patient's complete Health Risk Assessment and screening values have been reviewed and are found in Flowsheets. The following problems were reviewed today and where indicated follow up appointments were made and/or referrals ordered. Positive Risk Factor Screenings with Interventions:               General HRA Questions:  Select all that apply: (!) New or Increased Fatigue    Fatigue Interventions:  Patient advised to follow up in the office for further evaluation and treatment       Weight and Activity:  Physical Activity: Inactive    Days of Exercise per Week: 0 days    Minutes of Exercise per Session: 0 min     On average, how many days per week do you engage in moderate to strenuous exercise (like a brisk walk)?: 0 days  Have you lost any weight without trying in the past 3 months?: No  Body mass index is 27.37 kg/m². Inactivity Interventions:  Patient declined any further interventions or treatment       Hearing Screen:  Do you or your family notice any trouble with your hearing that hasn't been managed with hearing aids?: (!) Yes    Interventions:  Patient comments: has an appointment with ENT>     Safety:  Do all of your stairways have a railing or banister?: (!) No    Interventions:  Patient declined any further interventions or treatment                     Objective   Vitals:    06/09/23 1325   BP: 106/70   Site: Left Upper Arm   Position: Sitting   Pulse: 78   Resp: 18   Temp: 97.3 °F (36.3 °C)   TempSrc: Temporal   SpO2: 99%   Weight: 154 lb (69.9 kg)   Height: 5' 2.9\" (1.598 m)      Body mass index is 27.37 kg/m².              Allergies   Allergen Reactions    Bee Venom Swelling    Oxycodone-Acetaminophen Other (See Comments)      Other (comments)\"hellish nightmares\"    Penicillins Hives     IN CHILDHOOD    Shellfish Allergy Itching
0.2  0.2 - 1.0 EU/dL Final    Nitrite, Urine 04/11/2023 Negative  Negative   Final    Leukocyte Esterase, Urine 04/11/2023 SMALL (A)  Negative   Final    Urine Culture Reflex 04/11/2023 URINE CULTURE ORDERED (A)  CULTURE NOT INDICATED BY UA RESULT   Final    WBC, UA 04/11/2023 20-50  0 - 4 /hpf Final    RBC, UA 04/11/2023 0-5  0 - 5 /hpf Final    Epithelial Cells, UA 04/11/2023 FEW  FEW /lpf Final    Epithelial cell category consists of squamous cells and /or transitional urothelial cells. Renal tubular cells, if present, are separately identified as such. BACTERIA, URINE 04/11/2023 Negative  Negative /hpf Final    Hyaline Casts, UA 04/11/2023 0-2  0 - 5 /lpf Final         Review of Systems   Constitutional:  Negative for activity change, fatigue and unexpected weight change. HENT:  Negative for congestion, hearing loss, rhinorrhea and sore throat. Eyes:  Negative for discharge. Respiratory:  Negative for cough, chest tightness and shortness of breath. Gastrointestinal:  Negative for abdominal pain, constipation and diarrhea. Genitourinary:  Negative for dysuria, flank pain, frequency and urgency. Musculoskeletal:  Negative for arthralgias, back pain and myalgias. Skin:  Negative for color change and rash. Neurological:  Negative for dizziness, light-headedness and headaches. Psychiatric/Behavioral:  Negative for dysphoric mood and sleep disturbance. The patient is not nervous/anxious. /70 (Site: Left Upper Arm, Position: Sitting)   Pulse 78   Temp 97.3 °F (36.3 °C) (Temporal)   Resp 18   Ht 5' 2.9\" (1.598 m)   Wt 154 lb (69.9 kg)   SpO2 99%   BMI 27.37 kg/m²     Physical Exam  Vitals and nursing note reviewed. Constitutional:       General: She is not in acute distress. Appearance: Normal appearance. She is normal weight. She is not diaphoretic. HENT:      Right Ear: External ear normal.      Left Ear: External ear normal.   Eyes:      General: No scleral icterus.

## 2023-06-28 ENCOUNTER — HOSPITAL ENCOUNTER (OUTPATIENT)
Facility: HOSPITAL | Age: 83
Discharge: HOME | End: 2023-07-01
Attending: ORTHOPAEDIC SURGERY
Payer: MEDICARE

## 2023-06-28 DIAGNOSIS — Z96.652 HISTORY OF TOTAL KNEE ARTHROPLASTY, LEFT: ICD-10-CM

## 2023-06-28 LAB — ECHO BSA: 1.73 M2

## 2023-06-28 PROCEDURE — 93971 EXTREMITY STUDY: CPT

## 2023-07-04 DIAGNOSIS — G25.81 RESTLESS LEG SYNDROME: ICD-10-CM

## 2023-07-04 RX ORDER — PRAMIPEXOLE DIHYDROCHLORIDE 1 MG/1
1 TABLET ORAL 3 TIMES DAILY
Qty: 270 TABLET | Refills: 0 | Status: SHIPPED | OUTPATIENT
Start: 2023-07-04 | End: 2023-10-02

## 2023-07-07 DIAGNOSIS — I10 ESSENTIAL HYPERTENSION, BENIGN: Primary | ICD-10-CM

## 2023-07-07 RX ORDER — BENAZEPRIL HYDROCHLORIDE 40 MG/1
TABLET, FILM COATED ORAL
Qty: 90 TABLET | OUTPATIENT
Start: 2023-07-07

## 2023-07-07 RX ORDER — BENAZEPRIL HYDROCHLORIDE 10 MG/1
10 TABLET ORAL DAILY
Qty: 30 TABLET | Refills: 2 | Status: SHIPPED | OUTPATIENT
Start: 2023-07-07 | End: 2023-10-05

## 2023-08-02 DIAGNOSIS — R35.0 INCREASED URINARY FREQUENCY: Primary | ICD-10-CM

## 2023-08-03 DIAGNOSIS — R35.0 INCREASED URINARY FREQUENCY: ICD-10-CM

## 2023-08-03 LAB
APPEARANCE UR: CLEAR
BACTERIA URNS QL MICRO: NEGATIVE /HPF
BILIRUB UR QL: NEGATIVE
COLOR UR: ABNORMAL
EPITH CASTS URNS QL MICRO: ABNORMAL /LPF
GLUCOSE UR STRIP.AUTO-MCNC: NEGATIVE MG/DL
HGB UR QL STRIP: NEGATIVE
HYALINE CASTS URNS QL MICRO: ABNORMAL /LPF (ref 0–5)
KETONES UR QL STRIP.AUTO: NEGATIVE MG/DL
LEUKOCYTE ESTERASE UR QL STRIP.AUTO: ABNORMAL
NITRITE UR QL STRIP.AUTO: NEGATIVE
PH UR STRIP: 6.5 (ref 5–8)
PROT UR STRIP-MCNC: NEGATIVE MG/DL
RBC #/AREA URNS HPF: ABNORMAL /HPF (ref 0–5)
SP GR UR REFRACTOMETRY: 1.01 (ref 1–1.03)
URINE CULTURE IF INDICATED: ABNORMAL
UROBILINOGEN UR QL STRIP.AUTO: 0.2 EU/DL (ref 0.2–1)
WBC URNS QL MICRO: ABNORMAL /HPF (ref 0–4)

## 2023-09-05 RX ORDER — OMEPRAZOLE 20 MG/1
CAPSULE, DELAYED RELEASE ORAL
Qty: 90 CAPSULE | Refills: 0 | Status: SHIPPED | OUTPATIENT
Start: 2023-09-05

## 2023-10-03 ENCOUNTER — IMMUNIZATION (OUTPATIENT)
Age: 83
End: 2023-10-03

## 2023-10-12 DIAGNOSIS — I10 ESSENTIAL HYPERTENSION, BENIGN: ICD-10-CM

## 2023-10-12 RX ORDER — BENAZEPRIL HYDROCHLORIDE 10 MG/1
10 TABLET ORAL DAILY
Qty: 30 TABLET | Refills: 2 | Status: SHIPPED | OUTPATIENT
Start: 2023-10-12 | End: 2024-01-10

## 2023-10-12 NOTE — TELEPHONE ENCOUNTER
PCP: Albino Justin MD    Last Visit 6/9/2023   Future Appointments   Date Time Provider 4600  [address]   2/27/2024 10:00 AM Neptali Cadena MD TOS BS AMB       Requested Prescriptions     Pending Prescriptions Disp Refills    benazepril (LOTENSIN) 10 MG tablet [Pharmacy Med Name: BENAZEPRIL 10MG TABLETS] 30 tablet 2     Sig: TAKE 1 TABLET BY MOUTH DAILY         Other Comments: Last Refill 07/07/2023

## 2023-10-29 ENCOUNTER — APPOINTMENT (OUTPATIENT)
Facility: HOSPITAL | Age: 83
DRG: 948 | End: 2023-10-29
Payer: MEDICARE

## 2023-10-29 ENCOUNTER — HOSPITAL ENCOUNTER (INPATIENT)
Facility: HOSPITAL | Age: 83
LOS: 1 days | Discharge: HOME | DRG: 948 | End: 2023-10-30
Attending: EMERGENCY MEDICINE | Admitting: INTERNAL MEDICINE
Payer: MEDICARE

## 2023-10-29 DIAGNOSIS — R41.9 ALTERATION OF AWARENESS: ICD-10-CM

## 2023-10-29 DIAGNOSIS — R79.89 ELEVATED TROPONIN: ICD-10-CM

## 2023-10-29 DIAGNOSIS — G45.4 TRANSIENT GLOBAL AMNESIA: Primary | ICD-10-CM

## 2023-10-29 DIAGNOSIS — R41.3 MEMORY DEFICIT: ICD-10-CM

## 2023-10-29 LAB
ALBUMIN SERPL-MCNC: 4.2 G/DL (ref 3.5–5)
ALBUMIN/GLOB SERPL: 1.1 (ref 1.1–2.2)
ALP SERPL-CCNC: 127 U/L (ref 45–117)
ALT SERPL-CCNC: 20 U/L (ref 12–78)
ANION GAP SERPL CALC-SCNC: 4 MMOL/L (ref 5–15)
AST SERPL-CCNC: 15 U/L (ref 15–37)
BASOPHILS # BLD: 0.1 K/UL (ref 0–0.1)
BASOPHILS NFR BLD: 2 % (ref 0–1)
BILIRUB SERPL-MCNC: 0.5 MG/DL (ref 0.2–1)
BUN SERPL-MCNC: 23 MG/DL (ref 6–20)
BUN/CREAT SERPL: 22 (ref 12–20)
CALCIUM SERPL-MCNC: 9.2 MG/DL (ref 8.5–10.1)
CHLORIDE SERPL-SCNC: 104 MMOL/L (ref 97–108)
CO2 SERPL-SCNC: 27 MMOL/L (ref 21–32)
CREAT SERPL-MCNC: 1.04 MG/DL (ref 0.55–1.02)
DIFFERENTIAL METHOD BLD: ABNORMAL
EOSINOPHIL # BLD: 0.1 K/UL (ref 0–0.4)
EOSINOPHIL NFR BLD: 1 % (ref 0–7)
ERYTHROCYTE [DISTWIDTH] IN BLOOD BY AUTOMATED COUNT: 14.6 % (ref 11.5–14.5)
FOLATE SERPL-MCNC: 11.6 NG/ML (ref 5–21)
GLOBULIN SER CALC-MCNC: 3.9 G/DL (ref 2–4)
GLUCOSE BLD STRIP.AUTO-MCNC: 120 MG/DL (ref 65–117)
GLUCOSE SERPL-MCNC: 133 MG/DL (ref 65–100)
HCT VFR BLD AUTO: 42.3 % (ref 35–47)
HGB BLD-MCNC: 13.7 G/DL (ref 11.5–16)
IMM GRANULOCYTES # BLD AUTO: 0 K/UL (ref 0–0.04)
IMM GRANULOCYTES NFR BLD AUTO: 1 % (ref 0–0.5)
INR PPP: 1 (ref 0.9–1.1)
LYMPHOCYTES # BLD: 1.6 K/UL (ref 0.8–3.5)
LYMPHOCYTES NFR BLD: 21 % (ref 12–49)
MCH RBC QN AUTO: 28.4 PG (ref 26–34)
MCHC RBC AUTO-ENTMCNC: 32.4 G/DL (ref 30–36.5)
MCV RBC AUTO: 87.6 FL (ref 80–99)
MONOCYTES # BLD: 0.3 K/UL (ref 0–1)
MONOCYTES NFR BLD: 4 % (ref 5–13)
NEUTS SEG # BLD: 5.4 K/UL (ref 1.8–8)
NEUTS SEG NFR BLD: 71 % (ref 32–75)
NRBC # BLD: 0 K/UL (ref 0–0.01)
NRBC BLD-RTO: 0 PER 100 WBC
PLATELET # BLD AUTO: 323 K/UL (ref 150–400)
PMV BLD AUTO: 9.3 FL (ref 8.9–12.9)
POTASSIUM SERPL-SCNC: 4.4 MMOL/L (ref 3.5–5.1)
PROT SERPL-MCNC: 8.1 G/DL (ref 6.4–8.2)
PROTHROMBIN TIME: 10 SEC (ref 9–11.1)
RBC # BLD AUTO: 4.83 M/UL (ref 3.8–5.2)
SERVICE CMNT-IMP: ABNORMAL
SODIUM SERPL-SCNC: 135 MMOL/L (ref 136–145)
T4 FREE SERPL-MCNC: 1 NG/DL (ref 0.8–1.5)
TROPONIN I SERPL HS-MCNC: 67 NG/L (ref 0–51)
TROPONIN I SERPL HS-MCNC: 77 NG/L (ref 0–51)
TSH SERPL DL<=0.05 MIU/L-ACNC: 1.49 UIU/ML (ref 0.36–3.74)
VIT B12 SERPL-MCNC: 794 PG/ML (ref 193–986)
WBC # BLD AUTO: 7.5 K/UL (ref 3.6–11)

## 2023-10-29 PROCEDURE — 82746 ASSAY OF FOLIC ACID SERUM: CPT

## 2023-10-29 PROCEDURE — 2580000003 HC RX 258: Performed by: INTERNAL MEDICINE

## 2023-10-29 PROCEDURE — 70498 CT ANGIOGRAPHY NECK: CPT

## 2023-10-29 PROCEDURE — 82607 VITAMIN B-12: CPT

## 2023-10-29 PROCEDURE — 82962 GLUCOSE BLOOD TEST: CPT

## 2023-10-29 PROCEDURE — APPNB45 APP NON BILLABLE 31-45 MINUTES: Performed by: NURSE PRACTITIONER

## 2023-10-29 PROCEDURE — A9579 GAD-BASE MR CONTRAST NOS,1ML: HCPCS | Performed by: EMERGENCY MEDICINE

## 2023-10-29 PROCEDURE — 84443 ASSAY THYROID STIM HORMONE: CPT

## 2023-10-29 PROCEDURE — 83036 HEMOGLOBIN GLYCOSYLATED A1C: CPT

## 2023-10-29 PROCEDURE — 6370000000 HC RX 637 (ALT 250 FOR IP): Performed by: INTERNAL MEDICINE

## 2023-10-29 PROCEDURE — 6360000004 HC RX CONTRAST MEDICATION: Performed by: RADIOLOGY

## 2023-10-29 PROCEDURE — 99285 EMERGENCY DEPT VISIT HI MDM: CPT

## 2023-10-29 PROCEDURE — 85025 COMPLETE CBC W/AUTO DIFF WBC: CPT

## 2023-10-29 PROCEDURE — 70450 CT HEAD/BRAIN W/O DYE: CPT

## 2023-10-29 PROCEDURE — 80053 COMPREHEN METABOLIC PANEL: CPT

## 2023-10-29 PROCEDURE — 36415 COLL VENOUS BLD VENIPUNCTURE: CPT

## 2023-10-29 PROCEDURE — 6370000000 HC RX 637 (ALT 250 FOR IP): Performed by: EMERGENCY MEDICINE

## 2023-10-29 PROCEDURE — G0378 HOSPITAL OBSERVATION PER HR: HCPCS

## 2023-10-29 PROCEDURE — 1100000000 HC RM PRIVATE

## 2023-10-29 PROCEDURE — 93005 ELECTROCARDIOGRAM TRACING: CPT | Performed by: EMERGENCY MEDICINE

## 2023-10-29 PROCEDURE — 6360000004 HC RX CONTRAST MEDICATION: Performed by: EMERGENCY MEDICINE

## 2023-10-29 PROCEDURE — 0042T CT BRAIN PERFUSION: CPT

## 2023-10-29 PROCEDURE — 84484 ASSAY OF TROPONIN QUANT: CPT

## 2023-10-29 PROCEDURE — 70553 MRI BRAIN STEM W/O & W/DYE: CPT

## 2023-10-29 PROCEDURE — 84439 ASSAY OF FREE THYROXINE: CPT

## 2023-10-29 PROCEDURE — 85610 PROTHROMBIN TIME: CPT

## 2023-10-29 RX ORDER — ACETAMINOPHEN 325 MG/1
650 TABLET ORAL EVERY 6 HOURS PRN
Status: DISCONTINUED | OUTPATIENT
Start: 2023-10-29 | End: 2023-10-30 | Stop reason: HOSPADM

## 2023-10-29 RX ORDER — ACETAMINOPHEN 650 MG/1
650 SUPPOSITORY RECTAL EVERY 6 HOURS PRN
Status: DISCONTINUED | OUTPATIENT
Start: 2023-10-29 | End: 2023-10-30 | Stop reason: HOSPADM

## 2023-10-29 RX ORDER — SODIUM CHLORIDE 0.9 % (FLUSH) 0.9 %
5-40 SYRINGE (ML) INJECTION EVERY 12 HOURS SCHEDULED
Status: DISCONTINUED | OUTPATIENT
Start: 2023-10-29 | End: 2023-10-30 | Stop reason: HOSPADM

## 2023-10-29 RX ORDER — LISINOPRIL 10 MG/1
10 TABLET ORAL DAILY
Status: DISCONTINUED | OUTPATIENT
Start: 2023-10-30 | End: 2023-10-30 | Stop reason: HOSPADM

## 2023-10-29 RX ORDER — CLOPIDOGREL 300 MG/1
300 TABLET, FILM COATED ORAL
Status: COMPLETED | OUTPATIENT
Start: 2023-10-29 | End: 2023-10-29

## 2023-10-29 RX ORDER — ONDANSETRON 4 MG/1
4 TABLET, ORALLY DISINTEGRATING ORAL EVERY 8 HOURS PRN
Status: DISCONTINUED | OUTPATIENT
Start: 2023-10-29 | End: 2023-10-30 | Stop reason: HOSPADM

## 2023-10-29 RX ORDER — LANOLIN ALCOHOL/MO/W.PET/CERES
3 CREAM (GRAM) TOPICAL NIGHTLY PRN
Status: DISCONTINUED | OUTPATIENT
Start: 2023-10-29 | End: 2023-10-30 | Stop reason: HOSPADM

## 2023-10-29 RX ORDER — POLYETHYLENE GLYCOL 3350 17 G/17G
17 POWDER, FOR SOLUTION ORAL DAILY PRN
Status: DISCONTINUED | OUTPATIENT
Start: 2023-10-29 | End: 2023-10-30 | Stop reason: HOSPADM

## 2023-10-29 RX ORDER — SODIUM CHLORIDE 0.9 % (FLUSH) 0.9 %
5-40 SYRINGE (ML) INJECTION PRN
Status: DISCONTINUED | OUTPATIENT
Start: 2023-10-29 | End: 2023-10-30 | Stop reason: HOSPADM

## 2023-10-29 RX ORDER — 0.9 % SODIUM CHLORIDE 0.9 %
500 INTRAVENOUS SOLUTION INTRAVENOUS ONCE
Status: COMPLETED | OUTPATIENT
Start: 2023-10-29 | End: 2023-10-29

## 2023-10-29 RX ORDER — PRAMIPEXOLE DIHYDROCHLORIDE 1 MG/1
3 TABLET ORAL NIGHTLY
Status: DISCONTINUED | OUTPATIENT
Start: 2023-10-29 | End: 2023-10-30 | Stop reason: HOSPADM

## 2023-10-29 RX ORDER — SODIUM CHLORIDE 9 MG/ML
INJECTION, SOLUTION INTRAVENOUS PRN
Status: DISCONTINUED | OUTPATIENT
Start: 2023-10-29 | End: 2023-10-30 | Stop reason: HOSPADM

## 2023-10-29 RX ORDER — ASPIRIN 325 MG
325 TABLET ORAL
Status: COMPLETED | OUTPATIENT
Start: 2023-10-29 | End: 2023-10-29

## 2023-10-29 RX ORDER — ONDANSETRON 2 MG/ML
4 INJECTION INTRAMUSCULAR; INTRAVENOUS EVERY 6 HOURS PRN
Status: DISCONTINUED | OUTPATIENT
Start: 2023-10-29 | End: 2023-10-30 | Stop reason: HOSPADM

## 2023-10-29 RX ADMIN — IOPAMIDOL 80 ML: 755 INJECTION, SOLUTION INTRAVENOUS at 14:32

## 2023-10-29 RX ADMIN — SODIUM CHLORIDE, PRESERVATIVE FREE 10 ML: 5 INJECTION INTRAVENOUS at 21:08

## 2023-10-29 RX ADMIN — PRAMIPEXOLE DIHYDROCHLORIDE 3 MG: 1 TABLET ORAL at 21:08

## 2023-10-29 RX ADMIN — IOPAMIDOL 40 ML: 755 INJECTION, SOLUTION INTRAVENOUS at 14:31

## 2023-10-29 RX ADMIN — SODIUM CHLORIDE 500 ML: 9 INJECTION, SOLUTION INTRAVENOUS at 17:36

## 2023-10-29 RX ADMIN — CLOPIDOGREL BISULFATE 300 MG: 300 TABLET, FILM COATED ORAL at 15:20

## 2023-10-29 RX ADMIN — ASPIRIN 325 MG: 325 TABLET ORAL at 15:20

## 2023-10-29 RX ADMIN — GADOTERIDOL 15 ML: 279.3 INJECTION, SOLUTION INTRAVENOUS at 17:28

## 2023-10-29 ASSESSMENT — PAIN SCALES - GENERAL: PAINLEVEL_OUTOF10: 0

## 2023-10-29 NOTE — PROGRESS NOTES
Neurocritical Care Code Stroke Documentation      Symptoms:  AMS, confusion, can't remember what she was doing   Baseline mRS:      Last Known Well:   [address] hx: Past Medical History:   Diagnosis Date    Arthritis     Asthma     during childhood/seasonal    Cancer ([facility])     skin    GERD (gastroesophageal reflux disease)     Hypertension       Anticoagulation:  None but did take ASA today   VAN:   Negative   NIHSS:   1a-LOC:0    1b-Month/Age:0    1c-Open/Close Hand:0    2-Best Gaze:0    3-Visual Fields:0    4-Facial Palsy:0    5a-Left Arm:0    5b-Right Arm:0    6a-Left Le    6b-Right Le    7-Limb Ataxia:0    8-Sensory:0    9-Best Language:0    10-Dysarthria:0    11-Extinction/Inattention:0  TOTAL SCORE:0   Imaging:   CT head negative for acute process    CTA no LVO but 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 CTP   Plan:   TNK Candidate: NO    Mechanical thrombectomy Candidate: NO     *Perform dysphagia screening prior to any PO intake*    Discussed with: Dr Priscilla Hamilton. D/w Dr Shari Pabon, clinic follow up to discuss basilar tip aneurysm tx options. Pt  and daughter at bedside updated on need for clinic follow up and they verbalized understanding. Also updated Dr Mandi Fernández. Arrival time: 1415  Time spent: 45 minutes.      CJ Giordano - NP  Neurocritical Care Nurse Practitioner  [phone]

## 2023-10-29 NOTE — ED TRIAGE NOTES
Pt arrived ambulatory to the ER with CC short term memory loss noted at approximately 1130 this morning. 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 Pt  stated she was unable to even read her notes but was experiencing anxiety beforehand.  in triage.      Code S lvl 1 called with Dr. Lia Mendoza

## 2023-10-29 NOTE — ED NOTES
MD paged about pt's troponin increasing     Naga Cancer, RN  10/29/23 1723       Naga Cancer, RN  10/29/23 3579

## 2023-10-29 NOTE — H&P
meds      Notes reviewed: ED, NIS    Reviewed current home medications with patient and will continue same    Care affected by social determinants of health: [address] discussed with: Patient/Family  ISOLATION PRECAUTIONS: No active isolations  CODE STATUS: Full Code No additional code details  DVT PROPHYLAXIS: SCDs  Central Line:   none  IVF: small NS bolus s/p IV contrast  DIET: ADULT DIET;  Regular   FUNCTIONAL STATUS PRIOR TO HOSPITALIZATION: Minimal assistance (cane)  EARLY MOBILITY ASSESSMENT: Recommend routine ambulation while hospitalized with the assistance of nursing staff  Anticipated Disposition: Home  Anticipated Discharge: 24-48h    Given need for further testing and observation, this patient is admitted to Observation status    Level of care: neuro    CRITICAL CARE WAS PERFORMED FOR THIS ENCOUNTER: NO      Signed By: Kev Medina MD     October 29, 2023

## 2023-10-29 NOTE — ED NOTES
Paged MD about the parameters for the permissive HTN before we intervene.  Permissive is up to 220/120 per MD Danika Ramírez, RN  10/29/23 1951

## 2023-10-29 NOTE — ED NOTES
Pt taken straight to CT, however CT scan delayed due to there only being one working [address] [facility], [address] [place] [address]  10/29/23 3628

## 2023-10-29 NOTE — ED PROVIDER NOTES
[facility] EMERGENCY DEP  EMERGENCY DEPARTMENT ENCOUNTER      Pt Name: Jessica Gaines  MRN: [id number]  [address] [facility] 1940  Date of evaluation: 10/29/2023  Provider: Mirella Elizondo MD    CHIEF COMPLAINT       Chief Complaint   Patient presents with    Altered Mental Status    Memory Loss         HISTORY OF PRESENT ILLNESS    This is an 71-year-old female with past medical history of asthma, skin cancer noted in the chart, hypertension, GERD presenting to the ER for evaluation for speech disturbance associate with some memory loss. 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 Unclear if she was able to get her words out at that time. Now currently has significant memory issues and cannot remember answers to questions in the past few minutes. She denies any overt weakness in her arms or legs. Denies any headache. Tongue is midline, symmetric smile, no arm drift, able to identify objects without difficulty, recalls her name but cannot recall her medication history.  reports that currently she is acting at her baseline aside from the short-term memory issues which is new. She is ambulatory with her cane without gross difficulty    No anticoagulation. Did get a baby ASA earlier            Review of External Medical Records:     Nursing Notes were reviewed. REVIEW OF SYSTEMS       Review of Systems   Unable to perform ROS: Acuity of condition       Except as noted above the remainder of the review of systems was reviewed and negative.        PAST MEDICAL HISTORY     Past Medical History:   Diagnosis Date    Arthritis     Asthma     during childhood/seasonal    Cancer ([address])     skin    GERD (gastroesophageal reflux disease)     Hypertension          SURGICAL HISTORY       Past Surgical History:   Procedure Laterality Date    BACK SURGERY  2016    CARPAL TUNNEL RELEASE

## 2023-10-30 VITALS
WEIGHT: 152.56 LBS | SYSTOLIC BLOOD PRESSURE: 105 MMHG | HEART RATE: 80 BPM | OXYGEN SATURATION: 96 % | DIASTOLIC BLOOD PRESSURE: 65 MMHG | RESPIRATION RATE: 19 BRPM | BODY MASS INDEX: 28.8 KG/M2 | TEMPERATURE: 98.1 F | HEIGHT: 61 IN

## 2023-10-30 PROBLEM — G45.4 TRANSIENT GLOBAL AMNESIA: Status: RESOLVED | Noted: 2023-10-29 | Resolved: 2023-10-30

## 2023-10-30 LAB
ANION GAP SERPL CALC-SCNC: 2 MMOL/L (ref 5–15)
BUN SERPL-MCNC: 18 MG/DL (ref 6–20)
BUN/CREAT SERPL: 23 (ref 12–20)
CALCIUM SERPL-MCNC: 8.3 MG/DL (ref 8.5–10.1)
CHLORIDE SERPL-SCNC: 109 MMOL/L (ref 97–108)
CHOLEST SERPL-MCNC: 173 MG/DL
CO2 SERPL-SCNC: 25 MMOL/L (ref 21–32)
CREAT SERPL-MCNC: 0.79 MG/DL (ref 0.55–1.02)
EKG ATRIAL RATE: 68 BPM
EKG ATRIAL RATE: 76 BPM
EKG DIAGNOSIS: NORMAL
EKG DIAGNOSIS: NORMAL
EKG P AXIS: 13 DEGREES
EKG P AXIS: 15 DEGREES
EKG P-R INTERVAL: 168 MS
EKG P-R INTERVAL: 172 MS
EKG Q-T INTERVAL: 396 MS
EKG Q-T INTERVAL: 400 MS
EKG QRS DURATION: 126 MS
EKG QRS DURATION: 126 MS
EKG QTC CALCULATION (BAZETT): 421 MS
EKG QTC CALCULATION (BAZETT): 450 MS
EKG R AXIS: -33 DEGREES
EKG R AXIS: -34 DEGREES
EKG T AXIS: 17 DEGREES
EKG T AXIS: 17 DEGREES
EKG VENTRICULAR RATE: 68 BPM
EKG VENTRICULAR RATE: 76 BPM
EST. AVERAGE GLUCOSE BLD GHB EST-MCNC: 111 MG/DL
GLUCOSE SERPL-MCNC: 98 MG/DL (ref 65–100)
HBA1C MFR BLD: 5.5 % (ref 4–5.6)
HDLC SERPL-MCNC: 59 MG/DL
HDLC SERPL: 2.9 (ref 0–5)
LDLC SERPL CALC-MCNC: 98.4 MG/DL (ref 0–100)
POTASSIUM SERPL-SCNC: 4 MMOL/L (ref 3.5–5.1)
SODIUM SERPL-SCNC: 136 MMOL/L (ref 136–145)
TRIGL SERPL-MCNC: 78 MG/DL
VLDLC SERPL CALC-MCNC: 15.6 MG/DL

## 2023-10-30 PROCEDURE — G0378 HOSPITAL OBSERVATION PER HR: HCPCS

## 2023-10-30 PROCEDURE — 99222 1ST HOSP IP/OBS MODERATE 55: CPT | Performed by: PSYCHIATRY & NEUROLOGY

## 2023-10-30 PROCEDURE — 80048 BASIC METABOLIC PNL TOTAL CA: CPT

## 2023-10-30 PROCEDURE — 6370000000 HC RX 637 (ALT 250 FOR IP): Performed by: PSYCHIATRY & NEUROLOGY

## 2023-10-30 PROCEDURE — 6370000000 HC RX 637 (ALT 250 FOR IP): Performed by: INTERNAL MEDICINE

## 2023-10-30 PROCEDURE — 2580000003 HC RX 258: Performed by: INTERNAL MEDICINE

## 2023-10-30 PROCEDURE — 80061 LIPID PANEL: CPT

## 2023-10-30 PROCEDURE — 36415 COLL VENOUS BLD VENIPUNCTURE: CPT

## 2023-10-30 PROCEDURE — 93010 ELECTROCARDIOGRAM REPORT: CPT | Performed by: SPECIALIST

## 2023-10-30 RX ORDER — ASPIRIN 81 MG/1
81 TABLET, CHEWABLE ORAL DAILY
Status: DISCONTINUED | OUTPATIENT
Start: 2023-10-30 | End: 2023-10-30 | Stop reason: HOSPADM

## 2023-10-30 RX ORDER — ATORVASTATIN CALCIUM 10 MG/1
40 TABLET, FILM COATED ORAL NIGHTLY
Status: DISCONTINUED | OUTPATIENT
Start: 2023-10-30 | End: 2023-10-30 | Stop reason: HOSPADM

## 2023-10-30 RX ADMIN — ASPIRIN 81 MG: 81 TABLET, CHEWABLE ORAL at 13:37

## 2023-10-30 RX ADMIN — SODIUM CHLORIDE, PRESERVATIVE FREE 10 ML: 5 INJECTION INTRAVENOUS at 08:21

## 2023-10-30 RX ADMIN — LISINOPRIL 10 MG: 10 TABLET ORAL at 08:20

## 2023-10-30 NOTE — DISCHARGE SUMMARY
Discharge Summary       PATIENT ID: Risa Rodriguez  MRN: [id number]   YOB: 1940    DATE OF ADMISSION: 10/29/2023  2:08 PM    DATE OF DISCHARGE: 10/30/23   PRIMARY CARE PROVIDER: Ady Alcocer MD     ATTENDING PHYSICIAN: Carisa Aguilar  DISCHARGING PROVIDER: Carisa Aguilar MD    To contact this individual call [phone] and ask the  to page. If unavailable ask to be transferred the Adult Hospitalist Department. CONSULTATIONS: IP CONSULT TO HOSPITALIST  IP CONSULT TO NEUROLOGY  IP CONSULT TO NEUROINTERVENTIONAL SURGERY    PROCEDURES/SURGERIES: * No surgery found *    ADMITTING DIAGNOSES & HOSPITAL COURSE:     Risa Rodriguez is a 80 y.o. female with a history of HTN, insomnia, RLS who c/o memory loss. 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 According to her  who was in the class, she suddenly got stuck reading and kept reading or saying the same thing over and over again. Everyone figured she was just really nervous because she had said she was nervous at the beginning of the class. They met some people afterward which the pt doesn't remember. On the way home they stopped at a store which she doesn't remember. Her  had suggested she go to the hospital but she said no, and doesn't remember this conversation. She does remember getting in bed at home around 1:30pm. When her daughters heard what happened they insisted she come to the hospital. She remembers coming here around 2pm. She denies any etoh use. MRI -- IMPRESSION:  Mild temporal predominant cerebral atrophy. No acute intracranial process. No intracranial mass, hemorrhage or evidence of acute infarction.      Lipid panel within normal limit A1c pending follow-up with PCP patient has allergy to aspirin and statin      DISCHARGE DIAGNOSES / PLAN:       D/c home       PENDING TEST RESULTS:   At

## 2023-10-30 NOTE — CONSULTS
NIS Brief Note    Incidental 5mm basilar apex aneurysm  B/l carotid stenosis  MRI neg for strioke    Rec's  Asa, statin  F/u clinic for aneurysm eval
cerebral atrophy. No acute intracranial process. No intracranial mass, hemorrhage or evidence of acute infarction. CT Result (most recent):  CTA HEAD NECK W CONTRAST 10/29/2023    Narrative  INDICATION: Stroke Has a \"code stroke\" or \"stroke alert\" been called? ->Yes    EXAMINATION:  CT ANGIOGRAPHY HEAD AND NECK    COMPARISON: CT head 10/29/2023    TECHNIQUE:  Following the uneventful administration of  intravenous contrast,  axial CT angiography of the head and neck was performed. 3D image  postprocessing was performed. CT dose reduction was achieved through use of a  standardized protocol tailored for this examination and automatic exposure  control for dose modulation. Cerebral perfusion analysis using computed tomography with contrast  administration, including post processing of parametric maps with the  termination of cerebral blood flow, cerebral blood volume, and mean transit  time. This study was analyzed by the [employer] [place]. ai algorithm. FINDINGS:    CTA NECK    Aortic Arch: Patent. Right Common Carotid Artery: Patent. Right Internal Carotid Artery: There is atherosclerosis and narrowing the lumen  of the origin to 1.5 mm compared to the mid cervical ICA segment of 3.9 mm. NASCET Right: Approximately 60-70%  Left Common Carotid Artery: Patent. Left Internal Carotid Artery: There is atherosclerosis and narrowing the lumen  of the origin to 1.7 mm compared to the mid cervical ICA segment of 4.4 mm. NASCET Left: 60-70%. Carotid stenosis determined using NASCET criteria. Right Vertebral Artery: Patent. Left Vertebral Artery: Patent, mild to moderate stenosis of the mid V1 segment. Cervical Soft Tissues: No significant abnormality. Lung Apices: No significant abnormality. Bones: No destructive bone lesion. Additional Comments: N/A.    CTA HEAD    Posterior Circulation: Moderate stenosis of the left V4 vertebral artery segment  though it is patent.  Diminutive but patent right V4 vertebral artery

## 2023-10-30 NOTE — ED NOTES
Verbal shift change report given to [facility] (oncoming nurse) by Shawn Vazquez RN (offgoing nurse). Report included the following information Nurse Handoff Report, ED Encounter Summary, ED SBAR, Adult Overview, Intake/Output, MAR, and Recent Results.         Bailey Lange, RN  10/30/23 8464

## 2023-10-30 NOTE — ED NOTES
Patient discharged. RN reviewed discharge instructions with patient and spouse. Both verbalized understanding.       Virginia Blanco RN  10/30/23 8237

## 2023-10-30 NOTE — ED NOTES
RN spoke to MD Clinton Timmons about patient being discharged.  RN notified bed placement to cancel bed request.     Michelle Moreno RN  10/30/23 7891

## 2023-10-30 NOTE — ED NOTES
TRANSFER - OUT REPORT:    Verbal report given to FLIP Jones on [employer]  [facility] care       Report consisted of patient's Situation, Background, Assessment and   Recommendations(SBAR). Information from the following report(s) ED SBAR was reviewed with the receiving nurse. Orcas Fall Assessment:    Presents to emergency department  because of falls (Syncope, seizure, or loss of consciousness): No  Age > 70: Yes  Altered Mental Status, Intoxication with alcohol or substance confusion (Disorientation, impaired judgment, poor safety awaremess, or inability to follow instructions): No  Impaired Mobility: Ambulates or transfers with assistive devices or assistance; Unable to ambulate or transer.: No  Nursing Judgement: Yes          Lines:   Peripheral IV 10/29/23 Left Antecubital (Active)   Site Assessment Clean, dry & intact 10/29/23 1419   Line Status Blood return noted;Normal saline locked; Flushed 10/29/23 1419   Phlebitis Assessment No symptoms 10/29/23 1419   Infiltration Assessment 0 10/29/23 1419        Opportunity for questions and clarification was provided.       Patient transported with:  Monitor and Registered Nurse          Dina Garza RN  10/30/23 2880

## 2023-10-30 NOTE — DISCHARGE INSTRUCTIONS
Discharge Instructions       PATIENT ID: Colonel Mauro  MRN: [id number]   YOB: 1940    DATE OF ADMISSION: 10/29/2023   DATE OF DISCHARGE: 10/30/2023    PRIMARY CARE PROVIDER: Raoul Rios     ATTENDING PHYSICIAN: Raj Huang MD   DISCHARGING PROVIDER: Raj Huang MD    To contact this individual call [phone] and ask the  to page. If unavailable ask to be transferred the Adult Hospitalist Department. DISCHARGE DIAGNOSES TGA    CONSULTATIONS: [unfilled]    PROCEDURES/SURGERIES: * No surgery found *    PENDING TEST RESULTS:   At the time of discharge the following test results are still pending: Lipid panel A1c    FOLLOW UP APPOINTMENTS:   [unfilled]     ADDITIONAL CARE RECOMMENDATIONS:      DIET: diabetic diet       ACTIVITY: activity as tolerated    WOUND CARE:     EQUIPMENT needed:       DISCHARGE MEDICATIONS:   See Medication Reconciliation Form    It is important that you take the medication exactly as they are prescribed. Keep your medication in the bottles provided by the pharmacist and keep a list of the medication names, dosages, and times to be taken in your wallet. Do not take other medications without consulting your doctor. NOTIFY YOUR PHYSICIAN FOR ANY OF THE FOLLOWING:   Fever over 101 degrees for 24 hours. Chest pain, shortness of breath, fever, chills, nausea, vomiting, diarrhea, change in mentation, falling, weakness, bleeding. Severe pain or pain not relieved by medications. Or, any other signs or symptoms that you may have questions about.       DISPOSITION:  x  Home With:   OT  PT  HH  RN       SNF/Inpatient Rehab/LTAC    Independent/assisted living    Hospice    Other:     CDMP Checked:   Yes x     PROBLEM LIST Updated:  Yes x       Signed:   Raj Huang MD  10/30/2023  3:08 PM

## 2023-11-01 ENCOUNTER — TRANSCRIBE ORDERS (OUTPATIENT)
Facility: HOSPITAL | Age: 83
End: 2023-11-01

## 2023-11-01 ENCOUNTER — TELEPHONE (OUTPATIENT)
Dept: PRIMARY CARE CLINIC | Facility: CLINIC | Age: 83
End: 2023-11-01

## 2023-11-01 DIAGNOSIS — Z12.31 VISIT FOR SCREENING MAMMOGRAM: Primary | ICD-10-CM

## 2023-11-01 NOTE — TELEPHONE ENCOUNTER
----- Message from Dionna Aquino sent at 11/1/2023 12:22 PM EDT -----  Subject: Appointment Request    Reason for Call: Established Patient Appointment needed: Routine ED Follow   Up Visit    QUESTIONS    Reason for appointment request? No appointments available during search     Additional Information for Provider?  Pt needs a callback to schedule ED   f/u appt seen at [facility] 10/29-10/30 loss of memory  ---------------------------------------------------------------------------  --------------  [address] [place]  [phone]; OK to leave message on voicemail  ---------------------------------------------------------------------------  --------------  SCRIPT ANSWERS

## 2023-11-01 NOTE — TELEPHONE ENCOUNTER
Called mobile number and left a message to call back to make an ED follow up himanshu. Called house and pt picked up. I offered her an himanshu on Monday 11/6 and she said that was quick. I asked if she can come in 8:45am and she asked for something a little bit later.  She is on the schedule for 11/6/ at 11:00am.

## 2023-11-06 ENCOUNTER — OFFICE VISIT (OUTPATIENT)
Dept: PRIMARY CARE CLINIC | Facility: CLINIC | Age: 83
End: 2023-11-06

## 2023-11-06 VITALS
TEMPERATURE: 97.1 F | SYSTOLIC BLOOD PRESSURE: 126 MMHG | WEIGHT: 150 LBS | OXYGEN SATURATION: 100 % | RESPIRATION RATE: 18 BRPM | DIASTOLIC BLOOD PRESSURE: 76 MMHG | BODY MASS INDEX: 28.32 KG/M2 | HEART RATE: 79 BPM | HEIGHT: 61 IN

## 2023-11-06 DIAGNOSIS — R09.81 CONGESTION OF NASAL SINUS: ICD-10-CM

## 2023-11-06 DIAGNOSIS — G89.29 CHRONIC PAIN OF BOTH FEET: ICD-10-CM

## 2023-11-06 DIAGNOSIS — M79.672 CHRONIC PAIN OF BOTH FEET: ICD-10-CM

## 2023-11-06 DIAGNOSIS — I67.1 BRAIN ANEURYSM: ICD-10-CM

## 2023-11-06 DIAGNOSIS — M25.562 CHRONIC PAIN OF LEFT KNEE: ICD-10-CM

## 2023-11-06 DIAGNOSIS — I65.23 BILATERAL CAROTID ARTERY STENOSIS: Primary | ICD-10-CM

## 2023-11-06 DIAGNOSIS — Z96.652 STATUS POST LEFT KNEE REPLACEMENT: ICD-10-CM

## 2023-11-06 DIAGNOSIS — G45.4 TRANSIENT GLOBAL AMNESIA: ICD-10-CM

## 2023-11-06 DIAGNOSIS — G89.29 CHRONIC PAIN OF LEFT KNEE: ICD-10-CM

## 2023-11-06 DIAGNOSIS — M79.671 CHRONIC PAIN OF BOTH FEET: ICD-10-CM

## 2023-11-06 RX ORDER — FLUTICASONE PROPIONATE 50 MCG
1 SPRAY, SUSPENSION (ML) NASAL DAILY
Qty: 32 G | Refills: 1 | Status: SHIPPED | OUTPATIENT
Start: 2023-11-06

## 2023-11-06 RX ORDER — ATORVASTATIN CALCIUM 10 MG/1
10 TABLET, FILM COATED ORAL DAILY
Qty: 30 TABLET | Refills: 3 | Status: SHIPPED | OUTPATIENT
Start: 2023-11-06

## 2023-11-06 ASSESSMENT — ENCOUNTER SYMPTOMS
COLOR CHANGE: 0
ABDOMINAL PAIN: 0
CONSTIPATION: 0
EYE DISCHARGE: 0
SORE THROAT: 0
SHORTNESS OF BREATH: 0
DIARRHEA: 0
BACK PAIN: 0
CHEST TIGHTNESS: 0
RHINORRHEA: 0
COUGH: 0

## 2023-11-06 ASSESSMENT — PATIENT HEALTH QUESTIONNAIRE - PHQ9
2. FEELING DOWN, DEPRESSED OR HOPELESS: 0
SUM OF ALL RESPONSES TO PHQ QUESTIONS 1-9: 0
SUM OF ALL RESPONSES TO PHQ QUESTIONS 1-9: 0
SUM OF ALL RESPONSES TO PHQ9 QUESTIONS 1 & 2: 0
SUM OF ALL RESPONSES TO PHQ QUESTIONS 1-9: 0
1. LITTLE INTEREST OR PLEASURE IN DOING THINGS: 0
SUM OF ALL RESPONSES TO PHQ QUESTIONS 1-9: 0

## 2023-11-21 ENCOUNTER — OFFICE VISIT (OUTPATIENT)
Age: 83
End: 2023-11-21
Payer: MEDICARE

## 2023-11-21 VITALS
WEIGHT: 148.4 LBS | DIASTOLIC BLOOD PRESSURE: 78 MMHG | TEMPERATURE: 97.8 F | HEART RATE: 78 BPM | OXYGEN SATURATION: 100 % | HEIGHT: 61 IN | BODY MASS INDEX: 28.02 KG/M2 | SYSTOLIC BLOOD PRESSURE: 126 MMHG

## 2023-11-21 DIAGNOSIS — I67.1 CEREBRAL ANEURYSM: Primary | ICD-10-CM

## 2023-11-21 DIAGNOSIS — I67.1 CEREBRAL ANEURYSM: ICD-10-CM

## 2023-11-21 PROCEDURE — G8428 CUR MEDS NOT DOCUMENT: HCPCS | Performed by: PSYCHIATRY & NEUROLOGY

## 2023-11-21 PROCEDURE — 99205 OFFICE O/P NEW HI 60 MIN: CPT | Performed by: PSYCHIATRY & NEUROLOGY

## 2023-11-21 PROCEDURE — G8484 FLU IMMUNIZE NO ADMIN: HCPCS | Performed by: PSYCHIATRY & NEUROLOGY

## 2023-11-21 PROCEDURE — 1111F DSCHRG MED/CURRENT MED MERGE: CPT | Performed by: PSYCHIATRY & NEUROLOGY

## 2023-11-21 PROCEDURE — 1036F TOBACCO NON-USER: CPT | Performed by: PSYCHIATRY & NEUROLOGY

## 2023-11-21 PROCEDURE — 1123F ACP DISCUSS/DSCN MKR DOCD: CPT | Performed by: PSYCHIATRY & NEUROLOGY

## 2023-11-21 PROCEDURE — G8399 PT W/DXA RESULTS DOCUMENT: HCPCS | Performed by: PSYCHIATRY & NEUROLOGY

## 2023-11-21 PROCEDURE — 1090F PRES/ABSN URINE INCON ASSESS: CPT | Performed by: PSYCHIATRY & NEUROLOGY

## 2023-11-21 PROCEDURE — G8417 CALC BMI ABV UP PARAM F/U: HCPCS | Performed by: PSYCHIATRY & NEUROLOGY

## 2023-11-21 RX ORDER — PRAMIPEXOLE DIHYDROCHLORIDE 1 MG/1
1 TABLET ORAL 3 TIMES DAILY
COMMUNITY

## 2023-11-21 NOTE — H&P (VIEW-ONLY)
stenosis, right looks worse then reported        SUBMITTED BY:  Signed By: Ramon Palmer DO     Neurointerventional Surgery  [facility]    Available via [facility]    November 21, 2023

## 2023-11-21 NOTE — PROGRESS NOTES
New patient referred by Rochelle Weller,[address] presenting with Cerebral aneurysm. Spouse at visit with patient. Patient reports no symptoms at this time. Denies headaches, dizziness, numbness or tingling, blurred or double vision.
nasal spray 1 spray by Nasal route 2 times daily Use in each nostril as directed    Magnesium 400 MG TABS Take by mouth    ascorbic acid (VITAMIN C) 1000 MG tablet Take by mouth daily    melatonin 3 MG TABS tablet Take by mouth     No current facility-administered medications for this visit.        Allergies   Allergen Reactions    Bee Venom Swelling    Oxycodone-Acetaminophen Other (See Comments)      Other (comments)\"hellish nightmares\"    Penicillins Hives     IN CHILDHOOD    Shellfish Allergy Itching    Zoster Vaccine Live Swelling     Severe Right arm swelling with redness after administered by pharmacist in elbow    Buspirone Nausea Only    Hydrochlorothiazide Myalgia     Other reaction(s): sorethroat    Hydroxyzine Other (See Comments)     jittery    Hydroxyzine Hcl Other (See Comments)     jittery    Meloxicam Other (See Comments)     Easy bruising    Rosuvastatin Myalgia    Hydrocodone-Acetaminophen Nausea And Vomiting    Other Itching     If eats large quantities over several days causes itching: tomatoes, peaches, strawberry, fig         Social History  Social History     Socioeconomic History    Marital status:      Spouse name: Not on file    Number of children: Not on file    Years of education: Not on file    Highest education level: Not on file   Occupational History    Not on file   Tobacco Use    Smoking status: Never    Smokeless tobacco: Never   Vaping Use    Vaping Use: Never used   Substance and Sexual Activity    Alcohol use: Never    Drug use: Never    Sexual activity: Not Currently     Partners: Male   Other Topics Concern    Not on file   Social History Narrative    Not on file     Social Determinants of Health     Financial Resource Strain: Low Risk  (5/22/2023)    Overall Financial Resource Strain (CARDIA)     Difficulty of Paying Living Expenses: Not very hard   Food Insecurity: No Food Insecurity (5/22/2023)    Hunger Vital Sign     Worried About Running Out of Food in the Last

## 2023-11-21 NOTE — PATIENT INSTRUCTIONS
Diagnostic Angiogram on 12/14/2023 at [address] time 7:00 am at Patient Registration. [facility] Entrance. Blood work will be needed at least one week prior to procedure at a [facility].  -No eating or drinking after midnight the night prior to Angiogram.  -Take all morning medications with sips of water the morning of the angiogram.  -You must have a  to drive you home upon discharge. -Recommend you have someone with you for at least 24-48 hours post procedure.  -No metal or glue in hair.

## 2023-11-24 ENCOUNTER — HOSPITAL ENCOUNTER (OUTPATIENT)
Facility: HOSPITAL | Age: 83
End: 2023-11-24
Attending: INTERNAL MEDICINE
Payer: MEDICARE

## 2023-11-24 VITALS — BODY MASS INDEX: 27.05 KG/M2 | WEIGHT: 147 LBS | HEIGHT: 62 IN

## 2023-11-24 DIAGNOSIS — Z12.31 VISIT FOR SCREENING MAMMOGRAM: ICD-10-CM

## 2023-11-24 PROCEDURE — 77063 BREAST TOMOSYNTHESIS BI: CPT

## 2023-11-30 ENCOUNTER — OFFICE VISIT (OUTPATIENT)
Age: 83
End: 2023-11-30
Payer: MEDICARE

## 2023-11-30 VITALS
RESPIRATION RATE: 16 BRPM | OXYGEN SATURATION: 98 % | SYSTOLIC BLOOD PRESSURE: 122 MMHG | DIASTOLIC BLOOD PRESSURE: 62 MMHG | WEIGHT: 147 LBS | BODY MASS INDEX: 27.05 KG/M2 | HEART RATE: 74 BPM | HEIGHT: 62 IN

## 2023-11-30 DIAGNOSIS — G25.81 RESTLESS LEG SYNDROME: ICD-10-CM

## 2023-11-30 DIAGNOSIS — Z09 HOSPITAL DISCHARGE FOLLOW-UP: ICD-10-CM

## 2023-11-30 DIAGNOSIS — R41.3 EPISODE OF MEMORY LOSS: Primary | ICD-10-CM

## 2023-11-30 DIAGNOSIS — I72.5 BASILAR ARTERY ANEURYSM (HCC): ICD-10-CM

## 2023-11-30 PROCEDURE — G8427 DOCREV CUR MEDS BY ELIG CLIN: HCPCS

## 2023-11-30 PROCEDURE — 1090F PRES/ABSN URINE INCON ASSESS: CPT

## 2023-11-30 PROCEDURE — 1036F TOBACCO NON-USER: CPT

## 2023-11-30 PROCEDURE — G8484 FLU IMMUNIZE NO ADMIN: HCPCS

## 2023-11-30 PROCEDURE — 99215 OFFICE O/P EST HI 40 MIN: CPT

## 2023-11-30 PROCEDURE — G8417 CALC BMI ABV UP PARAM F/U: HCPCS

## 2023-11-30 PROCEDURE — G8399 PT W/DXA RESULTS DOCUMENT: HCPCS

## 2023-11-30 PROCEDURE — 1123F ACP DISCUSS/DSCN MKR DOCD: CPT

## 2023-11-30 PROCEDURE — 3078F DIAST BP <80 MM HG: CPT

## 2023-11-30 PROCEDURE — 3074F SYST BP LT 130 MM HG: CPT

## 2023-11-30 RX ORDER — PRAMIPEXOLE DIHYDROCHLORIDE 1 MG/1
3 TABLET ORAL NIGHTLY
Qty: 270 TABLET | Refills: 0 | Status: SHIPPED | OUTPATIENT
Start: 2023-11-30 | End: 2024-02-28

## 2023-11-30 ASSESSMENT — PATIENT HEALTH QUESTIONNAIRE - PHQ9
2. FEELING DOWN, DEPRESSED OR HOPELESS: 0
SUM OF ALL RESPONSES TO PHQ QUESTIONS 1-9: 0
SUM OF ALL RESPONSES TO PHQ QUESTIONS 1-9: 0
1. LITTLE INTEREST OR PLEASURE IN DOING THINGS: 0
SUM OF ALL RESPONSES TO PHQ QUESTIONS 1-9: 0
SUM OF ALL RESPONSES TO PHQ9 QUESTIONS 1 & 2: 0
SUM OF ALL RESPONSES TO PHQ QUESTIONS 1-9: 0

## 2023-12-09 LAB
ALBUMIN SERPL-MCNC: 4.1 G/DL (ref 3.5–5)
ALBUMIN/GLOB SERPL: 1.3 (ref 1.1–2.2)
ALP SERPL-CCNC: 118 U/L (ref 45–117)
ALT SERPL-CCNC: 22 U/L (ref 12–78)
ANION GAP SERPL CALC-SCNC: 4 MMOL/L (ref 5–15)
AST SERPL-CCNC: 19 U/L (ref 15–37)
BILIRUB SERPL-MCNC: 0.5 MG/DL (ref 0.2–1)
BUN SERPL-MCNC: 20 MG/DL (ref 6–20)
BUN/CREAT SERPL: 23 (ref 12–20)
CALCIUM SERPL-MCNC: 9.4 MG/DL (ref 8.5–10.1)
CHLORIDE SERPL-SCNC: 103 MMOL/L (ref 97–108)
CO2 SERPL-SCNC: 29 MMOL/L (ref 21–32)
CREAT SERPL-MCNC: 0.87 MG/DL (ref 0.55–1.02)
ERYTHROCYTE [DISTWIDTH] IN BLOOD BY AUTOMATED COUNT: 13.9 % (ref 11.5–14.5)
GLOBULIN SER CALC-MCNC: 3.2 G/DL (ref 2–4)
GLUCOSE SERPL-MCNC: 120 MG/DL (ref 65–100)
HCT VFR BLD AUTO: 39.3 % (ref 35–47)
HGB BLD-MCNC: 12.8 G/DL (ref 11.5–16)
MCH RBC QN AUTO: 29 PG (ref 26–34)
MCHC RBC AUTO-ENTMCNC: 32.6 G/DL (ref 30–36.5)
MCV RBC AUTO: 88.9 FL (ref 80–99)
NRBC # BLD: 0 K/UL (ref 0–0.01)
NRBC BLD-RTO: 0 PER 100 WBC
PLATELET # BLD AUTO: 289 K/UL (ref 150–400)
PMV BLD AUTO: 10 FL (ref 8.9–12.9)
POTASSIUM SERPL-SCNC: 5.3 MMOL/L (ref 3.5–5.1)
PROT SERPL-MCNC: 7.3 G/DL (ref 6.4–8.2)
RBC # BLD AUTO: 4.42 M/UL (ref 3.8–5.2)
SODIUM SERPL-SCNC: 136 MMOL/L (ref 136–145)
WBC # BLD AUTO: 6.5 K/UL (ref 3.6–11)

## 2023-12-13 ENCOUNTER — TELEPHONE (OUTPATIENT)
Age: 83
End: 2023-12-13

## 2023-12-13 NOTE — TELEPHONE ENCOUNTER
Spoke to patient to confirm procedure tomorrow. Arrival time 7:00 am at Patient registration. Reminder to patient:  -No eating or drinking after midnight the day prior to procedure.   -Take morning medications the morning of procedure with sips of water.   -Do not stop taking Blood Thinners if applicable unless notified by this office.  -You must have a  to drive you home upon discharge. -Recommend you have someone with you for at least 24-48 hours post procedure.  -No metal of glue in hair. Allergies confirmed. Patient stated understanding.

## 2023-12-14 ENCOUNTER — HOSPITAL ENCOUNTER (OUTPATIENT)
Facility: HOSPITAL | Age: 83
Discharge: HOME | End: 2023-12-14
Attending: PSYCHIATRY & NEUROLOGY | Admitting: PSYCHIATRY & NEUROLOGY
Payer: MEDICARE

## 2023-12-14 VITALS
DIASTOLIC BLOOD PRESSURE: 60 MMHG | OXYGEN SATURATION: 99 % | TEMPERATURE: 98.3 F | SYSTOLIC BLOOD PRESSURE: 114 MMHG | BODY MASS INDEX: 27.05 KG/M2 | WEIGHT: 147 LBS | HEART RATE: 68 BPM | HEIGHT: 62 IN | RESPIRATION RATE: 18 BRPM

## 2023-12-14 DIAGNOSIS — I67.1 CEREBRAL ANEURYSM: ICD-10-CM

## 2023-12-14 PROCEDURE — 6370000000 HC RX 637 (ALT 250 FOR IP): Performed by: PSYCHIATRY & NEUROLOGY

## 2023-12-14 PROCEDURE — 2500000003 HC RX 250 WO HCPCS: Performed by: PSYCHIATRY & NEUROLOGY

## 2023-12-14 PROCEDURE — 6360000004 HC RX CONTRAST MEDICATION: Performed by: PSYCHIATRY & NEUROLOGY

## 2023-12-14 PROCEDURE — 36216 PLACE CATHETER IN ARTERY: CPT

## 2023-12-14 PROCEDURE — 99152 MOD SED SAME PHYS/QHP 5/>YRS: CPT

## 2023-12-14 PROCEDURE — 2580000003 HC RX 258: Performed by: PSYCHIATRY & NEUROLOGY

## 2023-12-14 PROCEDURE — 6360000002 HC RX W HCPCS: Performed by: PSYCHIATRY & NEUROLOGY

## 2023-12-14 RX ORDER — ZINC GLUCONATE 50 MG
50 TABLET ORAL DAILY
COMMUNITY

## 2023-12-14 RX ORDER — NITROGLYCERIN 20 MG/100ML
INJECTION INTRAVENOUS PRN
Status: COMPLETED | OUTPATIENT
Start: 2023-12-14 | End: 2023-12-14

## 2023-12-14 RX ORDER — UBIDECARENONE 75 MG
50 CAPSULE ORAL DAILY
COMMUNITY

## 2023-12-14 RX ORDER — LIDOCAINE 40 MG/G
CREAM TOPICAL PRN
Status: COMPLETED | OUTPATIENT
Start: 2023-12-14 | End: 2023-12-14

## 2023-12-14 RX ORDER — VERAPAMIL HYDROCHLORIDE 2.5 MG/ML
INJECTION, SOLUTION INTRAVENOUS PRN
Status: COMPLETED | OUTPATIENT
Start: 2023-12-14 | End: 2023-12-14

## 2023-12-14 RX ORDER — HEPARIN SODIUM 5000 [USP'U]/ML
INJECTION, SOLUTION INTRAVENOUS; SUBCUTANEOUS PRN
Status: COMPLETED | OUTPATIENT
Start: 2023-12-14 | End: 2023-12-14

## 2023-12-14 RX ORDER — ASPIRIN 81 MG/1
81 TABLET ORAL DAILY
Qty: 90 TABLET | Refills: 1 | Status: SHIPPED | OUTPATIENT
Start: 2023-12-14

## 2023-12-14 RX ORDER — SODIUM CHLORIDE 9 MG/ML
INJECTION, SOLUTION INTRAVENOUS CONTINUOUS PRN
Status: COMPLETED | OUTPATIENT
Start: 2023-12-14 | End: 2023-12-14

## 2023-12-14 RX ORDER — FENTANYL CITRATE 50 UG/ML
INJECTION, SOLUTION INTRAMUSCULAR; INTRAVENOUS PRN
Status: COMPLETED | OUTPATIENT
Start: 2023-12-14 | End: 2023-12-14

## 2023-12-14 RX ORDER — LIDOCAINE HYDROCHLORIDE 10 MG/ML
INJECTION, SOLUTION EPIDURAL; INFILTRATION; INTRACAUDAL; PERINEURAL PRN
Status: COMPLETED | OUTPATIENT
Start: 2023-12-14 | End: 2023-12-14

## 2023-12-14 RX ORDER — MIDAZOLAM HYDROCHLORIDE 2 MG/2ML
INJECTION, SOLUTION INTRAMUSCULAR; INTRAVENOUS PRN
Status: COMPLETED | OUTPATIENT
Start: 2023-12-14 | End: 2023-12-14

## 2023-12-14 RX ORDER — SODIUM BICARBONATE 42 MG/ML
INJECTION, SOLUTION INTRAVENOUS PRN
Status: COMPLETED | OUTPATIENT
Start: 2023-12-14 | End: 2023-12-14

## 2023-12-14 RX ORDER — LANOLIN ALCOHOL/MO/W.PET/CERES
10 CREAM (GRAM) TOPICAL DAILY
COMMUNITY

## 2023-12-14 RX ADMIN — LIDOCAINE 1 TUBE: 40 CREAM TOPICAL at 08:28

## 2023-12-14 RX ADMIN — NITROGLYCERIN 1 INCH: 20 OINTMENT TOPICAL at 08:28

## 2023-12-14 RX ADMIN — HEPARIN SODIUM 2000 UNITS: 5000 INJECTION INTRAVENOUS; SUBCUTANEOUS at 08:27

## 2023-12-14 RX ADMIN — MIDAZOLAM HYDROCHLORIDE 1 MG: 1 INJECTION, SOLUTION INTRAMUSCULAR; INTRAVENOUS at 08:23

## 2023-12-14 RX ADMIN — VERAPAMIL HYDROCHLORIDE 2.5 MG: 2.5 INJECTION, SOLUTION INTRAVENOUS at 08:27

## 2023-12-14 RX ADMIN — HEPARIN SODIUM 3000 ML: 1000 INJECTION INTRAVENOUS; SUBCUTANEOUS at 08:34

## 2023-12-14 RX ADMIN — IOPAMIDOL 88 ML: 612 INJECTION, SOLUTION INTRAVENOUS at 08:47

## 2023-12-14 RX ADMIN — FENTANYL CITRATE 50 MCG: 50 INJECTION, SOLUTION INTRAMUSCULAR; INTRAVENOUS at 08:23

## 2023-12-14 RX ADMIN — LIDOCAINE HYDROCHLORIDE 2 ML: 10 INJECTION, SOLUTION EPIDURAL; INFILTRATION; INTRACAUDAL; PERINEURAL at 08:24

## 2023-12-14 RX ADMIN — SODIUM CHLORIDE 125 ML/HR: 9 INJECTION, SOLUTION INTRAVENOUS at 08:11

## 2023-12-14 RX ADMIN — NITROGLYCERIN 200 MCG: 20 INJECTION INTRAVENOUS at 08:27

## 2023-12-14 RX ADMIN — SODIUM BICARBONATE 0.5 MEQ: 42 INJECTION, SOLUTION INTRAVENOUS at 08:28

## 2023-12-14 ASSESSMENT — PULMONARY FUNCTION TESTS
PIF_VALUE: 0

## 2023-12-14 NOTE — INTERVAL H&P NOTE
Update History & Physical    The Patient's recent History and Physical was reviewed with the patient and I examined the patient. There was no change. ASA 2  Mallampati 1     Plan:  The risk, benefits, expected outcome, and alternative to the recommended procedure have been discussed with the patient. Patient understands and wants to proceed with the procedure.     Electronically signed by Russell Montiel DO on 12/14/2023 at 8:04 AM

## 2023-12-14 NOTE — PROGRESS NOTES
.Pt arrives ambulatory to angio department accompanied by  for dx cerebral angiogram procedure. All assessments completed and consent was reviewed. Education given was regarding procedure, modeerate sedation, post-procedure care and  management/follow-up. Opportunity for questions was provided and all questions and concerns were addressed.

## 2023-12-14 NOTE — DISCHARGE INSTRUCTIONS
You have received sedation medications today. YOU SHOULD NOT DRIVE FOR 24 HOURS, DO NOT OPERATE HEAVY MACHINERY, DO NOT MAKE ANY LEGAL DECISIONS OR SIGN LEGAL DOCUMENTS FOR 24 HOURS. DO NOT DRINK ALCOHOL, TAKE ANY MEDICATIONS UNLESS PRESCRIBED BY YOUR DOCTOR. IF YOU ARE A CAREGIVER, SOMEONE SHOULD TAKE THAT ROLE FOR 24 HOURS. Side effects of sedation medications and other medications used today have been reviewed  Those side effects can include but are not limited to: dizziness, drowsiness, poor balance, fatigue, sleepiness. Take precautions at home to prevent falls, such as assistance with walking or stairs if allowed and /or when needed or position changes. Allergic or adverse reactions could include nausea, itching, hives, dizziness, or anything else out of the ordinary. Should you experience any of these significant changes, please call [phone] between the hours of 7:30 am and 3:30 pm or [phone] after hours. After hours, ask the  to page the X-ray Technologist, and describe the problem to the technologist. If you are experiencing chest pain, shortness of breath, altered mental state, unusual bleeding or any other emergent symptom you should call 911 immediately. Brain Angiogram: What to Expect at [address] [place] [address] brain angiogram (cerebral angiogram) is a test (also called a procedure) that looks for problems with blood vessels and blood flow in the brain. The doctor inserted a thin, flexible tube (catheter) into a blood vessel in your groin. Or the doctor may have put the catheter in a blood vessel in your arm. Then a dye was inserted into the catheter. The dye flowed into the blood vessel. The dye made the blood vessels show up on a video screen. You may have had this test to see if a blood vessel in the brain is bulging, narrowed, or blocked.  The test may also be used to check other symptoms, such as unusual headaches, or to check problems found during a different

## 2023-12-14 NOTE — BRIEF OP NOTE
Brief Procedure Note      Patient: Unique Garner MRN: [id number]     YOB: 1940  Age: 80 y.o.   Sex: female      Service: Neurointerventional Surgery    Date of Procedure: 12/14/2023    Pre-Procedure Diagnosis: Basilar artery aneurysm    Post-Procedure Diagnosis: SAME    : Fabian Tomlinson DO    Assistant(s): N/A    Procedure(s):   Diagnostic cerebral angiogram    Vessels Studied:   Right CCA  Left CCA  Left vertebral artery  Right vertebral artery    Puncture Site: Right radial artery--> TR band    Preliminary Findings:   5mm basilar apex aneurysm  Severe R ICA stenosis    Plan:   - [id number] daily  - statin  - f/u clinic      Specimens: None    Implants: None    Drains: None    Anesthesia: Moderate Sedation    Estimated Blood Loss: 5 cc      Apparent Intraoperative Complications: None immediate    Patient Condition: Stable    Disposition: Same day recovery unit      Signed:   Fabian Tomlinson DO  [facility] [address] [facility]

## 2023-12-14 NOTE — PROGRESS NOTES
Name of procedure:  dx cerebral angiogram     Sedation medications given: versed 1mg, fentanyl 50mcg     Sedation tolerated: well     Total Procedure time: 25min     Vital Signs: stable     Any complications related to procedure: see orders     Post Procedure Care Needed/order sets placed in connect care: see orders     Pt tolerated procedure well. VSS. No C/O pain. Dressing to site D&I. No bleeding or hematoma noted to site. IV D/Cd. Discharge instructions given. Copy on chart and copy given to pt. Pt verbalized understanding. Pt taken to car by wheelchair and taken home by family. NAD noted at time of discharge.

## 2023-12-15 ENCOUNTER — APPOINTMENT (OUTPATIENT)
Facility: HOSPITAL | Age: 83
End: 2023-12-15
Payer: MEDICARE

## 2023-12-15 ENCOUNTER — HOSPITAL ENCOUNTER (EMERGENCY)
Facility: HOSPITAL | Age: 83
Discharge: HOME | End: 2023-12-15
Attending: STUDENT IN AN ORGANIZED HEALTH CARE EDUCATION/TRAINING PROGRAM
Payer: MEDICARE

## 2023-12-15 VITALS
BODY MASS INDEX: 27.42 KG/M2 | TEMPERATURE: 97.8 F | RESPIRATION RATE: 16 BRPM | OXYGEN SATURATION: 97 % | DIASTOLIC BLOOD PRESSURE: 98 MMHG | HEART RATE: 84 BPM | SYSTOLIC BLOOD PRESSURE: 189 MMHG | WEIGHT: 149.91 LBS

## 2023-12-15 DIAGNOSIS — L03.119 CELLULITIS OF LOWER EXTREMITY, UNSPECIFIED LATERALITY: Primary | ICD-10-CM

## 2023-12-15 LAB
ALBUMIN SERPL-MCNC: 3.3 G/DL (ref 3.5–5)
ALBUMIN/GLOB SERPL: 0.9 (ref 1.1–2.2)
ALP SERPL-CCNC: 105 U/L (ref 45–117)
ALT SERPL-CCNC: 18 U/L (ref 12–78)
ANION GAP SERPL CALC-SCNC: 4 MMOL/L (ref 5–15)
AST SERPL-CCNC: 14 U/L (ref 15–37)
BASOPHILS # BLD: 0.1 K/UL (ref 0–0.1)
BASOPHILS NFR BLD: 1 % (ref 0–1)
BILIRUB SERPL-MCNC: 0.7 MG/DL (ref 0.2–1)
BUN SERPL-MCNC: 16 MG/DL (ref 6–20)
BUN/CREAT SERPL: 22 (ref 12–20)
CALCIUM SERPL-MCNC: 7.9 MG/DL (ref 8.5–10.1)
CHLORIDE SERPL-SCNC: 109 MMOL/L (ref 97–108)
CO2 SERPL-SCNC: 26 MMOL/L (ref 21–32)
CREAT SERPL-MCNC: 0.74 MG/DL (ref 0.55–1.02)
DIFFERENTIAL METHOD BLD: ABNORMAL
EOSINOPHIL # BLD: 0.4 K/UL (ref 0–0.4)
EOSINOPHIL NFR BLD: 5 % (ref 0–7)
ERYTHROCYTE [DISTWIDTH] IN BLOOD BY AUTOMATED COUNT: 13.8 % (ref 11.5–14.5)
GLOBULIN SER CALC-MCNC: 3.5 G/DL (ref 2–4)
GLUCOSE SERPL-MCNC: 88 MG/DL (ref 65–100)
HCT VFR BLD AUTO: 38.2 % (ref 35–47)
HGB BLD-MCNC: 12.2 G/DL (ref 11.5–16)
IMM GRANULOCYTES # BLD AUTO: 0.1 K/UL (ref 0–0.04)
IMM GRANULOCYTES NFR BLD AUTO: 1 % (ref 0–0.5)
LYMPHOCYTES # BLD: 0.7 K/UL (ref 0.8–3.5)
LYMPHOCYTES NFR BLD: 9 % (ref 12–49)
MCH RBC QN AUTO: 29.2 PG (ref 26–34)
MCHC RBC AUTO-ENTMCNC: 31.9 G/DL (ref 30–36.5)
MCV RBC AUTO: 91.4 FL (ref 80–99)
MONOCYTES # BLD: 0.2 K/UL (ref 0–1)
MONOCYTES NFR BLD: 2 % (ref 5–13)
NEUTS SEG # BLD: 6.4 K/UL (ref 1.8–8)
NEUTS SEG NFR BLD: 82 % (ref 32–75)
NRBC # BLD: 0 K/UL (ref 0–0.01)
NRBC BLD-RTO: 0 PER 100 WBC
PLATELET # BLD AUTO: 240 K/UL (ref 150–400)
PMV BLD AUTO: 9.5 FL (ref 8.9–12.9)
POTASSIUM SERPL-SCNC: 4.1 MMOL/L (ref 3.5–5.1)
PROT SERPL-MCNC: 6.8 G/DL (ref 6.4–8.2)
RBC # BLD AUTO: 4.18 M/UL (ref 3.8–5.2)
RBC MORPH BLD: ABNORMAL
SODIUM SERPL-SCNC: 139 MMOL/L (ref 136–145)
WBC # BLD AUTO: 7.9 K/UL (ref 3.6–11)

## 2023-12-15 PROCEDURE — 6360000002 HC RX W HCPCS: Performed by: FAMILY MEDICINE

## 2023-12-15 PROCEDURE — 96374 THER/PROPH/DIAG INJ IV PUSH: CPT

## 2023-12-15 PROCEDURE — 2580000003 HC RX 258: Performed by: FAMILY MEDICINE

## 2023-12-15 PROCEDURE — 2500000003 HC RX 250 WO HCPCS: Performed by: FAMILY MEDICINE

## 2023-12-15 PROCEDURE — 85025 COMPLETE CBC W/AUTO DIFF WBC: CPT

## 2023-12-15 PROCEDURE — A4216 STERILE WATER/SALINE, 10 ML: HCPCS | Performed by: FAMILY MEDICINE

## 2023-12-15 PROCEDURE — 93970 EXTREMITY STUDY: CPT

## 2023-12-15 PROCEDURE — 36415 COLL VENOUS BLD VENIPUNCTURE: CPT

## 2023-12-15 PROCEDURE — 96375 TX/PRO/DX INJ NEW DRUG ADDON: CPT

## 2023-12-15 PROCEDURE — 6370000000 HC RX 637 (ALT 250 FOR IP): Performed by: FAMILY MEDICINE

## 2023-12-15 PROCEDURE — 80053 COMPREHEN METABOLIC PANEL: CPT

## 2023-12-15 PROCEDURE — 99284 EMERGENCY DEPT VISIT MOD MDM: CPT

## 2023-12-15 RX ORDER — DIPHENHYDRAMINE HYDROCHLORIDE 50 MG/ML
25 INJECTION INTRAMUSCULAR; INTRAVENOUS
Status: COMPLETED | OUTPATIENT
Start: 2023-12-15 | End: 2023-12-15

## 2023-12-15 RX ORDER — CEPHALEXIN 500 MG/1
500 CAPSULE ORAL 4 TIMES DAILY
Qty: 28 CAPSULE | Refills: 0 | Status: SHIPPED | OUTPATIENT
Start: 2023-12-15 | End: 2023-12-22

## 2023-12-15 RX ORDER — 0.9 % SODIUM CHLORIDE 0.9 %
1000 INTRAVENOUS SOLUTION INTRAVENOUS ONCE
Status: COMPLETED | OUTPATIENT
Start: 2023-12-15 | End: 2023-12-15

## 2023-12-15 RX ORDER — DOXYCYCLINE HYCLATE 100 MG
100 TABLET ORAL 2 TIMES DAILY
Qty: 14 TABLET | Refills: 0 | Status: SHIPPED | OUTPATIENT
Start: 2023-12-15 | End: 2023-12-22

## 2023-12-15 RX ORDER — PRAMIPEXOLE DIHYDROCHLORIDE 1 MG/1
0.5 TABLET ORAL
Status: COMPLETED | OUTPATIENT
Start: 2023-12-15 | End: 2023-12-15

## 2023-12-15 RX ADMIN — METHYLPREDNISOLONE SODIUM SUCCINATE 125 MG: 125 INJECTION, POWDER, FOR SOLUTION INTRAMUSCULAR; INTRAVENOUS at 11:07

## 2023-12-15 RX ADMIN — PRAMIPEXOLE DIHYDROCHLORIDE 0.5 MG: 1 TABLET ORAL at 12:31

## 2023-12-15 RX ADMIN — FAMOTIDINE 20 MG: 10 INJECTION, SOLUTION INTRAVENOUS at 11:06

## 2023-12-15 RX ADMIN — SODIUM CHLORIDE 1000 ML: 9 INJECTION, SOLUTION INTRAVENOUS at 11:03

## 2023-12-15 RX ADMIN — DIPHENHYDRAMINE HYDROCHLORIDE 25 MG: 50 INJECTION INTRAMUSCULAR; INTRAVENOUS at 11:03

## 2023-12-15 NOTE — DISCHARGE INSTRUCTIONS
Thank you for allowing us to provide you with medical care today. We realize that you have many choices for your emergency care needs. We thank you for choosing [facility]. Please choose us in the future for any continued health care needs. The exam and treatment you received in the emergency department were for an emergent problem and are not intended as complete care. It is important that you follow-up with a doctor. If your symptoms worsen or you do not improve you should return to the emergency department. We are available 24 hours a day. Please make an appointment with your health care provider for follow-up of your emergency department visit. Take this sheet with you when you go to your follow-up visit.

## 2023-12-15 NOTE — ED TRIAGE NOTES
Pt stated she is having an allergic reaction to contrast from her angiogram, pt with rash noted throughout , denies sob or difficulty breathing

## 2023-12-15 NOTE — ED NOTES
Patient left ED in no acute distress, alert and oriented x4. Patient was encouraged to come back if symptoms get worse. Patient was provided with discharge instructions and prescriptions. All questions were answered. Patient left ambulatory.          Benjie Varghese [place]  [phone]

## 2023-12-15 NOTE — ED PROVIDER NOTES
[facility] EMERGENCY DEP  EMERGENCY DEPARTMENT ENCOUNTER      Pt Name: Dennise Woodward  MRN: [id number]  [address] [facility] [place] 1940  Date of evaluation: 12/15/2023  Provider: CJ Berry NP    CHIEF COMPLAINT       Chief Complaint   Patient presents with    Allergic Reaction         HISTORY OF PRESENT ILLNESS   (Location/Symptom, Timing/Onset, Context/Setting, Quality, Duration, Modifying Factors, Severity)  Note limiting factors. Patient is an 58-year-old female with history of arthritis, asthma, GERD, hypertension presenting to the emergency department for evaluation of allergic reaction. Was seen yesterday for evaluation of a cerebral aneurysm and had a scan with contrast done. Yesterday, developed some generalized pruritus and erythema. No lip swelling, chest pain, or shortness of breath. Today had erythema and pruritus to her bilateral lower legs. Took 2 Benadryl without symptom relief. Was advised to come in by her outpatient provider for further evaluation. The history is provided by the patient. Review of External Medical Records:     Nursing Notes were reviewed. REVIEW OF SYSTEMS    (2-9 systems for level 4, 10 or more for level 5)     Review of Systems   Constitutional:  Negative for unexpected weight change. HENT:  Negative for congestion. Eyes:  Negative for visual disturbance. Respiratory:  Negative for cough and shortness of breath. Cardiovascular:  Negative for chest pain and palpitations. Gastrointestinal:  Negative for abdominal pain, nausea and vomiting. Endocrine: Negative for polyuria. Genitourinary:  Negative for dysuria and flank pain. Musculoskeletal:  Negative for back pain. Skin:  Positive for rash. Negative for pallor. Allergic/Immunologic: Negative for immunocompromised state. Neurological:  Negative for dizziness and headaches. Hematological:  Negative for adenopathy. Psychiatric/Behavioral:  Negative for agitation.         Except as

## 2023-12-19 ENCOUNTER — TELEPHONE (OUTPATIENT)
Dept: PRIMARY CARE CLINIC | Facility: CLINIC | Age: 83
End: 2023-12-19

## 2023-12-19 NOTE — TELEPHONE ENCOUNTER
Patient wanted to notify [facility] that they were able to get a cardiology appt with Quan for 12/28. No other comments at this time.

## 2023-12-28 ENCOUNTER — OFFICE VISIT (OUTPATIENT)
Age: 83
End: 2023-12-28
Payer: MEDICARE

## 2023-12-28 VITALS
WEIGHT: 150.2 LBS | OXYGEN SATURATION: 98 % | HEART RATE: 82 BPM | DIASTOLIC BLOOD PRESSURE: 70 MMHG | SYSTOLIC BLOOD PRESSURE: 110 MMHG | BODY MASS INDEX: 27.64 KG/M2 | HEIGHT: 62 IN

## 2023-12-28 DIAGNOSIS — G45.4 TRANSIENT GLOBAL AMNESIA: ICD-10-CM

## 2023-12-28 DIAGNOSIS — R07.9 CHEST PAIN, UNSPECIFIED TYPE: Primary | ICD-10-CM

## 2023-12-28 DIAGNOSIS — R01.1 MURMUR, CARDIAC: ICD-10-CM

## 2023-12-28 DIAGNOSIS — I65.23 BILATERAL CAROTID ARTERY STENOSIS: ICD-10-CM

## 2023-12-28 DIAGNOSIS — I45.2 RBBB (RIGHT BUNDLE BRANCH BLOCK WITH LEFT ANTERIOR FASCICULAR BLOCK): ICD-10-CM

## 2023-12-28 PROCEDURE — 99204 OFFICE O/P NEW MOD 45 MIN: CPT | Performed by: SPECIALIST

## 2023-12-28 NOTE — PROGRESS NOTES
HISTORY OF PRESENT ILLNESS  Kathy Brown is a 80 y.o. female   She is referred for evaluation of chest pain. She had severe chest pain about 10 days ago that occurred after bending over. It lasted 2 minutes or less and was on the left side. She has had about 4 episodes of chest pain in the past year. She used to walk quite a bit and lost over 60 pounds with weight watchers and powerwalking. However she does not walk much now. She has a history of bilateral total knee replacements with the left one having been done in May of this year and she still has problems with 1 knee. At the end of October she had global amnesia lasting several hours. She was seen at [facility] and eventually underwent angiography that revealed a 90% stenosis at the origin of the right internal carotid artery. She also had calcified plaque in the left internal carotid artery but it was not felt to be critical.  She has follow-up scheduled in several days in this regard. She was also found to have a basilar artery aneurysm measuring 5 x 4 mm. She is listed as having statin intolerance but was recently started on Lipitor 10 mg a day as well as baby aspirin once a day. She suffers from restless legs.   HPI     Specialty Problems          Cardiology Problems    Essential hypertension, benign        Cerebral aneurysm          Current Outpatient Medications   Medication Instructions    Albuterol Sulfate (VENTOLIN HFA IN) Inhalation, AS NEEDED    ascorbic acid (VITAMIN C) 1000 MG tablet Oral, DAILY    aspirin 81 mg, Oral, DAILY    atorvastatin (LIPITOR) 10 mg, Oral, DAILY    benazepril (LOTENSIN) 10 mg, Oral, DAILY    Doxylamine Succinate, Sleep, (UNISOM PO) Oral, NIGHTLY    Magnesium 400 MG TABS Oral    melatonin 10 mg, Oral, DAILY    omeprazole (PRILOSEC) 20 MG delayed release capsule TAKE 1 CAPSULE BY MOUTH DAILY    pramipexole (MIRAPEX) 3 mg, Oral, NIGHTLY    vitamin B-12 (CYANOCOBALAMIN) 50 mcg, Oral, DAILY    zinc gluconate 50

## 2023-12-28 NOTE — PROGRESS NOTES
You will be scheduled for a Nuclear Stress Test after your appointment today. Nuclear stress testing evaluates blood flow to your heart muscle and assesses cardiac function. There are 2 parts (Rest/Stress) to this procedure and will include either an exercise on a treadmill or an IV administration of a stressing medication called Lexiscan. Your cardiologist will determine which type of testing is best for you. This test can be performed in one day unless it is determined that better quality images will be obtained by performing the test over two days. *Please arrive 15 minutes prior to your appointment time    Test Duration:    -One day testing will take 4 hours    Day of testing instructions:    NO CAFFEINE (not even decaffeinated products) 24 HOURS PRIOR TO TESTING. This includes coffee, soda, tea, chocolate, multivitamins, and migraine medication, like Excedrin or Fioricet that contains caffeine. Nothing to eat or drink 4 HOURS prior to testing  NO NICOTINE 12 hours prior to testing  Hold any medications requested by your cardiologist. Otherwise take medications as directed with a few sips of water. If you are unsure you may bring your medications with you to take after instructed by your stressing nurse. It is recommended you hold NO medications prior to your test. DIABETIC PATIENTS: Take half of your insulin with a light meal 4 hours before your test.  Wear comfortable clothes and shoes (Shirts with no metal, shorts or pants, tennis shoes, no heels or flip flops)    IMPORTANT: This testing involves a cardiac tracer ordered specifically for you. If you are unable to make your appointment, please call to cancel/reschedule AT LEAST 24 hours prior to your appointment so your tracer can be cancelled. [phone].

## 2024-01-02 ENCOUNTER — OFFICE VISIT (OUTPATIENT)
Age: 84
End: 2024-01-02
Payer: MEDICARE

## 2024-01-02 VITALS
WEIGHT: 138 LBS | DIASTOLIC BLOOD PRESSURE: 64 MMHG | SYSTOLIC BLOOD PRESSURE: 124 MMHG | HEART RATE: 82 BPM | RESPIRATION RATE: 16 BRPM | OXYGEN SATURATION: 98 % | HEIGHT: 62 IN | BODY MASS INDEX: 25.4 KG/M2

## 2024-01-02 DIAGNOSIS — I67.1 CEREBRAL ANEURYSM: Primary | ICD-10-CM

## 2024-01-02 PROCEDURE — 1090F PRES/ABSN URINE INCON ASSESS: CPT | Performed by: PSYCHIATRY & NEUROLOGY

## 2024-01-02 PROCEDURE — G8417 CALC BMI ABV UP PARAM F/U: HCPCS | Performed by: PSYCHIATRY & NEUROLOGY

## 2024-01-02 PROCEDURE — 3078F DIAST BP <80 MM HG: CPT | Performed by: PSYCHIATRY & NEUROLOGY

## 2024-01-02 PROCEDURE — 3074F SYST BP LT 130 MM HG: CPT | Performed by: PSYCHIATRY & NEUROLOGY

## 2024-01-02 PROCEDURE — 1036F TOBACCO NON-USER: CPT | Performed by: PSYCHIATRY & NEUROLOGY

## 2024-01-02 PROCEDURE — 99215 OFFICE O/P EST HI 40 MIN: CPT | Performed by: PSYCHIATRY & NEUROLOGY

## 2024-01-02 PROCEDURE — G8399 PT W/DXA RESULTS DOCUMENT: HCPCS | Performed by: PSYCHIATRY & NEUROLOGY

## 2024-01-02 PROCEDURE — 1123F ACP DISCUSS/DSCN MKR DOCD: CPT | Performed by: PSYCHIATRY & NEUROLOGY

## 2024-01-02 PROCEDURE — G8484 FLU IMMUNIZE NO ADMIN: HCPCS | Performed by: PSYCHIATRY & NEUROLOGY

## 2024-01-02 PROCEDURE — G8427 DOCREV CUR MEDS BY ELIG CLIN: HCPCS | Performed by: PSYCHIATRY & NEUROLOGY

## 2024-01-02 ASSESSMENT — PATIENT HEALTH QUESTIONNAIRE - PHQ9
SUM OF ALL RESPONSES TO PHQ QUESTIONS 1-9: 0
1. LITTLE INTEREST OR PLEASURE IN DOING THINGS: 0
2. FEELING DOWN, DEPRESSED OR HOPELESS: 0
SUM OF ALL RESPONSES TO PHQ9 QUESTIONS 1 & 2: 0
SUM OF ALL RESPONSES TO PHQ QUESTIONS 1-9: 0

## 2024-01-02 NOTE — PROGRESS NOTES
Established Patient Visit         CONSULT INFORMATION:  Date of Service: 2024  Consultation Requested by: hospital    PATIENT IDENTIFICATION:  Patient Name: Shea Pickens  : 1940      CC: aneurysm    HISTORY OF PRESENT ILLNESS:  83 y.o. RH White (non-) [1]female consulted for aneurysm. Pt had an episode that sounds most c/w TGA. She had a stroke w/u MRI was negative for stroke or mass. 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 She is on a low dose statin. No blood thinners. No fam hx of SAH. No hx of sz.     Interim: Seen in f/u after DSA. She did have an allergic reaction (hives) to the contrast a day after. This has improved. She had an episode of chest pain and has a stress test scheduled. Case discussed in conference with rec's for right carotid stenting then aneurysm embolization at a later date.       Past Medical History:   Diagnosis Date    Aneurysm (HCC) 10/29/2023    Arthritis     Asthma     during childhood/seasonal    Cancer (HCC)     skin    Carotid artery stenosis     GERD (gastroesophageal reflux disease)     Hypertension     Low back pain     Lumbosacral disc disease     Memory disorder 10/29/2023    Neuropathy     Tremor        Past Surgical History:   Procedure Laterality Date    BACK SURGERY  2016    CARPAL TUNNEL RELEASE Left 2018    CATARACT REMOVAL Bilateral     COLPORRHAPHY  2005    HYSTERECTOMY (CERVIX STATUS UNKNOWN)  1976    JOINT REPLACEMENT Right 2018    LUMBAR LAMINECTOMY  2014    TONSILLECTOMY  194    TOTAL KNEE ARTHROPLASTY Left 2023    LEFT TOTAL KNEE ARTHROPLASTY performed by Ronna Sanon MD at [facility] MAIN OR    US BREAST FINE NEEDLE ASPIRATION         Current Outpatient Medications   Medication Sig    vitamin B-12 (CYANOCOBALAMIN) 100 MCG tablet Take 0.5 tablets by mouth daily    zinc gluconate 50 MG tablet Take 1 tablet by mouth daily    aspirin 81 MG EC tablet Take 1 tablet by mouth daily    pramipexole (MIRAPEX)

## 2024-01-15 ENCOUNTER — ANCILLARY PROCEDURE (OUTPATIENT)
Age: 84
End: 2024-01-15
Payer: MEDICARE

## 2024-01-15 VITALS
BODY MASS INDEX: 25.4 KG/M2 | DIASTOLIC BLOOD PRESSURE: 64 MMHG | SYSTOLIC BLOOD PRESSURE: 124 MMHG | HEIGHT: 62 IN | WEIGHT: 138 LBS

## 2024-01-15 VITALS
DIASTOLIC BLOOD PRESSURE: 64 MMHG | HEART RATE: 69 BPM | WEIGHT: 138 LBS | BODY MASS INDEX: 25.4 KG/M2 | SYSTOLIC BLOOD PRESSURE: 128 MMHG | HEIGHT: 62 IN

## 2024-01-15 DIAGNOSIS — R07.9 CHEST PAIN, UNSPECIFIED TYPE: ICD-10-CM

## 2024-01-15 DIAGNOSIS — R01.1 MURMUR, CARDIAC: ICD-10-CM

## 2024-01-15 PROCEDURE — PBSHW PBB SHADOW CHARGE: Performed by: INTERNAL MEDICINE

## 2024-01-15 PROCEDURE — 93306 TTE W/DOPPLER COMPLETE: CPT | Performed by: SPECIALIST

## 2024-01-15 PROCEDURE — A9500 TC99M SESTAMIBI: HCPCS | Performed by: INTERNAL MEDICINE

## 2024-01-15 PROCEDURE — 78452 HT MUSCLE IMAGE SPECT MULT: CPT | Performed by: INTERNAL MEDICINE

## 2024-01-15 RX ORDER — TETRAKIS(2-METHOXYISOBUTYLISOCYANIDE)COPPER(I) TETRAFLUOROBORATE 1 MG/ML
24.6 INJECTION, POWDER, LYOPHILIZED, FOR SOLUTION INTRAVENOUS
Status: COMPLETED | OUTPATIENT
Start: 2024-01-15 | End: 2024-01-15

## 2024-01-15 RX ORDER — REGADENOSON 0.08 MG/ML
0.4 INJECTION, SOLUTION INTRAVENOUS
Status: COMPLETED | OUTPATIENT
Start: 2024-01-15 | End: 2024-01-15

## 2024-01-15 RX ORDER — TETRAKIS(2-METHOXYISOBUTYLISOCYANIDE)COPPER(I) TETRAFLUOROBORATE 1 MG/ML
8.2 INJECTION, POWDER, LYOPHILIZED, FOR SOLUTION INTRAVENOUS
Status: COMPLETED | OUTPATIENT
Start: 2024-01-15 | End: 2024-01-15

## 2024-01-15 RX ADMIN — TECHNETIUM TC-99M SESTAMIBI 8.2 MILLICURIE: 1 INJECTION INTRAVENOUS at 08:15

## 2024-01-15 RX ADMIN — REGADENOSON 0.4 MG: 0.08 INJECTION, SOLUTION INTRAVENOUS at 10:15

## 2024-01-15 RX ADMIN — TECHNETIUM TC-99M SESTAMIBI 24.6 MILLICURIE: 1 INJECTION INTRAVENOUS at 10:15

## 2024-01-16 ENCOUNTER — TELEPHONE (OUTPATIENT)
Age: 84
End: 2024-01-16

## 2024-01-16 LAB
ECHO AO ASC DIAM: 3.7 CM
ECHO AO ASCENDING AORTA INDEX: 2.27 CM/M2
ECHO AO ROOT DIAM: 3.2 CM
ECHO AO ROOT INDEX: 1.96 CM/M2
ECHO AV AREA PEAK VELOCITY: 2 CM2
ECHO AV AREA VTI: 2.1 CM2
ECHO AV AREA/BSA PEAK VELOCITY: 1.2 CM2/M2
ECHO AV AREA/BSA VTI: 1.3 CM2/M2
ECHO AV MEAN GRADIENT: 10 MMHG
ECHO AV MEAN VELOCITY: 1.6 M/S
ECHO AV PEAK GRADIENT: 16 MMHG
ECHO AV PEAK VELOCITY: 2 M/S
ECHO AV VELOCITY RATIO: 0.6
ECHO AV VTI: 53.6 CM
ECHO BSA: 1.65 M2
ECHO BSA: 1.65 M2
ECHO EST RA PRESSURE: 3 MMHG
ECHO LA DIAMETER INDEX: 2.52 CM/M2
ECHO LA DIAMETER: 4.1 CM
ECHO LA TO AORTIC ROOT RATIO: 1.28
ECHO LA VOL A-L A2C: 60 ML (ref 22–52)
ECHO LA VOL A-L A4C: 61 ML (ref 22–52)
ECHO LA VOL BP: 58 ML (ref 22–52)
ECHO LA VOL MOD A2C: 57 ML (ref 22–52)
ECHO LA VOL MOD A4C: 59 ML (ref 22–52)
ECHO LA VOL/BSA BIPLANE: 36 ML/M2 (ref 16–34)
ECHO LA VOLUME AREA LENGTH: 61 ML
ECHO LA VOLUME INDEX A-L A2C: 37 ML/M2 (ref 16–34)
ECHO LA VOLUME INDEX A-L A4C: 37 ML/M2 (ref 16–34)
ECHO LA VOLUME INDEX AREA LENGTH: 37 ML/M2 (ref 16–34)
ECHO LA VOLUME INDEX MOD A2C: 35 ML/M2 (ref 16–34)
ECHO LA VOLUME INDEX MOD A4C: 36 ML/M2 (ref 16–34)
ECHO LV E' LATERAL VELOCITY: 8 CM/S
ECHO LV E' SEPTAL VELOCITY: 7 CM/S
ECHO LV EJECTION FRACTION A2C: 59 %
ECHO LV EJECTION FRACTION A4C: 57 %
ECHO LV FRACTIONAL SHORTENING: 43 % (ref 28–44)
ECHO LV INTERNAL DIMENSION DIASTOLE INDEX: 2.58 CM/M2
ECHO LV INTERNAL DIMENSION DIASTOLIC: 4.2 CM (ref 3.9–5.3)
ECHO LV INTERNAL DIMENSION SYSTOLIC INDEX: 1.47 CM/M2
ECHO LV INTERNAL DIMENSION SYSTOLIC: 2.4 CM
ECHO LV IVSD: 0.9 CM (ref 0.6–0.9)
ECHO LV MASS 2D: 118.7 G (ref 67–162)
ECHO LV MASS INDEX 2D: 72.8 G/M2 (ref 43–95)
ECHO LV POSTERIOR WALL DIASTOLIC: 0.9 CM (ref 0.6–0.9)
ECHO LV RELATIVE WALL THICKNESS RATIO: 0.43
ECHO LVOT AREA: 3.5 CM2
ECHO LVOT AV VTI INDEX: 0.59
ECHO LVOT DIAM: 2.1 CM
ECHO LVOT MEAN GRADIENT: 3 MMHG
ECHO LVOT PEAK GRADIENT: 6 MMHG
ECHO LVOT PEAK VELOCITY: 1.2 M/S
ECHO LVOT STROKE VOLUME INDEX: 66.9 ML/M2
ECHO LVOT SV: 109 ML
ECHO LVOT VTI: 31.5 CM
ECHO MV A VELOCITY: 0.95 M/S
ECHO MV E DECELERATION TIME (DT): 200.1 MS
ECHO MV E VELOCITY: 0.84 M/S
ECHO MV E/A RATIO: 0.88
ECHO MV E/E' LATERAL: 10.5
ECHO MV E/E' RATIO (AVERAGED): 11.25
ECHO RIGHT VENTRICULAR SYSTOLIC PRESSURE (RVSP): 32 MMHG
ECHO RV BASAL DIMENSION: 3.7 CM
ECHO RV FREE WALL PEAK S': 13 CM/S
ECHO RV TAPSE: 2.2 CM (ref 1.7–?)
ECHO TV REGURGITANT MAX VELOCITY: 2.67 M/S
ECHO TV REGURGITANT PEAK GRADIENT: 28 MMHG
NUC STRESS EJECTION FRACTION: 76 %
STRESS BASELINE DIAS BP: 70 MMHG
STRESS BASELINE HR: 68 BPM
STRESS BASELINE ST DEPRESSION: 0 MM
STRESS BASELINE SYS BP: 140 MMHG
STRESS ESTIMATED WORKLOAD: 1 METS
STRESS O2 SAT PEAK: 100 %
STRESS O2 SAT REST: 99 %
STRESS PEAK DIAS BP: 70 MMHG
STRESS PEAK SYS BP: 130 MMHG
STRESS PERCENT HR ACHIEVED: 69 %
STRESS POST PEAK HR: 95 BPM
STRESS RATE PRESSURE PRODUCT: NORMAL BPM*MMHG
STRESS ST DEPRESSION: 0 MM
STRESS TARGET HR: 137 BPM
TID: 0.92

## 2024-01-16 PROCEDURE — 93016 CV STRESS TEST SUPVJ ONLY: CPT | Performed by: INTERNAL MEDICINE

## 2024-01-16 PROCEDURE — 78452 HT MUSCLE IMAGE SPECT MULT: CPT | Performed by: INTERNAL MEDICINE

## 2024-01-16 PROCEDURE — 93018 CV STRESS TEST I&R ONLY: CPT | Performed by: INTERNAL MEDICINE

## 2024-01-16 NOTE — TELEPHONE ENCOUNTER
----- Message from Scar Glass MD sent at 1/16/2024 12:15 PM EST -----  Normal heart function.  Some calcium AV, no stenosis

## 2024-01-16 NOTE — TELEPHONE ENCOUNTER
----- Message from Oliver Roblero MD sent at 1/16/2024 12:07 AM EST -----  Stress test on pt of yours was normal. thx

## 2024-01-16 NOTE — TELEPHONE ENCOUNTER
----- Message from Scar Glass MD sent at 1/16/2024 10:04 AM EST -----  Stress test is normal.  This would suggest that her chest pain is not coming from her heart.  I can see her back as needed.  ----- Message -----  From: Oliver Roblero MD  Sent: 1/16/2024  12:06 AM EST  To: Scar Glass MD

## 2024-01-16 NOTE — TELEPHONE ENCOUNTER
Called ptMau LAZO on . Stated I would also send message in What[employer]. Sent [employer] message with both test results.

## 2024-01-23 DIAGNOSIS — G25.81 RESTLESS LEG SYNDROME: ICD-10-CM

## 2024-01-23 RX ORDER — PRAMIPEXOLE DIHYDROCHLORIDE 1 MG/1
1 TABLET ORAL 3 TIMES DAILY
Qty: 270 TABLET | Refills: 0 | Status: SHIPPED | OUTPATIENT
Start: 2024-01-23

## 2024-01-30 ENCOUNTER — TELEPHONE (OUTPATIENT)
Age: 84
End: 2024-01-30

## 2024-01-30 ENCOUNTER — OFFICE VISIT (OUTPATIENT)
Age: 84
End: 2024-01-30
Payer: MEDICARE

## 2024-01-30 VITALS
HEIGHT: 62 IN | SYSTOLIC BLOOD PRESSURE: 130 MMHG | TEMPERATURE: 98.2 F | BODY MASS INDEX: 26.68 KG/M2 | DIASTOLIC BLOOD PRESSURE: 88 MMHG | HEART RATE: 86 BPM | WEIGHT: 145 LBS | OXYGEN SATURATION: 99 %

## 2024-01-30 DIAGNOSIS — I67.1 CEREBRAL ANEURYSM: Primary | ICD-10-CM

## 2024-01-30 PROCEDURE — 1036F TOBACCO NON-USER: CPT | Performed by: PSYCHIATRY & NEUROLOGY

## 2024-01-30 PROCEDURE — 1090F PRES/ABSN URINE INCON ASSESS: CPT | Performed by: PSYCHIATRY & NEUROLOGY

## 2024-01-30 PROCEDURE — 3079F DIAST BP 80-89 MM HG: CPT | Performed by: PSYCHIATRY & NEUROLOGY

## 2024-01-30 PROCEDURE — G8484 FLU IMMUNIZE NO ADMIN: HCPCS | Performed by: PSYCHIATRY & NEUROLOGY

## 2024-01-30 PROCEDURE — 3075F SYST BP GE 130 - 139MM HG: CPT | Performed by: PSYCHIATRY & NEUROLOGY

## 2024-01-30 PROCEDURE — G8399 PT W/DXA RESULTS DOCUMENT: HCPCS | Performed by: PSYCHIATRY & NEUROLOGY

## 2024-01-30 PROCEDURE — G8427 DOCREV CUR MEDS BY ELIG CLIN: HCPCS | Performed by: PSYCHIATRY & NEUROLOGY

## 2024-01-30 PROCEDURE — G8417 CALC BMI ABV UP PARAM F/U: HCPCS | Performed by: PSYCHIATRY & NEUROLOGY

## 2024-01-30 PROCEDURE — 1123F ACP DISCUSS/DSCN MKR DOCD: CPT | Performed by: PSYCHIATRY & NEUROLOGY

## 2024-01-30 PROCEDURE — 99214 OFFICE O/P EST MOD 30 MIN: CPT | Performed by: PSYCHIATRY & NEUROLOGY

## 2024-01-30 RX ORDER — AZELASTINE 1 MG/ML
1 SPRAY, METERED NASAL 2 TIMES DAILY
COMMUNITY

## 2024-01-30 RX ORDER — CLOPIDOGREL BISULFATE 75 MG/1
75 TABLET ORAL DAILY
Qty: 30 TABLET | Refills: 3 | Status: SHIPPED | OUTPATIENT
Start: 2024-01-30

## 2024-01-30 RX ORDER — PHENOL 1.4 %
1 AEROSOL, SPRAY (ML) MUCOUS MEMBRANE DAILY
COMMUNITY

## 2024-01-30 RX ORDER — PANTOPRAZOLE SODIUM 20 MG/1
20 TABLET, DELAYED RELEASE ORAL
Qty: 30 TABLET | Refills: 5 | Status: SHIPPED | OUTPATIENT
Start: 2024-01-30

## 2024-01-30 RX ORDER — BENAZEPRIL HYDROCHLORIDE 10 MG/1
10 TABLET ORAL DAILY
COMMUNITY

## 2024-01-30 NOTE — PROGRESS NOTES
Follow up for Cerebral aneurysm and discussion regarding treatment.  Patient reports no symptoms.  Denies headaches, dizziness, numbness or tingling, blurred or double vision.  Reports hand tremors for about 2 years.  No acute problems voiced.

## 2024-01-30 NOTE — H&P (VIEW-ONLY)
and Sexual Activity    Alcohol use: Never    Drug use: Never    Sexual activity: Not Currently     Partners: Male   Other Topics Concern    Not on file   Social History Narrative    Not on file     Social Determinants of Health     Financial Resource Strain: Low Risk  (5/22/2023)    Overall Financial Resource Strain (CARDIA)     Difficulty of Paying Living Expenses: Not very hard   Food Insecurity: Not on file (5/22/2023)   Transportation Needs: Unknown (5/22/2023)    PRAPARE - Transportation     Lack of Transportation (Medical): Not on file     Lack of Transportation (Non-Medical): No   Physical Activity: Inactive (6/9/2023)    Exercise Vital Sign     Days of Exercise per Week: 0 days     Minutes of Exercise per Session: 0 min   Stress: Not on file   Social Connections: Not on file   Intimate Partner Violence: Not on file   Housing Stability: Unknown (5/22/2023)    Housing Stability Vital Sign     Unable to Pay for Housing in the Last Year: Not on file     Number of Places Lived in the Last Year: Not on file     Unstable Housing in the Last Year: No      Social History     Substance and Sexual Activity   Drug Use Never     Family History   Problem Relation Age of Onset    Alzheimer's Disease Mother     Hypertension Mother     Kidney Disease Father     Lung Disease Father     Hypertension Sister     Anesth Problems Neg Hx           REVIEW OF SYSTEMS:  Neg except for above      OBJECTIVE:  Vitals:    01/02/24 1027   BP: 124/64   Pulse: 82   Resp: 16   SpO2: 98%         Physical Exam:   Gen: NAD      Neuro:  A/Ox3,speech/lang inact  CN 2-12 intact, perrl,   5/5 throughout no drift; SILT,  DTR sym  FTN, gait intact  Tremor LUE      Lab Review:  Lab Results   Component Value Date    WBC 7.9 12/15/2023    HGB 12.2 12/15/2023    HCT 38.2 12/15/2023     12/15/2023    CHOL 173 10/30/2023    TRIG 78 10/30/2023    HDL 59 10/30/2023    ALT 18 12/15/2023    AST 14 (L) 12/15/2023     12/15/2023    K 4.1 12/15/2023

## 2024-01-30 NOTE — PROGRESS NOTES
Established Patient Visit         CONSULT INFORMATION:  Date of Service: 2024  Consultation Requested by: hospital    PATIENT IDENTIFICATION:  Patient Name: Shea Pickens  : 1940      CC: aneurysm    HISTORY OF PRESENT ILLNESS:  83 y.o. RH White (non-) [1]female consulted for aneurysm. Pt had an episode that sounds most c/w TGA. She had a stroke w/u MRI was negative for stroke or mass. 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 She is on a low dose statin. No blood thinners. No fam hx of SAH. No hx of sz.     Seen in f/u after DSA. She did have an allergic reaction (hives) to the contrast a day after. This has improved. She had an episode of chest pain and has a stress test scheduled. Case discussed in conference with rec's for right carotid stenting then aneurysm embolization at a later date.    Interim: No new issues. Stress test was negative     Past Medical History:   Diagnosis Date    Aneurysm (HCC) 10/29/2023    Arthritis     Asthma     during childhood/seasonal    Cancer (HCC)     skin    Carotid artery stenosis     GERD (gastroesophageal reflux disease)     Hypertension     Low back pain     Lumbosacral disc disease     Memory disorder 10/29/2023    Neuropathy     Tremor        Past Surgical History:   Procedure Laterality Date    BACK SURGERY  2016    CARPAL TUNNEL RELEASE Left 2018    CATARACT REMOVAL Bilateral     COLPORRHAPHY  2005    HYSTERECTOMY (CERVIX STATUS UNKNOWN)  1976    JOINT REPLACEMENT Right 2018    LUMBAR LAMINECTOMY  2014    TONSILLECTOMY      TOTAL KNEE ARTHROPLASTY Left 2023    LEFT TOTAL KNEE ARTHROPLASTY performed by Ronna Sanon MD at [facility] MAIN OR    US BREAST FINE NEEDLE ASPIRATION         Current Outpatient Medications   Medication Sig    vitamin B-12 (CYANOCOBALAMIN) 100 MCG tablet Take 0.5 tablets by mouth daily    zinc gluconate 50 MG tablet Take 1 tablet by mouth daily    aspirin 81 MG EC tablet Take 1

## 2024-01-30 NOTE — PATIENT INSTRUCTIONS
You will have Carotid angioplasty and stenting on 2/28/2024 at [facility].   Arrival time is  7:30 am at Patient Registration.  You will be contacted by Preadmission Testing to schedule an appointment for labs, chest xray, ekg.  Reminders to patient:  -No eating or drinking after midnight the day prior to procedure.  -Take morning medications the morning of procedure with sips of water.   -Do not stop taking Blood Thinners if applicable unless notified by this office.  -You must have a  to drive you home upon discharge.  -Recommend you have someone with you for at least 24-48 hours post procedure.  -No metal of glue in hair.

## 2024-01-30 NOTE — ANESTHESIA PREPROCEDURE EVALUATION
Anesthetic History               Review of Systems / Medical History  Patient summary reviewed, nursing notes reviewed and pertinent labs reviewed    Pulmonary    COPD    Sleep apnea    Asthma : well controlled       Neuro/Psych         Headaches    Comments: Chronic low back pain (M54.5, G89.29)  01/2014  Dr. Neida Dumont.  DJD and spurs, Narrowing of L 3,4,5.  Dr. Saloni Alexander.     Degenerative lumbar spinal stenosis (M48.061)       Restless legs syndrome (RLS) (G25.81)  09/2015  Dr. Jared De Guzman.    Tremor of both hands (R25.1)      Cardiovascular    Hypertension          Hyperlipidemia         GI/Hepatic/Renal     GERD          Comments: Dysphagia (R13.10)  Endo/Other    Diabetes    Arthritis, cancer and anemia     Other Findings              Physical Exam    Airway  Mallampati: II  TM Distance: 4 - 6 cm  Neck ROM: normal range of motion   Mouth opening: Normal     Cardiovascular    Rhythm: regular  Rate: normal         Dental    Dentition: Caps/crowns     Pulmonary  Breath sounds clear to auscultation               Abdominal  GI exam deferred       Other Findings            Anesthetic Plan    ASA: 3  Anesthesia type: MAC          Induction: Intravenous  Anesthetic plan and risks discussed with: Patient Her/She

## 2024-02-16 ENCOUNTER — HOSPITAL ENCOUNTER (OUTPATIENT)
Facility: HOSPITAL | Age: 84
End: 2024-02-16
Payer: MEDICARE

## 2024-02-16 VITALS
BODY MASS INDEX: 27.42 KG/M2 | TEMPERATURE: 97.6 F | HEIGHT: 62 IN | SYSTOLIC BLOOD PRESSURE: 120 MMHG | DIASTOLIC BLOOD PRESSURE: 73 MMHG | HEART RATE: 79 BPM | OXYGEN SATURATION: 99 % | WEIGHT: 149 LBS

## 2024-02-16 LAB
ALBUMIN SERPL-MCNC: 4 G/DL (ref 3.5–5)
ALBUMIN/GLOB SERPL: 1.2 (ref 1.1–2.2)
ALP SERPL-CCNC: 116 U/L (ref 45–117)
ALT SERPL-CCNC: 19 U/L (ref 12–78)
ANION GAP SERPL CALC-SCNC: 4 MMOL/L (ref 5–15)
ASPIRIN: 380 ARU
AST SERPL-CCNC: 17 U/L (ref 15–37)
BASOPHILS # BLD: 0.1 K/UL (ref 0–0.1)
BASOPHILS NFR BLD: 2 % (ref 0–1)
BILIRUB SERPL-MCNC: 0.6 MG/DL (ref 0.2–1)
BUN SERPL-MCNC: 23 MG/DL (ref 6–20)
BUN/CREAT SERPL: 25 (ref 12–20)
CALCIUM SERPL-MCNC: 9.8 MG/DL (ref 8.5–10.1)
CHLORIDE SERPL-SCNC: 103 MMOL/L (ref 97–108)
CO2 SERPL-SCNC: 29 MMOL/L (ref 21–32)
CREAT SERPL-MCNC: 0.91 MG/DL (ref 0.55–1.02)
DIFFERENTIAL METHOD BLD: ABNORMAL
EOSINOPHIL # BLD: 0.2 K/UL (ref 0–0.4)
EOSINOPHIL NFR BLD: 3 % (ref 0–7)
ERYTHROCYTE [DISTWIDTH] IN BLOOD BY AUTOMATED COUNT: 13.3 % (ref 11.5–14.5)
GLOBULIN SER CALC-MCNC: 3.3 G/DL (ref 2–4)
GLUCOSE SERPL-MCNC: 108 MG/DL (ref 65–100)
HCT VFR BLD AUTO: 43.3 % (ref 35–47)
HGB BLD-MCNC: 13.9 G/DL (ref 11.5–16)
IMM GRANULOCYTES # BLD AUTO: 0 K/UL (ref 0–0.04)
IMM GRANULOCYTES NFR BLD AUTO: 0 % (ref 0–0.5)
LYMPHOCYTES # BLD: 1.5 K/UL (ref 0.8–3.5)
LYMPHOCYTES NFR BLD: 23 % (ref 12–49)
MCH RBC QN AUTO: 29.5 PG (ref 26–34)
MCHC RBC AUTO-ENTMCNC: 32.1 G/DL (ref 30–36.5)
MCV RBC AUTO: 91.9 FL (ref 80–99)
MONOCYTES # BLD: 0.4 K/UL (ref 0–1)
MONOCYTES NFR BLD: 6 % (ref 5–13)
NEUTS SEG # BLD: 4.3 K/UL (ref 1.8–8)
NEUTS SEG NFR BLD: 66 % (ref 32–75)
NRBC # BLD: 0 K/UL (ref 0–0.01)
NRBC BLD-RTO: 0 PER 100 WBC
P2Y12 PLT RESPONSE: 167 PRU (ref 194–418)
PLATELET # BLD AUTO: 300 K/UL (ref 150–400)
PMV BLD AUTO: 9.8 FL (ref 8.9–12.9)
POTASSIUM SERPL-SCNC: 4.7 MMOL/L (ref 3.5–5.1)
PROT SERPL-MCNC: 7.3 G/DL (ref 6.4–8.2)
RBC # BLD AUTO: 4.71 M/UL (ref 3.8–5.2)
SODIUM SERPL-SCNC: 136 MMOL/L (ref 136–145)
WBC # BLD AUTO: 6.4 K/UL (ref 3.6–11)

## 2024-02-16 PROCEDURE — 86900 BLOOD TYPING SEROLOGIC ABO: CPT

## 2024-02-16 PROCEDURE — 86901 BLOOD TYPING SEROLOGIC RH(D): CPT

## 2024-02-16 PROCEDURE — 85576 BLOOD PLATELET AGGREGATION: CPT

## 2024-02-16 PROCEDURE — 80053 COMPREHEN METABOLIC PANEL: CPT

## 2024-02-16 PROCEDURE — 85025 COMPLETE CBC W/AUTO DIFF WBC: CPT

## 2024-02-16 PROCEDURE — 86850 RBC ANTIBODY SCREEN: CPT

## 2024-02-16 PROCEDURE — 36415 COLL VENOUS BLD VENIPUNCTURE: CPT

## 2024-02-16 NOTE — PERIOP NOTE
[facility]  PREOPERATIVE INSTRUCTIONS  Neuro Interventional Surgery    Surgery Date:   2/28/24     Your surgeon's office will call you to confirm day of surgery and arrival time. If you do not hear from them, please call [phone] to confirm day of surgery and arrival time.    Please report to [facility] Patient Access/Admitting on the 1st floor.  Bring your insurance card, photo identification, and any copayment ( if applicable).   If you are going home the same day of your surgery, you must have a responsible adult to drive you home. You need to have a responsible adult to stay with you the first 24 hours after surgery and you should not drive a car for 24 hours following your surgery.  Nothing to eat or drink after midnight the night before surgery. This includes no water, gum, mints, coffee, juice, etc.  Please note special instructions, if applicable, below for medications.  Do NOT drink alcohol or smoke 24 hours before surgery. STOP smoking for 14 days prior as it helps with breathing and healing after surgery.  If you are being admitted to the hospital, please leave personal belongings/luggage in your car until you have an assigned hospital room number.  Please wear comfortable clothes. Wear your glasses instead of contacts. We ask that all money, jewelry and valuables be left at home. Wear no make up, particularly mascara, the day of surgery.    All body piercings, rings, and jewelry need to be removed and left at home. Please remove any nail polish or artifical nails from your fingernails. Please wear your hair loose or down. Please no pony-tails, buns, or any metal hair accessories. If you shower the morning of surgery, please do not apply any lotions or powders afterwards.  You may wear deodorant. Do not shave any body area within 24 hours of your surgery.  Please follow all instructions to avoid any potential surgical cancellation.  Should your physical condition change, (i.e. fever, cold, flu,  etc.) please notify your surgeon as soon as possible.  It is important to be on time. If a situation occurs where you may be delayed, please call:  [phone] on the day of surgery.  The Preadmission Testing staff can be reached at [phone].  Special instructions: FOLLOW ALL INSTRUCTIONS GIVEN BY YOUR SURGEON`S OFFICE  AND BRING ADVANCE DIRECTIVE PAPERS ON DAY OF SURGERY.    Current Outpatient Medications   Medication Sig    benazepril (LOTENSIN) 10 MG tablet Take 1 tablet by mouth daily    azelastine (ASTELIN) 0.1 % nasal spray 1 spray by Nasal route 2 times daily Use in each nostril as directed    BIOTIN PO Take by mouth daily    clopidogrel (PLAVIX) 75 MG tablet Take 1 tablet by mouth daily    pantoprazole (PROTONIX) 20 MG tablet Take 1 tablet by mouth every morning (before breakfast)    pramipexole (MIRAPEX) 1 MG tablet TAKE 1 TABLET BY MOUTH THREE TIMES DAILY    melatonin 3 MG TABS tablet Take 10 mg by mouth nightly    vitamin B-12 (CYANOCOBALAMIN) 100 MCG tablet Take 0.5 tablets by mouth daily    zinc gluconate 50 MG tablet Take 1 tablet by mouth daily    aspirin 81 MG EC tablet Take 1 tablet by mouth daily    Albuterol Sulfate (VENTOLIN HFA IN) Inhale into the lungs as needed    atorvastatin (LIPITOR) 10 MG tablet Take 1 tablet by mouth daily    Magnesium 400 MG TABS Take by mouth    ascorbic acid (VITAMIN C) 1000 MG tablet Take by mouth daily     No current facility-administered medications for this encounter.       YOU MUST ONLY TAKE THESE MEDICATIONS THE MORNING OF SURGERY WITH A SIP OF WATER: PANTOPRAZOLE AND LIPITOR  MEDICATIONS TO TAKE THE MORNING OF SURGERY ONLY IF NEEDED: INHALER IF NEEDED  HOLD THESE PRESCRIPTION MEDICATIONS BEFORE SURGERY:   Ask your surgeon/prescribing physician about when/if to STOP taking these medications: ASPIRIN AND PLAVIX.  Stop all vitamins, herbal medicines and any non-steroidal anti-inflammatory drugs (i.e. Ibuprofen, Naproxen, Advil, Aleve) 7 days prior to

## 2024-02-17 LAB
ABO + RH BLD: NORMAL
BLOOD GROUP ANTIBODIES SERPL: NORMAL
SPECIMEN EXP DATE BLD: NORMAL

## 2024-02-20 ENCOUNTER — TELEPHONE (OUTPATIENT)
Age: 84
End: 2024-02-20

## 2024-02-20 NOTE — TELEPHONE ENCOUNTER
Patient LVM stating she has some questions following her PAT appointment.    She states that she was told not to take certain medications.

## 2024-02-21 NOTE — TELEPHONE ENCOUNTER
Spoke to patient and answered questions regarding holding medications the morning of her procedure.  Informed patient to continue Plavix and Aspirin daily as ordered including the morning of her procedure.  Informed patient she does not need to use any special soap.  Patient stated understanding.

## 2024-02-26 ENCOUNTER — TELEPHONE (OUTPATIENT)
Age: 84
End: 2024-02-26

## 2024-02-26 NOTE — TELEPHONE ENCOUNTER
Spoke to patient regarding a mychart message she received stating stop blood thinners.  Informed patient to disregard any messages regarding blood thinners unless it comes from [facility] office.  Informed patient to take Plavix and aspirin daily as ordered along with her other ordered medications.  Patient stated understanding.

## 2024-02-27 ENCOUNTER — TELEPHONE (OUTPATIENT)
Age: 84
End: 2024-02-27

## 2024-02-27 NOTE — TELEPHONE ENCOUNTER
Message left for patient to confirm procedure tomorrow.   Arrival time 7:00 am at Patient registration.  Reminder to patient:  -No eating or drinking after midnight the day prior to procedure.   -Take morning medications the morning of procedure with sips of water.   -Do not stop taking Blood Thinners if applicable unless notified by this office.  -You must have a  to drive you home upon discharge.  -Recommend you have someone with you for at least 24-48 hours post procedure.  -No metal of glue in hair.    Allergies confirmed.

## 2024-02-28 ENCOUNTER — ANESTHESIA (OUTPATIENT)
Facility: HOSPITAL | Age: 84
DRG: 036 | End: 2024-02-28
Payer: MEDICARE

## 2024-02-28 ENCOUNTER — HOSPITAL ENCOUNTER (INPATIENT)
Facility: HOSPITAL | Age: 84
LOS: 1 days | Discharge: HOME | DRG: 036 | End: 2024-02-29
Attending: PSYCHIATRY & NEUROLOGY | Admitting: PSYCHIATRY & NEUROLOGY
Payer: MEDICARE

## 2024-02-28 ENCOUNTER — ANESTHESIA EVENT (OUTPATIENT)
Facility: HOSPITAL | Age: 84
DRG: 036 | End: 2024-02-28
Payer: MEDICARE

## 2024-02-28 DIAGNOSIS — I67.1 CEREBRAL ANEURYSM: ICD-10-CM

## 2024-02-28 PROBLEM — I77.9 CAROTID ARTERY DISEASE WITHOUT CEREBRAL INFARCTION (HCC): Status: ACTIVE | Noted: 2024-02-28

## 2024-02-28 LAB — ACT BLD: 212 SECS (ref 79–138)

## 2024-02-28 PROCEDURE — 6360000002 HC RX W HCPCS: Performed by: PSYCHIATRY & NEUROLOGY

## 2024-02-28 PROCEDURE — 6360000002 HC RX W HCPCS: Performed by: NURSE ANESTHETIST, CERTIFIED REGISTERED

## 2024-02-28 PROCEDURE — 36216 PLACE CATHETER IN ARTERY: CPT

## 2024-02-28 PROCEDURE — 6370000000 HC RX 637 (ALT 250 FOR IP): Performed by: PSYCHIATRY & NEUROLOGY

## 2024-02-28 PROCEDURE — 37215 TRANSCATH STENT CCA W/EPS: CPT | Performed by: PSYCHIATRY & NEUROLOGY

## 2024-02-28 PROCEDURE — 6360000004 HC RX CONTRAST MEDICATION: Performed by: PSYCHIATRY & NEUROLOGY

## 2024-02-28 PROCEDURE — 2580000003 HC RX 258

## 2024-02-28 PROCEDURE — 76942 ECHO GUIDE FOR BIOPSY: CPT

## 2024-02-28 PROCEDURE — 85347 COAGULATION TIME ACTIVATED: CPT

## 2024-02-28 PROCEDURE — 037K3DZ DILATION OF RIGHT INTERNAL CAROTID ARTERY WITH INTRALUMINAL DEVICE, PERCUTANEOUS APPROACH: ICD-10-PCS | Performed by: PSYCHIATRY & NEUROLOGY

## 2024-02-28 PROCEDURE — 2580000003 HC RX 258: Performed by: NURSE PRACTITIONER

## 2024-02-28 PROCEDURE — 2580000003 HC RX 258: Performed by: NURSE ANESTHETIST, CERTIFIED REGISTERED

## 2024-02-28 PROCEDURE — 6370000000 HC RX 637 (ALT 250 FOR IP)

## 2024-02-28 PROCEDURE — 76937 US GUIDE VASCULAR ACCESS: CPT | Performed by: PSYCHIATRY & NEUROLOGY

## 2024-02-28 PROCEDURE — 2000000000 HC ICU R&B

## 2024-02-28 PROCEDURE — 2500000003 HC RX 250 WO HCPCS: Performed by: PSYCHIATRY & NEUROLOGY

## 2024-02-28 PROCEDURE — 2580000003 HC RX 258: Performed by: PSYCHIATRY & NEUROLOGY

## 2024-02-28 PROCEDURE — 6370000000 HC RX 637 (ALT 250 FOR IP): Performed by: NURSE PRACTITIONER

## 2024-02-28 PROCEDURE — 2500000003 HC RX 250 WO HCPCS: Performed by: NURSE ANESTHETIST, CERTIFIED REGISTERED

## 2024-02-28 RX ORDER — CLOPIDOGREL BISULFATE 75 MG/1
75 TABLET ORAL DAILY
Status: DISCONTINUED | OUTPATIENT
Start: 2024-02-29 | End: 2024-02-29 | Stop reason: HOSPADM

## 2024-02-28 RX ORDER — METHYLPREDNISOLONE SODIUM SUCCINATE 125 MG/2ML
125 INJECTION, POWDER, LYOPHILIZED, FOR SOLUTION INTRAMUSCULAR; INTRAVENOUS ONCE
Status: DISCONTINUED | OUTPATIENT
Start: 2024-02-28 | End: 2024-02-28 | Stop reason: SDUPTHER

## 2024-02-28 RX ORDER — BENAZEPRIL HYDROCHLORIDE 10 MG/1
10 TABLET ORAL DAILY
Status: DISCONTINUED | OUTPATIENT
Start: 2024-02-28 | End: 2024-02-29 | Stop reason: HOSPADM

## 2024-02-28 RX ORDER — SODIUM CHLORIDE 0.9 % (FLUSH) 0.9 %
5-40 SYRINGE (ML) INJECTION EVERY 12 HOURS SCHEDULED
Status: DISCONTINUED | OUTPATIENT
Start: 2024-02-28 | End: 2024-02-29 | Stop reason: HOSPADM

## 2024-02-28 RX ORDER — SODIUM CHLORIDE 9 MG/ML
INJECTION, SOLUTION INTRAVENOUS CONTINUOUS PRN
Status: DISCONTINUED | OUTPATIENT
Start: 2024-02-28 | End: 2024-02-28 | Stop reason: SDUPTHER

## 2024-02-28 RX ORDER — DEXMEDETOMIDINE HYDROCHLORIDE 100 UG/ML
INJECTION, SOLUTION INTRAVENOUS PRN
Status: DISCONTINUED | OUTPATIENT
Start: 2024-02-28 | End: 2024-02-28 | Stop reason: SDUPTHER

## 2024-02-28 RX ORDER — ASPIRIN 81 MG/1
81 TABLET, CHEWABLE ORAL DAILY
Status: DISCONTINUED | OUTPATIENT
Start: 2024-02-29 | End: 2024-02-29 | Stop reason: HOSPADM

## 2024-02-28 RX ORDER — HEPARIN SODIUM 1000 [USP'U]/ML
INJECTION, SOLUTION INTRAVENOUS; SUBCUTANEOUS PRN
Status: COMPLETED | OUTPATIENT
Start: 2024-02-28 | End: 2024-02-28

## 2024-02-28 RX ORDER — HEPARIN SODIUM 1000 [USP'U]/ML
INJECTION, SOLUTION INTRAVENOUS; SUBCUTANEOUS PRN
Status: DISCONTINUED | OUTPATIENT
Start: 2024-02-28 | End: 2024-02-28 | Stop reason: SDUPTHER

## 2024-02-28 RX ORDER — DIPHENHYDRAMINE HYDROCHLORIDE 50 MG/ML
25 INJECTION INTRAMUSCULAR; INTRAVENOUS ONCE
Status: COMPLETED | OUTPATIENT
Start: 2024-02-28 | End: 2024-02-28

## 2024-02-28 RX ORDER — LABETALOL HYDROCHLORIDE 5 MG/ML
10 INJECTION, SOLUTION INTRAVENOUS EVERY 4 HOURS PRN
Status: DISCONTINUED | OUTPATIENT
Start: 2024-02-28 | End: 2024-02-29 | Stop reason: HOSPADM

## 2024-02-28 RX ORDER — FENTANYL CITRATE 50 UG/ML
INJECTION, SOLUTION INTRAMUSCULAR; INTRAVENOUS PRN
Status: DISCONTINUED | OUTPATIENT
Start: 2024-02-28 | End: 2024-02-28 | Stop reason: SDUPTHER

## 2024-02-28 RX ORDER — PROPOFOL 10 MG/ML
INJECTION, EMULSION INTRAVENOUS PRN
Status: DISCONTINUED | OUTPATIENT
Start: 2024-02-28 | End: 2024-02-28 | Stop reason: SDUPTHER

## 2024-02-28 RX ORDER — ATORVASTATIN CALCIUM 10 MG/1
10 TABLET, FILM COATED ORAL NIGHTLY
Status: DISCONTINUED | OUTPATIENT
Start: 2024-02-28 | End: 2024-02-29 | Stop reason: HOSPADM

## 2024-02-28 RX ORDER — PANTOPRAZOLE SODIUM 20 MG/1
20 TABLET, DELAYED RELEASE ORAL
Status: DISCONTINUED | OUTPATIENT
Start: 2024-02-29 | End: 2024-02-29 | Stop reason: HOSPADM

## 2024-02-28 RX ORDER — LANOLIN ALCOHOL/MO/W.PET/CERES
400 CREAM (GRAM) TOPICAL NIGHTLY
Status: DISCONTINUED | OUTPATIENT
Start: 2024-02-28 | End: 2024-02-29 | Stop reason: HOSPADM

## 2024-02-28 RX ORDER — ENOXAPARIN SODIUM 100 MG/ML
40 INJECTION SUBCUTANEOUS DAILY
Status: DISCONTINUED | OUTPATIENT
Start: 2024-02-28 | End: 2024-02-28

## 2024-02-28 RX ORDER — SODIUM CHLORIDE 9 MG/ML
INJECTION, SOLUTION INTRAVENOUS CONTINUOUS
Status: DISCONTINUED | OUTPATIENT
Start: 2024-02-28 | End: 2024-02-29

## 2024-02-28 RX ORDER — SODIUM CHLORIDE 0.9 % (FLUSH) 0.9 %
5-40 SYRINGE (ML) INJECTION PRN
Status: DISCONTINUED | OUTPATIENT
Start: 2024-02-28 | End: 2024-02-28

## 2024-02-28 RX ORDER — PRAMIPEXOLE DIHYDROCHLORIDE 0.25 MG/1
1 TABLET ORAL 3 TIMES DAILY
Status: DISCONTINUED | OUTPATIENT
Start: 2024-02-28 | End: 2024-02-29 | Stop reason: HOSPADM

## 2024-02-28 RX ORDER — LIDOCAINE HYDROCHLORIDE 20 MG/ML
INJECTION, SOLUTION EPIDURAL; INFILTRATION; INTRACAUDAL; PERINEURAL PRN
Status: DISCONTINUED | OUTPATIENT
Start: 2024-02-28 | End: 2024-02-28 | Stop reason: SDUPTHER

## 2024-02-28 RX ORDER — ONDANSETRON 2 MG/ML
4 INJECTION INTRAMUSCULAR; INTRAVENOUS EVERY 6 HOURS PRN
Status: DISCONTINUED | OUTPATIENT
Start: 2024-02-28 | End: 2024-02-29 | Stop reason: HOSPADM

## 2024-02-28 RX ORDER — VERAPAMIL HYDROCHLORIDE 2.5 MG/ML
INJECTION, SOLUTION INTRAVENOUS PRN
Status: COMPLETED | OUTPATIENT
Start: 2024-02-28 | End: 2024-02-28

## 2024-02-28 RX ORDER — LIDOCAINE 40 MG/G
CREAM TOPICAL PRN
Status: COMPLETED | OUTPATIENT
Start: 2024-02-28 | End: 2024-02-28

## 2024-02-28 RX ORDER — SODIUM BICARBONATE 42 MG/ML
INJECTION, SOLUTION INTRAVENOUS PRN
Status: COMPLETED | OUTPATIENT
Start: 2024-02-28 | End: 2024-02-28

## 2024-02-28 RX ORDER — LIDOCAINE HYDROCHLORIDE 10 MG/ML
INJECTION, SOLUTION EPIDURAL; INFILTRATION; INTRACAUDAL; PERINEURAL PRN
Status: COMPLETED | OUTPATIENT
Start: 2024-02-28 | End: 2024-02-28

## 2024-02-28 RX ORDER — ACETAMINOPHEN 325 MG/1
650 TABLET ORAL EVERY 4 HOURS PRN
Status: DISCONTINUED | OUTPATIENT
Start: 2024-02-28 | End: 2024-02-29 | Stop reason: HOSPADM

## 2024-02-28 RX ORDER — SODIUM CHLORIDE 9 MG/ML
INJECTION, SOLUTION INTRAVENOUS PRN
Status: DISCONTINUED | OUTPATIENT
Start: 2024-02-28 | End: 2024-02-29 | Stop reason: HOSPADM

## 2024-02-28 RX ORDER — ONDANSETRON 4 MG/1
4 TABLET, ORALLY DISINTEGRATING ORAL EVERY 8 HOURS PRN
Status: DISCONTINUED | OUTPATIENT
Start: 2024-02-28 | End: 2024-02-29 | Stop reason: HOSPADM

## 2024-02-28 RX ADMIN — WATER 125 MG: 1 INJECTION INTRAMUSCULAR; INTRAVENOUS; SUBCUTANEOUS at 08:10

## 2024-02-28 RX ADMIN — SODIUM CHLORIDE: 9 INJECTION, SOLUTION INTRAVENOUS at 20:39

## 2024-02-28 RX ADMIN — HEPARIN SODIUM 1000 ML: 1000 INJECTION INTRAVENOUS; SUBCUTANEOUS at 08:41

## 2024-02-28 RX ADMIN — ATORVASTATIN CALCIUM 10 MG: 10 TABLET, FILM COATED ORAL at 20:52

## 2024-02-28 RX ADMIN — SODIUM CHLORIDE: 9 INJECTION, SOLUTION INTRAVENOUS at 10:42

## 2024-02-28 RX ADMIN — DEXMEDETOMIDINE 10 MCG: 100 INJECTION, SOLUTION INTRAVENOUS at 08:19

## 2024-02-28 RX ADMIN — LIDOCAINE HYDROCHLORIDE 80 MG: 20 INJECTION, SOLUTION EPIDURAL; INFILTRATION; INTRACAUDAL; PERINEURAL at 08:19

## 2024-02-28 RX ADMIN — IOPAMIDOL 52 ML: 612 INJECTION, SOLUTION INTRAVENOUS at 08:44

## 2024-02-28 RX ADMIN — HEPARIN SODIUM 1000 ML: 1000 INJECTION INTRAVENOUS; SUBCUTANEOUS at 08:40

## 2024-02-28 RX ADMIN — PROPOFOL 50 MG: 10 INJECTION, EMULSION INTRAVENOUS at 08:19

## 2024-02-28 RX ADMIN — SODIUM CHLORIDE: 9 INJECTION, SOLUTION INTRAVENOUS at 08:15

## 2024-02-28 RX ADMIN — HEPARIN SODIUM 3000 UNITS: 1000 INJECTION INTRAVENOUS; SUBCUTANEOUS at 08:30

## 2024-02-28 RX ADMIN — LIDOCAINE 1 G: 40 CREAM TOPICAL at 07:40

## 2024-02-28 RX ADMIN — PRAMIPEXOLE DIHYDROCHLORIDE 1 MG: 0.25 TABLET ORAL at 15:14

## 2024-02-28 RX ADMIN — DIPHENHYDRAMINE HYDROCHLORIDE 25 MG: 50 INJECTION INTRAMUSCULAR; INTRAVENOUS at 08:11

## 2024-02-28 RX ADMIN — PROPOFOL 25 MCG/KG/MIN: 10 INJECTION, EMULSION INTRAVENOUS at 08:20

## 2024-02-28 RX ADMIN — MAGNESIUM OXIDE TAB 400 MG (241.3 MG ELEMENTAL MG) 400 MG: 400 (241.3 MG) TAB at 20:52

## 2024-02-28 RX ADMIN — SODIUM CHLORIDE, PRESERVATIVE FREE 10 ML: 5 INJECTION INTRAVENOUS at 10:44

## 2024-02-28 RX ADMIN — VERAPAMIL HYDROCHLORIDE 2.5 MG: 2.5 INJECTION, SOLUTION INTRAVENOUS at 08:27

## 2024-02-28 RX ADMIN — PRAMIPEXOLE DIHYDROCHLORIDE 1 MG: 0.25 TABLET ORAL at 20:52

## 2024-02-28 RX ADMIN — Medication 100 MCG: at 08:27

## 2024-02-28 RX ADMIN — LIDOCAINE HYDROCHLORIDE 1 ML: 10 INJECTION, SOLUTION EPIDURAL; INFILTRATION; INTRACAUDAL; PERINEURAL at 08:23

## 2024-02-28 RX ADMIN — SODIUM BICARBONATE 0.5 MEQ: 42 INJECTION, SOLUTION INTRAVENOUS at 08:27

## 2024-02-28 RX ADMIN — FENTANYL CITRATE 50 MCG: 50 INJECTION, SOLUTION INTRAMUSCULAR; INTRAVENOUS at 08:19

## 2024-02-28 RX ADMIN — NITROGLYCERIN 1 INCH: 20 OINTMENT TOPICAL at 07:40

## 2024-02-28 RX ADMIN — HEPARIN SODIUM 2000 UNITS: 1000 INJECTION INTRAVENOUS; SUBCUTANEOUS at 08:27

## 2024-02-28 RX ADMIN — SODIUM CHLORIDE, PRESERVATIVE FREE 10 ML: 5 INJECTION INTRAVENOUS at 20:52

## 2024-02-28 NOTE — H&P
headache, blurred vision or dizziness.     Objective:     Vitals:    02/28/24 0915 02/28/24 0930 02/28/24 0945 02/28/24 1000   BP: 125/62 (!) 145/70 139/66 137/74   Pulse: 59 73 57 51   Resp: 14 15 23 14   Temp:       TempSrc:       SpO2: 97% 98% 99% 97%      Physical Exam:  GENERAL: Calm, cooperative, NAD  SKIN: Warm, dry, color appropriate for ethnicity.   HEART: RRR  LUNGS: CTAB    Neurologic Exam:  Mental Status:  Alert and oriented x 4.  Appropriate affect, mood and behavior.       Language:    Normal fluency, repetition, comprehension and naming.    Cranial Nerves:   Pupils 3 mm, equal, round and reactive to light.     Visual fields full to confrontation.     Extraocular movements intact.        Facial sensation intact.     Full facial strength, no asymmetry.      Hearing grossly intact bilaterally.     No dysarthria. Tongue protrudes to midline, palate elevates symmetrically.      Shoulder shrug 5/5 bilaterally.       Motor:    No pronator drift.      Bulk and tone normal.      5/5 power in all extremities proximally and distally.     No involuntary movements.    Sensation:    Sensation intact throughout to light touch, temperature, pinprick, vibration, proprioception       Coordination & Gait: Normal. FTN and HTS intact with no ataxia present.    Labs:  Lab Results   Component Value Date/Time    WBC 6.4 02/16/2024 10:08 AM    HGB 13.9 02/16/2024 10:08 AM    HCT 43.3 02/16/2024 10:08 AM     02/16/2024 10:08 AM    MCV 91.9 02/16/2024 10:08 AM      Lab Results   Component Value Date/Time     02/16/2024 10:08 AM    K 4.7 02/16/2024 10:08 AM     02/16/2024 10:08 AM    CO2 29 02/16/2024 10:08 AM    BUN 23 02/16/2024 10:08 AM    GFRAA >60 01/07/2022 04:04 PM       Assessment:     Asymptomatic R ICA stenosis       Plan:     SBP   Asa/plavix  Statin  NS  ICU overnight, d/c home in am if stable        Signed By: CJ Patterson NP     February 28, 2024

## 2024-02-28 NOTE — PROGRESS NOTES
0700: Pt arrives to angio for procedure.     0720: MD at bedside    0752: Anesthesia at bedside for consent.

## 2024-02-28 NOTE — ANESTHESIA POSTPROCEDURE EVALUATION
Department of Anesthesiology  Postprocedure Note    Patient: Shea Pickens  MRN: [id number]  YOB: 1940  Date of evaluation: 2/28/2024    Procedure Summary       Date: 02/28/24 Room / Location: [facility] ANGIO IR    Anesthesia Start: 0815 Anesthesia Stop: 0855    Procedure: IR ARCH UNI CAR CERV CEREBRAL Diagnosis:       Cerebral aneurysm      Cerebral aneurysm      Carotid artery disease without cerebral infarction (HCC)      (Carotid angioplasty and stenting.)    Scheduled Providers: Donald Gardner DO; Jorge Adams MD Responsible Provider: Jorge Adams MD    Anesthesia Type: MAC ASA Status: 3            Anesthesia Type: No value filed.    Negro Phase I: Negro Score: 10    Negro Phase II:      Anesthesia Post Evaluation    Patient location during evaluation: PACU  Patient participation: complete - patient participated  Level of consciousness: responsive to verbal stimuli and sleepy but conscious  Pain score: 2  Airway patency: patent  Cardiovascular status: blood pressure returned to baseline  Respiratory status: acceptable  Hydration status: stable  Comments: +Post-Anesthesia Evaluation and Assessment    Patient: Shea Pickens MRN: [id number]  SSN: [id number]   YOB: 1940  Age: 83 y.o.  Sex: female          Cardiovascular Function/Vital Signs    /74   Pulse 51   Temp 97.6 °F (36.4 °C) (Oral)   Resp 14   SpO2 97%     Patient is status post * No procedures listed *.    Nausea/Vomiting: Controlled.    Postoperative hydration reviewed and adequate.    Pain:      Managed.    Neurological Status:       At baseline.    Mental Status and Level of Consciousness: Arousable.    Pulmonary Status:       Adequate oxygenation and airway patent.    Complications related to anesthesia: None    Post-anesthesia assessment completed. No concerns.    I have evaluated the patient and the patient is stable and ready to be discharged from PACU .    Signed By: Jorge Adams

## 2024-02-28 NOTE — ANESTHESIA PRE PROCEDURE
Department of Anesthesiology  Preprocedure Note       Name:  Shea Pickens   Age:  83 y.o.  :  1940                                          MRN:  [id number]         Date:  2024      Surgeon: * No surgeons listed *    Procedure: * No procedures listed *    Medications prior to admission:   Prior to Admission medications    Medication Sig Start Date End Date Taking? Authorizing Provider   benazepril (LOTENSIN) 10 MG tablet Take 1 tablet by mouth daily    Jerrell Gil MD   azelastine (ASTELIN) 0.1 % nasal spray 1 spray by Nasal route 2 times daily Use in each nostril as directed    ProviderJerrell MD   BIOTIN PO Take by mouth daily    Jerrell Gil MD   clopidogrel (PLAVIX) 75 MG tablet Take 1 tablet by mouth daily 24   Donald Gardner DO   pantoprazole (PROTONIX) 20 MG tablet Take 1 tablet by mouth every morning (before breakfast) 24   Donald Gardner DO   pramipexole (MIRAPEX) 1 MG tablet TAKE 1 TABLET BY MOUTH THREE TIMES DAILY 24   Lorena Jovel MD   melatonin 3 MG TABS tablet Take 10 mg by mouth nightly    Jerrell Gil MD   vitamin B-12 (CYANOCOBALAMIN) 100 MCG tablet Take 0.5 tablets by mouth daily    Jerrell Gil MD   zinc gluconate 50 MG tablet Take 1 tablet by mouth daily    Jerrell Gil MD   aspirin 81 MG EC tablet Take 1 tablet by mouth daily 23   Donald Gardner DO   Albuterol Sulfate (VENTOLIN HFA IN) Inhale into the lungs as needed    Jerrell Gil MD   atorvastatin (LIPITOR) 10 MG tablet Take 1 tablet by mouth daily 23   Lorena Jovel MD   Magnesium 400 MG TABS Take by mouth    Jerrell Gil MD   ascorbic acid (VITAMIN C) 1000 MG tablet Take by mouth daily    Automatic Reconciliation, Ar       Current medications:    No current facility-administered medications for this encounter.       Allergies:    Allergies   Allergen Reactions   • Bee Venom Swelling   • Prunus Persica Itching   • Strawberry

## 2024-02-28 NOTE — CARE COORDINATION
Family able to assist with home care needs: Yes   Would you like for me to discuss the discharge plan with any other family members/significant others, and if so, who? No   Financial Resources Medicare Community Resources None

## 2024-02-28 NOTE — BRIEF OP NOTE
Brief Procedure Note      Patient: Shea Pickens MRN: [id number]     YOB: 1940  Age: 83 y.o.  Sex: female      Service: Neurointerventional Surgery    Date of Procedure: 2/28/2024    Pre-Procedure Diagnosis: Asymptomatic R ICA stenosis    Post-Procedure Diagnosis: SAME    : Donald Gardner DO    Assistant(s): N/A    Procedure(s):   Diagnostic cerebral angiogram  Carotid angioplasty & stenting    Vessels Studied:   Right CCA      Puncture Site: Right radial artery    Preliminary Findings:   Severe cervical R ICA stenosis--> R LEIDA    Plan:   SBP   Asa/plavix  Statin  NS  ICU ovn, d/c home in am if stable      Specimens: None    Implants: see op report    Drains: None    Anesthesia: MAC    Estimated Blood Loss: 15 cc      Apparent Intraoperative Complications: None immediate    Patient Condition: Stable    Disposition: ICU      Signed:   Donald Gardner DO  [facility] Neurointerventional Surgery  [facility]

## 2024-02-29 VITALS
SYSTOLIC BLOOD PRESSURE: 129 MMHG | TEMPERATURE: 98.4 F | WEIGHT: 139.55 LBS | OXYGEN SATURATION: 99 % | BODY MASS INDEX: 25.52 KG/M2 | HEART RATE: 64 BPM | DIASTOLIC BLOOD PRESSURE: 70 MMHG | RESPIRATION RATE: 28 BRPM

## 2024-02-29 PROCEDURE — 6370000000 HC RX 637 (ALT 250 FOR IP)

## 2024-02-29 PROCEDURE — 2580000003 HC RX 258: Performed by: NURSE PRACTITIONER

## 2024-02-29 PROCEDURE — 2580000003 HC RX 258

## 2024-02-29 PROCEDURE — 6370000000 HC RX 637 (ALT 250 FOR IP): Performed by: NURSE PRACTITIONER

## 2024-02-29 RX ADMIN — PRAMIPEXOLE DIHYDROCHLORIDE 1 MG: 0.25 TABLET ORAL at 08:29

## 2024-02-29 RX ADMIN — CLOPIDOGREL BISULFATE 75 MG: 75 TABLET, FILM COATED ORAL at 08:30

## 2024-02-29 RX ADMIN — ASPIRIN 81 MG CHEWABLE TABLET 81 MG: 81 TABLET CHEWABLE at 08:28

## 2024-02-29 RX ADMIN — PANTOPRAZOLE SODIUM 20 MG: 20 TABLET, DELAYED RELEASE ORAL at 07:13

## 2024-02-29 RX ADMIN — SODIUM CHLORIDE: 9 INJECTION, SOLUTION INTRAVENOUS at 05:22

## 2024-02-29 RX ADMIN — SODIUM CHLORIDE, PRESERVATIVE FREE 10 ML: 5 INJECTION INTRAVENOUS at 08:30

## 2024-02-29 NOTE — PROGRESS NOTES
Neurocritical Care Brief Progress Note:    S/P RUI angioplasty and stenting with Dr Gardner    Physical Exam:  Gen: NAD, calm, cooperative  Neuro: A&Ox4. Follows commands. Speech clear. Affect normal. PERRL, 3 mm bilaterally. Blinks to threat. No disconjugate gaze present. EOMI. Face symmetric. Palate symmetric. Tongue midline. Silas spontaneously. Strength 5/5 in UE and LE BL. Negative drift. Bulk and tone normal. No involuntary movements. Gait deferred.  Skin: Warm, dry, color appropriate for ethnicity. R radial arteriotomy site soft and non-tender on palpation, small hematoma noted without active oozing. Ecchymosis extending down forearm with dried blood at site.     Seen at bedside with her . No complaints of pain/discomfort, nausea, headache, dizziness, weakness or numbness. Will reassess right radial site later in shift and redress as needed.     0530: No acute neuro events overnight. R wrist dressing removed. No evidence of worsening hematoma or bleeding. 4x4 placed to protect site.   Pt with occasional sinus jono episodes to mid 40's, but without associated AMS or hypotension. Not currently beta blocked and no indication of previous cardiac history. Will convey to Jj.       Ronna Soria, APRN - CNP  Neurocritical Care Nurse Practitioner  [phone]

## 2024-02-29 NOTE — DISCHARGE SUMMARY
Neurointerventional Surgery Discharge Summary  [address]  [address]  [place] [address]  [phone]    Patient: Shea Pickens MRN: [id number]  SSN: [id number]    YOB: 1940  Age: 83 y.o.  Sex: female      DATE OF ADMISSION: 2/28/2024    DATE OF DISCHARGE: 02/29/24     ADMITTING PROVIDER: Donald Gardner DO    DISCHARGING PROVIDER: Same    PROCEDURES/SURGERIES: Procedure(s) with comments:     OFFICE NUMBER: [phone] , you can reach the on call physician 24/7 even during non-business hours     ADMITTING DIAGNOSIS: Asymptomatic right ICA stenosis  DISCHARGE DIAGNOSIS: Same    HOSPITAL COURSE:   Pt was admitted for scheduled elective cerebral angiogram and right carotid angioplasty and stenting by Dr. Gardner. The procedure was performed in the Interventional Radiology suite under MAC anesthesia. The patient tolerated the procedure well without complications. Following extubation, patient was transferred to ICU, fully recovered and returned to neurological baseline. The patient was admitted overnight for continued monitoring due to potential risks of stroke and thrombosis. There were no neurological events overnight. The hospital course was uneventful and the patient was appropriate for discharge home today in stable condition.    Vitals:    02/29/24 0840   BP: 129/70   Pulse: 64   Resp: 28   Temp:    SpO2: 99%           Physical Exam:  GENERAL: NAD, calm, cooperative  HEART: RRR  LUNGS: CTAB  SKIN: Warm, dry, color appropriate for ethnicity. Arteriotomy access site soft and non-tender on palpation, dressing is C/D/I, with no active bleeding or drainage noted.     Neurologic Exam:  Mental Status:  Alert and oriented x 4.  Appropriate affect, mood and behavior.       Language:    Normal fluency, repetition, comprehension and naming. Follows commands    Cranial Nerves:   Pupils 3 mm bilaterally, equal, round and briskly reactive to light.     Visual fields full to confrontation.     Extraocular

## 2024-02-29 NOTE — PROGRESS NOTES
4 Eyes Skin Assessment     NAME:  Shea Pickens  YOB: 1940  MEDICAL RECORD NUMBER:  [id number]    The patient is being assessed for  Shift Handoff    I agree that at least one RN has performed a thorough Head to Toe Skin Assessment on the patient. ALL assessment sites listed below have been assessed.      Areas assessed by both nurses:    Head, Face, Ears, Shoulders, Back, Chest, Arms, Elbows, Hands, Sacrum. Buttock, Coccyx, Ischium, Legs. Feet and Heels, and Under Medical Devices         Does the Patient have a Wound? No noted wound(s)       Humberto Prevention initiated by RN: Yes  Wound Care Orders initiated by RN: No    Pressure Injury (Stage 3,4, Unstageable, DTI, NWPT, and Complex wounds) if present, place Wound referral order by RN under : No    New Ostomies, if present place, Ostomy referral order under : No     Nurse 1 eSignature: Electronically signed by Cris Castillo RN on 2/28/24 at 10:43 PM EST    **SHARE this note so that the co-signing nurse can place an eSignature**    Nurse 2 eSignature: Electronically signed by Margareth Dickey RN on 2/28/24 at 10:46 PM EST

## 2024-03-05 DIAGNOSIS — I10 ESSENTIAL HYPERTENSION, BENIGN: Primary | ICD-10-CM

## 2024-03-05 RX ORDER — BENAZEPRIL HYDROCHLORIDE 10 MG/1
10 TABLET ORAL DAILY
Qty: 30 TABLET | Refills: 2 | Status: SHIPPED | OUTPATIENT
Start: 2024-03-05

## 2024-03-12 ENCOUNTER — OFFICE VISIT (OUTPATIENT)
Age: 84
End: 2024-03-12
Payer: MEDICARE

## 2024-03-12 VITALS
DIASTOLIC BLOOD PRESSURE: 80 MMHG | WEIGHT: 149 LBS | HEART RATE: 77 BPM | TEMPERATURE: 97.3 F | HEIGHT: 62 IN | SYSTOLIC BLOOD PRESSURE: 120 MMHG | OXYGEN SATURATION: 100 % | BODY MASS INDEX: 27.42 KG/M2

## 2024-03-12 DIAGNOSIS — I65.23 BILATERAL CAROTID ARTERY STENOSIS: ICD-10-CM

## 2024-03-12 DIAGNOSIS — I67.1 CEREBRAL ANEURYSM: Primary | ICD-10-CM

## 2024-03-12 PROCEDURE — G8427 DOCREV CUR MEDS BY ELIG CLIN: HCPCS | Performed by: PSYCHIATRY & NEUROLOGY

## 2024-03-12 PROCEDURE — G8417 CALC BMI ABV UP PARAM F/U: HCPCS | Performed by: PSYCHIATRY & NEUROLOGY

## 2024-03-12 PROCEDURE — 1111F DSCHRG MED/CURRENT MED MERGE: CPT | Performed by: PSYCHIATRY & NEUROLOGY

## 2024-03-12 PROCEDURE — 3074F SYST BP LT 130 MM HG: CPT | Performed by: PSYCHIATRY & NEUROLOGY

## 2024-03-12 PROCEDURE — 3079F DIAST BP 80-89 MM HG: CPT | Performed by: PSYCHIATRY & NEUROLOGY

## 2024-03-12 PROCEDURE — 1036F TOBACCO NON-USER: CPT | Performed by: PSYCHIATRY & NEUROLOGY

## 2024-03-12 PROCEDURE — 1123F ACP DISCUSS/DSCN MKR DOCD: CPT | Performed by: PSYCHIATRY & NEUROLOGY

## 2024-03-12 PROCEDURE — G8484 FLU IMMUNIZE NO ADMIN: HCPCS | Performed by: PSYCHIATRY & NEUROLOGY

## 2024-03-12 PROCEDURE — G8399 PT W/DXA RESULTS DOCUMENT: HCPCS | Performed by: PSYCHIATRY & NEUROLOGY

## 2024-03-12 PROCEDURE — 99214 OFFICE O/P EST MOD 30 MIN: CPT | Performed by: PSYCHIATRY & NEUROLOGY

## 2024-03-12 PROCEDURE — 1090F PRES/ABSN URINE INCON ASSESS: CPT | Performed by: PSYCHIATRY & NEUROLOGY

## 2024-03-12 RX ORDER — ATORVASTATIN CALCIUM 10 MG/1
10 TABLET, FILM COATED ORAL DAILY
Qty: 30 TABLET | Refills: 3 | Status: SHIPPED | OUTPATIENT
Start: 2024-03-12

## 2024-03-12 NOTE — PATIENT INSTRUCTIONS
Follow up in 3 months, May/June 2024, after imaging is completed.  See attached paperwork to schedule imaging.  Referral to [facility] movement disorder clinic.  Continue Plavix and Aspirin as ordered.

## 2024-03-12 NOTE — PROGRESS NOTES
Established Patient Visit         CONSULT INFORMATION:  Date of Service: 3/12/2024  Consultation Requested by: hospital    PATIENT IDENTIFICATION:  Patient Name: Shea Pickens  : 1940      CC: aneurysm    HISTORY OF PRESENT ILLNESS:  83 y.o. RH White (non-) [1]female consulted for aneurysm. Pt had an episode that sounds most c/w TGA. She had a stroke w/u MRI was negative for stroke or mass. 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 She is on a low dose statin. No blood thinners. No fam hx of SAH. No hx of sz.     Seen in f/u after DSA. She did have an allergic reaction (hives) to the contrast a day after. This has improved. She had an episode of chest pain and has a stress test scheduled. Case discussed in conference with rec's for right carotid stenting then aneurysm embolization at a later date.    Interim: No new issues after LEIDA. Feels much more active. States her tremor is interfering with her life.       Past Medical History:   Diagnosis Date    Aneurysm (HCC) 10/29/2023    Arthritis     Asthma     during childhood/seasonal    Cancer (HCC)     skin    Carotid artery stenosis     GERD (gastroesophageal reflux disease)     Hyperlipidemia     Hypertension     Low back pain     Lumbosacral disc disease     Memory disorder 10/29/2023    Neuropathy     Transient global amnesia 10/29/2023    Tremor     Umbilical hernia        Past Surgical History:   Procedure Laterality Date    BACK SURGERY  2016    CARDIAC CATHETERIZATION  2023    HEART ANGIOGRAM    CARPAL TUNNEL RELEASE Left 2018    CATARACT REMOVAL Bilateral     COLPORRHAPHY  2005    EYE SURGERY Right 2017    CATARACT    HYSTERECTOMY (CERVIX STATUS UNKNOWN)  1976    JOINT REPLACEMENT Right 2018    KNEE    JOINT REPLACEMENT Left 2023    KNEE    LUMBAR LAMINECTOMY  2014    SKIN BIOPSY      NOSE, MULTIPLE TIMES PER PT, MOST RECENT ONE     TONSILLECTOMY      TOTAL KNEE ARTHROPLASTY

## 2024-03-12 NOTE — PROGRESS NOTES
Procedure follow up for Cerebral aneurysm.  Spouse at visit.  Patient reports no symptoms and right radial healed without difficulty.  Denies headaches, dizziness, blurred or double vision, numbness or tingling.  No acute problems voiced.

## 2024-03-13 DIAGNOSIS — I67.1 CEREBRAL ANEURYSM: Primary | ICD-10-CM

## 2024-03-20 ENCOUNTER — TELEPHONE (OUTPATIENT)
Age: 84
End: 2024-03-20

## 2024-03-20 NOTE — TELEPHONE ENCOUNTER
Patient's daughter called to let us know that her mother was just brought to [facility].     Per daughter patient had been falling and having \"seizure like activity\" since her procedure.   Patient's daughter is wanting to know if she should have an EEG done while in the ER.    I informed the daughter that we are unable to put in orders at that hospital. I asked her to have the ER provider call if he needs to discuss patient's history with out provider.    I also gave the patient's daughter the phone number to Neurology to notify them of the falls and \"seizure like activity.\"    I will request the images from [facility].

## 2024-03-22 ENCOUNTER — TELEPHONE (OUTPATIENT)
Age: 84
End: 2024-03-22

## 2024-03-22 NOTE — TELEPHONE ENCOUNTER
Return call from patient. Patient reports she was still in [facility].  She had had several fainting spell and falls.  Patient stated she has a heart condition and had a pacemaker placed.   Stated she has hematoma on leg from a fall. She may need to have it drained.  Patient stated she wanted her providers to be updated.  Informed patient I would notify provider.

## 2024-03-22 NOTE — TELEPHONE ENCOUNTER
Patient LVM stating that she wanted to talk to Dr. Gardner about \"some things that happened.\"    I called patient back and got her voicemail.   I asked for a return call.

## 2024-04-19 ENCOUNTER — OFFICE VISIT (OUTPATIENT)
Dept: PRIMARY CARE CLINIC | Facility: CLINIC | Age: 84
End: 2024-04-19

## 2024-04-19 VITALS
WEIGHT: 146.8 LBS | DIASTOLIC BLOOD PRESSURE: 82 MMHG | HEIGHT: 62 IN | OXYGEN SATURATION: 100 % | HEART RATE: 74 BPM | RESPIRATION RATE: 12 BRPM | SYSTOLIC BLOOD PRESSURE: 146 MMHG | TEMPERATURE: 97.1 F | BODY MASS INDEX: 27.02 KG/M2

## 2024-04-19 DIAGNOSIS — G25.81 RESTLESS LEGS: ICD-10-CM

## 2024-04-19 DIAGNOSIS — I10 PRIMARY HYPERTENSION: Primary | ICD-10-CM

## 2024-04-19 DIAGNOSIS — Z95.828 INTERNAL CAROTID ARTERY STENT PRESENT: ICD-10-CM

## 2024-04-19 DIAGNOSIS — Z95.0 CARDIAC PACEMAKER: ICD-10-CM

## 2024-04-19 DIAGNOSIS — E11.9 DIABETES MELLITUS TYPE 2, DIET-CONTROLLED (HCC): ICD-10-CM

## 2024-04-19 LAB — HBA1C MFR BLD: 5.6 %

## 2024-04-19 SDOH — ECONOMIC STABILITY: FOOD INSECURITY: WITHIN THE PAST 12 MONTHS, THE FOOD YOU BOUGHT JUST DIDN'T LAST AND YOU DIDN'T HAVE MONEY TO GET MORE.: NEVER TRUE

## 2024-04-19 SDOH — ECONOMIC STABILITY: INCOME INSECURITY: HOW HARD IS IT FOR YOU TO PAY FOR THE VERY BASICS LIKE FOOD, HOUSING, MEDICAL CARE, AND HEATING?: NOT HARD AT ALL

## 2024-04-19 SDOH — ECONOMIC STABILITY: FOOD INSECURITY: WITHIN THE PAST 12 MONTHS, YOU WORRIED THAT YOUR FOOD WOULD RUN OUT BEFORE YOU GOT MONEY TO BUY MORE.: NEVER TRUE

## 2024-04-19 ASSESSMENT — ENCOUNTER SYMPTOMS
COUGH: 0
CONSTIPATION: 0
EYE DISCHARGE: 0
COLOR CHANGE: 0
SHORTNESS OF BREATH: 0
ABDOMINAL PAIN: 0
CHEST TIGHTNESS: 0
RHINORRHEA: 0
BACK PAIN: 0
SORE THROAT: 0
DIARRHEA: 0

## 2024-04-19 ASSESSMENT — PATIENT HEALTH QUESTIONNAIRE - PHQ9
SUM OF ALL RESPONSES TO PHQ QUESTIONS 1-9: 0
SUM OF ALL RESPONSES TO PHQ9 QUESTIONS 1 & 2: 0
2. FEELING DOWN, DEPRESSED OR HOPELESS: NOT AT ALL
SUM OF ALL RESPONSES TO PHQ QUESTIONS 1-9: 0
SUM OF ALL RESPONSES TO PHQ QUESTIONS 1-9: 0
1. LITTLE INTEREST OR PLEASURE IN DOING THINGS: NOT AT ALL
SUM OF ALL RESPONSES TO PHQ QUESTIONS 1-9: 0

## 2024-04-19 NOTE — PROGRESS NOTES
BP (!) 153/80 (Site: Left Upper Arm, Position: Sitting, Cuff Size: Medium Adult)   Pulse 74   Temp 97.1 °F (36.2 °C) (Infrared)   Resp 12   Ht 1.575 m (5' 2\")   Wt 66.6 kg (146 lb 12.8 oz)   SpO2 100%   BMI 26.85 kg/m²      \"Have you been to the ER, urgent care clinic since your last visit?  Hospitalized since your last visit?\"    YES - When: approximately 1 months ago.  Where and Why: [facility] .    “Have you seen or consulted any other health care providers outside of [facility] since your last visit?”    Yes, Seb Danielle            Click Here for Release of Records Request   
PO Take by mouth daily      clopidogrel (PLAVIX) 75 MG tablet Take 1 tablet by mouth daily 30 tablet 3    pantoprazole (PROTONIX) 20 MG tablet Take 1 tablet by mouth every morning (before breakfast) 30 tablet 5    pramipexole (MIRAPEX) 1 MG tablet TAKE 1 TABLET BY MOUTH THREE TIMES DAILY 270 tablet 0    vitamin B-12 (CYANOCOBALAMIN) 100 MCG tablet Take 0.5 tablets by mouth daily      aspirin 81 MG EC tablet Take 1 tablet by mouth daily 90 tablet 1    Albuterol Sulfate (VENTOLIN HFA IN) Inhale into the lungs as needed      Magnesium 400 MG TABS Take by mouth      ascorbic acid (VITAMIN C) 1000 MG tablet Take by mouth daily      melatonin 3 MG TABS tablet Take 10 mg by mouth nightly (Patient not taking: Reported on 3/12/2024)      zinc gluconate 50 MG tablet Take 1 tablet by mouth daily       No current facility-administered medications on file prior to visit.       Allergies   Allergen Reactions    Bee Venom Swelling    Prunus Persica Itching    Strawberry Extract Itching    Oxycodone-Acetaminophen Other (See Comments)      Other (comments)\"hellish nightmares\"    Penicillins Hives     IN CHILDHOOD  Patient screened for any delayed non-IgE-mediated reaction to PCN.        Patient notes the following:    No delayed non-IgE-mediated reaction to PCN             Shellfish Allergy Itching    Zoster Vaccine Live Swelling     Severe Right arm swelling with redness after administered by pharmacist in elbow    Buspirone Nausea Only    Hydrochlorothiazide Myalgia     Other reaction(s): sorethroat    Hydroxyzine Other (See Comments)     jittery    Lisinopril      Other reaction(s): Cough    Meloxicam Other (See Comments)     Easy bruising    Rosuvastatin Myalgia    Sulfa Antibiotics     Epinephrine Palpitations and Other (See Comments)    Hydrocodone-Acetaminophen Nausea And Vomiting    Iodinated Contrast Media Rash       Past Medical History:   Diagnosis Date    Aneurysm (HCC) 10/29/2023    Arthritis     Asthma     during

## 2024-04-30 NOTE — PERIOP NOTES
PREOPERATIVE INSTRUCTIONS REVIEWED WITH PATIENT. PATIENT GIVEN SIX PACK OF CHG WIPES. INSTRUCTIONS TO BE REVIEWED  IN CLASS] ON USE OF CHG WIPES. PATIENT GIVEN SSI INFECTION FAQ SHEET, INFORMATION SHEET ON DIABETIC TREATMENT CENTER AS WELL AS HAND WASHING TIPS SHEETS. MRSA/MSSA TREATMENT INSTRUCTION SHEET GIVEN WITH AN EXPLANATION TO PATIENT THAT THEY WILL BE NOTIFIED IF TREATMENT INSTRUCTIONS NEED TO BE INITIATED. PATIENT WAS GIVEN THE OPPORTUNITY TO ASK QUESTIONS ON THE INFORMATION PROVIDED.
[[id number]]

## 2024-05-06 DIAGNOSIS — I67.1 CEREBRAL ANEURYSM: ICD-10-CM

## 2024-05-06 RX ORDER — CLOPIDOGREL BISULFATE 75 MG/1
75 TABLET ORAL DAILY
Qty: 30 TABLET | Refills: 1 | Status: SHIPPED | OUTPATIENT
Start: 2024-05-06

## 2024-05-16 SDOH — ECONOMIC STABILITY: FOOD INSECURITY: WITHIN THE PAST 12 MONTHS, YOU WORRIED THAT YOUR FOOD WOULD RUN OUT BEFORE YOU GOT MONEY TO BUY MORE.: NEVER TRUE

## 2024-05-16 SDOH — HEALTH STABILITY: PHYSICAL HEALTH: ON AVERAGE, HOW MANY DAYS PER WEEK DO YOU ENGAGE IN MODERATE TO STRENUOUS EXERCISE (LIKE A BRISK WALK)?: 0 DAYS

## 2024-05-16 SDOH — HEALTH STABILITY: PHYSICAL HEALTH: ON AVERAGE, HOW MANY MINUTES DO YOU ENGAGE IN EXERCISE AT THIS LEVEL?: 0 MIN

## 2024-05-16 SDOH — ECONOMIC STABILITY: INCOME INSECURITY: HOW HARD IS IT FOR YOU TO PAY FOR THE VERY BASICS LIKE FOOD, HOUSING, MEDICAL CARE, AND HEATING?: NOT HARD AT ALL

## 2024-05-16 SDOH — ECONOMIC STABILITY: FOOD INSECURITY: WITHIN THE PAST 12 MONTHS, THE FOOD YOU BOUGHT JUST DIDN'T LAST AND YOU DIDN'T HAVE MONEY TO GET MORE.: NEVER TRUE

## 2024-05-16 ASSESSMENT — PATIENT HEALTH QUESTIONNAIRE - PHQ9
SUM OF ALL RESPONSES TO PHQ QUESTIONS 1-9: 0
SUM OF ALL RESPONSES TO PHQ QUESTIONS 1-9: 0
SUM OF ALL RESPONSES TO PHQ9 QUESTIONS 1 & 2: 0
1. LITTLE INTEREST OR PLEASURE IN DOING THINGS: NOT AT ALL
2. FEELING DOWN, DEPRESSED OR HOPELESS: NOT AT ALL
SUM OF ALL RESPONSES TO PHQ QUESTIONS 1-9: 0
SUM OF ALL RESPONSES TO PHQ QUESTIONS 1-9: 0

## 2024-05-16 ASSESSMENT — LIFESTYLE VARIABLES
HOW MANY STANDARD DRINKS CONTAINING ALCOHOL DO YOU HAVE ON A TYPICAL DAY: PATIENT DOES NOT DRINK
HOW OFTEN DO YOU HAVE SIX OR MORE DRINKS ON ONE OCCASION: 1
HOW MANY STANDARD DRINKS CONTAINING ALCOHOL DO YOU HAVE ON A TYPICAL DAY: 0
HOW OFTEN DO YOU HAVE A DRINK CONTAINING ALCOHOL: NEVER
HOW OFTEN DO YOU HAVE A DRINK CONTAINING ALCOHOL: 1

## 2024-05-17 ENCOUNTER — OFFICE VISIT (OUTPATIENT)
Dept: PRIMARY CARE CLINIC | Facility: CLINIC | Age: 84
End: 2024-05-17

## 2024-05-17 VITALS
SYSTOLIC BLOOD PRESSURE: 138 MMHG | RESPIRATION RATE: 16 BRPM | TEMPERATURE: 97.8 F | WEIGHT: 148.2 LBS | HEIGHT: 62 IN | DIASTOLIC BLOOD PRESSURE: 80 MMHG | HEART RATE: 86 BPM | BODY MASS INDEX: 27.27 KG/M2 | OXYGEN SATURATION: 100 %

## 2024-05-17 DIAGNOSIS — F51.01 PRIMARY INSOMNIA: ICD-10-CM

## 2024-05-17 DIAGNOSIS — J32.9 CHRONIC CONGESTION OF PARANASAL SINUS: ICD-10-CM

## 2024-05-17 DIAGNOSIS — G25.0 ESSENTIAL TREMOR: ICD-10-CM

## 2024-05-17 DIAGNOSIS — M79.671 CHRONIC PAIN OF BOTH FEET: ICD-10-CM

## 2024-05-17 DIAGNOSIS — I67.1 CEREBRAL ANEURYSM: ICD-10-CM

## 2024-05-17 DIAGNOSIS — G25.81 RESTLESS LEG SYNDROME: ICD-10-CM

## 2024-05-17 DIAGNOSIS — R05.8 ACE-INHIBITOR COUGH: ICD-10-CM

## 2024-05-17 DIAGNOSIS — I10 PRIMARY HYPERTENSION: ICD-10-CM

## 2024-05-17 DIAGNOSIS — M79.672 CHRONIC PAIN OF BOTH FEET: ICD-10-CM

## 2024-05-17 DIAGNOSIS — G89.29 CHRONIC PAIN OF BOTH FEET: ICD-10-CM

## 2024-05-17 DIAGNOSIS — M85.89 OSTEOPENIA OF MULTIPLE SITES: ICD-10-CM

## 2024-05-17 DIAGNOSIS — M20.41 HAMMER TOE OF RIGHT FOOT: ICD-10-CM

## 2024-05-17 DIAGNOSIS — T46.4X5A ACE-INHIBITOR COUGH: ICD-10-CM

## 2024-05-17 DIAGNOSIS — Z00.00 MEDICARE ANNUAL WELLNESS VISIT, SUBSEQUENT: Primary | ICD-10-CM

## 2024-05-17 RX ORDER — OLMESARTAN MEDOXOMIL 5 MG/1
5 TABLET ORAL DAILY
Qty: 90 TABLET | Refills: 1 | Status: SHIPPED | OUTPATIENT
Start: 2024-05-17

## 2024-05-17 ASSESSMENT — ENCOUNTER SYMPTOMS
BACK PAIN: 0
CHEST TIGHTNESS: 0
COLOR CHANGE: 0
ABDOMINAL PAIN: 0
COUGH: 1
DIARRHEA: 0
EYE DISCHARGE: 0
RHINORRHEA: 0
SHORTNESS OF BREATH: 0
SORE THROAT: 0
CONSTIPATION: 0

## 2024-05-17 NOTE — PATIENT INSTRUCTIONS
range of motion in joints and muscles.  Includes upper arm stretches, calf stretches, and gentle yoga.  Aim for at least twice a week, preferably after your muscles are warmed up from other activities.  It can help you function better in daily life.  Balancing.  This helps you stay coordinated and have good posture.  Includes heel-to-toe walking, sarkis chi, and certain types of yoga.  Aim for at least 3 days a week.  It can reduce your risk of falling.  Even if you have a hard time meeting the recommendations, it's better to be more active than less active. All activity done in each category counts toward your weekly total. You'd be surprised how daily things like carrying groceries, keeping up with grandchildren, and taking the stairs can add up.  What keeps you from being active?  If you've had a hard time being more active, you're not alone. Maybe you remember being able to do more. Or maybe you've never thought of yourself as being active. It's frustrating when you can't do the things you want. Being more active can help. What's holding you back?  Getting started.  Have a goal, but break it into easy tasks. Small steps build into big accomplishments.  Staying motivated.  If you feel like skipping your activity, remember your goal. Maybe you want to move better and stay independent. Every activity gets you one step closer.  Not feeling your best.  Start with 5 minutes of an activity you enjoy. Prove to yourself you can do it. As you get comfortable, increase your time.  You may not be where you want to be. But you're in the process of getting there. Everyone starts somewhere.  How can you find safe ways to stay active?  Talk with your doctor about any physical challenges you're facing. Make a plan with your doctor if you have a health problem or aren't sure how to get started with activity.  If you're already active, ask your doctor if there is anything you should change to stay safe as your body and health

## 2024-05-17 NOTE — PROGRESS NOTES
Health Decision Maker has been checked with the patient   Primary Decision Maker: MIMI VALENTINE - Spouse - [phone]    Secondary Decision Maker: KramerElena woods - Child - [phone]     Patient has stated that the scribe can come in room    Chief Complaint   Patient presents with    Medicare AWV       \"Have you been to the ER, urgent care clinic since your last visit?  Hospitalized since your last visit?\"    NO    “Have you seen or consulted any other health care providers outside of [facility] since your last visit?”    NO      Vitals:    05/17/24 1011   BP: 137/77   Site: Left Upper Arm   Pulse: 86   Resp: 16   Temp: 97.8 °F (36.6 °C)   SpO2: 100%   Weight: 67.2 kg (148 lb 3.2 oz)   Height: 1.575 m (5' 2\")      Depression: Not at risk (5/16/2024)    PHQ-2     PHQ-2 Score: 0          Click Here for Release of Records Request    Specialist patient sees: none    Chart reviewed: immunizations are documented.   Immunization History   Administered Date(s) Administered    COVID-19, PFIZER PURPLE top, DILUTE for use, (age 12 y+), 30mcg/0.3mL 02/22/2021, 03/18/2021    Influenza, FLUAD, (age 65 y+), Adjuvanted, 0.5mL 10/03/2023    Influenza, High Dose (Fluzone 65 yrs and older) 10/29/2013, 09/20/2014, 10/07/2016, 10/13/2017, 09/23/2019, 01/08/2020, 10/21/2021    Influenza, Triv, inactivated, subunit, adjuvanted, IM (Fluad 65 yrs and older) 10/05/2018    Pneumococcal Vaccine 09/17/1997, 10/22/2004    Pneumococcal, PCV-13, PREVNAR 13, (age 6w+), IM, 0.5mL 10/13/2017    Pneumococcal, PPSV23, PNEUMOVAX 23, (age 2y+), SC/IM, 0.5mL 04/17/2019    Td vaccine (adult) 09/07/2004    Zoster Live (Zostavax) 01/21/2009    Zoster Recombinant (Shingrix) 10/15/2019, 01/09/2020      
Medicare Annual Wellness Visit    Shea Pickens is here for Medicare AWV           Subjective       Patient's complete Health Risk Assessment and screening values have been reviewed and are found in Flowsheets. The following problems were reviewed today and where indicated follow up appointments were made and/or referrals ordered.    Positive Risk Factor Screenings with Interventions:    Fall Risk:  Do you feel unsteady or are you worried about falling? : (!) yes  2 or more falls in past year?: (!) yes  Fall with injury in past year?: (!) yes       Interventions:    Reviewed medications, home hazards, visual acuity, and co-morbidities that can increase risk for falls  Patient declines any further evaluation or treatment             Activity, Diet, and Weight:  On average, how many days per week do you engage in moderate to strenuous exercise (like a brisk walk)?: 0 days  On average, how many minutes do you engage in exercise at this level?: 0 min    Do you eat balanced/healthy meals regularly?: Yes    Body mass index is 27.11 kg/m².        Inactivity Interventions:  Recommended walking 30 minutes daily.                            Objective   Vitals:    05/17/24 1011 05/17/24 1044   BP: 137/77 138/80   Site: Left Upper Arm Right Upper Arm   Position:  Sitting   Pulse: 86    Resp: 16    Temp: 97.8 °F (36.6 °C)    SpO2: 100%    Weight: 67.2 kg (148 lb 3.2 oz)    Height: 1.575 m (5' 2\")       Body mass index is 27.11 kg/m².             Allergies   Allergen Reactions    Bee Venom Swelling    Prunus Persica Itching    Strawberry Extract Itching    Oxycodone-Acetaminophen Other (See Comments)      Other (comments)\"hellish nightmares\"    Penicillins Hives     IN CHILDHOOD  Patient screened for any delayed non-IgE-mediated reaction to PCN.        Patient notes the following:    No delayed non-IgE-mediated reaction to PCN             Shellfish Allergy Itching    Zoster Vaccine Live Swelling     Severe Right arm swelling 
     General: No scleral icterus.        Right eye: No discharge.         Left eye: No discharge.      Extraocular Movements: Extraocular movements intact.      Conjunctiva/sclera: Conjunctivae normal.   Cardiovascular:      Rate and Rhythm: Normal rate and regular rhythm.   Pulmonary:      Effort: Pulmonary effort is normal.      Breath sounds: Normal breath sounds. No wheezing.   Abdominal:      General: Bowel sounds are normal.      Palpations: Abdomen is soft.      Tenderness: There is no abdominal tenderness.   Musculoskeletal:      Cervical back: Normal range of motion and neck supple.   Lymphadenopathy:      Cervical: No cervical adenopathy.   Neurological:      Mental Status: She is alert and oriented to person, place, and time.   Psychiatric:         Mood and Affect: Mood normal.            LACEY WHITAKER MA, am scribing for and in the presence of Lorena Jovel MD. 5/17/24/10:51 AM     Lorena WHITAKER MD, personally performed the services described by my scribe in my presence, and it is both accurate and complete.    An electronic signature was used to authenticate this note.    --LACEY VIDES MA

## 2024-05-23 NOTE — PROGRESS NOTES
Chief Complaint   Patient presents with    Neurologic Problem     \"My legs just randomly give out, it's usually my left leg, and I have also started having tremors. \"     Visit Vitals  /82 (BP 1 Location: Left upper arm, BP Patient Position: Sitting)   Pulse 92   Ht 5' 2\" (1.575 m)   Wt 150 lb (68 kg)   SpO2 98%   BMI 27.44 kg/m² awake/alert DISPLAY PLAN FREE TEXT

## 2024-05-28 ENCOUNTER — HOSPITAL ENCOUNTER (OUTPATIENT)
Facility: HOSPITAL | Age: 84
Discharge: HOME | End: 2024-05-30
Attending: PSYCHIATRY & NEUROLOGY
Payer: MEDICARE

## 2024-05-28 DIAGNOSIS — I67.1 CEREBRAL ANEURYSM: ICD-10-CM

## 2024-05-28 PROCEDURE — 93880 EXTRACRANIAL BILAT STUDY: CPT

## 2024-05-29 LAB
VAS LEFT CCA DIST EDV: 16.1 CM/S
VAS LEFT CCA DIST PSV: 56.6 CM/S
VAS LEFT CCA PROX EDV: 16.1 CM/S
VAS LEFT CCA PROX PSV: 61.6 CM/S
VAS LEFT ECA EDV: 10.1 CM/S
VAS LEFT ECA PSV: 137.4 CM/S
VAS LEFT ICA DIST EDV: 14.9 CM/S
VAS LEFT ICA DIST PSV: 47.2 CM/S
VAS LEFT ICA MID EDV: 17 CM/S
VAS LEFT ICA MID PSV: 54.2 CM/S
VAS LEFT ICA PROX EDV: 27 CM/S
VAS LEFT ICA PROX PSV: 71 CM/S
VAS LEFT ICA/CCA PSV: 1.3 NO UNITS
VAS LEFT VERTEBRAL EDV: 20.4 CM/S
VAS LEFT VERTEBRAL PSV: 72 CM/S
VAS RIGHT CCA DIST EDV: 15.7 CM/S
VAS RIGHT CCA DIST PSV: 50.6 CM/S
VAS RIGHT CCA PROX EDV: 9.5 CM/S
VAS RIGHT CCA PROX PSV: 96.3 CM/S
VAS RIGHT ECA EDV: 12 CM/S
VAS RIGHT ECA PSV: 134.3 CM/S
VAS RIGHT ICA DIST EDV: 17.6 CM/S
VAS RIGHT ICA DIST PSV: 71.9 CM/S
VAS RIGHT ICA MID EDV: 35.1 CM/S
VAS RIGHT ICA MID PSV: 102.6 CM/S
VAS RIGHT ICA PROX EDV: 30.2 CM/S
VAS RIGHT ICA PROX PSV: 95.3 CM/S
VAS RIGHT ICA/CCA PSV: 2 NO UNITS
VAS RIGHT VERTEBRAL EDV: 14.7 CM/S
VAS RIGHT VERTEBRAL PSV: 39.6 CM/S

## 2024-05-30 ENCOUNTER — TRANSCRIBE ORDERS (OUTPATIENT)
Facility: HOSPITAL | Age: 84
End: 2024-05-30

## 2024-05-30 ENCOUNTER — HOSPITAL ENCOUNTER (OUTPATIENT)
Facility: HOSPITAL | Age: 84
Discharge: HOME | End: 2024-05-30
Attending: INTERNAL MEDICINE
Payer: MEDICARE

## 2024-05-30 DIAGNOSIS — Z12.31 VISIT FOR SCREENING MAMMOGRAM: Primary | ICD-10-CM

## 2024-05-30 DIAGNOSIS — M85.89 OSTEOPENIA OF MULTIPLE SITES: ICD-10-CM

## 2024-05-30 PROCEDURE — 77080 DXA BONE DENSITY AXIAL: CPT

## 2024-05-31 ENCOUNTER — TELEPHONE (OUTPATIENT)
Age: 84
End: 2024-05-31

## 2024-05-31 DIAGNOSIS — I67.1 CEREBRAL ANEURYSM: Primary | ICD-10-CM

## 2024-05-31 DIAGNOSIS — I77.9 CAROTID ARTERY DISEASE WITHOUT CEREBRAL INFARCTION (HCC): ICD-10-CM

## 2024-05-31 NOTE — TELEPHONE ENCOUNTER
----- Message from Donald Gardner DO sent at 5/30/2024  5:27 PM EDT -----  Regarding: FW:  Please let her no stent looks good. She can stop the plavix. Continue Asa.  US in one year. I don’t need to see her Tuesday     Thanks  TD  ----- Message -----  From: Scar New MD  Sent: 5/29/2024   4:51 PM EDT  To: Donald Gardner DO

## 2024-05-31 NOTE — TELEPHONE ENCOUNTER
Spoke to patient and informed her of Carotid duplex results per provider's note.  New order to be sent to patient via [facility] per her request.  Informed patient to stop taking Plavix but continue taking Aspirin daily as ordered indefinitely.  Patient stated understanding.

## 2024-06-04 ENCOUNTER — OFFICE VISIT (OUTPATIENT)
Age: 84
End: 2024-06-04
Payer: MEDICARE

## 2024-06-04 VITALS
HEART RATE: 75 BPM | TEMPERATURE: 97.5 F | HEIGHT: 62 IN | OXYGEN SATURATION: 99 % | WEIGHT: 147 LBS | DIASTOLIC BLOOD PRESSURE: 70 MMHG | SYSTOLIC BLOOD PRESSURE: 110 MMHG | BODY MASS INDEX: 27.05 KG/M2

## 2024-06-04 DIAGNOSIS — I67.1 CEREBRAL ANEURYSM: Primary | ICD-10-CM

## 2024-06-04 DIAGNOSIS — I77.9 CAROTID ARTERY DISEASE WITHOUT CEREBRAL INFARCTION (HCC): ICD-10-CM

## 2024-06-04 PROCEDURE — 1090F PRES/ABSN URINE INCON ASSESS: CPT | Performed by: PSYCHIATRY & NEUROLOGY

## 2024-06-04 PROCEDURE — G8428 CUR MEDS NOT DOCUMENT: HCPCS | Performed by: PSYCHIATRY & NEUROLOGY

## 2024-06-04 PROCEDURE — G8417 CALC BMI ABV UP PARAM F/U: HCPCS | Performed by: PSYCHIATRY & NEUROLOGY

## 2024-06-04 PROCEDURE — 1123F ACP DISCUSS/DSCN MKR DOCD: CPT | Performed by: PSYCHIATRY & NEUROLOGY

## 2024-06-04 PROCEDURE — 99213 OFFICE O/P EST LOW 20 MIN: CPT | Performed by: PSYCHIATRY & NEUROLOGY

## 2024-06-04 PROCEDURE — G8399 PT W/DXA RESULTS DOCUMENT: HCPCS | Performed by: PSYCHIATRY & NEUROLOGY

## 2024-06-04 PROCEDURE — 1036F TOBACCO NON-USER: CPT | Performed by: PSYCHIATRY & NEUROLOGY

## 2024-06-04 NOTE — PROGRESS NOTES
jittery    Lisinopril      Other reaction(s): Cough    Meloxicam Other (See Comments)     Easy bruising    Rosuvastatin Myalgia    Sulfa Antibiotics     Epinephrine Palpitations and Other (See Comments)    Hydrocodone-Acetaminophen Nausea And Vomiting    Iodinated Contrast Media Rash         Social History  Social History     Socioeconomic History    Marital status:      Spouse name: Not on file    Number of children: Not on file    Years of education: Not on file    Highest education level: Not on file   Occupational History    Not on file   Tobacco Use    Smoking status: Never    Smokeless tobacco: Never   Vaping Use    Vaping Use: Never used   Substance and Sexual Activity    Alcohol use: Never    Drug use: Never    Sexual activity: Not Currently     Partners: Male   Other Topics Concern    Not on file   Social History Narrative    Not on file     Social Determinants of Health     Financial Resource Strain: Low Risk  (5/16/2024)    Overall Financial Resource Strain (CARDIA)     Difficulty of Paying Living Expenses: Not hard at all   Food Insecurity: No Food Insecurity (5/16/2024)    Hunger Vital Sign     Worried About Running Out of Food in the Last Year: Never true     Ran Out of Food in the Last Year: Never true   Transportation Needs: Unknown (5/16/2024)    PRAPARE - Transportation     Lack of Transportation (Medical): Not on file     Lack of Transportation (Non-Medical): No   Physical Activity: Inactive (5/16/2024)    Exercise Vital Sign     Days of Exercise per Week: 0 days     Minutes of Exercise per Session: 0 min   Stress: Not on file   Social Connections: Not on file   Intimate Partner Violence: Not on file   Housing Stability: Unknown (5/16/2024)    Housing Stability Vital Sign     Unable to Pay for Housing in the Last Year: Not on file     Number of Places Lived in the Last Year: Not on file     Unstable Housing in the Last Year: No      Social History     Substance and Sexual Activity   Drug

## 2024-07-30 DIAGNOSIS — G25.81 RESTLESS LEG SYNDROME: ICD-10-CM

## 2024-07-30 DIAGNOSIS — I65.23 BILATERAL CAROTID ARTERY STENOSIS: ICD-10-CM

## 2024-07-30 RX ORDER — PRAMIPEXOLE DIHYDROCHLORIDE 1 MG/1
1 TABLET ORAL 3 TIMES DAILY
Qty: 270 TABLET | Refills: 0 | Status: SHIPPED | OUTPATIENT
Start: 2024-07-30

## 2024-07-30 RX ORDER — ATORVASTATIN CALCIUM 10 MG/1
10 TABLET, FILM COATED ORAL DAILY
Qty: 30 TABLET | Refills: 3 | Status: SHIPPED | OUTPATIENT
Start: 2024-07-30

## 2024-08-19 ENCOUNTER — HOSPITAL ENCOUNTER (OUTPATIENT)
Facility: HOSPITAL | Age: 84
Discharge: HOME | End: 2024-08-22
Payer: MEDICARE

## 2024-08-19 ENCOUNTER — OFFICE VISIT (OUTPATIENT)
Dept: PRIMARY CARE CLINIC | Facility: CLINIC | Age: 84
End: 2024-08-19
Payer: MEDICARE

## 2024-08-19 VITALS
SYSTOLIC BLOOD PRESSURE: 119 MMHG | HEIGHT: 62 IN | RESPIRATION RATE: 16 BRPM | DIASTOLIC BLOOD PRESSURE: 71 MMHG | BODY MASS INDEX: 28.45 KG/M2 | HEART RATE: 78 BPM | WEIGHT: 154.6 LBS | OXYGEN SATURATION: 99 % | TEMPERATURE: 97.4 F

## 2024-08-19 DIAGNOSIS — R06.83 SNORES: ICD-10-CM

## 2024-08-19 DIAGNOSIS — J32.0 CHRONIC MAXILLARY SINUSITIS: Primary | ICD-10-CM

## 2024-08-19 DIAGNOSIS — G25.81 RESTLESS LEG SYNDROME: ICD-10-CM

## 2024-08-19 DIAGNOSIS — J32.0 CHRONIC MAXILLARY SINUSITIS: ICD-10-CM

## 2024-08-19 DIAGNOSIS — R09.81 NASAL CONGESTION: ICD-10-CM

## 2024-08-19 PROCEDURE — 3078F DIAST BP <80 MM HG: CPT | Performed by: INTERNAL MEDICINE

## 2024-08-19 PROCEDURE — 99214 OFFICE O/P EST MOD 30 MIN: CPT | Performed by: INTERNAL MEDICINE

## 2024-08-19 PROCEDURE — G8417 CALC BMI ABV UP PARAM F/U: HCPCS | Performed by: INTERNAL MEDICINE

## 2024-08-19 PROCEDURE — 1090F PRES/ABSN URINE INCON ASSESS: CPT | Performed by: INTERNAL MEDICINE

## 2024-08-19 PROCEDURE — 1123F ACP DISCUSS/DSCN MKR DOCD: CPT | Performed by: INTERNAL MEDICINE

## 2024-08-19 PROCEDURE — 1036F TOBACCO NON-USER: CPT | Performed by: INTERNAL MEDICINE

## 2024-08-19 PROCEDURE — G8399 PT W/DXA RESULTS DOCUMENT: HCPCS | Performed by: INTERNAL MEDICINE

## 2024-08-19 PROCEDURE — 3074F SYST BP LT 130 MM HG: CPT | Performed by: INTERNAL MEDICINE

## 2024-08-19 PROCEDURE — 70220 X-RAY EXAM OF SINUSES: CPT

## 2024-08-19 PROCEDURE — G8427 DOCREV CUR MEDS BY ELIG CLIN: HCPCS | Performed by: INTERNAL MEDICINE

## 2024-08-19 RX ORDER — METHYLPREDNISOLONE 4 MG/1
TABLET ORAL
Qty: 1 KIT | Refills: 0 | Status: SHIPPED | OUTPATIENT
Start: 2024-08-19 | End: 2024-08-25

## 2024-08-19 SDOH — ECONOMIC STABILITY: FOOD INSECURITY: WITHIN THE PAST 12 MONTHS, THE FOOD YOU BOUGHT JUST DIDN'T LAST AND YOU DIDN'T HAVE MONEY TO GET MORE.: NEVER TRUE

## 2024-08-19 SDOH — ECONOMIC STABILITY: FOOD INSECURITY: WITHIN THE PAST 12 MONTHS, YOU WORRIED THAT YOUR FOOD WOULD RUN OUT BEFORE YOU GOT MONEY TO BUY MORE.: NEVER TRUE

## 2024-08-19 ASSESSMENT — ENCOUNTER SYMPTOMS
EYE DISCHARGE: 0
DIFFICULTY BREATHING: 1
CONSTIPATION: 0
DIARRHEA: 0
COLOR CHANGE: 0
RHINORRHEA: 0
COUGH: 0
ABDOMINAL PAIN: 0
CHEST TIGHTNESS: 0
BACK PAIN: 0
SORE THROAT: 0
SHORTNESS OF BREATH: 0
SINUS PAIN: 1

## 2024-08-19 ASSESSMENT — PATIENT HEALTH QUESTIONNAIRE - PHQ9
SUM OF ALL RESPONSES TO PHQ QUESTIONS 1-9: 0
2. FEELING DOWN, DEPRESSED OR HOPELESS: NOT AT ALL
1. LITTLE INTEREST OR PLEASURE IN DOING THINGS: NOT AT ALL
SUM OF ALL RESPONSES TO PHQ9 QUESTIONS 1 & 2: 0

## 2024-08-19 NOTE — PROGRESS NOTES
Shea Pickens (:  1940) is a 84 y.o. female, Established patient, here for evaluation of the following chief complaint(s):  Breathing Problem (Ongoing for 1 year.)    Assessment & Plan   ASSESSMENT/PLAN:  1. Chronic maxillary sinusitis  -     XR SINUSES (MIN 3 VIEWS ); Future  -     methylPREDNISolone (MEDROL DOSEPACK) 4 MG tablet; Take by mouth., Disp-1 kit, R-0Normal sent to pharmacy.  I ordered an X-Ray for her sinuses. I prescribed Medrol Dosepack 4mg to start taking as instructed on medication package. Potential side effects were discussed. If X-Ray is unremarkable and Medrol Dosepack is ineffective, then will discuss referral to a new ENT.    2. Nasal congestion  I recommend that she continue using her saline nasal spray.    3. Restless leg syndrome  I recommend that she continue taking Mirapex 1mg TID.    4. Snores  -     [facility] - Dorothy Castillo MD, Sleep Medicine, [place] ([facility])  I referred her to Dr. Castillo (Sleep Medicine) for a sleep study.             Subjective   SUBJECTIVE/OBJECTIVE:  Breathing Problem  She complains of difficulty breathing. There is no cough or shortness of breath. Pertinent negatives include no headaches, myalgias, rhinorrhea or sore throat.     Patient presents today for congestion and sleep issues.    She reports congestion, which causes her trouble breathing. She uses a saline spray and Ricola and finds them temporarily effective. She notes that Flonase and Astelin have not been effective. She states that her saliva is green and that she is drooling. She visited Dr. Fox (ENT), who did not think she had a sinus infection.    BP is well-controlled in office today at 119/71. She states that she is checking her BP at home and that her readings are normal. She takes olmesartan 5mg daily.    She has tremors and restless legs and takes Mirapex 1mg TID. She does not find it effective for RLS, but she is using Calm Powder, which is effective for RLS. She reports  08-Jan-2021 03:14

## 2024-08-19 NOTE — PROGRESS NOTES
Health Decision Maker has been checked with the patient   Primary Decision Maker: MIMI VALENTINE - Spouse - [phone]    Secondary Decision Maker: Elena Kramer - Child - [phone]         Chief Complaint   Patient presents with    Breathing Problem     Ongoing for 1 year.       \"Have you been to the ER, urgent care clinic since your last visit?  Hospitalized since your last visit?\"    NO    “Have you seen or consulted any other health care providers outside of [facility] since your last visit?”    NO    PT GIVES PERMISSION FOR SCRIBE TO BE IN ROOM AND DECLINEDWATER.       Vitals:    08/19/24 1430   BP: 119/71   Pulse: 78   Resp: 16   Temp: 97.4 °F (36.3 °C)   SpO2: 99%          Click Here for Release of Records Request

## 2024-08-29 RX ORDER — PANTOPRAZOLE SODIUM 20 MG/1
20 TABLET, DELAYED RELEASE ORAL
Qty: 30 TABLET | Refills: 5 | Status: SHIPPED | OUTPATIENT
Start: 2024-08-29

## 2024-09-12 DIAGNOSIS — I65.23 BILATERAL CAROTID ARTERY STENOSIS: ICD-10-CM

## 2024-09-12 RX ORDER — ATORVASTATIN CALCIUM 10 MG/1
10 TABLET, FILM COATED ORAL DAILY
Qty: 30 TABLET | Refills: 3 | OUTPATIENT
Start: 2024-09-12

## 2024-10-10 DIAGNOSIS — I65.23 BILATERAL CAROTID ARTERY STENOSIS: ICD-10-CM

## 2024-10-10 DIAGNOSIS — I10 PRIMARY HYPERTENSION: ICD-10-CM

## 2024-10-10 DIAGNOSIS — T46.4X5A ACE-INHIBITOR COUGH: ICD-10-CM

## 2024-10-10 DIAGNOSIS — R05.8 ACE-INHIBITOR COUGH: ICD-10-CM

## 2024-10-11 RX ORDER — ATORVASTATIN CALCIUM 10 MG/1
10 TABLET, FILM COATED ORAL DAILY
Qty: 30 TABLET | Refills: 3 | Status: SHIPPED | OUTPATIENT
Start: 2024-10-11

## 2024-10-11 RX ORDER — OLMESARTAN MEDOXOMIL 5 MG/1
5 TABLET ORAL DAILY
Qty: 90 TABLET | Refills: 1 | Status: SHIPPED | OUTPATIENT
Start: 2024-10-11

## 2024-10-14 RX ORDER — PANTOPRAZOLE SODIUM 20 MG/1
20 TABLET, DELAYED RELEASE ORAL
Qty: 30 TABLET | Refills: 5 | Status: SHIPPED | OUTPATIENT
Start: 2024-10-14

## 2024-11-05 SDOH — ECONOMIC STABILITY: INCOME INSECURITY: HOW HARD IS IT FOR YOU TO PAY FOR THE VERY BASICS LIKE FOOD, HOUSING, MEDICAL CARE, AND HEATING?: PATIENT DECLINED

## 2024-11-05 SDOH — ECONOMIC STABILITY: TRANSPORTATION INSECURITY
IN THE PAST 12 MONTHS, HAS LACK OF TRANSPORTATION KEPT YOU FROM MEETINGS, WORK, OR FROM GETTING THINGS NEEDED FOR DAILY LIVING?: PATIENT DECLINED

## 2024-11-05 SDOH — ECONOMIC STABILITY: FOOD INSECURITY: WITHIN THE PAST 12 MONTHS, THE FOOD YOU BOUGHT JUST DIDN'T LAST AND YOU DIDN'T HAVE MONEY TO GET MORE.: PATIENT DECLINED

## 2024-11-05 SDOH — ECONOMIC STABILITY: FOOD INSECURITY: WITHIN THE PAST 12 MONTHS, YOU WORRIED THAT YOUR FOOD WOULD RUN OUT BEFORE YOU GOT MONEY TO BUY MORE.: PATIENT DECLINED

## 2024-11-06 ENCOUNTER — OFFICE VISIT (OUTPATIENT)
Dept: PRIMARY CARE CLINIC | Facility: CLINIC | Age: 84
End: 2024-11-06

## 2024-11-06 VITALS
OXYGEN SATURATION: 99 % | HEART RATE: 86 BPM | BODY MASS INDEX: 27.79 KG/M2 | DIASTOLIC BLOOD PRESSURE: 75 MMHG | WEIGHT: 151 LBS | SYSTOLIC BLOOD PRESSURE: 132 MMHG | RESPIRATION RATE: 18 BRPM | TEMPERATURE: 97 F | HEIGHT: 62 IN

## 2024-11-06 DIAGNOSIS — R25.1 TREMOR OF BOTH HANDS: Primary | ICD-10-CM

## 2024-11-06 DIAGNOSIS — R26.89 BALANCE DISORDER: ICD-10-CM

## 2024-11-06 DIAGNOSIS — G25.81 RESTLESS LEG SYNDROME: ICD-10-CM

## 2024-11-06 DIAGNOSIS — M79.605 PAIN IN BOTH LOWER EXTREMITIES: ICD-10-CM

## 2024-11-06 DIAGNOSIS — I10 PRIMARY HYPERTENSION: ICD-10-CM

## 2024-11-06 DIAGNOSIS — M79.604 PAIN IN BOTH LOWER EXTREMITIES: ICD-10-CM

## 2024-11-06 DIAGNOSIS — L08.9 TOE INFECTION: ICD-10-CM

## 2024-11-06 RX ORDER — DOXYCYCLINE HYCLATE 100 MG
100 TABLET ORAL 2 TIMES DAILY
Qty: 20 TABLET | Refills: 0 | Status: SHIPPED | OUTPATIENT
Start: 2024-11-06 | End: 2024-11-16

## 2024-11-06 RX ORDER — PROPRANOLOL HYDROCHLORIDE 10 MG/1
10 TABLET ORAL 2 TIMES DAILY
Qty: 60 TABLET | Refills: 0 | Status: SHIPPED | OUTPATIENT
Start: 2024-11-06 | End: 2024-12-06

## 2024-11-06 ASSESSMENT — ENCOUNTER SYMPTOMS
CONSTIPATION: 0
SORE THROAT: 0
DIARRHEA: 0
SHORTNESS OF BREATH: 0
COUGH: 0
RHINORRHEA: 0
BACK PAIN: 0
COLOR CHANGE: 0
ABDOMINAL PAIN: 0
CHEST TIGHTNESS: 0
EYE DISCHARGE: 0

## 2024-11-06 ASSESSMENT — PATIENT HEALTH QUESTIONNAIRE - PHQ9
SUM OF ALL RESPONSES TO PHQ QUESTIONS 1-9: 1
1. LITTLE INTEREST OR PLEASURE IN DOING THINGS: NOT AT ALL
SUM OF ALL RESPONSES TO PHQ QUESTIONS 1-9: 1
SUM OF ALL RESPONSES TO PHQ QUESTIONS 1-9: 1
2. FEELING DOWN, DEPRESSED OR HOPELESS: SEVERAL DAYS
SUM OF ALL RESPONSES TO PHQ9 QUESTIONS 1 & 2: 1
SUM OF ALL RESPONSES TO PHQ QUESTIONS 1-9: 1

## 2024-11-06 NOTE — PROGRESS NOTES
\"Have you been to the ER, urgent care clinic since your last visit?  Hospitalized since your last visit?\"    NO      “Have you seen or consulted any other health care providers outside our system since your last visit?”    [facility]       “Have you had a diabetic eye exam?”    2024  Record has been requested via fax.     Chief Complaint   Patient presents with    Discussion       Pt is ok with scribe.     Takes a powder called calm and she mixes in water to help her to sleep.   
Final    Right ICA mid PSV 05/28/2024 102.6  cm/s Final    Right ICA mid EDV 05/28/2024 35.1  cm/s Final    Right ICA prox PSV 05/28/2024 95.3  cm/s Final    Right ICA prox EDV 05/28/2024 30.2  cm/s Final    Right vertebral PSV 05/28/2024 39.6  cm/s Final    Right vertebral EDV 05/28/2024 14.70  cm/s Final    Right ICA/CCA PSV 05/28/2024 2.0  no units Final    Left CCA dist PSV 05/28/2024 56.6  cm/s Final    Left CCA dist EDV 05/28/2024 16.1  cm/s Final    Left CCA prox PSV 05/28/2024 61.6  cm/s Final    Left CCA prox EDV 05/28/2024 16.1  cm/s Final    Left ECA PSV 05/28/2024 137.4  cm/s Final    Left ECA EDV 05/28/2024 10.10  cm/s Final    Left ICA dist PSV 05/28/2024 47.2  cm/s Final    Left ICA dist EDV 05/28/2024 14.9  cm/s Final    Left ICA mid PSV 05/28/2024 54.2  cm/s Final    Left ICA mid EDV 05/28/2024 17.0  cm/s Final    Left ICA prox PSV 05/28/2024 71.0  cm/s Final    Left ICA prox EDV 05/28/2024 27.0  cm/s Final    Left vertebral PSV 05/28/2024 72.0  cm/s Final    Left vertebral EDV 05/28/2024 20.40  cm/s Final    Left ICA/CCA PSV 05/28/2024 1.30  no units Final         Review of Systems   Constitutional:  Negative for activity change, fatigue and unexpected weight change.   HENT:  Positive for congestion. Negative for hearing loss, rhinorrhea and sore throat.    Eyes:  Negative for discharge.   Respiratory:  Negative for cough, chest tightness and shortness of breath.    Gastrointestinal:  Negative for abdominal pain, constipation and diarrhea.   Genitourinary:  Negative for dysuria, flank pain, frequency and urgency.   Musculoskeletal:  Positive for arthralgias (hip, toe). Negative for back pain and myalgias.   Skin:  Negative for color change and rash.   Neurological:  Positive for tremors (hands). Negative for dizziness, light-headedness and headaches.        Restless legs   Psychiatric/Behavioral:  Positive for sleep disturbance (snores). Negative for dysphoric mood. The patient is not

## 2024-11-18 DIAGNOSIS — G25.81 RESTLESS LEG SYNDROME: ICD-10-CM

## 2024-11-18 RX ORDER — PRAMIPEXOLE DIHYDROCHLORIDE 1 MG/1
1 TABLET ORAL 3 TIMES DAILY
Qty: 270 TABLET | Refills: 0 | Status: SHIPPED | OUTPATIENT
Start: 2024-11-18

## 2024-11-25 ENCOUNTER — HOSPITAL ENCOUNTER (OUTPATIENT)
Facility: HOSPITAL | Age: 84
Discharge: HOME | End: 2024-11-28
Payer: MEDICARE

## 2024-11-25 VITALS — HEIGHT: 62 IN | BODY MASS INDEX: 27.23 KG/M2 | WEIGHT: 148 LBS

## 2024-11-25 DIAGNOSIS — Z12.31 VISIT FOR SCREENING MAMMOGRAM: ICD-10-CM

## 2024-11-25 PROCEDURE — 77067 SCR MAMMO BI INCL CAD: CPT

## 2025-01-04 DIAGNOSIS — I10 PRIMARY HYPERTENSION: ICD-10-CM

## 2025-01-04 DIAGNOSIS — R25.1 TREMOR OF BOTH HANDS: ICD-10-CM

## 2025-01-04 DIAGNOSIS — I65.23 BILATERAL CAROTID ARTERY STENOSIS: ICD-10-CM

## 2025-01-04 DIAGNOSIS — G25.81 RESTLESS LEG SYNDROME: ICD-10-CM

## 2025-01-06 RX ORDER — PROPRANOLOL HYDROCHLORIDE 10 MG/1
10 TABLET ORAL 2 TIMES DAILY
Qty: 60 TABLET | Refills: 0 | Status: SHIPPED | OUTPATIENT
Start: 2025-01-06 | End: 2025-02-05

## 2025-01-06 RX ORDER — PRAMIPEXOLE DIHYDROCHLORIDE 1 MG/1
1 TABLET ORAL 3 TIMES DAILY
Qty: 90 TABLET | Refills: 0 | Status: SHIPPED | OUTPATIENT
Start: 2025-01-06

## 2025-01-06 RX ORDER — ATORVASTATIN CALCIUM 10 MG/1
10 TABLET, FILM COATED ORAL DAILY
Qty: 30 TABLET | Refills: 0 | Status: SHIPPED | OUTPATIENT
Start: 2025-01-06

## 2025-01-07 RX ORDER — PROPRANOLOL HYDROCHLORIDE 10 MG/1
10 TABLET ORAL 2 TIMES DAILY
Qty: 60 TABLET | Refills: 0 | OUTPATIENT
Start: 2025-01-07

## 2025-01-23 DIAGNOSIS — R25.1 TREMOR OF BOTH HANDS: ICD-10-CM

## 2025-01-23 DIAGNOSIS — I10 PRIMARY HYPERTENSION: ICD-10-CM

## 2025-01-23 RX ORDER — PROPRANOLOL HYDROCHLORIDE 10 MG/1
10 TABLET ORAL 2 TIMES DAILY
Qty: 180 TABLET | Refills: 0 | Status: SHIPPED | OUTPATIENT
Start: 2025-01-23 | End: 2025-04-23

## 2025-01-24 RX ORDER — PANTOPRAZOLE SODIUM 20 MG/1
20 TABLET, DELAYED RELEASE ORAL
Qty: 30 TABLET | Refills: 5 | Status: SHIPPED | OUTPATIENT
Start: 2025-01-24

## 2025-01-27 RX ORDER — PANTOPRAZOLE SODIUM 20 MG/1
20 TABLET, DELAYED RELEASE ORAL
Qty: 30 TABLET | Refills: 1 | Status: SHIPPED | OUTPATIENT
Start: 2025-01-27

## 2025-02-03 ENCOUNTER — PATIENT MESSAGE (OUTPATIENT)
Dept: PRIMARY CARE CLINIC | Facility: CLINIC | Age: 85
End: 2025-02-03

## 2025-02-04 ENCOUNTER — OFFICE VISIT (OUTPATIENT)
Dept: PRIMARY CARE CLINIC | Facility: CLINIC | Age: 85
End: 2025-02-04
Payer: MEDICARE

## 2025-02-04 VITALS
WEIGHT: 153 LBS | OXYGEN SATURATION: 98 % | HEIGHT: 62 IN | TEMPERATURE: 97.3 F | BODY MASS INDEX: 28.16 KG/M2 | RESPIRATION RATE: 20 BRPM | HEART RATE: 78 BPM | DIASTOLIC BLOOD PRESSURE: 79 MMHG | SYSTOLIC BLOOD PRESSURE: 129 MMHG

## 2025-02-04 DIAGNOSIS — E11.9 DIABETES MELLITUS TYPE 2, DIET-CONTROLLED (HCC): ICD-10-CM

## 2025-02-04 DIAGNOSIS — L08.9 TOE INFECTION: Primary | ICD-10-CM

## 2025-02-04 DIAGNOSIS — E78.2 MIXED HYPERLIPIDEMIA: ICD-10-CM

## 2025-02-04 DIAGNOSIS — Z91.09 ENVIRONMENTAL ALLERGIES: ICD-10-CM

## 2025-02-04 DIAGNOSIS — R26.89 BALANCE PROBLEM: ICD-10-CM

## 2025-02-04 PROCEDURE — G8417 CALC BMI ABV UP PARAM F/U: HCPCS | Performed by: INTERNAL MEDICINE

## 2025-02-04 PROCEDURE — 1160F RVW MEDS BY RX/DR IN RCRD: CPT | Performed by: INTERNAL MEDICINE

## 2025-02-04 PROCEDURE — G8427 DOCREV CUR MEDS BY ELIG CLIN: HCPCS | Performed by: INTERNAL MEDICINE

## 2025-02-04 PROCEDURE — 3078F DIAST BP <80 MM HG: CPT | Performed by: INTERNAL MEDICINE

## 2025-02-04 PROCEDURE — 99214 OFFICE O/P EST MOD 30 MIN: CPT | Performed by: INTERNAL MEDICINE

## 2025-02-04 PROCEDURE — 1159F MED LIST DOCD IN RCRD: CPT | Performed by: INTERNAL MEDICINE

## 2025-02-04 PROCEDURE — 1090F PRES/ABSN URINE INCON ASSESS: CPT | Performed by: INTERNAL MEDICINE

## 2025-02-04 PROCEDURE — 3074F SYST BP LT 130 MM HG: CPT | Performed by: INTERNAL MEDICINE

## 2025-02-04 PROCEDURE — 1126F AMNT PAIN NOTED NONE PRSNT: CPT | Performed by: INTERNAL MEDICINE

## 2025-02-04 PROCEDURE — 1123F ACP DISCUSS/DSCN MKR DOCD: CPT | Performed by: INTERNAL MEDICINE

## 2025-02-04 PROCEDURE — 1036F TOBACCO NON-USER: CPT | Performed by: INTERNAL MEDICINE

## 2025-02-04 PROCEDURE — G8399 PT W/DXA RESULTS DOCUMENT: HCPCS | Performed by: INTERNAL MEDICINE

## 2025-02-04 RX ORDER — DOXYCYCLINE HYCLATE 100 MG
100 TABLET ORAL 2 TIMES DAILY
Qty: 20 TABLET | Refills: 0 | Status: SHIPPED | OUTPATIENT
Start: 2025-02-04 | End: 2025-02-14

## 2025-02-04 SDOH — ECONOMIC STABILITY: FOOD INSECURITY: WITHIN THE PAST 12 MONTHS, THE FOOD YOU BOUGHT JUST DIDN'T LAST AND YOU DIDN'T HAVE MONEY TO GET MORE.: NEVER TRUE

## 2025-02-04 SDOH — ECONOMIC STABILITY: FOOD INSECURITY: WITHIN THE PAST 12 MONTHS, YOU WORRIED THAT YOUR FOOD WOULD RUN OUT BEFORE YOU GOT MONEY TO BUY MORE.: NEVER TRUE

## 2025-02-04 ASSESSMENT — ENCOUNTER SYMPTOMS
BACK PAIN: 0
EYE DISCHARGE: 0
COUGH: 0
SORE THROAT: 0
SHORTNESS OF BREATH: 0
CONSTIPATION: 0
ABDOMINAL PAIN: 0
DIARRHEA: 0
RHINORRHEA: 0
COLOR CHANGE: 0
CHEST TIGHTNESS: 0

## 2025-02-04 ASSESSMENT — PATIENT HEALTH QUESTIONNAIRE - PHQ9
SUM OF ALL RESPONSES TO PHQ QUESTIONS 1-9: 0
SUM OF ALL RESPONSES TO PHQ9 QUESTIONS 1 & 2: 0
SUM OF ALL RESPONSES TO PHQ QUESTIONS 1-9: 0
1. LITTLE INTEREST OR PLEASURE IN DOING THINGS: NOT AT ALL
2. FEELING DOWN, DEPRESSED OR HOPELESS: NOT AT ALL

## 2025-02-04 NOTE — PROGRESS NOTES
Shea Pickens (:  1940) is a 84 y.o. female, Established patient, here for evaluation of the following chief complaint(s):  Referral - General (Foot doctor )      ASSESSMENT/PLAN:  1. Toe infection  -     [facility] - Zhen Zarate MD, Podiatry, [facility])  -     CBC; Future  -     doxycycline hyclate (VIBRA-TABS) 100 MG tablet; Take 1 tablet by mouth 2 times daily for 10 days, Disp-20 tablet, R-0Normal. sent to pharmacy.  -     Sedimentation Rate; Future  I referred patient to Dr. Zarate for her infection. I am checking WBC to evaluate infection. I ordered an ESR test. Waiting for results. I prescribed doxycyline 100 mg to be taken BID for 10 days for her infection.     2. Diabetes mellitus type 2, diet-controlled (HCC)  -     Comprehensive Metabolic Panel; Future  -     Albumin/Creatinine Ratio, Urine; Future  -     Hemoglobin A1C; Future  Patient is controlling diabetes via healthy diet. I ordered a CMP. I ordered an albumin/creatinine urine ratio. I ordered blood work to measure Hgb A1C levels. Waiting for results.      3. Balance problem  Patient is followed by PT. Recommended Matt-Chi exercises 2-3 times a week.     4. Mixed hyperlipidemia  -     Lipid Panel; Future  I ordered a lipid panel to assess cholesterol levels. Waiting for results.    5. Environmental allergies  I recommend patient take an OTC allergy medication such as Claritin or Allegra daily for her environmental allergies.            Subjective   SUBJECTIVE/OBJECTIVE:  HPI    Patient presents today for an office visit for referrals. She is not fasting for labs.     She requests another opinion for a toe infection. The region is extremely tender to the touch. She say it hurts for sheets to touch the infected toe. She says she has to wear a shoe size larger on the affected foot due to the discomfort. The pain radiates throughout her lower extremity, causing leg pain. She says the first podiatrist she saw said it was not

## 2025-02-04 NOTE — PROGRESS NOTES
\"Have you been to the ER, urgent care clinic since your last visit?  Hospitalized since your last visit?\"    NO      “Have you seen or consulted any other health care providers outside our system since your last visit?”    [facility]      “Have you had a diabetic eye exam?”    2025  Record has been requested via fax.     Chief Complaint   Patient presents with    Referral - General     Foot doctor        Pt is ok with scribe.   Pt is ok with NP shadowing.

## 2025-02-04 NOTE — TELEPHONE ENCOUNTER
Called and spoke to pt and offered her an himanshu today at 230pm with Dr. Jovel and she said yes, thank you.

## 2025-02-05 DIAGNOSIS — E11.9 DIABETES MELLITUS TYPE 2, DIET-CONTROLLED (HCC): ICD-10-CM

## 2025-02-05 DIAGNOSIS — E78.2 MIXED HYPERLIPIDEMIA: ICD-10-CM

## 2025-02-05 DIAGNOSIS — L08.9 TOE INFECTION: ICD-10-CM

## 2025-02-06 LAB
ALBUMIN SERPL-MCNC: 3.9 G/DL (ref 3.5–5)
ALBUMIN/GLOB SERPL: 1.2 (ref 1.1–2.2)
ALP SERPL-CCNC: 106 U/L (ref 45–117)
ALT SERPL-CCNC: 23 U/L (ref 12–78)
ANION GAP SERPL CALC-SCNC: 3 MMOL/L (ref 2–12)
AST SERPL-CCNC: 20 U/L (ref 15–37)
BILIRUB SERPL-MCNC: 0.8 MG/DL (ref 0.2–1)
BUN SERPL-MCNC: 20 MG/DL (ref 6–20)
BUN/CREAT SERPL: 24 (ref 12–20)
CALCIUM SERPL-MCNC: 9.4 MG/DL (ref 8.5–10.1)
CHLORIDE SERPL-SCNC: 102 MMOL/L (ref 97–108)
CHOLEST SERPL-MCNC: 178 MG/DL
CO2 SERPL-SCNC: 29 MMOL/L (ref 21–32)
CREAT SERPL-MCNC: 0.85 MG/DL (ref 0.55–1.02)
CREAT UR-MCNC: 60.7 MG/DL
ERYTHROCYTE [DISTWIDTH] IN BLOOD BY AUTOMATED COUNT: 13.2 % (ref 11.5–14.5)
ERYTHROCYTE [SEDIMENTATION RATE] IN BLOOD: 6 MM/HR (ref 0–30)
EST. AVERAGE GLUCOSE BLD GHB EST-MCNC: 126 MG/DL
GLOBULIN SER CALC-MCNC: 3.3 G/DL (ref 2–4)
GLUCOSE SERPL-MCNC: 95 MG/DL (ref 65–100)
HBA1C MFR BLD: 6 % (ref 4–5.6)
HCT VFR BLD AUTO: 41.7 % (ref 35–47)
HDLC SERPL-MCNC: 81 MG/DL
HDLC SERPL: 2.2 (ref 0–5)
HGB BLD-MCNC: 13.3 G/DL (ref 11.5–16)
LDLC SERPL CALC-MCNC: 85.8 MG/DL (ref 0–100)
MCH RBC QN AUTO: 28.8 PG (ref 26–34)
MCHC RBC AUTO-ENTMCNC: 31.9 G/DL (ref 30–36.5)
MCV RBC AUTO: 90.3 FL (ref 80–99)
MICROALBUMIN UR-MCNC: 0.65 MG/DL
MICROALBUMIN/CREAT UR-RTO: 11 MG/G (ref 0–30)
NRBC # BLD: 0 K/UL (ref 0–0.01)
NRBC BLD-RTO: 0 PER 100 WBC
PLATELET # BLD AUTO: 294 K/UL (ref 150–400)
PMV BLD AUTO: 9.8 FL (ref 8.9–12.9)
POTASSIUM SERPL-SCNC: 4.6 MMOL/L (ref 3.5–5.1)
PROT SERPL-MCNC: 7.2 G/DL (ref 6.4–8.2)
RBC # BLD AUTO: 4.62 M/UL (ref 3.8–5.2)
SODIUM SERPL-SCNC: 134 MMOL/L (ref 136–145)
SPECIMEN HOLD: NORMAL
TRIGL SERPL-MCNC: 56 MG/DL
VLDLC SERPL CALC-MCNC: 11.2 MG/DL
WBC # BLD AUTO: 6.9 K/UL (ref 3.6–11)

## 2025-02-17 ENCOUNTER — HOSPITAL ENCOUNTER (OUTPATIENT)
Facility: HOSPITAL | Age: 85
Discharge: HOME | End: 2025-02-20
Attending: INTERNAL MEDICINE
Payer: MEDICARE

## 2025-02-17 ENCOUNTER — TELEPHONE (OUTPATIENT)
Dept: PRIMARY CARE CLINIC | Facility: CLINIC | Age: 85
End: 2025-02-17

## 2025-02-17 ENCOUNTER — OFFICE VISIT (OUTPATIENT)
Dept: PRIMARY CARE CLINIC | Facility: CLINIC | Age: 85
End: 2025-02-17
Payer: MEDICARE

## 2025-02-17 VITALS
DIASTOLIC BLOOD PRESSURE: 80 MMHG | HEART RATE: 82 BPM | SYSTOLIC BLOOD PRESSURE: 155 MMHG | TEMPERATURE: 97 F | HEIGHT: 62 IN | WEIGHT: 152 LBS | BODY MASS INDEX: 27.97 KG/M2 | OXYGEN SATURATION: 100 % | RESPIRATION RATE: 20 BRPM

## 2025-02-17 DIAGNOSIS — R06.2 WHEEZING ON AUSCULTATION: ICD-10-CM

## 2025-02-17 DIAGNOSIS — R05.1 ACUTE COUGH: ICD-10-CM

## 2025-02-17 DIAGNOSIS — J20.9 BRONCHITIS WITH BRONCHOSPASM: ICD-10-CM

## 2025-02-17 DIAGNOSIS — J20.9 BRONCHITIS WITH BRONCHOSPASM: Primary | ICD-10-CM

## 2025-02-17 DIAGNOSIS — R09.81 SINUS CONGESTION: ICD-10-CM

## 2025-02-17 PROCEDURE — 1123F ACP DISCUSS/DSCN MKR DOCD: CPT | Performed by: INTERNAL MEDICINE

## 2025-02-17 PROCEDURE — 1036F TOBACCO NON-USER: CPT | Performed by: INTERNAL MEDICINE

## 2025-02-17 PROCEDURE — 1160F RVW MEDS BY RX/DR IN RCRD: CPT | Performed by: INTERNAL MEDICINE

## 2025-02-17 PROCEDURE — 3079F DIAST BP 80-89 MM HG: CPT | Performed by: INTERNAL MEDICINE

## 2025-02-17 PROCEDURE — 3077F SYST BP >= 140 MM HG: CPT | Performed by: INTERNAL MEDICINE

## 2025-02-17 PROCEDURE — 1090F PRES/ABSN URINE INCON ASSESS: CPT | Performed by: INTERNAL MEDICINE

## 2025-02-17 PROCEDURE — 71046 X-RAY EXAM CHEST 2 VIEWS: CPT

## 2025-02-17 PROCEDURE — 99214 OFFICE O/P EST MOD 30 MIN: CPT | Performed by: INTERNAL MEDICINE

## 2025-02-17 PROCEDURE — G8417 CALC BMI ABV UP PARAM F/U: HCPCS | Performed by: INTERNAL MEDICINE

## 2025-02-17 PROCEDURE — 1159F MED LIST DOCD IN RCRD: CPT | Performed by: INTERNAL MEDICINE

## 2025-02-17 PROCEDURE — G8427 DOCREV CUR MEDS BY ELIG CLIN: HCPCS | Performed by: INTERNAL MEDICINE

## 2025-02-17 PROCEDURE — G8399 PT W/DXA RESULTS DOCUMENT: HCPCS | Performed by: INTERNAL MEDICINE

## 2025-02-17 PROCEDURE — 1126F AMNT PAIN NOTED NONE PRSNT: CPT | Performed by: INTERNAL MEDICINE

## 2025-02-17 RX ORDER — AZELASTINE 1 MG/ML
1 SPRAY, METERED NASAL 2 TIMES DAILY
Qty: 60 ML | Refills: 1 | Status: SHIPPED | OUTPATIENT
Start: 2025-02-17

## 2025-02-17 RX ORDER — IPRATROPIUM BROMIDE AND ALBUTEROL SULFATE 2.5; .5 MG/3ML; MG/3ML
1 SOLUTION RESPIRATORY (INHALATION) EVERY 6 HOURS
Qty: 360 ML | Refills: 0 | Status: SHIPPED | OUTPATIENT
Start: 2025-02-17

## 2025-02-17 RX ORDER — BENZONATATE 200 MG/1
200 CAPSULE ORAL 3 TIMES DAILY PRN
Qty: 30 CAPSULE | Refills: 0 | Status: SHIPPED | OUTPATIENT
Start: 2025-02-17 | End: 2025-02-27

## 2025-02-17 RX ORDER — IPRATROPIUM BROMIDE AND ALBUTEROL SULFATE 2.5; .5 MG/3ML; MG/3ML
1 SOLUTION RESPIRATORY (INHALATION) ONCE
Status: COMPLETED | OUTPATIENT
Start: 2025-02-17 | End: 2025-02-17

## 2025-02-17 RX ADMIN — IPRATROPIUM BROMIDE AND ALBUTEROL SULFATE 1 DOSE: 2.5; .5 SOLUTION RESPIRATORY (INHALATION) at 15:56

## 2025-02-17 ASSESSMENT — PATIENT HEALTH QUESTIONNAIRE - PHQ9
SUM OF ALL RESPONSES TO PHQ QUESTIONS 1-9: 0
SUM OF ALL RESPONSES TO PHQ9 QUESTIONS 1 & 2: 0
2. FEELING DOWN, DEPRESSED OR HOPELESS: NOT AT ALL
1. LITTLE INTEREST OR PLEASURE IN DOING THINGS: NOT AT ALL
SUM OF ALL RESPONSES TO PHQ QUESTIONS 1-9: 0

## 2025-02-17 ASSESSMENT — ENCOUNTER SYMPTOMS
COUGH: 1
SHORTNESS OF BREATH: 0
SORE THROAT: 0
EYE DISCHARGE: 0
ABDOMINAL PAIN: 0
CONSTIPATION: 0
BACK PAIN: 0
DIARRHEA: 0
RHINORRHEA: 0
COLOR CHANGE: 0
CHEST TIGHTNESS: 1

## 2025-02-17 NOTE — PROGRESS NOTES
\"Have you been to the ER, urgent care clinic since your last visit?  Hospitalized since your last visit?\"    NO      “Have you seen or consulted any other health care providers outside our system since your last visit?”    NO      “Have you had a diabetic eye exam?”    Record has already been requested.       Chief Complaint   Patient presents with    Cough     Started yesterday.        Pt is ok with scribe and NP shadowing.

## 2025-02-17 NOTE — PROGRESS NOTES
Shea Pickens (:  1940) is a 84 y.o. female, Established patient, here for evaluation of the following chief complaint(s):  Cough (Started yesterday. )      ASSESSMENT/PLAN:  1. Bronchitis with bronchospasm  -     ipratropium 0.5 mg-albuterol 2.5 mg (DUONEB) nebulizer solution 1 Dose; 1 Dose, Inhalation, ONCE, 1 dose, On 25 at 1615Initiate RT Bronchodilator Protocol: No  -     XR CHEST (2 VIEWS); Future  Duoneb nebulizer treatment administered in office today. Recently completed a 10-day course of doxycycline 100 mg BID. I ordered a Chest X-Ray.     2. Acute cough  -     benzonatate (TESSALON) 200 MG capsule; Take 1 capsule by mouth 3 times daily as needed for Cough, Disp-30 capsule, R-0Normal sent to pharmacy.  Recently completed a 10-day course of doxycycline 100 mg BID. I prescribed Tessalon 200mg to start taking TID PRN for cough. Potential side effects were discussed.     3. Wheezing on auscultation  -     ipratropium 0.5 mg-albuterol 2.5 mg (DUONEB) 0.5-2.5 (3) MG/3ML SOLN nebulizer solution; Inhale 3 mLs into the lungs every 6 hours, Disp-360 mL, R-0Normal sent to pharmacy.  I ordered a new nebulizer solution for her to use at home. If her insurance does not cover Duoneb, then I recommend she see if she can purchase it from [employer].    4. Sinus congestion  -     azelastine (ASTELIN) 0.1 % nasal spray; 1 spray by Nasal route 2 times daily Use in each nostril as directed, Disp-60 mL, R-1Normal  I recommend that she use Astelin nasal spray with OTC Flonase 50mcg/ACT nasal spray.              Subjective   SUBJECTIVE/OBJECTIVE:  Cough  Pertinent negatives include no headaches, myalgias, rash, rhinorrhea, sore throat or shortness of breath.     Patient presents today for a cough and a follow-up on chronic conditions. She presents with her  who helps provide medical history and information.     She reports a deep cough that started yesterday. She recalls the cough waking her up last

## 2025-02-17 NOTE — TELEPHONE ENCOUNTER
I told Dr. Jovel and she said what time is available. I told her 315pm and she said ok. I called pt back and told her the only time we have is 315pm, she said ok, thank you. She is scheduled for then.

## 2025-02-17 NOTE — TELEPHONE ENCOUNTER
Patient is calling to see what Dr. Jovel would want her to do with crop sound cough. She said that it started yesterday and she cough throughout the night. Told patient I would send message back so this can discuss recommendations.

## 2025-02-23 ASSESSMENT — SLEEP AND FATIGUE QUESTIONNAIRES
DO YOU HAVE DIFFICULTY OPERATING A MOTOR VEHICLE FOR LONG DISTANCES (GREATER THAN 100 MILES) BECAUSE YOU BECOME SLEEPY: YES, A LITTLE
HOW LIKELY ARE YOU TO NOD OFF OR FALL ASLEEP WHEN YOU ARE A PASSENGER IN A CAR FOR AN HOUR WITHOUT A BREAK: HIGH CHANCE OF DOZING
ARE YOU BOTHERED BY WAKING UP TOO EARLY AND NOT BEING ABLE TO GET BACK TO SLEEP: YES
HOW LIKELY ARE YOU TO NOD OFF OR FALL ASLEEP WHEN YOU ARE A PASSENGER IN A CAR FOR AN HOUR WITHOUT A BREAK: HIGH CHANCE OF DOZING
DO YOU WORK SHIFTS: NO
HAS YOUR MOOD BEEN AFFECTED BECAUSE YOU ARE SLEEPY OR TIRED: NO
HOW LIKELY ARE YOU TO NOD OFF OR FALL ASLEEP WHILE LYING DOWN TO REST IN THE AFTERNOON WHEN CIRCUMSTANCES PERMIT: HIGH CHANCE OF DOZING
SELECT ANY OF THE FOLLOWING BEHAVIORS OBSERVED WHILE PATIENT ASLEEP: LOUD SNORING;TWITCHING OF LEGS OR FEET;SLEEP TALKING
SELECT ANY OF THE FOLLOWING BEHAVIORS OBSERVED WHILE YOU ARE ASLEEP: TWITCHING OF LEGS OR FEET
DO YOU HAVE DIFFICULTY BEING AS ACTIVE AS YOU WANT TO BE IN THE MORNING BECAUSE YOU ARE SLEEPY OR TIRED: YES, LITTLE
HOW LIKELY ARE YOU TO NOD OFF OR FALL ASLEEP WHILE WATCHING TV: SLIGHT CHANCE OF DOZING
HOW LIKELY ARE YOU TO NOD OFF OR FALL ASLEEP WHILE SITTING AND TALKING TO SOMEONE: HIGH CHANCE OF DOZING
DO YOU HAVE PROBLEMS WITH FREQUENT AWAKENINGS AT NIGHT: YES
DO YOU TAKE NAPS: NO
DO YOU HAVE DIFFICULTY WATCHING A MOVIE OR VIDEO BECAUSE YOU BECOME SLEEPY OR TIRED: NO
HOW LIKELY ARE YOU TO NOD OFF OR FALL ASLEEP WHILE SITTING QUIETLY AFTER LUNCH WITHOUT ALCOHOL: HIGH CHANCE OF DOZING
AVERAGE NUMBER OF SLEEP HOURS: 7
HOW LIKELY ARE YOU TO NOD OFF OR FALL ASLEEP WHILE SITTING QUIETLY AFTER LUNCH WITHOUT ALCOHOL: HIGH CHANCE OF DOZING
DO YOU HAVE DIFFICULTY OPERATING A MOTOR VEHICLE FOR SHORT DISTANCES (LESS THAN 100 MILES) BECAUSE YOU BECOME SLEEPY: YES, A LITTLE
SELECT ANY OF THE FOLLOWING BEHAVIORS OBSERVED WHILE YOU ARE ASLEEP: LOUD SNORING
HOW LIKELY ARE YOU TO NOD OFF OR FALL ASLEEP WHILE SITTING AND READING: HIGH CHANCE OF DOZING
ARE YOU BOTHERED BY WAKING UP TOO EARLY AND NOT BEING ABLE TO GET BACK TO SLEEP: YES
HOW LIKELY ARE YOU TO NOD OFF OR FALL ASLEEP WHILE LYING DOWN TO REST IN THE AFTERNOON WHEN CIRCUMSTANCES PERMIT: HIGH CHANCE OF DOZING
DO YOU GENERALLY HAVE DIFFICULTY REMEMBERING THINGS BECAUSE YOU ARE SLEEPY OR TIRED: YES, MODERATE
HOW LIKELY ARE YOU TO NOD OFF OR FALL ASLEEP WHILE SITTING AND READING: HIGH CHANCE OF DOZING
HOW LIKELY ARE YOU TO NOD OFF OR FALL ASLEEP IN A CAR, WHILE STOPPED FOR A FEW MINUTES IN TRAFFIC: HIGH CHANCE OF DOZING
HAS YOUR RELATIONSHIP WITH FAMILY, FRIENDS OR WORK COLLEAGUES BEEN AFFECTED BECAUSE YOU ARE SLEEPY OR TIRED: YES, A LITTLE
DO YOU HAVE DIFFICULTY CONCENTRATING ON THE THINGS YOU DO BECAUSE YOU ARE SLEEPY OR TIRED: YES, MODERATE
NUMBER OF TIMES YOU WAKE PER NIGHT: 3
DO YOU GET TOO LITTLE SLEEP AT NIGHT: YES
HOW LIKELY ARE YOU TO NOD OFF OR FALL ASLEEP IN A CAR, WHILE STOPPED FOR A FEW MINUTES IN TRAFFIC: HIGH CHANCE OF DOZING
DO YOU GET TOO LITTLE SLEEP AT NIGHT: YES
HOW LIKELY ARE YOU TO NOD OFF OR FALL ASLEEP WHILE SITTING AND TALKING TO SOMEONE: HIGH CHANCE OF DOZING
ESS TOTAL SCORE: 22
SELECT ANY OF THE FOLLOWING BEHAVIORS OBSERVED WHILE YOU ARE ASLEEP: SLEEP TALKING
DO YOU HAVE DIFFICULTY BEING AS ACTIVE AS YOU WANT TO BE IN THE EVENING BECAUSE YOU ARE SLEEPY OR TIRED: YES, LITTLE
HOW LIKELY ARE YOU TO NOD OFF OR FALL ASLEEP WHILE SITTING INACTIVE IN A PUBLIC PLACE: HIGH CHANCE OF DOZING
FOSQ SCORE: 15.5
DO YOU HAVE DIFFICULTY VISITING YOUR FAMILY OR FRIENDS IN THEIR HOME BECAUSE YOU BECOME SLEEPY OR TIRED: NO
HOW LIKELY ARE YOU TO NOD OFF OR FALL ASLEEP WHILE SITTING INACTIVE IN A PUBLIC PLACE: HIGH CHANCE OF DOZING
HOW LIKELY ARE YOU TO NOD OFF OR FALL ASLEEP WHILE WATCHING TV: SLIGHT CHANCE OF DOZING

## 2025-02-26 ENCOUNTER — OFFICE VISIT (OUTPATIENT)
Age: 85
End: 2025-02-26
Payer: MEDICARE

## 2025-02-26 VITALS
DIASTOLIC BLOOD PRESSURE: 66 MMHG | BODY MASS INDEX: 27.44 KG/M2 | SYSTOLIC BLOOD PRESSURE: 109 MMHG | WEIGHT: 150 LBS | TEMPERATURE: 95.8 F | OXYGEN SATURATION: 99 % | HEART RATE: 75 BPM

## 2025-02-26 DIAGNOSIS — G25.81 RLS (RESTLESS LEGS SYNDROME): ICD-10-CM

## 2025-02-26 DIAGNOSIS — G47.33 OBSTRUCTIVE SLEEP APNEA (ADULT) (PEDIATRIC): Primary | ICD-10-CM

## 2025-02-26 DIAGNOSIS — R09.89 CHEST CONGESTION: Primary | ICD-10-CM

## 2025-02-26 DIAGNOSIS — G47.10 HYPERSOMNIA, UNSPECIFIED: ICD-10-CM

## 2025-02-26 PROCEDURE — 1123F ACP DISCUSS/DSCN MKR DOCD: CPT | Performed by: INTERNAL MEDICINE

## 2025-02-26 PROCEDURE — 3078F DIAST BP <80 MM HG: CPT | Performed by: INTERNAL MEDICINE

## 2025-02-26 PROCEDURE — 99204 OFFICE O/P NEW MOD 45 MIN: CPT | Performed by: INTERNAL MEDICINE

## 2025-02-26 PROCEDURE — 1160F RVW MEDS BY RX/DR IN RCRD: CPT | Performed by: INTERNAL MEDICINE

## 2025-02-26 PROCEDURE — 3074F SYST BP LT 130 MM HG: CPT | Performed by: INTERNAL MEDICINE

## 2025-02-26 PROCEDURE — G8417 CALC BMI ABV UP PARAM F/U: HCPCS | Performed by: INTERNAL MEDICINE

## 2025-02-26 PROCEDURE — 1036F TOBACCO NON-USER: CPT | Performed by: INTERNAL MEDICINE

## 2025-02-26 PROCEDURE — G8427 DOCREV CUR MEDS BY ELIG CLIN: HCPCS | Performed by: INTERNAL MEDICINE

## 2025-02-26 PROCEDURE — 1090F PRES/ABSN URINE INCON ASSESS: CPT | Performed by: INTERNAL MEDICINE

## 2025-02-26 PROCEDURE — 1159F MED LIST DOCD IN RCRD: CPT | Performed by: INTERNAL MEDICINE

## 2025-02-26 PROCEDURE — G8399 PT W/DXA RESULTS DOCUMENT: HCPCS | Performed by: INTERNAL MEDICINE

## 2025-02-26 RX ORDER — HEPARIN SODIUM 200 [USP'U]/100ML
INJECTION, SOLUTION INTRAVENOUS
COMMUNITY
Start: 2024-03-21 | End: 2025-02-26

## 2025-02-26 RX ORDER — CLOPIDOGREL BISULFATE 75 MG/1
75 TABLET ORAL
COMMUNITY
Start: 2024-03-20 | End: 2025-02-26

## 2025-02-26 RX ORDER — MECLIZINE HCL 12.5 MG 12.5 MG/1
TABLET ORAL EVERY 8 HOURS
COMMUNITY
End: 2025-02-26

## 2025-02-26 RX ORDER — BUDESONIDE AND FORMOTEROL FUMARATE DIHYDRATE 160; 4.5 UG/1; UG/1
2 AEROSOL RESPIRATORY (INHALATION)
COMMUNITY

## 2025-02-26 RX ORDER — FERROUS SULFATE 325(65) MG
325 TABLET, DELAYED RELEASE (ENTERIC COATED) ORAL 2 TIMES DAILY WITH MEALS
COMMUNITY
Start: 2024-10-16 | End: 2025-02-26

## 2025-02-26 RX ORDER — ALBUTEROL SULFATE 90 UG/1
2 INHALANT RESPIRATORY (INHALATION) 4 TIMES DAILY PRN
Qty: 54 G | Refills: 1 | Status: SHIPPED | OUTPATIENT
Start: 2025-02-26

## 2025-02-26 RX ORDER — BENAZEPRIL HYDROCHLORIDE 10 MG/1
10 TABLET ORAL
COMMUNITY
Start: 2024-03-20 | End: 2025-02-26

## 2025-02-26 RX ORDER — FAMOTIDINE 10 MG/ML
INJECTION, SOLUTION INTRAVENOUS
COMMUNITY
Start: 2024-03-21 | End: 2025-02-26

## 2025-02-26 NOTE — PATIENT INSTRUCTIONS
[address]  [place] [address]  Tel.  [phone]  Fax. [phone] [address]  [place] [address]  Tel.  [phone]  Fax. [phone] [address] [place] [address]  Tel.  [phone]  Fax. [phone]     Sleep Apnea: After Your Visit  Your Care Instructions  Sleep apnea occurs when you frequently stop breathing for 10 seconds or longer during sleep. It can be mild to severe, based on the number of times per hour that you stop breathing or have slowed breathing. Blocked or narrowed airways in your nose, mouth, or throat can cause sleep apnea. Your airway can become blocked when your throat muscles and tongue relax during sleep.  Sleep apnea is common, occurring in 1 out of 20 individuals.  Individuals having any of the following characteristics should be evaluated and treated right away due to high risk and detrimental consequences from untreated sleep apnea:  Obesity  Congestive Heart failure  Atrial Fibrillation  Uncontrolled Hypertension  Type II Diabetes  Night-time Arrhythmias  Stroke  Pulmonary Hypertension  High-risk Driving Populations (pilots, truck drivers, etc.)  Patients Considering Weight-loss Surgery    How do you know you have sleep apnea?  You probably have sleep apnea if you answer 'yes' to 3 or more of the following questions:  S - Have you been told that you Snore?   T - Are you often Tired during the day?  O - Has anyone Observed you stop breathing while sleeping?  P- Do you have (or are being treated for) high blood Pressure?    B - Are you obese (Body Mass Index > 35)?  A - Is your Age 50 years old or older?  N - Is your Neck size greater than 16 inches?  G - Are you male Gender?  A sleep physician can prescribe a breathing device that prevents tissues in the throat from blocking your airway. Or your doctor may recommend using a dental device (oral breathing device) to help keep your airway open. In some cases, surgery may

## 2025-02-26 NOTE — PROGRESS NOTES
Chief Complaint   Patient presents with    Sleep Problem     NP, referred by Dr. Lorena Jovel, falling asleep when seated and has pacemaker

## 2025-02-26 NOTE — PROGRESS NOTES
[address]  [place] [address]  Tel.  [phone]  Fax. [phone] [address]  [place] [address]  Tel.  [phone]  Fax. [phone] [address] [place] [address]  Tel.  [phone]  Fax. [phone]         Subjective:      Shea Pickens is an 84 y.o. female referred for evaluation for a sleep disorder. She complains of excessive daytime sleepiness associated with falls asleep playing majong in the evenings (around 8 pm). She also thinks she may doze off for a second sometimes when driving.  Symptoms began a couple ofyears ago, gradually worsening since that time. She usually can fall asleep in a minutes.  Family or house members note occasional snoring.   Shea Pickens (P) does wake up frequently at night. She (P) is bothered by waking up too early and left unable to get back to sleep. She actually sleeps about 5 (5 0r less) hours at night and wakes up about (P) 3 times during the night. She (P) does not work shifts:  .   Shea indicates she (P) does get too little sleep at night. Her bedtime is 2300 (10-12pm). She awakens at 0500. She does take naps. She takes 7 naps a week lasting 10 to 15, Minute(s). She has the following observed behaviors: (P) Loud snoring, Twitching of legs or feet, Sleep talking;  .  Other remarks:          2/23/2025     9:30 AM   Sleep Medicine   Sitting and reading 3   Watching TV 1   Sitting, inactive in a public place (e.g. a theatre or a meeting) 3   As a passenger in a car for an hour without a break 3   Lying down to rest in the afternoon when circumstances permit 3   Sitting and talking to someone 3   Sitting quietly after a lunch without alcohol 3   In a car, while stopped for a few minutes in traffic 3   [place] Sleepiness Score 22   Neck (Inches) 12.5    which reflect severe daytime drowsiness.    Allergies   Allergen Reactions    Bee Venom Swelling    Prunus Persica Itching    Strawberry

## 2025-03-07 ENCOUNTER — TELEPHONE (OUTPATIENT)
Age: 85
End: 2025-03-07

## 2025-03-07 DIAGNOSIS — Z91.041 CONTRAST MEDIA ALLERGY: Primary | ICD-10-CM

## 2025-03-07 RX ORDER — PREDNISONE 50 MG/1
TABLET ORAL
Qty: 3 TABLET | Refills: 0 | Status: SHIPPED | OUTPATIENT
Start: 2025-03-07

## 2025-03-07 NOTE — TELEPHONE ENCOUNTER
Patient called back to let Sathya know that she doesn't stop taking her Prednisone until 3/18/2025.

## 2025-03-07 NOTE — TELEPHONE ENCOUNTER
Patient is having her CTA Head/Neck done on 3/13/2025. Patient is calling to obtain her contrast premedication.    Mrs. Pickens states that she is currently taking Prednisone and an antibiotic for her Chronic Bronchitis.

## 2025-03-07 NOTE — TELEPHONE ENCOUNTER
New order for premedication for contrast allergy escribed to [facility] pharmacy listed.  Spoke to patient and informed her of new orders.  Went over instructions with patient.  Reminded patient to have pharmacist write time of medications down for her.  Patient stated understanding.

## 2025-03-11 ENCOUNTER — TELEPHONE (OUTPATIENT)
Age: 85
End: 2025-03-11

## 2025-03-11 NOTE — TELEPHONE ENCOUNTER
Kathrin from [facility] CT Department called about patient's contrast allergy.  She requests a callback to verify that the patient was placed on the 13hr contrast allergy protocol.     Callback number: [phone]

## 2025-03-11 NOTE — TELEPHONE ENCOUNTER
Spoke to Rosemary at CT and informed her patient pre medication protocol has been escribed to pharmacy and patient is aware.

## 2025-03-11 NOTE — TELEPHONE ENCOUNTER
Message left for patient per provider, do not take both doses of prednisone. Can continue her prednisone as ordered for URI.  Call office for questions.

## 2025-03-11 NOTE — TELEPHONE ENCOUNTER
Patient called in today regarding her Prednisone.   Patient has a follow up today with the provider that originally put her on Prednisone.     Patient also wanted to make this office aware that she had chest pain this weekend and went to the ER.

## 2025-03-12 ENCOUNTER — TELEPHONE (OUTPATIENT)
Age: 85
End: 2025-03-12

## 2025-03-12 NOTE — TELEPHONE ENCOUNTER
Patient called asking for clarification regarding the Prednisone.     Patient wants to know if she should take the dose prescribed by Dr. Gardner or the one prescribed by the Pulmonologist.     Patient's CTA is scheduled on 3/13/2025

## 2025-03-12 NOTE — TELEPHONE ENCOUNTER
Message left again for patient to only take prednisone dose prescribed by her Pulmonologist.  Do not take pre medication dose of prednisone.

## 2025-03-13 ENCOUNTER — HOSPITAL ENCOUNTER (OUTPATIENT)
Facility: HOSPITAL | Age: 85
Discharge: HOME | End: 2025-03-16
Attending: PSYCHIATRY & NEUROLOGY
Payer: MEDICARE

## 2025-03-13 DIAGNOSIS — I67.1 CEREBRAL ANEURYSM: ICD-10-CM

## 2025-03-13 PROCEDURE — 70496 CT ANGIOGRAPHY HEAD: CPT

## 2025-03-13 PROCEDURE — 6360000004 HC RX CONTRAST MEDICATION: Performed by: RADIOLOGY

## 2025-03-13 RX ORDER — IOPAMIDOL 755 MG/ML
100 INJECTION, SOLUTION INTRAVASCULAR
Status: COMPLETED | OUTPATIENT
Start: 2025-03-13 | End: 2025-03-13

## 2025-03-13 RX ADMIN — IOPAMIDOL 100 ML: 755 INJECTION, SOLUTION INTRAVENOUS at 11:29

## 2025-03-17 ENCOUNTER — PATIENT MESSAGE (OUTPATIENT)
Dept: PRIMARY CARE CLINIC | Facility: CLINIC | Age: 85
End: 2025-03-17

## 2025-03-17 DIAGNOSIS — I65.23 BILATERAL CAROTID ARTERY STENOSIS: ICD-10-CM

## 2025-03-17 RX ORDER — ATORVASTATIN CALCIUM 10 MG/1
10 TABLET, FILM COATED ORAL DAILY
Qty: 30 TABLET | Refills: 2 | Status: SHIPPED | OUTPATIENT
Start: 2025-03-17

## 2025-03-19 ENCOUNTER — TELEPHONE (OUTPATIENT)
Age: 85
End: 2025-03-19

## 2025-03-20 ENCOUNTER — RESULTS FOLLOW-UP (OUTPATIENT)
Facility: HOSPITAL | Age: 85
End: 2025-03-20

## 2025-03-20 NOTE — TELEPHONE ENCOUNTER
----- Message from Dr. Donald Gardner DO sent at 3/19/2025  2:59 PM EDT -----  Please inform patient. Stent looks great. Aneurysm is stable  We can do Carotid UA and MRA head in 1 year    Thanks  TD  ----- Message -----  From: Ronaldo, [facility] Incoming Orders Results To Radiant  Sent: 3/17/2025   7:35 PM EDT  To: Donald Gardner DO

## 2025-03-20 NOTE — TELEPHONE ENCOUNTER
Spoke to patient and informed her of imaging results per provider message.  Patient requested an appointment with provider. Patient would like to see images and speak to provider.  Informed patient we would schedule her with provider.  Patient stated understanding.

## 2025-03-26 DIAGNOSIS — L81.9 DISCOLORATION OF SKIN OF FOOT: Primary | ICD-10-CM

## 2025-04-08 ENCOUNTER — OFFICE VISIT (OUTPATIENT)
Age: 85
End: 2025-04-08
Payer: MEDICARE

## 2025-04-08 VITALS
DIASTOLIC BLOOD PRESSURE: 62 MMHG | OXYGEN SATURATION: 100 % | HEART RATE: 60 BPM | BODY MASS INDEX: 26.22 KG/M2 | HEIGHT: 63 IN | TEMPERATURE: 97.8 F | WEIGHT: 148 LBS | SYSTOLIC BLOOD PRESSURE: 110 MMHG

## 2025-04-08 DIAGNOSIS — I67.1 CEREBRAL ANEURYSM: Primary | ICD-10-CM

## 2025-04-08 PROCEDURE — 1036F TOBACCO NON-USER: CPT | Performed by: PSYCHIATRY & NEUROLOGY

## 2025-04-08 PROCEDURE — 99213 OFFICE O/P EST LOW 20 MIN: CPT | Performed by: PSYCHIATRY & NEUROLOGY

## 2025-04-08 PROCEDURE — 3074F SYST BP LT 130 MM HG: CPT | Performed by: PSYCHIATRY & NEUROLOGY

## 2025-04-08 PROCEDURE — G8427 DOCREV CUR MEDS BY ELIG CLIN: HCPCS | Performed by: PSYCHIATRY & NEUROLOGY

## 2025-04-08 PROCEDURE — 3078F DIAST BP <80 MM HG: CPT | Performed by: PSYCHIATRY & NEUROLOGY

## 2025-04-08 PROCEDURE — 1090F PRES/ABSN URINE INCON ASSESS: CPT | Performed by: PSYCHIATRY & NEUROLOGY

## 2025-04-08 PROCEDURE — 1159F MED LIST DOCD IN RCRD: CPT | Performed by: PSYCHIATRY & NEUROLOGY

## 2025-04-08 PROCEDURE — 1123F ACP DISCUSS/DSCN MKR DOCD: CPT | Performed by: PSYCHIATRY & NEUROLOGY

## 2025-04-08 PROCEDURE — G8417 CALC BMI ABV UP PARAM F/U: HCPCS | Performed by: PSYCHIATRY & NEUROLOGY

## 2025-04-08 PROCEDURE — G8399 PT W/DXA RESULTS DOCUMENT: HCPCS | Performed by: PSYCHIATRY & NEUROLOGY

## 2025-04-08 NOTE — PROGRESS NOTES
Annual follow up for Cerebral aneurysm and imaging review.  Spouse at visit.  Patient reports no headaches, dizziness, numbness or tingling, blurred or double vision.  Does report episodes of swelling in lower legs and drowsiness. Has appointments for testing.

## 2025-04-08 NOTE — PROGRESS NOTES
Established Patient Visit         CONSULT INFORMATION:  Date of Service: 2025  Consultation Requested by: hospital    PATIENT IDENTIFICATION:  Patient Name: Shea Pickens  : 1940      CC: aneurysm    HISTORY OF PRESENT ILLNESS:  84 y.o. RH White (non-) [1]female consulted for aneurysm. Pt had an episode that sounds most c/w TGA. She had a stroke w/u MRI was negative for stroke or mass. 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 She is on a low dose statin. No blood thinners. No fam hx of SAH. No hx of sz.     Seen in f/u after DSA. She did have an allergic reaction (hives) to the contrast a day after. This has improved. She had an episode of chest pain and has a stress test scheduled. Case discussed in conference with rec's for right carotid stenting then aneurysm embolization at a later date.    Interim: Last seen almost 1 year ago. Has had pacemaker placed. Was seen by [facility] for tremor, not PD but still interfering with her life.        Past Medical History:   Diagnosis Date    Aneurysm 10/29/2023    Arthritis     Asthma     during childhood/seasonal    Cancer (HCC)     skin    Carotid artery stenosis     GERD (gastroesophageal reflux disease)     Hyperlipidemia     Hypertension     Low back pain     Lumbosacral disc disease     Memory disorder 10/29/2023    Neuropathy     Transient global amnesia 10/29/2023    Umbilical hernia        Past Surgical History:   Procedure Laterality Date    BACK SURGERY  2016    CARDIAC CATHETERIZATION  2023    HEART ANGIOGRAM    CARDIAC PACEMAKER PLACEMENT  23    CARPAL TUNNEL RELEASE Left 2018    CATARACT REMOVAL Bilateral     COLPORRHAPHY  2005    EYE SURGERY Right 2017    CATARACT    HYSTERECTOMY (CERVIX STATUS UNKNOWN)  1976    IR CAROTID STENT W PROTECTION Right 2024    JOINT REPLACEMENT Right 2018    KNEE    JOINT REPLACEMENT Left 2023    KNEE    LUMBAR LAMINECTOMY  2014    PACEMAKER PLACEMENT Left

## 2025-04-08 NOTE — PATIENT INSTRUCTIONS
Follow up in March 2026 after imaging is completed.  See attached paperwork to schedule imaging.  Referral to Cardiology.

## 2025-04-14 ENCOUNTER — TELEPHONE (OUTPATIENT)
Age: 85
End: 2025-04-14

## 2025-04-14 NOTE — TELEPHONE ENCOUNTER
Needs a new patient appt cuca/Gema per Dr TAVO Jovel rfd/ Dx: Cerebral aneurysm, former [facility] pt. L/m on C vm requesting a c/b to [facility] appt.

## 2025-05-15 ENCOUNTER — PATIENT MESSAGE (OUTPATIENT)
Dept: PRIMARY CARE CLINIC | Facility: CLINIC | Age: 85
End: 2025-05-15

## 2025-05-15 DIAGNOSIS — R25.1 TREMOR OF BOTH HANDS: ICD-10-CM

## 2025-05-15 DIAGNOSIS — I10 PRIMARY HYPERTENSION: ICD-10-CM

## 2025-05-15 RX ORDER — PROPRANOLOL HYDROCHLORIDE 10 MG/1
10 TABLET ORAL 2 TIMES DAILY
Qty: 180 TABLET | Refills: 0 | Status: SHIPPED | OUTPATIENT
Start: 2025-05-15 | End: 2025-08-13

## 2025-05-22 DIAGNOSIS — G25.81 RESTLESS LEG SYNDROME: ICD-10-CM

## 2025-05-23 RX ORDER — PRAMIPEXOLE DIHYDROCHLORIDE 1 MG/1
1 TABLET ORAL 3 TIMES DAILY
Qty: 90 TABLET | Refills: 0 | Status: SHIPPED | OUTPATIENT
Start: 2025-05-23

## 2025-05-23 NOTE — TELEPHONE ENCOUNTER
PCP: Lorena Jovel MD    Last Visit 2/17/2025   Future Appointments   Date Time Provider Department Center   6/12/2025  2:00 PM Alvino Ribeiro MD [facility]       Requested Prescriptions     Pending Prescriptions Disp Refills    pramipexole (MIRAPEX) 1 MG tablet 90 tablet 0     Sig: Take 1 tablet by mouth 3 times daily         Other Comments: Last Refill

## 2025-06-12 ENCOUNTER — RESULTS FOLLOW-UP (OUTPATIENT)
Dept: PRIMARY CARE CLINIC | Facility: CLINIC | Age: 85
End: 2025-06-12

## 2025-06-12 ENCOUNTER — OFFICE VISIT (OUTPATIENT)
Age: 85
End: 2025-06-12
Payer: MEDICARE

## 2025-06-12 VITALS
WEIGHT: 154 LBS | BODY MASS INDEX: 27.29 KG/M2 | SYSTOLIC BLOOD PRESSURE: 162 MMHG | DIASTOLIC BLOOD PRESSURE: 98 MMHG | OXYGEN SATURATION: 89 % | HEART RATE: 89 BPM | HEIGHT: 63 IN

## 2025-06-12 DIAGNOSIS — G45.4 TRANSIENT GLOBAL AMNESIA: ICD-10-CM

## 2025-06-12 DIAGNOSIS — E11.9 DIABETES MELLITUS TYPE 2, DIET-CONTROLLED (HCC): ICD-10-CM

## 2025-06-12 DIAGNOSIS — G25.81 RESTLESS LEGS SYNDROME (RLS): ICD-10-CM

## 2025-06-12 DIAGNOSIS — I67.1 CEREBRAL ANEURYSM: ICD-10-CM

## 2025-06-12 DIAGNOSIS — I49.5 SSS (SICK SINUS SYNDROME) (HCC): Primary | ICD-10-CM

## 2025-06-12 DIAGNOSIS — Z82.49 FAMILY HISTORY OF HEART ATTACK: ICD-10-CM

## 2025-06-12 DIAGNOSIS — I45.2 RBBB (RIGHT BUNDLE BRANCH BLOCK WITH LEFT ANTERIOR FASCICULAR BLOCK): ICD-10-CM

## 2025-06-12 DIAGNOSIS — I10 ESSENTIAL HYPERTENSION, BENIGN: ICD-10-CM

## 2025-06-12 DIAGNOSIS — I65.23 BILATERAL CAROTID ARTERY STENOSIS: ICD-10-CM

## 2025-06-12 DIAGNOSIS — R07.9 CHEST PAIN, UNSPECIFIED TYPE: ICD-10-CM

## 2025-06-12 DIAGNOSIS — R01.1 MURMUR, CARDIAC: ICD-10-CM

## 2025-06-12 PROCEDURE — 93005 ELECTROCARDIOGRAM TRACING: CPT | Performed by: SPECIALIST

## 2025-06-12 PROCEDURE — 99214 OFFICE O/P EST MOD 30 MIN: CPT | Performed by: SPECIALIST

## 2025-06-12 RX ORDER — FLUTICASONE FUROATE, UMECLIDINIUM BROMIDE AND VILANTEROL TRIFENATATE 100; 62.5; 25 UG/1; UG/1; UG/1
1 POWDER RESPIRATORY (INHALATION) 2 TIMES DAILY
COMMUNITY
Start: 2025-03-11

## 2025-06-12 ASSESSMENT — PATIENT HEALTH QUESTIONNAIRE - PHQ9
SUM OF ALL RESPONSES TO PHQ QUESTIONS 1-9: 0
SUM OF ALL RESPONSES TO PHQ QUESTIONS 1-9: 0
1. LITTLE INTEREST OR PLEASURE IN DOING THINGS: NOT AT ALL
2. FEELING DOWN, DEPRESSED OR HOPELESS: NOT AT ALL
SUM OF ALL RESPONSES TO PHQ QUESTIONS 1-9: 0
SUM OF ALL RESPONSES TO PHQ QUESTIONS 1-9: 0

## 2025-06-12 ASSESSMENT — ENCOUNTER SYMPTOMS
CONSTIPATION: 0
DIARRHEA: 0
TROUBLE SWALLOWING: 0
VOMITING: 0
WHEEZING: 0
NAUSEA: 0
ABDOMINAL PAIN: 0
BLOOD IN STOOL: 0
BACK PAIN: 1
VOICE CHANGE: 0
EYE PAIN: 0
SHORTNESS OF BREATH: 1
COUGH: 0

## 2025-06-12 NOTE — PROGRESS NOTES
Shea Pickens     1940       Alvino Ribeiro MD, [facility]  Date of Visit-6/12/2025   PCP is Lorena Jovel MD   [facility] [place]  [facility]  HPI:  Shea Pickens is a 84 y.o. female   New patient referral from Dr. Gardner-she indicated she wished to change providers and is coming to see us today.      Patient is here to see us today with her .  She is interested in switching providers.  She had previously seen a physician in our practice in 2023 and had test including an echo and nuclear stress test in 2024.  These are below.  In the interim she then [facility] and had a pacemaker placed last year.  She had her first visit with her [facility] electrophysiologist in 4 months ago in February and was told the device was doing well.  She had some sharp pains at that time that were related perhaps to the device.  She reports to me today that she generally gets an occasional chest pain that is very rare last seconds sharp and is sometimes on the pacer site but sometimes in the middle.  She is not having prolonged pain and no pain with exertion.  Her chief complaint is that she has redness in her legs and soreness in the legs very painful.  She has had a lot of orthopedic surgeries including a tibial repair.  She is also being evaluated for possible Parkinson's at the movement clinic with Dr. De La Rosa.  The last testing was negative for Parkinson's though there is still a clinical suspicion.  She gets occasional palpitations.  She has an Abbott device and wishes to switch her care to here and her past medical history is as below.      Cardiovascular review of systems:   Chest pain-as above intermittent sharp nonexertional, she went to [facility] the ER last year and had about 20 minutes of pain thought to be reflux.  She has had a Lexiscan in the past.  Shortness of breath with exertion occasionally gets just dependent and

## 2025-06-24 DIAGNOSIS — I65.23 BILATERAL CAROTID ARTERY STENOSIS: ICD-10-CM

## 2025-06-24 RX ORDER — ATORVASTATIN CALCIUM 10 MG/1
10 TABLET, FILM COATED ORAL DAILY
Qty: 90 TABLET | Refills: 0 | Status: SHIPPED | OUTPATIENT
Start: 2025-06-24

## 2025-06-30 ENCOUNTER — TELEPHONE (OUTPATIENT)
Age: 85
End: 2025-06-30

## 2025-07-01 ENCOUNTER — TELEPHONE (OUTPATIENT)
Age: 85
End: 2025-07-01

## 2025-07-01 NOTE — TELEPHONE ENCOUNTER
Pt calling to speak with someone regarding transferring care from S Dr. Bae to Dr. Oconnell. She does have an appt for 10/9/25 but was wondering if there was anything further she had to do. She does have [facility] and can receive messages there as well.    Pt ph# [phone]

## 2025-07-15 ENCOUNTER — TELEPHONE (OUTPATIENT)
Age: 85
End: 2025-07-15

## 2025-07-15 NOTE — TELEPHONE ENCOUNTER
Patient request a call back asking if we received medical records from Dr Bae with [facility]. Patient also was sent a blood pressure pump through [facility], patient is asking how to use the machine.     # [phone] request a call back after 3pm

## 2025-07-16 NOTE — TELEPHONE ENCOUNTER
Called patient, ID verified using two patient identifiers.  Notified Ms. Pickens that I found her records from [facility] that were faxed to us on 7/2/25.  No need to request records.    Patient verbalized understanding and will call back with any other questions or concerns.    Records uploaded to patient's chart and available in media.

## 2025-07-16 NOTE — TELEPHONE ENCOUNTER
Returned patient call, ID verified using two patient identifiers.  Advised Ms. Pickens that we have not received her records from Dr. Bae.  I checked both offices.    Advised patient that I will request the records but it may be helpful if she can get a copy and bring it to her appointment.    Ms. Pickens asked about the home blood pressure monitoring and if we will receive the reports.  Advised her that we do not participate with the home BP monitoring.    Patient verbalized understanding and will call back with any other questions or concerns.    Future Appointments   Date Time Provider Department Center   10/9/2025  9:00 AM WILIAN [facility]   10/9/2025  9:20 AM Ada Oconnell MD [facility]   6/15/2026  1:40 PM Alvino Ribeiro MD [facility]

## 2025-07-29 ENCOUNTER — OFFICE VISIT (OUTPATIENT)
Dept: PRIMARY CARE CLINIC | Facility: CLINIC | Age: 85
End: 2025-07-29

## 2025-07-29 VITALS
RESPIRATION RATE: 16 BRPM | OXYGEN SATURATION: 99 % | HEIGHT: 63 IN | DIASTOLIC BLOOD PRESSURE: 82 MMHG | HEART RATE: 80 BPM | BODY MASS INDEX: 26.68 KG/M2 | WEIGHT: 150.6 LBS | SYSTOLIC BLOOD PRESSURE: 153 MMHG

## 2025-07-29 DIAGNOSIS — G25.81 RESTLESS LEG: ICD-10-CM

## 2025-07-29 DIAGNOSIS — I10 PRIMARY HYPERTENSION: Primary | ICD-10-CM

## 2025-07-29 DIAGNOSIS — K21.9 GASTROESOPHAGEAL REFLUX DISEASE WITHOUT ESOPHAGITIS: ICD-10-CM

## 2025-07-29 DIAGNOSIS — M81.0 AGE-RELATED OSTEOPOROSIS WITHOUT CURRENT PATHOLOGICAL FRACTURE: ICD-10-CM

## 2025-07-29 DIAGNOSIS — G25.0 ESSENTIAL TREMOR: ICD-10-CM

## 2025-07-29 DIAGNOSIS — R26.89 BALANCE PROBLEM: ICD-10-CM

## 2025-07-29 DIAGNOSIS — G47.419 PRIMARY NARCOLEPSY WITHOUT CATAPLEXY: ICD-10-CM

## 2025-07-29 RX ORDER — ONDANSETRON 2 MG/ML
8 INJECTION INTRAMUSCULAR; INTRAVENOUS
OUTPATIENT
Start: 2025-07-30

## 2025-07-29 RX ORDER — HYDROCORTISONE SODIUM SUCCINATE 100 MG/2ML
100 INJECTION INTRAMUSCULAR; INTRAVENOUS
OUTPATIENT
Start: 2025-07-30

## 2025-07-29 RX ORDER — ALBUTEROL SULFATE 90 UG/1
4 INHALANT RESPIRATORY (INHALATION) PRN
OUTPATIENT
Start: 2025-07-30

## 2025-07-29 RX ORDER — ACETAMINOPHEN 325 MG/1
650 TABLET ORAL
OUTPATIENT
Start: 2025-07-30

## 2025-07-29 RX ORDER — PROPRANOLOL HYDROCHLORIDE 60 MG/1
60 CAPSULE, EXTENDED RELEASE ORAL DAILY
Qty: 30 CAPSULE | Refills: 1 | Status: SHIPPED | OUTPATIENT
Start: 2025-07-29

## 2025-07-29 RX ORDER — SODIUM CHLORIDE 9 MG/ML
INJECTION, SOLUTION INTRAVENOUS PRN
OUTPATIENT
Start: 2025-07-30

## 2025-07-29 RX ORDER — ROPINIROLE 1 MG/1
1 TABLET, FILM COATED ORAL NIGHTLY
Qty: 60 TABLET | Refills: 1 | Status: SHIPPED | OUTPATIENT
Start: 2025-07-29

## 2025-07-29 RX ORDER — FAMOTIDINE 10 MG/ML
20 INJECTION, SOLUTION INTRAVENOUS
OUTPATIENT
Start: 2025-07-30

## 2025-07-29 RX ORDER — EPINEPHRINE 1 MG/ML
0.3 INJECTION, SOLUTION, CONCENTRATE INTRAVENOUS PRN
OUTPATIENT
Start: 2025-07-30

## 2025-07-29 RX ORDER — DIPHENHYDRAMINE HYDROCHLORIDE 50 MG/ML
50 INJECTION, SOLUTION INTRAMUSCULAR; INTRAVENOUS
OUTPATIENT
Start: 2025-07-30

## 2025-07-29 NOTE — PROGRESS NOTES
Shea Pickens (:  1940) is a 85 y.o. female,Established patient, here for evaluation of the following chief complaint(s):  Swelling (Wakes her up in the night with stinging and burning sensation), Leg Swelling, and Hip Pain (Reports her hip is going out and she will feel like she will fall)    [facility] SHORT [facility] PRIMARY [facility]  [address] [address]    History of Present Illness  The patient presents for evaluation of blood pressure, tremors, narcolepsy, balance issues, restless legs syndrome, osteoporosis, and gastroesophageal reflux disease.    She has been monitoring her blood pressure since 2025, noting occasional high readings but generally within normal range. She believes her blood pressure may have been elevated today due to the stress of arriving late for her appointment. She takes propranolol twice daily, before meals.    She is under the care of Dr. De La Rosa at [facility] for a movement disorder, with a suspected diagnosis of Parkinson's disease. Despite a negative all-day test at [facility], she continues to experience hand tremors. Her , a retired doctor, does not believe she has Parkinson's disease.    She expresses concern about narcolepsy, as she struggles to stay awake during the day even after a full night's sleep. She has an upcoming sleep study scheduled with a pulmonologist.    She is currently undergoing home physical therapy twice a week to improve her balance and strength. She reports severe walking difficulties and leg weakness, necessitating the use of a cane. She is taking vitamin B12 and magnesium supplements nightly, which aid her sleep.    She experiences restless legs syndrome, which was previously treated with medication that caused tremors. She is considering the need for a diuretic due to leg swelling. She has reduced her medication dosage to one tablet at night, which is ineffective, but found two tablets to be

## 2025-07-29 NOTE — PROGRESS NOTES
Health Decision Maker has been checked with the patient   Primary Decision Maker: MIMI VALENTINE - Spouse - [phone]    Secondary Decision Maker: Elena Kramer - Child - [phone]     AI form    Chief Complaint   Patient presents with    Swelling     Wakes her up in the night with stinging and burning sensation    Leg Swelling    Hip Pain     Reports her hip is going out and she will feel like she will fall       \"Have you been to the ER, urgent care clinic since your last visit?  Hospitalized since your last visit?\"    NO    “Have you seen or consulted any other health care providers outside of [facility] since your last visit?”    NO      Vitals:    07/29/25 1124   BP: (!) 153/82   BP Site: Left Upper Arm   Patient Position: Sitting   BP Cuff Size: Small Adult   Pulse: 80   Resp: 16   SpO2: 99%   Weight: 68.3 kg (150 lb 9.6 oz)   Height: 1.6 m (5' 3\")      Depression: Not at risk (6/12/2025)    PHQ-2     PHQ-2 Score: 0              Click Here for Release of Records Request    Chart reviewed: immunizations are documented.   Immunization History   Administered Date(s) Administered    COVID-19, PFIZER PURPLE top, DILUTE for use, (age 12 y+), 30mcg/0.3mL 02/22/2021, 03/18/2021    Influenza, FLUAD, (age 65 y+), IM, Quadv, 0.5mL 10/03/2023    Influenza, FLUAD, (age 65 y+), IM, Trivalent PF, 0.5mL 10/05/2018    Influenza, FLUZONE High Dose (age 65 y+), IM, Quadv, 0.7mL 10/14/2024    Influenza, FLUZONE High Dose, (age 65 y+), IM, Trivalent PF, 0.5mL 10/29/2013, 09/20/2014, 10/07/2016, 10/13/2017, 09/23/2019, 01/08/2020, 10/21/2021    Pneumococcal Vaccine 09/17/1997, 10/22/2004    Pneumococcal, PCV-13, PREVNAR 13, (age 6w+), IM, 0.5mL 10/13/2017    Pneumococcal, PPSV23, PNEUMOVAX 23, (age 2y+), SC/IM, 0.5mL 04/17/2019    TDaP, ADACEL (age 10y-64y), BOOSTRIX (age 10y+), IM, 0.5mL 04/20/2024    Td vaccine (adult) 09/07/2004    Zoster Live (Zostavax) 01/21/2009    Zoster Recombinant (Shingrix) 10/15/2019,

## 2025-08-11 ENCOUNTER — TELEPHONE (OUTPATIENT)
Dept: PRIMARY CARE CLINIC | Facility: CLINIC | Age: 85
End: 2025-08-11

## 2025-08-13 ENCOUNTER — PATIENT MESSAGE (OUTPATIENT)
Dept: PRIMARY CARE CLINIC | Facility: CLINIC | Age: 85
End: 2025-08-13

## 2025-08-13 DIAGNOSIS — G25.81 RESTLESS LEG: Primary | ICD-10-CM

## 2025-08-13 DIAGNOSIS — G25.81 RESTLESS LEG SYNDROME: ICD-10-CM

## 2025-08-13 DIAGNOSIS — I65.23 BILATERAL CAROTID ARTERY STENOSIS: ICD-10-CM

## 2025-08-13 RX ORDER — ATORVASTATIN CALCIUM 10 MG/1
10 TABLET, FILM COATED ORAL DAILY
Qty: 90 TABLET | Refills: 0 | Status: SHIPPED | OUTPATIENT
Start: 2025-08-13

## 2025-08-13 RX ORDER — PANTOPRAZOLE SODIUM 20 MG/1
20 TABLET, DELAYED RELEASE ORAL
Qty: 30 TABLET | Refills: 5 | Status: SHIPPED | OUTPATIENT
Start: 2025-08-13

## 2025-08-13 RX ORDER — PRAMIPEXOLE DIHYDROCHLORIDE 1 MG/1
1 TABLET ORAL NIGHTLY
Qty: 90 TABLET | Refills: 0 | Status: SHIPPED | OUTPATIENT
Start: 2025-08-13

## 2025-08-21 DIAGNOSIS — R12 HEART BURN: Primary | ICD-10-CM

## 2025-08-21 RX ORDER — SUCRALFATE ORAL 1 G/10ML
1 SUSPENSION ORAL
Qty: 900 ML | Refills: 0 | Status: SHIPPED | OUTPATIENT
Start: 2025-08-21

## (undated) DEVICE — GAUZE SPONGES,12 PLY: Brand: CURITY

## (undated) DEVICE — INFECTION CONTROL KIT SYS

## (undated) DEVICE — GARMENT,MEDLINE,DVT,INT,CALF,MED, GEN2: Brand: MEDLINE

## (undated) DEVICE — PENCIL SMK EVAC 10 FT BLADE ELECTRD ROCKER FOR TELSCP

## (undated) DEVICE — 4-PORT MANIFOLD: Brand: NEPTUNE 2

## (undated) DEVICE — SUTURE STRATAFIX SYMMETRIC PDS + SZ 1 L18IN ABSRB VLT L48MM SXPP1A400

## (undated) DEVICE — STERILE POLYISOPRENE POWDER-FREE SURGICAL GLOVES WITH EMOLLIENT COATING: Brand: PROTEXIS

## (undated) DEVICE — STRETCH BANDAGE ROLL: Brand: DERMACEA

## (undated) DEVICE — SUTURE MCRYL SZ 4-0 L27IN ABSRB UD L24MM PS-1 3/8 CIR PRIM Y935H

## (undated) DEVICE — DEVON™ KNEE AND BODY STRAP 60" X 3" (1.5 M X 7.6 CM): Brand: DEVON

## (undated) DEVICE — CONTAINER,SPECIMEN,3OZ,OR STRL: Brand: MEDLINE

## (undated) DEVICE — HOOK LOCK LATEX FREE ELASTIC BANDAGE 2INX5YD

## (undated) DEVICE — 3M™ STERI-DRAPE™ U-DRAPE 1015: Brand: STERI-DRAPE™

## (undated) DEVICE — ZIMMER® STERILE DISPOSABLE TOURNIQUET CUFF WITH PLC, DUAL PORT, SINGLE BLADDER, 34 IN. (86 CM)

## (undated) DEVICE — CUSTOM CAST PD STR

## (undated) DEVICE — SYRINGE 20ML LL S/C 50

## (undated) DEVICE — SUT ETHLN 4-0 18IN PS2 BLK --

## (undated) DEVICE — REM POLYHESIVE ADULT PATIENT RETURN ELECTRODE: Brand: VALLEYLAB

## (undated) DEVICE — SOLUTION IRRIG 1000ML H2O STRL BLT

## (undated) DEVICE — SCRUB DRY SURG EZ SCRUB BRUSH PREOPERATIVE GRN

## (undated) DEVICE — FCPS BX HOT RJ4 2.2MMX240CM -- RADIAL JAW 4 BX/40

## (undated) DEVICE — INTENT OT USE PROVIDES A STERILE INTERFACE BETWEEN THE OPERATING ROOM SURGICAL LAMPS (NON-STERILE) AND THE SURGEON OR STAFF WORKING IN THE STERILE FIELD.: Brand: ASPEN® ALC PLUS LIGHT HANDLE COVER

## (undated) DEVICE — K WIRE FIX L150MM DIA2MM S STL W/ TRCR PNT

## (undated) DEVICE — Device

## (undated) DEVICE — PENCIL SMK EVAC ALL IN 1 DSGN ENH VISIBILITY IMPROVED AIR

## (undated) DEVICE — 2108 SERIES SAGITTAL BLADE AGGRESSIVE  (25.0 X 1.19 X 85.0MM)

## (undated) DEVICE — TRAY CATH 16F URIN MTR LTX -- CONVERT TO ITEM 363111

## (undated) DEVICE — GLOVE ORANGE PI 8 1/2   MSG9085

## (undated) DEVICE — PAD,ABDOMINAL,5"X9",ST,LF,25/BX: Brand: MEDLINE INDUSTRIES, INC.

## (undated) DEVICE — PIN EXT FIX SCHNZ 3 MM

## (undated) DEVICE — SUTURE VCRL SZ 2 L54IN ABSRB UD L65MM TP-1 1/2 CIR J880T

## (undated) DEVICE — DRESSING PETRO W3XL8IN N ADH OIL EMUL GZ CURAD

## (undated) DEVICE — CARTRIDGE BNE CEM MIX UNIV TWR VAC ROTOR BRK OFF NOZ W/O

## (undated) DEVICE — STRIP,CLOSURE,WOUND,MEDI-STRIP,1/2X4: Brand: MEDLINE

## (undated) DEVICE — PACK SURG PROC KNEE USER GPS

## (undated) DEVICE — SOLUTION IRRIG 3000ML 0.9% SOD CHL FLX CONT 0797208] ICU MEDICAL INC]

## (undated) DEVICE — TUBING SUCT 12FR MAL ALUM SHFT FN CAP VENT UNIV CONN W/ OBT

## (undated) DEVICE — 450 ML BOTTLE OF 0.05% CHLORHEXIDINE GLUCONATE IN 99.95% STERILE WATER FOR IRRIGATION, USP AND APPLICATOR.: Brand: IRRISEPT ANTIMICROBIAL WOUND LAVAGE

## (undated) DEVICE — HANDPIECE SET WITH BONE CLEANING TIP AND SUCTION TUBE: Brand: INTERPULSE

## (undated) DEVICE — PADDING CAST W6INXL4YD NONSTERILE COT RAYON MICROPLEATED

## (undated) DEVICE — SOLUTION IRRIG 3000ML 0.9% SOD CHL USP UROMATIC PLAS CONT

## (undated) DEVICE — SPONGE GZ W4XL4IN COT 12 PLY TYP VII WVN C FLD DSGN STERILE

## (undated) DEVICE — BLADE SAW W083XL354IN THK0047IN CUT THK0047IN SAG FLR

## (undated) DEVICE — HOOK LOCK LATEX FREE ELASTIC BANDAGE D/L 6INX10YD

## (undated) DEVICE — SYRINGE MED 20ML STD CLR PLAS LUERLOCK TIP N CTRL DISP

## (undated) DEVICE — DISPOSABLE TOURNIQUET CUFF SINGLE BLADDER, DUAL PORT AND QUICK CONNECT CONNECTOR: Brand: COLOR CUFF

## (undated) DEVICE — SUTURE VCRL 0 L27IN ABSRB UD CT L40MM 1/2 CIR TAPERPOINT J280H

## (undated) DEVICE — (D)PREP SKN CHLRAPRP APPL 26ML -- CONVERT TO ITEM 371833

## (undated) DEVICE — TOTAL JOINT - SMH: Brand: MEDLINE INDUSTRIES, INC.

## (undated) DEVICE — HYPODERMIC SAFETY NEEDLE: Brand: MAGELLAN

## (undated) DEVICE — BLADE SAW W051XL276IN THK005IN CUT THK005IN REPL SAG FLR

## (undated) DEVICE — SYR 10ML LUER LOK 1/5ML GRAD --

## (undated) DEVICE — SOLUTION SURG PREP 26 CC PURPREP

## (undated) DEVICE — PADDING CST 6IN STERILE --

## (undated) DEVICE — STRYKER PERFORMANCE SERIES SAGITTAL BLADE: Brand: STRYKER PERFORMANCE SERIES

## (undated) DEVICE — NEEDLE HYPO 22GA L1.5IN BLK S STL HUB POLYPR SHLD REG BVL

## (undated) DEVICE — STERILE POLYISOPRENE POWDER-FREE SURGICAL GLOVES: Brand: PROTEXIS

## (undated) DEVICE — SYR LR LCK 1ML GRAD NSAF 30ML --

## (undated) DEVICE — TIBURON HAND DRAPE: Brand: CONVERTORS

## (undated) DEVICE — GOWN,SIRUS,NONRNF,SETINSLV,2XL,18/CS: Brand: MEDLINE

## (undated) DEVICE — BANDAGE COMPR M W6INXL10YD WHT BGE VELC E MTRX HK AND LOOP

## (undated) DEVICE — SUTURE VCRL SZ 2-0 L36IN ABSRB UD L40MM CT 1/2 CIR J957H

## (undated) DEVICE — TRAY PREP DRY W/ PREM GLV 2 APPL 6 SPNG 2 UNDPD 1 OVERWRAP

## (undated) DEVICE — NEEDLE HYPO 25GA L1.5IN BVL ORIENTED ECLIPSE

## (undated) DEVICE — CURITY NON-ADHERENT STRIPS: Brand: CURITY

## (undated) DEVICE — GOWN,NON-REINFORCED,XXL: Brand: MEDLINE

## (undated) DEVICE — T4 HOOD

## (undated) DEVICE — SLIM BODY SKIN STAPLER: Brand: APPOSE ULC

## (undated) DEVICE — SOLUTION IRRIG 1000ML STRL H2O USP PLAS POUR BTL

## (undated) DEVICE — DRAPE,EXTREMITY,89X128,STERILE: Brand: MEDLINE

## (undated) DEVICE — SOLUTION IV 1000ML 0.9% SOD CHL